# Patient Record
Sex: FEMALE | Race: WHITE | NOT HISPANIC OR LATINO | Employment: OTHER | URBAN - NONMETROPOLITAN AREA
[De-identification: names, ages, dates, MRNs, and addresses within clinical notes are randomized per-mention and may not be internally consistent; named-entity substitution may affect disease eponyms.]

---

## 2017-02-15 ENCOUNTER — OFFICE VISIT (OUTPATIENT)
Dept: FAMILY MEDICINE CLINIC | Facility: CLINIC | Age: 82
End: 2017-02-15

## 2017-02-15 VITALS
SYSTOLIC BLOOD PRESSURE: 150 MMHG | BODY MASS INDEX: 28.61 KG/M2 | HEART RATE: 85 BPM | DIASTOLIC BLOOD PRESSURE: 80 MMHG | HEIGHT: 66 IN | WEIGHT: 178 LBS | OXYGEN SATURATION: 98 %

## 2017-02-15 DIAGNOSIS — M25.562 PAIN IN BOTH KNEES, UNSPECIFIED CHRONICITY: ICD-10-CM

## 2017-02-15 DIAGNOSIS — I87.2 VENOUS INSUFFICIENCY OF BOTH LOWER EXTREMITIES: ICD-10-CM

## 2017-02-15 DIAGNOSIS — M25.561 PAIN IN BOTH KNEES, UNSPECIFIED CHRONICITY: ICD-10-CM

## 2017-02-15 DIAGNOSIS — E11.9 CONTROLLED TYPE 2 DIABETES MELLITUS WITHOUT COMPLICATION, WITHOUT LONG-TERM CURRENT USE OF INSULIN (HCC): Primary | ICD-10-CM

## 2017-02-15 DIAGNOSIS — I49.9 IRREGULAR HEART BEAT: ICD-10-CM

## 2017-02-15 PROBLEM — M16.12 PRIMARY OSTEOARTHRITIS OF LEFT HIP: Status: ACTIVE | Noted: 2017-02-15

## 2017-02-15 LAB — HBA1C MFR BLD: 6.1 %

## 2017-02-15 PROCEDURE — 99214 OFFICE O/P EST MOD 30 MIN: CPT | Performed by: FAMILY MEDICINE

## 2017-02-15 PROCEDURE — 93000 ELECTROCARDIOGRAM COMPLETE: CPT | Performed by: FAMILY MEDICINE

## 2017-02-15 PROCEDURE — 83036 HEMOGLOBIN GLYCOSYLATED A1C: CPT | Performed by: FAMILY MEDICINE

## 2017-02-15 NOTE — PROGRESS NOTES
Subjective   Bri Iniguez is a 81 y.o. female.     History of Present Illness   81-year-old female.  Chief complaint fatigue, some shortness of breath.  No specific chest pain.  Has also noted some swelling in her legs.  She recently fell.  She has a history of some arthritic pain in her knees and she thinks this is what happened.    Diabetes.  A1c 6 months ago was 6.6.  She looks as if she has lost some weight but it's been only about 3 pounds in the last 6 months.    Some peripheral edema noted.  She does have a history of significant varicose veins.  The following portions of the patient's history were reviewed and updated as appropriate: allergies, current medications, past family history, past medical history, past social history, past surgical history and problem list.    Review of Systems   Constitutional: Positive for fatigue and unexpected weight change. Negative for chills.   HENT: Negative for congestion.    Respiratory: Positive for shortness of breath and wheezing. Negative for cough.    Cardiovascular: Positive for palpitations and leg swelling. Negative for chest pain.   Gastrointestinal: Negative.        Objective   Physical Exam   Constitutional: She appears well-developed and well-nourished.   HENT:   Right Ear: External ear normal.   Left Ear: External ear normal.   Eyes: EOM are normal. Pupils are equal, round, and reactive to light.   Neck: Neck supple.   Cardiovascular: Normal rate and normal heart sounds.  An irregular rhythm present. Frequent extrasystoles are present.   Mild peripheral edema on both sides, perhaps greater on the right.   Pulmonary/Chest: Effort normal and breath sounds normal.   Vitals reviewed.    ECG 12 Lead  Date/Time: 2/15/2017 9:50 AM  Performed by: ERIK WHALEY  Authorized by: ERIK WHALEY   Comparison: not compared with previous ECG   Rhythm: sinus rhythm  Rate: normal  Conduction: LAFB  ST Segments: ST segments normal  T Waves: T waves normal  QRS axis:  left  Clinical impression: normal ECG  Comments: prob REGULO. AGREE W COMPUTER WHICH CALLS THIS A SINUS ARRHYMIA.            Assessment/Plan   Bri was seen today for med refill.    Diagnoses and all orders for this visit:    Controlled type 2 diabetes mellitus without complication, without long-term current use of insulin  -     POC Glycosylated Hemoglobin (Hb A1C)  -     metFORMIN (GLUCOPHAGE) 500 MG tablet; Take 1 tablet by mouth Daily.    Irregular heart beat  -     ECG 12 Lead    Pain in both knees, unspecified chronicity  -     XR Knee 1 or 2 View Bilateral    Venous insufficiency of both lower extremities      Diabetes.  A1c is now 6.1.  She has lost weight.  Debated whether to reduce or stop her metformin but I decided to keep her on 500 twice a day.  In 6 months we'll look at this again when we do her yearly exam.    Sinus arrhythmia.  I ordered an EKG because she was feeling tired and her pulse was irregular.  I was afraid this might be A. fib.  There is no ischemic changes.    Bilateral knee pain.  Suspect osteoarthritis.  She's come to the point now where she wants something done.  We'll x-ray both knees follow-up in a couple weeks.  We'll then decide if referral or other x-rays are needed.    Edema/venous insufficiency.  She did recently fall and I think this is part of the reason her legs are swollen.  There is nothing to suggest congestive heart failure.  Labs 6 months ago which I have reviewed were all fine regarding renal and liver function.  She is not a large consumer of salt.  No new specific therapy for this today.

## 2017-03-02 ENCOUNTER — OFFICE VISIT (OUTPATIENT)
Dept: FAMILY MEDICINE CLINIC | Facility: CLINIC | Age: 82
End: 2017-03-02

## 2017-03-02 VITALS
SYSTOLIC BLOOD PRESSURE: 120 MMHG | HEART RATE: 82 BPM | DIASTOLIC BLOOD PRESSURE: 60 MMHG | OXYGEN SATURATION: 98 % | HEIGHT: 66 IN | WEIGHT: 178 LBS | BODY MASS INDEX: 28.61 KG/M2

## 2017-03-02 DIAGNOSIS — M25.561 PAIN IN BOTH KNEES, UNSPECIFIED CHRONICITY: Primary | ICD-10-CM

## 2017-03-02 DIAGNOSIS — M25.562 PAIN IN BOTH KNEES, UNSPECIFIED CHRONICITY: Primary | ICD-10-CM

## 2017-03-02 DIAGNOSIS — M17.11 PRIMARY OSTEOARTHRITIS OF RIGHT KNEE: ICD-10-CM

## 2017-03-02 PROCEDURE — 99213 OFFICE O/P EST LOW 20 MIN: CPT | Performed by: FAMILY MEDICINE

## 2017-03-02 NOTE — PROGRESS NOTES
Subjective   Bri Iniguez is a 81 y.o. female.     History of Present Illness   81-year-old female.  Knee pain.  Right worse than the left.  She is here with her daughter.  Bri minimizes her symptoms but her daughter states that it is causing a lot of problems.  When she gets up to walk she is obviously limping.  X-rays were made in she's here to review these and progress our plan  The following portions of the patient's history were reviewed and updated as appropriate: allergies, current medications, past family history, past medical history, past social history, past surgical history and problem list.    Review of Systems   Constitutional: Positive for activity change. Negative for appetite change and fatigue.   Musculoskeletal: Positive for arthralgias, gait problem and joint swelling. Negative for myalgias.       Objective   Physical Exam   Constitutional: She is oriented to person, place, and time. She appears well-nourished.   HENT:   Head: Normocephalic.   Pulmonary/Chest: Effort normal.   Musculoskeletal: She exhibits edema.        Right knee: She exhibits swelling and effusion. She exhibits no deformity.        Legs:  Neurological: She is alert and oriented to person, place, and time.       Assessment/Plan   Bri was seen today for knee pain.    Diagnoses and all orders for this visit:    Pain in both knees, unspecified chronicity  -     MRI Knee Right Without Contrast    Primary osteoarthritis of right knee  -     MRI Knee Right Without Contrast      Knee pain/arthritis.  X-ray confirms degenerative arthritis in the knee.  Right greater than the left.  However, I think there may be much more.  Her right knee is giving out and she has an obvious limp.  I am concerned of meniscal damage.  MRI is ordered.  We spoke in detail about osteoarthritis.  Discussed NSAIDs, prolonged cough.  Will follow-up after the MRI to finalize our plan.

## 2017-03-13 ENCOUNTER — OFFICE VISIT (OUTPATIENT)
Dept: FAMILY MEDICINE CLINIC | Facility: CLINIC | Age: 82
End: 2017-03-13

## 2017-03-13 VITALS
WEIGHT: 176 LBS | SYSTOLIC BLOOD PRESSURE: 124 MMHG | OXYGEN SATURATION: 98 % | HEIGHT: 66 IN | DIASTOLIC BLOOD PRESSURE: 70 MMHG | BODY MASS INDEX: 28.28 KG/M2 | HEART RATE: 101 BPM

## 2017-03-13 DIAGNOSIS — M25.561 PAIN IN BOTH KNEES, UNSPECIFIED CHRONICITY: Primary | ICD-10-CM

## 2017-03-13 DIAGNOSIS — M25.562 PAIN IN BOTH KNEES, UNSPECIFIED CHRONICITY: Primary | ICD-10-CM

## 2017-03-13 DIAGNOSIS — M23.303 MENISCUS, MEDIAL, DERANGEMENT, RIGHT: ICD-10-CM

## 2017-03-13 PROCEDURE — 99213 OFFICE O/P EST LOW 20 MIN: CPT | Performed by: FAMILY MEDICINE

## 2017-03-13 NOTE — PROGRESS NOTES
Subjective   Bri Iniguez is a 81 y.o. female.     History of Present Illness   81-year-old female with knee pain.  Knee is been locking and giving out.  Has had an MRI and is here for follow-up.  The following portions of the patient's history were reviewed and updated as appropriate: allergies, current medications, past family history, past medical history, past social history, past surgical history and problem list.    Review of Systems   Musculoskeletal: Positive for arthralgias (nee pain.) and gait problem. Negative for back pain.       Objective   Physical Exam   Constitutional: She is oriented to person, place, and time. She appears well-nourished.   HENT:   Head: Normocephalic.   Eyes: EOM are normal.   Pulmonary/Chest: Effort normal.   Neurological: She is alert and oriented to person, place, and time.       Assessment/Plan   Bri was seen today for knee pain.    Diagnoses and all orders for this visit:    Pain in both knees, unspecified chronicity    Meniscus, medial, derangement, right  -     Ambulatory Referral to Orthopedic Surgery      MRI reveals significant meniscal damage on the medial aspect of the right knee.  Also degenerative changes in the area of the patella.  I want her to see an orthopedist.  We'll try to arrange this and will give them a call.

## 2017-08-29 DIAGNOSIS — I10 ESSENTIAL HYPERTENSION: ICD-10-CM

## 2017-08-30 RX ORDER — ATORVASTATIN CALCIUM 10 MG/1
10 TABLET, FILM COATED ORAL DAILY
Qty: 90 TABLET | Refills: 3 | Status: SHIPPED | OUTPATIENT
Start: 2017-08-30 | End: 2020-01-22 | Stop reason: ALTCHOICE

## 2017-08-30 RX ORDER — LISINOPRIL AND HYDROCHLOROTHIAZIDE 20; 12.5 MG/1; MG/1
1 TABLET ORAL DAILY
Qty: 90 TABLET | Refills: 3 | Status: SHIPPED | OUTPATIENT
Start: 2017-08-30 | End: 2020-01-22 | Stop reason: ALTCHOICE

## 2017-09-25 ENCOUNTER — OFFICE VISIT (OUTPATIENT)
Dept: ORTHOPEDIC SURGERY | Facility: CLINIC | Age: 82
End: 2017-09-25

## 2017-09-25 VITALS
HEIGHT: 66 IN | BODY MASS INDEX: 26.87 KG/M2 | SYSTOLIC BLOOD PRESSURE: 158 MMHG | HEART RATE: 71 BPM | WEIGHT: 167.2 LBS | DIASTOLIC BLOOD PRESSURE: 80 MMHG

## 2017-09-25 DIAGNOSIS — M17.0 PRIMARY OSTEOARTHRITIS OF BOTH KNEES: Primary | ICD-10-CM

## 2017-09-25 PROCEDURE — 99213 OFFICE O/P EST LOW 20 MIN: CPT | Performed by: ORTHOPAEDIC SURGERY

## 2017-09-25 PROCEDURE — 20610 DRAIN/INJ JOINT/BURSA W/O US: CPT | Performed by: ORTHOPAEDIC SURGERY

## 2017-09-25 NOTE — PROGRESS NOTES
Procedure   Large Joint Arthrocentesis  Date/Time: 9/25/2017 10:58 AM  Consent given by: patient  Site marked: site marked  Timeout: Immediately prior to procedure a time out was called to verify the correct patient, procedure, equipment, support staff and site/side marked as required   Supporting Documentation  Indications: pain   Procedure Details  Location: knee - R knee  Preparation: Patient was prepped and draped in the usual sterile fashion  Needle size: 22 G  Medications administered: 30 mg Hyaluronan 30 MG/2ML  Patient tolerance: patient tolerated the procedure well with no immediate complications

## 2017-09-25 NOTE — PROGRESS NOTES
Procedure   Large Joint Arthrocentesis  Date/Time: 9/25/2017 10:59 AM  Consent given by: patient  Site marked: site marked  Timeout: Immediately prior to procedure a time out was called to verify the correct patient, procedure, equipment, support staff and site/side marked as required   Supporting Documentation  Indications: pain   Procedure Details  Location: knee - L knee  Preparation: Patient was prepped and draped in the usual sterile fashion  Needle size: 22 G  Approach: anterolateral  Medications administered: 30 mg Hyaluronan 30 MG/2ML  Patient tolerance: patient tolerated the procedure well with no immediate complications

## 2017-09-25 NOTE — PROGRESS NOTES
[facility] [place] [facility] [place] [place] [facility] [place]    Subjective     Chief Complaint   Patient presents with   • Left Knee - Follow-up     7 weeks     • Right Knee - Follow-up     7 weeks          HPI    Bri Iniguez is a 81 y.o. female. She presents today for bilateral knee pain.  Pain is continuous, and persistent in both of her knees.  The pain is severe, and sharp in quality.  It is associated with swelling and stiffness.  It worsens with walking.  She would like to try a Visco supplementation injections at this point.  She has tried previous steroid injections with some relief.  She is not interested in surgical management.      Patient Active Problem List   Diagnosis   • Macular degeneration   • Hyperlipidemia   • Essential hypertension   • Deafness   • Diabetes type 2, controlled   • Primary osteoarthritis of left hip   • Venous insufficiency of both lower extremities     Past Medical History:   Diagnosis Date   • Bursitis     trochanteric area   • Colonoscopy refused 2015   • Deafness     unspecified   • Diabetes mellitus    • Essential hypertension    • Hip pain    • Hyperlipidemia     other   • Influenza vaccine administered 2014   • Macular degeneration    • Mammogram declined 2015   • Osteoarthritis of left hip    • Primary osteoarthritis of right knee    • Supraventricular premature beats       Past Surgical History:   Procedure Laterality Date   • CATARACT EXTRACTION     • CHOLECYSTECTOMY  2002   • HERNIA REPAIR  2005   • HYSTERECTOMY  1974   • TONSILLECTOMY        Family History   Problem Relation Age of Onset   • Hypertension Other    • Diabetes Other      type 2   • Stroke Mother    • Diabetes Mother    • Hypertension Mother    • Cancer Father      Social History     Social History   • Marital status:      Spouse name: N/A   • Number of children: N/A   • Years of education: N/A     Occupational History   • Not on file.     Social History Main Topics   • Smoking status: Never Smoker   • Smokeless  tobacco: Never Used   • Alcohol use No   • Drug use: Defer   • Sexual activity: Defer     Other Topics Concern   • Not on file     Social History Narrative      Current Outpatient Prescriptions on File Prior to Visit   Medication Sig Dispense Refill   • aspirin 81 MG chewable tablet Chew 81 mg every morning.     • atorvastatin (LIPITOR) 10 MG tablet Take 1 tablet by mouth Daily. 90 tablet 3   • lisinopril-hydrochlorothiazide (PRINZIDE,ZESTORETIC) 20-12.5 MG per tablet Take 1 tablet by mouth Daily. 90 tablet 3   • metFORMIN (GLUCOPHAGE) 500 MG tablet Take 1 tablet by mouth Daily. 90 tablet 3   • Multiple Vitamins-Minerals (OCUVITE ADULT 50+) capsule Take  by mouth.       No current facility-administered medications on file prior to visit.       No Known Allergies     Review of Systems   Constitutional: Negative for activity change, appetite change, chills, diaphoresis, fatigue, fever and unexpected weight change.   HENT: Negative for congestion, dental problem, drooling, ear discharge, ear pain, facial swelling, hearing loss, mouth sores, nosebleeds, postnasal drip, rhinorrhea, sinus pressure, sneezing, sore throat, tinnitus, trouble swallowing and voice change.    Eyes: Negative for photophobia, pain, discharge, redness, itching and visual disturbance.   Respiratory: Negative for apnea, cough, choking, chest tightness, shortness of breath, wheezing and stridor.    Cardiovascular: Negative for chest pain, palpitations and leg swelling.   Gastrointestinal: Negative for abdominal distention, abdominal pain, anal bleeding, blood in stool, constipation, diarrhea, nausea, rectal pain and vomiting.   Endocrine: Negative for cold intolerance, heat intolerance, polydipsia, polyphagia and polyuria.   Genitourinary: Negative for decreased urine volume, difficulty urinating, dysuria, enuresis, flank pain, frequency, genital sores, hematuria and urgency.   Musculoskeletal: Positive for arthralgias, gait problem and joint  "swelling. Negative for back pain, myalgias, neck pain and neck stiffness.   Skin: Negative for color change, pallor, rash and wound.   Allergic/Immunologic: Negative for environmental allergies, food allergies and immunocompromised state.   Neurological: Negative for dizziness, tremors, seizures, syncope, facial asymmetry, speech difficulty, weakness, light-headedness, numbness and headaches.   Hematological: Negative for adenopathy. Does not bruise/bleed easily.   Psychiatric/Behavioral: Negative for agitation, behavioral problems, confusion, decreased concentration, dysphoric mood, hallucinations, self-injury, sleep disturbance and suicidal ideas. The patient is not nervous/anxious and is not hyperactive.         Objective      Physical Exam  /80  Pulse 71  Ht 66\" (167.6 cm)  Wt 167 lb 3.2 oz (75.8 kg)  BMI 26.99 kg/m2    Body mass index is 26.99 kg/(m^2).    General:   Mental Status:  Alert   Appearance: Cooperative, in no acute distress   Build and Nutrition: Well-nourished and well developed female   Orientation: Alert and oriented to person, place and time   Posture: Normal   Gait: Uses a cane    Integument:   Right knee: no skin lesions, no rash, no ecchymosis   Left knee: no skin lesions, no rash, no ecchymosis    Lower Extremities:   Right Knee:    Tenderness:  Medial and lateral joint line tenderness    Effusion:  1+    Swelling:  None    Crepitus:  Positive    Atrophy:  None    Range of motion:  Extension: 0°       Flexion: 120°  Instability:  No varus laxity, no valgus laxity, negative anterior drawer  Deformities:  None   Left Knee:    Tenderness:  Mild medial joint line tenderness    Effusion:  None    Swelling:  None    Crepitus: Positive    Atrophy: None    Range of motion:  Extension: 0°       Flexion: 120°  Instability:  No varus laxity, no valgus laxity, negative anterior drawer  Deformities:  None      Imaging/Studies  Previous radiographs were reviewed, which showed arthritic changes " in both knees, consistent with tricompartmental arthritis.      Assessment and Plan     Bri was seen today for follow-up and follow-up.    Diagnoses and all orders for this visit:    Primary osteoarthritis of both knees  -     Large Joint Arthrocentesis  -     Large Joint Arthrocentesis        I reviewed my findings with patient today.  Her knees continue to bother her, and she would like to try the Visco supplementation injections.  She is not interested in surgical management.  Please see my procedure note for details.    Return in about 1 week (around 10/2/2017) for Injection.      Medical Decision Making  Management Options : prescription/IM medicine  Data/Risk: independent visualization of imaging, lab tests, or EMG/NCV      Gideon Son MD  09/25/17  11:05 AM

## 2017-10-04 ENCOUNTER — CLINICAL SUPPORT (OUTPATIENT)
Dept: ORTHOPEDIC SURGERY | Facility: CLINIC | Age: 82
End: 2017-10-04

## 2017-10-04 VITALS
DIASTOLIC BLOOD PRESSURE: 89 MMHG | BODY MASS INDEX: 26.81 KG/M2 | SYSTOLIC BLOOD PRESSURE: 170 MMHG | HEART RATE: 71 BPM | WEIGHT: 166.8 LBS | HEIGHT: 66 IN

## 2017-10-04 DIAGNOSIS — M17.0 PRIMARY OSTEOARTHRITIS OF BOTH KNEES: Primary | ICD-10-CM

## 2017-10-04 PROCEDURE — 20610 DRAIN/INJ JOINT/BURSA W/O US: CPT | Performed by: ORTHOPAEDIC SURGERY

## 2017-10-04 NOTE — PROGRESS NOTES
[facility] – [place], [place] Orth[facility] Surgery Clinic Note    Subjective     Chief Complaint   Patient presents with   • Left Knee - Follow-up     1 week   2nd injection     • Right Knee - Follow-up     1 week  2nd injection            EVITA Iniguez is a 81 y.o. female who presents for the 2nd injections into both knee joints.  No significant relief up to this point.      Patient Active Problem List   Diagnosis   • Macular degeneration   • Hyperlipidemia   • Essential hypertension   • Deafness   • Diabetes type 2, controlled   • Primary osteoarthritis of left hip   • Venous insufficiency of both lower extremities     Past Medical History:   Diagnosis Date   • Bursitis     trochanteric area   • Colonoscopy refused 2015   • Deafness     unspecified   • Diabetes mellitus    • Essential hypertension    • Hip pain    • Hyperlipidemia     other   • Influenza vaccine administered 2014   • Macular degeneration    • Mammogram declined 2015   • Osteoarthritis of left hip    • Primary osteoarthritis of right knee    • Supraventricular premature beats       Past Surgical History:   Procedure Laterality Date   • CATARACT EXTRACTION     • CHOLECYSTECTOMY  2002   • HERNIA REPAIR  2005   • HYSTERECTOMY  1974   • TONSILLECTOMY        Family History   Problem Relation Age of Onset   • Hypertension Other    • Diabetes Other      type 2   • Stroke Mother    • Diabetes Mother    • Hypertension Mother    • Cancer Father      Social History     Social History   • Marital status:      Spouse name: N/A   • Number of children: N/A   • Years of education: N/A     Occupational History   • Not on file.     Social History Main Topics   • Smoking status: Never Smoker   • Smokeless tobacco: Never Used   • Alcohol use No   • Drug use: Defer   • Sexual activity: Defer     Other Topics Concern   • Not on file     Social History Narrative      Current Outpatient Prescriptions on File Prior to Visit   Medication Sig Dispense Refill   • aspirin 81 MG  "chewable tablet Chew 81 mg every morning.     • atorvastatin (LIPITOR) 10 MG tablet Take 1 tablet by mouth Daily. 90 tablet 3   • lisinopril-hydrochlorothiazide (PRINZIDE,ZESTORETIC) 20-12.5 MG per tablet Take 1 tablet by mouth Daily. 90 tablet 3   • metFORMIN (GLUCOPHAGE) 500 MG tablet Take 1 tablet by mouth Daily. 90 tablet 3   • Multiple Vitamins-Minerals (OCUVITE ADULT 50+) capsule Take  by mouth.       No current facility-administered medications on file prior to visit.       No Known Allergies     Review of Systems     Objective      Physical Exam  /89  Pulse 71  Ht 66\" (167.6 cm)  Wt 166 lb 12.8 oz (75.7 kg)  BMI 26.92 kg/m2    Body mass index is 26.92 kg/(m^2).    General:   Mental Status:  Alert   Appearance: Cooperative, in no acute distress   Posture: Normal    Assessment and Plan     Bri was seen today for follow-up and follow-up.    Diagnoses and all orders for this visit:    Primary osteoarthritis of both knees  -     Large Joint Arthrocentesis  -     Large Joint Arthrocentesis  -     Hyaluronan (ORTHOVISC) injection 30 mg; Inject 30 mg into the joint Once.  -     Hyaluronan (ORTHOVISC) injection 30 mg; Inject 30 mg into the joint Once.      Administration of injection(s) today.  Please see my procedure note for details.    Return in about 1 week (around 10/11/2017) for Injection.    Gideon Son MD  10/04/17  6:02 PM  "

## 2017-10-04 NOTE — PROGRESS NOTES
Procedure   Large Joint Arthrocentesis  Date/Time: 10/4/2017 1:34 PM  Consent given by: patient  Site marked: site marked  Timeout: Immediately prior to procedure a time out was called to verify the correct patient, procedure, equipment, support staff and site/side marked as required   Supporting Documentation  Indications: pain   Procedure Details  Location: knee - R knee  Preparation: Patient was prepped and draped in the usual sterile fashion  Needle size: 22 G  Approach: anterolateral  Medications administered: 30 mg Hyaluronan 30 MG/2ML  Patient tolerance: patient tolerated the procedure well with no immediate complications    Large Joint Arthrocentesis  Date/Time: 10/4/2017 1:34 PM  Consent given by: patient  Site marked: site marked  Timeout: Immediately prior to procedure a time out was called to verify the correct patient, procedure, equipment, support staff and site/side marked as required   Supporting Documentation  Indications: pain   Procedure Details  Location: knee - L knee  Preparation: Patient was prepped and draped in the usual sterile fashion  Needle size: 22 G  Approach: anterolateral  Medications administered: 30 mg Hyaluronan 30 MG/2ML  Patient tolerance: patient tolerated the procedure well with no immediate complications

## 2017-10-11 ENCOUNTER — CLINICAL SUPPORT (OUTPATIENT)
Dept: ORTHOPEDIC SURGERY | Facility: CLINIC | Age: 82
End: 2017-10-11

## 2017-10-11 DIAGNOSIS — M17.0 PRIMARY OSTEOARTHRITIS OF BOTH KNEES: Primary | ICD-10-CM

## 2017-10-11 PROCEDURE — 20610 DRAIN/INJ JOINT/BURSA W/O US: CPT | Performed by: PHYSICIAN ASSISTANT

## 2017-10-11 NOTE — PROGRESS NOTES
Subjective     Injections (bilateral knees #3 orthovisc)      Bri Iniguez is a 81 y.o. female.     History of Present Illness   Patient presents for third injection of Orthovisc bilateral knees.  She is tolerated previous injections well.  No Known Allergies  Current Outpatient Prescriptions on File Prior to Visit   Medication Sig Dispense Refill   • aspirin 81 MG chewable tablet Chew 81 mg every morning.     • atorvastatin (LIPITOR) 10 MG tablet Take 1 tablet by mouth Daily. 90 tablet 3   • lisinopril-hydrochlorothiazide (PRINZIDE,ZESTORETIC) 20-12.5 MG per tablet Take 1 tablet by mouth Daily. 90 tablet 3   • metFORMIN (GLUCOPHAGE) 500 MG tablet Take 1 tablet by mouth Daily. 90 tablet 3   • Multiple Vitamins-Minerals (OCUVITE ADULT 50+) capsule Take  by mouth.       No current facility-administered medications on file prior to visit.      Social History     Social History   • Marital status:      Spouse name: N/A   • Number of children: N/A   • Years of education: N/A     Occupational History   • Not on file.     Social History Main Topics   • Smoking status: Never Smoker   • Smokeless tobacco: Never Used   • Alcohol use No   • Drug use: Defer   • Sexual activity: Defer     Other Topics Concern   • Not on file     Social History Narrative     Past Surgical History:   Procedure Laterality Date   • CATARACT EXTRACTION     • CHOLECYSTECTOMY  2002   • HERNIA REPAIR  2005   • HYSTERECTOMY  1974   • TONSILLECTOMY       Family History   Problem Relation Age of Onset   • Hypertension Other    • Diabetes Other      type 2   • Stroke Mother    • Diabetes Mother    • Hypertension Mother    • Cancer Father      Past Medical History:   Diagnosis Date   • Bursitis     trochanteric area   • Colonoscopy refused 2015   • Deafness     unspecified   • Diabetes mellitus    • Essential hypertension    • Hip pain    • Hyperlipidemia     other   • Influenza vaccine administered 2014   • Macular degeneration    • Mammogram  declined 2015   • Osteoarthritis of left hip    • Primary osteoarthritis of right knee    • Supraventricular premature beats          Review of Systems    The following portions of the patient's history were reviewed and updated as appropriate: allergies, current medications, past family history, past medical history, past social history, past surgical history and problem list.    Ortho Exam      Medical Decision Making    Assessment and Plan/ Diagnosis/Treatment options:   Bilateral knee arthritis undergoing an Orthovisc series.  Patient is tolerated previous injections well.  Plan is to finish the series today in bilateral knees.  She will return in 6-8 weeks to see how she is progressed and consider repeat steroid injection if needed.    Using sterile technique, the left knee was sterilely prepped with Hibiclens.  Following time out, using a 22 gauge needle the left knee was aspirated and then injected with 2 ml Orthovisc. Approximately 0.5 mm of straw-colored fluid was obtained.  Patient tolerated the procedure well.  No complications.      Using sterile technique, the right knee was sterilely prepped with Hibiclens.  Following time out using a 22 gauge needle, the right knee was aspirated and then injected with 2 ml Orthovisc.  Approximately 0.5 cc of straw-colored fluid was obtained.  Patient tolerated the procedure well. No complications

## 2017-10-11 NOTE — PROGRESS NOTES
Procedure   Large Joint Arthrocentesis  Date/Time: 10/11/2017 1:22 PM  Consent given by: patient  Site marked: site marked  Timeout: Immediately prior to procedure a time out was called to verify the correct patient, procedure, equipment, support staff and site/side marked as required   Supporting Documentation  Indications: pain   Procedure Details  Location: knee - R knee  Preparation: Patient was prepped and draped in the usual sterile fashion  Needle size: 22 G  Approach: anterolateral  Medications administered: 30 mg Hyaluronan 30 MG/2ML  Patient tolerance: patient tolerated the procedure well with no immediate complications    Large Joint Arthrocentesis  Date/Time: 10/11/2017 1:23 PM  Consent given by: patient  Site marked: site marked  Timeout: Immediately prior to procedure a time out was called to verify the correct patient, procedure, equipment, support staff and site/side marked as required   Supporting Documentation  Indications: pain   Procedure Details  Location: knee - L knee  Preparation: Patient was prepped and draped in the usual sterile fashion  Needle size: 22 G  Approach: anterolateral  Medications administered: 30 mg Hyaluronan 30 MG/2ML  Patient tolerance: patient tolerated the procedure well with no immediate complications

## 2017-11-21 ENCOUNTER — OFFICE VISIT (OUTPATIENT)
Dept: ORTHOPEDIC SURGERY | Facility: CLINIC | Age: 82
End: 2017-11-21

## 2017-11-21 DIAGNOSIS — M16.12 ARTHRITIS OF LEFT HIP: Primary | ICD-10-CM

## 2017-11-21 PROCEDURE — 99212 OFFICE O/P EST SF 10 MIN: CPT | Performed by: PHYSICIAN ASSISTANT

## 2017-11-21 NOTE — PROGRESS NOTES
[facility] [place] [facility] Note    Subjective     Follow-up of the Left Hip (4 months- had Fluoro guided injection 07/06/2017)      EVITA Iniguez is a 82 y.o. female here to discuss her returned left hip pain.  She reports this is the same pain she had prior to her fluoroscopy guided intra-articular injection in July 2017.  She has some radiating leg pain associated with the buttock and lateral hip pain.  She has occasional groin pain as well.  She is having pain with walking, pain lying on her back and night pain.  She reports the pain has gradually returned over the past month she has some level of pain constantly.  She is using a cane secondary to the hip pain.  She is having difficulty doing her normal daily activities secondary to the pain.  She rates the pain as severe.  She is ready to schedule hip replacement with Dr. Son.  She denies any fever, chills or constitutional symptoms.     Past Medical History:   Diagnosis Date   • Bursitis     trochanteric area   • Colonoscopy refused 2015   • Deafness     unspecified   • Diabetes mellitus    • Essential hypertension    • Hip pain    • Hyperlipidemia     other   • Influenza vaccine administered 2014   • Macular degeneration    • Mammogram declined 2015   • Osteoarthritis of left hip    • Primary osteoarthritis of right knee    • Supraventricular premature beats       Past Surgical History:   Procedure Laterality Date   • CATARACT EXTRACTION     • CHOLECYSTECTOMY  2002   • HERNIA REPAIR  2005   • HYSTERECTOMY  1974   • TONSILLECTOMY        Family History   Problem Relation Age of Onset   • Hypertension Other    • Diabetes Other      type 2   • Stroke Mother    • Diabetes Mother    • Hypertension Mother    • Cancer Father      Social History     Social History   • Marital status:      Spouse name: N/A   • Number of children: N/A   • Years of education: N/A     Occupational History   • Not on file.     Social History Main Topics   •  Smoking status: Never Smoker   • Smokeless tobacco: Never Used   • Alcohol use No   • Drug use: Defer   • Sexual activity: Defer     Other Topics Concern   • Not on file     Social History Narrative      Current Outpatient Prescriptions on File Prior to Visit   Medication Sig Dispense Refill   • aspirin 81 MG chewable tablet Chew 81 mg every morning.     • atorvastatin (LIPITOR) 10 MG tablet Take 1 tablet by mouth Daily. 90 tablet 3   • lisinopril-hydrochlorothiazide (PRINZIDE,ZESTORETIC) 20-12.5 MG per tablet Take 1 tablet by mouth Daily. 90 tablet 3   • metFORMIN (GLUCOPHAGE) 500 MG tablet Take 1 tablet by mouth Daily. 90 tablet 3   • Multiple Vitamins-Minerals (OCUVITE ADULT 50+) capsule Take  by mouth.       No current facility-administered medications on file prior to visit.       No Known Allergies     The following portions of the patient's history were reviewed and updated as appropriate: allergies, current medications, past family history, past medical history, past social history, past surgical history and problem list.    Review of Systems     Objective      Physical Exam  There were no vitals taken for this visit.    There is no height or weight on file to calculate BMI.    General  Mental Status - alert  General Appearance - cooperative, well groomed, not in acute distress  Orientation - Oriented X3  Build & Nutrition - well developed and well nourished  Posture - normal posture  Gait - using cane     Integumentary  Global Assessment  Examination of related systems reveals - no lymphadenopathy  General Characteristics  Overall examination of the patient's skin reveals - no rashes, no evidence of scars, no suspicious lesions and no bruises.  Color - normal coloration of skin.    Ortho Exam  Left hip exam:    RANGE OF MOTION:   FLEXION CONTRACTURE:  10  FLEXION: 90 degrees   INTERNAL ROTATION: 20 degrees at 90 degrees of flexion   EXTERNAL ROTATION: 40 degrees at 90 degrees of flexion    PAIN WITH HIP  MOTION: yes  PAIN WITH LOGROLL: yes  STINCHFIELD TEST: positive    STRENGTH:  4/5 hip adduction, abduction, flexion. 5/5 strength knee flexion, extension. 5/5 strength ankle dorsiflexion and plantarflexion.     GREATER TROCHANTER BURSAL PAIN:  No    SENSATION TO LIGHT TOUCH:  DEEP PERONEAL/SUPERFICIAL PERONEAL/SURAL/SAPHENOUS/TIBIAL:  intact    STRAIGHT LEG TEST:   negative      Right  hip    RANGE OF MOTION:   FLEXION CONTRACTURE: None   FLEXION: 110 degrees   INTERNAL ROTATION: 20 degrees at 90 degrees of flexion   EXTERNAL ROTATION: 40 degrees at 90 degrees of flexion    PAIN WITH HIP MOTION: no  PAIN WITH LOGROLL: no  STINCHFIELD TEST: negative    STRENGTH:  5/5 hip adduction, abduction, flexion. 5/5 strength knee flexion, extension. 5/5 strength ankle dorsiflexion and plantarflexion.     GREATER TROCHANTER BURSAL PAIN:  No    SENSATION TO LIGHT TOUCH:  DEEP PERONEAL/SUPERFICIAL PERONEAL/SURAL/SAPHENOUS/TIBIAL:  intact    STRAIGHT LEG TEST:   negative      Imaging/Studies  none    Assessment:  1. Arthritis of left hip        Plan:  Left hip arthritis.  I reviewed previous x-rays, clinical findings past and current treatment with the patient.  On exam, she has extreme pain with hip rotation, positive Stinchfield's and decreased range of motion of the hip.  She has been treated in the past with intra-articular steroid injection as well as cane use and ibuprofen.  She has had returned pain over the past month or so which is now keeping her awake at night and affecting her ability to do normal daily activity.  We discussed further treatment options including repeat intra-articular injection or hip replacement.  She would like to proceed with hip replacement however have her return tomorrow to see Dr. Son and get it scheduled.  Ayala Caceres PA-C  11/22/17  2:23 PM

## 2017-11-22 ENCOUNTER — OFFICE VISIT (OUTPATIENT)
Dept: ORTHOPEDIC SURGERY | Facility: CLINIC | Age: 82
End: 2017-11-22

## 2017-11-22 DIAGNOSIS — M16.12 PRIMARY OSTEOARTHRITIS OF LEFT HIP: Primary | ICD-10-CM

## 2017-11-22 DIAGNOSIS — M16.12 ARTHRITIS OF LEFT HIP: ICD-10-CM

## 2017-11-22 PROCEDURE — 99214 OFFICE O/P EST MOD 30 MIN: CPT | Performed by: PHYSICIAN ASSISTANT

## 2017-11-22 RX ORDER — CHLORHEXIDINE GLUCONATE 4 G/100ML
SOLUTION TOPICAL DAILY
Qty: 236 ML | Refills: 0 | Status: SHIPPED | OUTPATIENT
Start: 2017-11-22 | End: 2018-01-24 | Stop reason: HOSPADM

## 2017-11-22 RX ORDER — MELOXICAM 7.5 MG/1
15 TABLET ORAL ONCE
Status: CANCELLED | OUTPATIENT
Start: 2017-11-22 | End: 2017-11-22

## 2017-11-22 RX ORDER — ACETAMINOPHEN 325 MG/1
1000 TABLET ORAL ONCE
Status: CANCELLED | OUTPATIENT
Start: 2017-11-22 | End: 2017-11-22

## 2017-11-22 RX ORDER — PREGABALIN 150 MG/1
150 CAPSULE ORAL ONCE
Status: CANCELLED | OUTPATIENT
Start: 2017-11-22 | End: 2017-11-22

## 2017-11-22 NOTE — PROGRESS NOTES
[facility] [place] [facility] Note    Subjective     Follow-up of the Left Hip and Follow-up (left hip arthritis)      HPI    Bri Iniguez is a 82 y.o. female returning today to discuss scheduling right hip replacement with Dr. Son.  She reports mild improved pain today from yesterday.  Again, she has returned left hip and leg pain for the past month.  She describes the pain as lateral with some groin pain and radiating leg pain.  No radiating pain past the knee.  She describes sharp pain as well as aching pain at rest sometimes.  It is keeping her awake at night and making it difficult for her to do her normal daily activities.  She rates the pain as severe.  She is using a cane secondary to pain.  She has taken ibuprofen.  She has had previous intra-articular joint injection in 2017 which gave her relief for 2-3 months.  She reports her current pain is the exact same pain she had prior to intra-articular hip injection.  She is a diabetic with most recent A1c 6.0.  She takes a baby aspirin daily.  She denies any fever chills or constitutional symptoms    Past Medical History:   Diagnosis Date   • Bursitis     trochanteric area   • Colonoscopy refused 2015   • Deafness     unspecified   • Diabetes mellitus    • Essential hypertension    • Hip pain    • Hyperlipidemia     other   • Influenza vaccine administered 2014   • Macular degeneration    • Mammogram declined 2015   • Osteoarthritis of left hip    • Primary osteoarthritis of right knee    • Supraventricular premature beats       Past Surgical History:   Procedure Laterality Date   • CATARACT EXTRACTION     • CHOLECYSTECTOMY  2002   • HERNIA REPAIR  2005   • HYSTERECTOMY  1974   • TONSILLECTOMY        Family History   Problem Relation Age of Onset   • Hypertension Other    • Diabetes Other      type 2   • Stroke Mother    • Diabetes Mother    • Hypertension Mother    • Cancer Father      Social History     Social History   • Marital status:       Spouse name: N/A   • Number of children: N/A   • Years of education: N/A     Occupational History   • Not on file.     Social History Main Topics   • Smoking status: Never Smoker   • Smokeless tobacco: Never Used   • Alcohol use No   • Drug use: Defer   • Sexual activity: Defer     Other Topics Concern   • Not on file     Social History Narrative      Current Outpatient Prescriptions on File Prior to Visit   Medication Sig Dispense Refill   • aspirin 81 MG chewable tablet Chew 81 mg every morning.     • atorvastatin (LIPITOR) 10 MG tablet Take 1 tablet by mouth Daily. 90 tablet 3   • lisinopril-hydrochlorothiazide (PRINZIDE,ZESTORETIC) 20-12.5 MG per tablet Take 1 tablet by mouth Daily. 90 tablet 3   • metFORMIN (GLUCOPHAGE) 500 MG tablet Take 1 tablet by mouth Daily. 90 tablet 3   • Multiple Vitamins-Minerals (OCUVITE ADULT 50+) capsule Take  by mouth.       No current facility-administered medications on file prior to visit.       No Known Allergies     The following portions of the patient's history were reviewed and updated as appropriate: allergies, current medications, past family history, past medical history, past social history, past surgical history and problem list.    Review of Systems     Objective      Physical Exam  There were no vitals taken for this visit.    There is no height or weight on file to calculate BMI.    General  Mental Status - alert  General Appearance - cooperative, well groomed, not in acute distress  Orientation - Oriented X3  Build & Nutrition - well developed and well nourished  Posture - normal posture  Gait - normal gait     Integumentary  Global Assessment  Examination of related systems reveals - no lymphadenopathy  General Characteristics  Overall examination of the patient's skin reveals - no rashes, no evidence of scars, no suspicious lesions and no bruises.  Color - normal coloration of skin.    Ortho Exam  Left hip exam:    RANGE OF MOTION:   FLEXION CONTRACTURE:  10  FLEXION: 90 degrees   INTERNAL ROTATION: 20 degrees at 90 degrees of flexion   EXTERNAL ROTATION: 40 degrees at 90 degrees of flexion    PAIN WITH HIP MOTION: yes  PAIN WITH LOGROLL: yes  STINCHFIELD TEST: positive    STRENGTH:  4/5 hip adduction, abduction, flexion. 5/5 strength knee flexion, extension. 5/5 strength ankle dorsiflexion and plantarflexion.     GREATER TROCHANTER BURSAL PAIN:  No    SENSATION TO LIGHT TOUCH:  DEEP PERONEAL/SUPERFICIAL PERONEAL/SURAL/SAPHENOUS/TIBIAL:  intact    STRAIGHT LEG TEST:   negative      Assessment:  1. Arthritis of left hip        Plan:  1. End-stage left hip arthritis.  Patient has x-rays from June 2017 showing end-stage left hip arthritis.  On exam she has stiffness of the hip with pain with hip rotation, decreased range of motion and positive Stinchfield's.  Patient has been treated in the past with cane use, anti-inflammatories, intra-articular joint injection, activity modification with persisting pain..  She is now having daily pain during the day and at night.  She reports the pain to be severe.  She is ready to proceed with left hip replacement.  2.   Surgical Counseling     I have informed the patient of the diagnosis and the prognosis.  Exhaustive conservative treatment modalities have not resulted in long term pain relief.  The symptoms have progressed to the point of daily pain and inability to perform activities of daily living without significant pain.  The patient has reached the point of desiring to proceed with total hip arthroplasty after discussing the risks, benefits and alternatives to the procedure.  The surgical procedure itself was discussed in detail.  Risks of the procedure were discussed, which included but are not limited to, bleeding, infection, damage to blood vessels and nerves, incomplete pain relief, loosening of the prosthesis, deep infection, need for further surgery, leg length discrepancy, hip dislocation, loss of limb, deep venous  thrombosis, pulmonary embolus, death, heart attack, stroke, kidney failure, liver failure, and anesthetic complications.  In addition, the potential for deep infection developing in the future was discussed, which could require further surgery.  The hip would have to be re-opened, debrided, and potentially remove the prosthesis, which may or may not be replaced in the future.  Also, the possibility for loosening of the prosthesis has been mentioned.  If the prosthesis loosened, a revision arthroplasty could be performed, with results that are not as predictable compared to the original procedure.  The typical rehabilitative course has also been discussed, and full recovery may take up to a year to see the maximum benefit.  The importance of patient cooperation in the rehabilitative efforts has also been discussed.  No guarantees whatsoever were given.  The patient understands the potential risks versus the benefits and desires to proceed with total hip arthroplasty at a mutually convenient time.    Ayala Caceres PA-C  11/22/17  3:21 PM

## 2018-01-09 ENCOUNTER — APPOINTMENT (OUTPATIENT)
Dept: PREADMISSION TESTING | Facility: HOSPITAL | Age: 83
End: 2018-01-09

## 2018-01-09 VITALS — HEIGHT: 66 IN | WEIGHT: 166.67 LBS | BODY MASS INDEX: 26.79 KG/M2

## 2018-01-09 DIAGNOSIS — Z01.89 LABORATORY TEST: Primary | ICD-10-CM

## 2018-01-09 DIAGNOSIS — M16.12 PRIMARY OSTEOARTHRITIS OF LEFT HIP: ICD-10-CM

## 2018-01-09 LAB
ABO GROUP BLD: NORMAL
ALBUMIN SERPL-MCNC: 4.4 G/DL (ref 3.2–4.8)
ALBUMIN/GLOB SERPL: 1.4 G/DL (ref 1.5–2.5)
ALP SERPL-CCNC: 69 U/L (ref 25–100)
ALT SERPL W P-5'-P-CCNC: 23 U/L (ref 7–40)
ANION GAP SERPL CALCULATED.3IONS-SCNC: 11 MMOL/L (ref 3–11)
APTT PPP: 24.1 SECONDS (ref 24–31)
AST SERPL-CCNC: 34 U/L (ref 0–33)
BASOPHILS # BLD AUTO: 0.01 10*3/MM3 (ref 0–0.2)
BASOPHILS NFR BLD AUTO: 0.1 % (ref 0–1)
BILIRUB SERPL-MCNC: 0.6 MG/DL (ref 0.3–1.2)
BILIRUB UR QL STRIP: NEGATIVE
BUN BLD-MCNC: 13 MG/DL (ref 9–23)
BUN/CREAT SERPL: 21.7 (ref 7–25)
CALCIUM SPEC-SCNC: 9.6 MG/DL (ref 8.7–10.4)
CHLORIDE SERPL-SCNC: 95 MMOL/L (ref 99–109)
CLARITY UR: CLEAR
CO2 SERPL-SCNC: 27 MMOL/L (ref 20–31)
COLOR UR: YELLOW
CREAT BLD-MCNC: 0.6 MG/DL (ref 0.6–1.3)
CRP SERPL-MCNC: 0.02 MG/DL (ref 0–1)
DEPRECATED RDW RBC AUTO: 42.6 FL (ref 37–54)
EOSINOPHIL # BLD AUTO: 0.07 10*3/MM3 (ref 0–0.3)
EOSINOPHIL NFR BLD AUTO: 0.9 % (ref 0–3)
ERYTHROCYTE [DISTWIDTH] IN BLOOD BY AUTOMATED COUNT: 12.9 % (ref 11.3–14.5)
ERYTHROCYTE [SEDIMENTATION RATE] IN BLOOD: 37 MM/HR (ref 0–30)
GFR SERPL CREATININE-BSD FRML MDRD: 96 ML/MIN/1.73
GLOBULIN UR ELPH-MCNC: 3.1 GM/DL
GLUCOSE BLD-MCNC: 90 MG/DL (ref 70–100)
GLUCOSE UR STRIP-MCNC: NEGATIVE MG/DL
HBA1C MFR BLD: 6.6 % (ref 4.8–5.6)
HCT VFR BLD AUTO: 39.9 % (ref 34.5–44)
HGB BLD-MCNC: 13.4 G/DL (ref 11.5–15.5)
HGB UR QL STRIP.AUTO: NEGATIVE
IMM GRANULOCYTES # BLD: 0.02 10*3/MM3 (ref 0–0.03)
IMM GRANULOCYTES NFR BLD: 0.3 % (ref 0–0.6)
INR PPP: 0.95
KETONES UR QL STRIP: NEGATIVE
LEUKOCYTE ESTERASE UR QL STRIP.AUTO: NEGATIVE
LYMPHOCYTES # BLD AUTO: 2.82 10*3/MM3 (ref 0.6–4.8)
LYMPHOCYTES NFR BLD AUTO: 35.7 % (ref 24–44)
MCH RBC QN AUTO: 30.4 PG (ref 27–31)
MCHC RBC AUTO-ENTMCNC: 33.6 G/DL (ref 32–36)
MCV RBC AUTO: 90.5 FL (ref 80–99)
MONOCYTES # BLD AUTO: 0.63 10*3/MM3 (ref 0–1)
MONOCYTES NFR BLD AUTO: 8 % (ref 0–12)
NEUTROPHILS # BLD AUTO: 4.35 10*3/MM3 (ref 1.5–8.3)
NEUTROPHILS NFR BLD AUTO: 55 % (ref 41–71)
NITRITE UR QL STRIP: NEGATIVE
PH UR STRIP.AUTO: 5.5 [PH] (ref 5–8)
PLATELET # BLD AUTO: 265 10*3/MM3 (ref 150–450)
PMV BLD AUTO: 8.8 FL (ref 6–12)
POTASSIUM BLD-SCNC: 4.1 MMOL/L (ref 3.5–5.5)
PROT SERPL-MCNC: 7.5 G/DL (ref 5.7–8.2)
PROT UR QL STRIP: NEGATIVE
PROTHROMBIN TIME: 10.3 SECONDS (ref 9.6–11.5)
RBC # BLD AUTO: 4.41 10*6/MM3 (ref 3.89–5.14)
RH BLD: POSITIVE
SODIUM BLD-SCNC: 133 MMOL/L (ref 132–146)
SP GR UR STRIP: 1.02 (ref 1–1.03)
UROBILINOGEN UR QL STRIP: NORMAL
WBC NRBC COR # BLD: 7.9 10*3/MM3 (ref 3.5–10.8)

## 2018-01-09 PROCEDURE — 93010 ELECTROCARDIOGRAM REPORT: CPT | Performed by: INTERNAL MEDICINE

## 2018-01-09 PROCEDURE — 85730 THROMBOPLASTIN TIME PARTIAL: CPT | Performed by: ORTHOPAEDIC SURGERY

## 2018-01-09 PROCEDURE — 86900 BLOOD TYPING SEROLOGIC ABO: CPT

## 2018-01-09 PROCEDURE — 81003 URINALYSIS AUTO W/O SCOPE: CPT | Performed by: ORTHOPAEDIC SURGERY

## 2018-01-09 PROCEDURE — 36415 COLL VENOUS BLD VENIPUNCTURE: CPT

## 2018-01-09 PROCEDURE — 80053 COMPREHEN METABOLIC PANEL: CPT | Performed by: ORTHOPAEDIC SURGERY

## 2018-01-09 PROCEDURE — 86901 BLOOD TYPING SEROLOGIC RH(D): CPT

## 2018-01-09 PROCEDURE — 86140 C-REACTIVE PROTEIN: CPT | Performed by: ORTHOPAEDIC SURGERY

## 2018-01-09 PROCEDURE — 93005 ELECTROCARDIOGRAM TRACING: CPT

## 2018-01-09 PROCEDURE — 85610 PROTHROMBIN TIME: CPT | Performed by: ORTHOPAEDIC SURGERY

## 2018-01-09 PROCEDURE — 83036 HEMOGLOBIN GLYCOSYLATED A1C: CPT | Performed by: ORTHOPAEDIC SURGERY

## 2018-01-09 PROCEDURE — 85025 COMPLETE CBC W/AUTO DIFF WBC: CPT | Performed by: ORTHOPAEDIC SURGERY

## 2018-01-09 RX ORDER — CEPHALEXIN 500 MG/1
500 CAPSULE ORAL 2 TIMES DAILY
Status: ON HOLD | COMMUNITY
Start: 2017-12-26 | End: 2018-01-23

## 2018-01-09 RX ORDER — HYDROCODONE BITARTRATE AND ACETAMINOPHEN 10; 325 MG/1; MG/1
1 TABLET ORAL EVERY 6 HOURS PRN
COMMUNITY
End: 2018-01-24 | Stop reason: HOSPADM

## 2018-01-09 RX ORDER — SENNOSIDES 8.6 MG
650 CAPSULE ORAL
COMMUNITY
End: 2020-09-25

## 2018-01-09 RX ORDER — CELECOXIB 200 MG/1
200 CAPSULE ORAL DAILY
COMMUNITY
End: 2018-01-24 | Stop reason: HOSPADM

## 2018-01-09 ASSESSMENT — HOOS JR
HOOS JR SCORE: 14
HOOS JR SCORE: 46.652

## 2018-01-09 NOTE — PAT
Amina notified of pt recently finishing po keflex or right toe infection, last dose 1-3-18, pt has appt with dr canales on 1-10-18, pt instructed to call dr still office with any new orders and also to stop celebrex 1 week prior to surgery, pt verbalized understanding    Dr meng reviewed ekg and stated sinus rhythm not a fib and cleared pt for surgery

## 2018-01-09 NOTE — PAT
MEASURED FOR TEDS/SCDS IN PAT  CALF MEASUREMENT     15in  LENGTH MEASUREMENT 17in    Bactroban and Chlorhexidine Prescription given during PAT visit, as well as written and verbal instructions given to patient during PAT visit.    Patient to apply Chlorhexadine wipes  to surgical area (as instructed) the night before procedure and the AM of procedure. Wipes provided.    Pt passed memory screen 5/5    Pt attended joint class today    Type and screen too early in pat, pink tube drawn

## 2018-01-11 ENCOUNTER — TELEPHONE (OUTPATIENT)
Dept: ORTHOPEDIC SURGERY | Facility: CLINIC | Age: 83
End: 2018-01-11

## 2018-01-22 ENCOUNTER — ANESTHESIA EVENT (OUTPATIENT)
Dept: PERIOP | Facility: HOSPITAL | Age: 83
End: 2018-01-22

## 2018-01-22 RX ORDER — FAMOTIDINE 10 MG/ML
20 INJECTION, SOLUTION INTRAVENOUS ONCE
Status: CANCELLED | OUTPATIENT
Start: 2018-01-22 | End: 2018-01-22

## 2018-01-23 ENCOUNTER — APPOINTMENT (OUTPATIENT)
Dept: GENERAL RADIOLOGY | Facility: HOSPITAL | Age: 83
End: 2018-01-23

## 2018-01-23 ENCOUNTER — ANESTHESIA (OUTPATIENT)
Dept: PERIOP | Facility: HOSPITAL | Age: 83
End: 2018-01-23

## 2018-01-23 ENCOUNTER — HOSPITAL ENCOUNTER (INPATIENT)
Facility: HOSPITAL | Age: 83
LOS: 1 days | Discharge: HOME HEALTH | End: 2018-01-24
Attending: ORTHOPAEDIC SURGERY | Admitting: ORTHOPAEDIC SURGERY

## 2018-01-23 DIAGNOSIS — Z96.642 STATUS POST TOTAL REPLACEMENT OF LEFT HIP: ICD-10-CM

## 2018-01-23 DIAGNOSIS — Z78.9 IMPAIRED MOBILITY AND ADLS: ICD-10-CM

## 2018-01-23 DIAGNOSIS — M16.12 PRIMARY OSTEOARTHRITIS OF LEFT HIP: ICD-10-CM

## 2018-01-23 DIAGNOSIS — Z74.09 IMPAIRED FUNCTIONAL MOBILITY, BALANCE, GAIT, AND ENDURANCE: Primary | ICD-10-CM

## 2018-01-23 DIAGNOSIS — Z74.09 IMPAIRED MOBILITY AND ADLS: ICD-10-CM

## 2018-01-23 LAB
ABO GROUP BLD: NORMAL
BLD GP AB SCN SERPL QL: NEGATIVE
GLUCOSE BLDC GLUCOMTR-MCNC: 135 MG/DL (ref 70–130)
GLUCOSE BLDC GLUCOMTR-MCNC: 159 MG/DL (ref 70–130)
GLUCOSE BLDC GLUCOMTR-MCNC: 246 MG/DL (ref 70–130)
POTASSIUM BLDA-SCNC: 3.85 MMOL/L (ref 3.5–5.3)
RH BLD: POSITIVE

## 2018-01-23 PROCEDURE — 25010000003 CEFAZOLIN IN DEXTROSE 2-4 GM/100ML-% SOLUTION: Performed by: ORTHOPAEDIC SURGERY

## 2018-01-23 PROCEDURE — C1776 JOINT DEVICE (IMPLANTABLE): HCPCS | Performed by: ORTHOPAEDIC SURGERY

## 2018-01-23 PROCEDURE — C1755 CATHETER, INTRASPINAL: HCPCS | Performed by: ORTHOPAEDIC SURGERY

## 2018-01-23 PROCEDURE — 25010000002 DEXAMETHASONE PER 1 MG: Performed by: NURSE ANESTHETIST, CERTIFIED REGISTERED

## 2018-01-23 PROCEDURE — 86901 BLOOD TYPING SEROLOGIC RH(D): CPT | Performed by: ORTHOPAEDIC SURGERY

## 2018-01-23 PROCEDURE — 25010000002 FENTANYL CITRATE (PF) 100 MCG/2ML SOLUTION: Performed by: ANESTHESIOLOGY

## 2018-01-23 PROCEDURE — 73502 X-RAY EXAM HIP UNI 2-3 VIEWS: CPT

## 2018-01-23 PROCEDURE — 86900 BLOOD TYPING SEROLOGIC ABO: CPT | Performed by: ORTHOPAEDIC SURGERY

## 2018-01-23 PROCEDURE — 0SRB04Z REPLACEMENT OF LEFT HIP JOINT WITH CERAMIC ON POLYETHYLENE SYNTHETIC SUBSTITUTE, OPEN APPROACH: ICD-10-PCS | Performed by: ORTHOPAEDIC SURGERY

## 2018-01-23 PROCEDURE — 25010000002 ROPIVACAINE PER 1 MG: Performed by: ORTHOPAEDIC SURGERY

## 2018-01-23 PROCEDURE — 63710000001 INSULIN LISPRO (HUMAN) PER 5 UNITS: Performed by: NURSE PRACTITIONER

## 2018-01-23 PROCEDURE — 25010000002 ONDANSETRON PER 1 MG: Performed by: NURSE ANESTHETIST, CERTIFIED REGISTERED

## 2018-01-23 PROCEDURE — 25010000002 PROPOFOL 10 MG/ML EMULSION: Performed by: NURSE ANESTHETIST, CERTIFIED REGISTERED

## 2018-01-23 PROCEDURE — 86850 RBC ANTIBODY SCREEN: CPT | Performed by: ORTHOPAEDIC SURGERY

## 2018-01-23 PROCEDURE — 82962 GLUCOSE BLOOD TEST: CPT

## 2018-01-23 PROCEDURE — 25010000003 MORPHINE PER 10 MG: Performed by: ORTHOPAEDIC SURGERY

## 2018-01-23 PROCEDURE — 27130 TOTAL HIP ARTHROPLASTY: CPT | Performed by: ORTHOPAEDIC SURGERY

## 2018-01-23 PROCEDURE — 84132 ASSAY OF SERUM POTASSIUM: CPT | Performed by: ANESTHESIOLOGY

## 2018-01-23 DEVICE — PINNACLE HIP SOLUTIONS ALTRX POLYETHYLENE ACETABULAR LINER +4 NEUTRAL 36MM ID 56MM OD
Type: IMPLANTABLE DEVICE | Site: HIP | Status: FUNCTIONAL
Brand: PINNACLE ALTRX

## 2018-01-23 DEVICE — PINNACLE GRIPTION ACETABULAR SHELL SECTOR 56MM OD
Type: IMPLANTABLE DEVICE | Site: HIP | Status: FUNCTIONAL
Brand: PINNACLE GRIPTION

## 2018-01-23 DEVICE — BIOLOX DELTA CERAMIC FEMORAL HEAD +1.5 36MM DIA 12/14 TAPER
Type: IMPLANTABLE DEVICE | Site: HIP | Status: FUNCTIONAL
Brand: BIOLOX DELTA

## 2018-01-23 DEVICE — TOTL HIP GRIPTION CUP DEPUY UPCHRG: Type: IMPLANTABLE DEVICE | Site: HIP | Status: FUNCTIONAL

## 2018-01-23 DEVICE — SUMMIT FEMORAL STEM 12/14 TAPER TAPER ED W/POROCOAT SIZE 6 STD 150MM
Type: IMPLANTABLE DEVICE | Site: HIP | Status: FUNCTIONAL
Brand: SUMMIT POROCOAT

## 2018-01-23 DEVICE — TOTL HIP COA DEPUY 9641334: Type: IMPLANTABLE DEVICE | Site: HIP | Status: FUNCTIONAL

## 2018-01-23 RX ORDER — BUPIVACAINE HYDROCHLORIDE 5 MG/ML
INJECTION, SOLUTION EPIDURAL; INTRACAUDAL AS NEEDED
Status: DISCONTINUED | OUTPATIENT
Start: 2018-01-23 | End: 2018-01-23 | Stop reason: SURG

## 2018-01-23 RX ORDER — DEXTROSE MONOHYDRATE 25 G/50ML
25 INJECTION, SOLUTION INTRAVENOUS
Status: DISCONTINUED | OUTPATIENT
Start: 2018-01-23 | End: 2018-01-24 | Stop reason: HOSPADM

## 2018-01-23 RX ORDER — ONDANSETRON 2 MG/ML
4 INJECTION INTRAMUSCULAR; INTRAVENOUS EVERY 6 HOURS PRN
Status: DISCONTINUED | OUTPATIENT
Start: 2018-01-23 | End: 2018-01-24 | Stop reason: HOSPADM

## 2018-01-23 RX ORDER — ASPIRIN 325 MG
325 TABLET, DELAYED RELEASE (ENTERIC COATED) ORAL DAILY
Status: DISCONTINUED | OUTPATIENT
Start: 2018-01-24 | End: 2018-01-24 | Stop reason: HOSPADM

## 2018-01-23 RX ORDER — NALOXONE HCL 0.4 MG/ML
0.1 VIAL (ML) INJECTION
Status: DISCONTINUED | OUTPATIENT
Start: 2018-01-23 | End: 2018-01-24 | Stop reason: HOSPADM

## 2018-01-23 RX ORDER — MELOXICAM 7.5 MG/1
15 TABLET ORAL ONCE
Status: COMPLETED | OUTPATIENT
Start: 2018-01-23 | End: 2018-01-23

## 2018-01-23 RX ORDER — FAMOTIDINE 20 MG/1
20 TABLET, FILM COATED ORAL ONCE
Status: COMPLETED | OUTPATIENT
Start: 2018-01-23 | End: 2018-01-23

## 2018-01-23 RX ORDER — PROPOFOL 10 MG/ML
VIAL (ML) INTRAVENOUS CONTINUOUS PRN
Status: DISCONTINUED | OUTPATIENT
Start: 2018-01-23 | End: 2018-01-23 | Stop reason: SURG

## 2018-01-23 RX ORDER — BISACODYL 5 MG/1
10 TABLET, DELAYED RELEASE ORAL DAILY PRN
Status: DISCONTINUED | OUTPATIENT
Start: 2018-01-23 | End: 2018-01-24 | Stop reason: HOSPADM

## 2018-01-23 RX ORDER — PREGABALIN 75 MG/1
150 CAPSULE ORAL ONCE
Status: COMPLETED | OUTPATIENT
Start: 2018-01-23 | End: 2018-01-23

## 2018-01-23 RX ORDER — ONDANSETRON 2 MG/ML
INJECTION INTRAMUSCULAR; INTRAVENOUS AS NEEDED
Status: DISCONTINUED | OUTPATIENT
Start: 2018-01-23 | End: 2018-01-23 | Stop reason: SURG

## 2018-01-23 RX ORDER — HYDROMORPHONE HYDROCHLORIDE 1 MG/ML
0.5 INJECTION, SOLUTION INTRAMUSCULAR; INTRAVENOUS; SUBCUTANEOUS
Status: DISCONTINUED | OUTPATIENT
Start: 2018-01-23 | End: 2018-01-24 | Stop reason: HOSPADM

## 2018-01-23 RX ORDER — NICOTINE POLACRILEX 4 MG
15 LOZENGE BUCCAL
Status: DISCONTINUED | OUTPATIENT
Start: 2018-01-23 | End: 2018-01-24 | Stop reason: HOSPADM

## 2018-01-23 RX ORDER — CEFAZOLIN SODIUM 2 G/100ML
2 INJECTION, SOLUTION INTRAVENOUS EVERY 8 HOURS
Status: COMPLETED | OUTPATIENT
Start: 2018-01-23 | End: 2018-01-24

## 2018-01-23 RX ORDER — CEFAZOLIN SODIUM 2 G/100ML
2 INJECTION, SOLUTION INTRAVENOUS ONCE
Status: COMPLETED | OUTPATIENT
Start: 2018-01-23 | End: 2018-01-23

## 2018-01-23 RX ORDER — LISINOPRIL 20 MG/1
20 TABLET ORAL
Status: DISCONTINUED | OUTPATIENT
Start: 2018-01-24 | End: 2018-01-24 | Stop reason: HOSPADM

## 2018-01-23 RX ORDER — ATORVASTATIN CALCIUM 10 MG/1
10 TABLET, FILM COATED ORAL DAILY
Status: DISCONTINUED | OUTPATIENT
Start: 2018-01-23 | End: 2018-01-24 | Stop reason: HOSPADM

## 2018-01-23 RX ORDER — ONDANSETRON 2 MG/ML
4 INJECTION INTRAMUSCULAR; INTRAVENOUS ONCE AS NEEDED
Status: DISCONTINUED | OUTPATIENT
Start: 2018-01-23 | End: 2018-01-23 | Stop reason: HOSPADM

## 2018-01-23 RX ORDER — FENTANYL CITRATE 50 UG/ML
25 INJECTION, SOLUTION INTRAMUSCULAR; INTRAVENOUS
Status: DISCONTINUED | OUTPATIENT
Start: 2018-01-23 | End: 2018-01-23 | Stop reason: HOSPADM

## 2018-01-23 RX ORDER — FENTANYL CITRATE 50 UG/ML
50 INJECTION, SOLUTION INTRAMUSCULAR; INTRAVENOUS
Status: DISCONTINUED | OUTPATIENT
Start: 2018-01-23 | End: 2018-01-23 | Stop reason: HOSPADM

## 2018-01-23 RX ORDER — SODIUM CHLORIDE 0.9 % (FLUSH) 0.9 %
1-10 SYRINGE (ML) INJECTION AS NEEDED
Status: DISCONTINUED | OUTPATIENT
Start: 2018-01-23 | End: 2018-01-24 | Stop reason: HOSPADM

## 2018-01-23 RX ORDER — MORPHINE SULFATE 2 MG/ML
4 INJECTION, SOLUTION INTRAMUSCULAR; INTRAVENOUS
Status: DISCONTINUED | OUTPATIENT
Start: 2018-01-23 | End: 2018-01-24 | Stop reason: HOSPADM

## 2018-01-23 RX ORDER — SODIUM CHLORIDE 0.9 % (FLUSH) 0.9 %
1-10 SYRINGE (ML) INJECTION AS NEEDED
Status: DISCONTINUED | OUTPATIENT
Start: 2018-01-23 | End: 2018-01-23 | Stop reason: HOSPADM

## 2018-01-23 RX ORDER — MELOXICAM 7.5 MG/1
15 TABLET ORAL DAILY
Status: DISCONTINUED | OUTPATIENT
Start: 2018-01-23 | End: 2018-01-24 | Stop reason: HOSPADM

## 2018-01-23 RX ORDER — HYDROCODONE BITARTRATE AND ACETAMINOPHEN 7.5; 325 MG/1; MG/1
1 TABLET ORAL EVERY 4 HOURS PRN
Status: DISCONTINUED | OUTPATIENT
Start: 2018-01-23 | End: 2018-01-24 | Stop reason: HOSPADM

## 2018-01-23 RX ORDER — NALOXONE HCL 0.4 MG/ML
0.4 VIAL (ML) INJECTION
Status: DISCONTINUED | OUTPATIENT
Start: 2018-01-23 | End: 2018-01-24 | Stop reason: HOSPADM

## 2018-01-23 RX ORDER — SODIUM CHLORIDE 9 MG/ML
120 INJECTION, SOLUTION INTRAVENOUS CONTINUOUS
Status: DISCONTINUED | OUTPATIENT
Start: 2018-01-23 | End: 2018-01-24 | Stop reason: HOSPADM

## 2018-01-23 RX ORDER — LIDOCAINE HYDROCHLORIDE 10 MG/ML
0.5 INJECTION, SOLUTION EPIDURAL; INFILTRATION; INTRACAUDAL; PERINEURAL ONCE AS NEEDED
Status: COMPLETED | OUTPATIENT
Start: 2018-01-23 | End: 2018-01-23

## 2018-01-23 RX ORDER — ACETAMINOPHEN 500 MG
1000 TABLET ORAL ONCE
Status: COMPLETED | OUTPATIENT
Start: 2018-01-23 | End: 2018-01-23

## 2018-01-23 RX ORDER — ATORVASTATIN CALCIUM 10 MG/1
10 TABLET, FILM COATED ORAL DAILY
Status: DISCONTINUED | OUTPATIENT
Start: 2018-01-23 | End: 2018-01-23

## 2018-01-23 RX ORDER — DEXAMETHASONE SODIUM PHOSPHATE 10 MG/ML
INJECTION INTRAMUSCULAR; INTRAVENOUS AS NEEDED
Status: DISCONTINUED | OUTPATIENT
Start: 2018-01-23 | End: 2018-01-23 | Stop reason: SURG

## 2018-01-23 RX ORDER — ONDANSETRON 4 MG/1
4 TABLET, FILM COATED ORAL EVERY 6 HOURS PRN
Status: DISCONTINUED | OUTPATIENT
Start: 2018-01-23 | End: 2018-01-24 | Stop reason: HOSPADM

## 2018-01-23 RX ORDER — MAGNESIUM HYDROXIDE 1200 MG/15ML
LIQUID ORAL AS NEEDED
Status: DISCONTINUED | OUTPATIENT
Start: 2018-01-23 | End: 2018-01-23 | Stop reason: HOSPADM

## 2018-01-23 RX ORDER — BISACODYL 10 MG
10 SUPPOSITORY, RECTAL RECTAL DAILY PRN
Status: DISCONTINUED | OUTPATIENT
Start: 2018-01-23 | End: 2018-01-24 | Stop reason: HOSPADM

## 2018-01-23 RX ORDER — HYDRALAZINE HYDROCHLORIDE 20 MG/ML
10 INJECTION INTRAMUSCULAR; INTRAVENOUS EVERY 6 HOURS PRN
Status: DISCONTINUED | OUTPATIENT
Start: 2018-01-23 | End: 2018-01-24 | Stop reason: HOSPADM

## 2018-01-23 RX ORDER — SODIUM CHLORIDE, SODIUM LACTATE, POTASSIUM CHLORIDE, CALCIUM CHLORIDE 600; 310; 30; 20 MG/100ML; MG/100ML; MG/100ML; MG/100ML
INJECTION, SOLUTION INTRAVENOUS CONTINUOUS PRN
Status: DISCONTINUED | OUTPATIENT
Start: 2018-01-23 | End: 2018-01-23 | Stop reason: SURG

## 2018-01-23 RX ORDER — DOCUSATE SODIUM 100 MG/1
100 CAPSULE, LIQUID FILLED ORAL 2 TIMES DAILY PRN
Status: DISCONTINUED | OUTPATIENT
Start: 2018-01-23 | End: 2018-01-24 | Stop reason: HOSPADM

## 2018-01-23 RX ORDER — SODIUM CHLORIDE, SODIUM LACTATE, POTASSIUM CHLORIDE, CALCIUM CHLORIDE 600; 310; 30; 20 MG/100ML; MG/100ML; MG/100ML; MG/100ML
9 INJECTION, SOLUTION INTRAVENOUS CONTINUOUS
Status: DISCONTINUED | OUTPATIENT
Start: 2018-01-23 | End: 2018-01-23

## 2018-01-23 RX ADMIN — DEXAMETHASONE SODIUM PHOSPHATE 8 MG: 10 INJECTION INTRAMUSCULAR; INTRAVENOUS at 14:40

## 2018-01-23 RX ADMIN — EPHEDRINE SULFATE 20 MG: 50 INJECTION INTRAMUSCULAR; INTRAVENOUS; SUBCUTANEOUS at 14:35

## 2018-01-23 RX ADMIN — ACETAMINOPHEN 1000 MG: 500 TABLET ORAL at 11:36

## 2018-01-23 RX ADMIN — SODIUM CHLORIDE, POTASSIUM CHLORIDE, SODIUM LACTATE AND CALCIUM CHLORIDE: 600; 310; 30; 20 INJECTION, SOLUTION INTRAVENOUS at 15:00

## 2018-01-23 RX ADMIN — HYDROCODONE BITARTRATE AND ACETAMINOPHEN 1 TABLET: 7.5; 325 TABLET ORAL at 19:43

## 2018-01-23 RX ADMIN — MELOXICAM 15 MG: 7.5 TABLET ORAL at 11:36

## 2018-01-23 RX ADMIN — MELOXICAM 15 MG: 7.5 TABLET ORAL at 18:52

## 2018-01-23 RX ADMIN — ATORVASTATIN CALCIUM 10 MG: 10 TABLET, FILM COATED ORAL at 20:51

## 2018-01-23 RX ADMIN — CEFAZOLIN SODIUM 2 G: 2 INJECTION, SOLUTION INTRAVENOUS at 21:47

## 2018-01-23 RX ADMIN — PROPOFOL 66 MCG/KG/MIN: 10 INJECTION, EMULSION INTRAVENOUS at 14:29

## 2018-01-23 RX ADMIN — SODIUM CHLORIDE, POTASSIUM CHLORIDE, SODIUM LACTATE AND CALCIUM CHLORIDE: 600; 310; 30; 20 INJECTION, SOLUTION INTRAVENOUS at 14:19

## 2018-01-23 RX ADMIN — SODIUM CHLORIDE 120 ML/HR: 9 INJECTION, SOLUTION INTRAVENOUS at 18:54

## 2018-01-23 RX ADMIN — EPHEDRINE SULFATE 20 MG: 50 INJECTION INTRAMUSCULAR; INTRAVENOUS; SUBCUTANEOUS at 15:03

## 2018-01-23 RX ADMIN — SODIUM CHLORIDE, POTASSIUM CHLORIDE, SODIUM LACTATE AND CALCIUM CHLORIDE 9 ML/HR: 600; 310; 30; 20 INJECTION, SOLUTION INTRAVENOUS at 11:36

## 2018-01-23 RX ADMIN — MUPIROCIN 1 APPLICATION: 20 OINTMENT TOPICAL at 11:36

## 2018-01-23 RX ADMIN — INSULIN LISPRO 3 UNITS: 100 INJECTION, SOLUTION INTRAVENOUS; SUBCUTANEOUS at 21:48

## 2018-01-23 RX ADMIN — LIDOCAINE HYDROCHLORIDE 0.3 ML: 10 INJECTION, SOLUTION EPIDURAL; INFILTRATION; INTRACAUDAL; PERINEURAL at 11:37

## 2018-01-23 RX ADMIN — CEFAZOLIN SODIUM 2 G: 2 INJECTION, SOLUTION INTRAVENOUS at 14:25

## 2018-01-23 RX ADMIN — TRANEXAMIC ACID 1000 MG: 100 INJECTION, SOLUTION INTRAVENOUS at 14:34

## 2018-01-23 RX ADMIN — ONDANSETRON 4 MG: 2 INJECTION INTRAMUSCULAR; INTRAVENOUS at 16:05

## 2018-01-23 RX ADMIN — PREGABALIN 150 MG: 75 CAPSULE ORAL at 11:36

## 2018-01-23 RX ADMIN — FENTANYL CITRATE 25 MCG: 50 INJECTION INTRAMUSCULAR; INTRAVENOUS at 13:13

## 2018-01-23 RX ADMIN — FAMOTIDINE 20 MG: 20 TABLET, FILM COATED ORAL at 11:36

## 2018-01-23 RX ADMIN — BUPIVACAINE HYDROCHLORIDE 12 MG: 5 INJECTION, SOLUTION EPIDURAL; INTRACAUDAL; PERINEURAL at 14:29

## 2018-01-23 RX ADMIN — INSULIN LISPRO 2 UNITS: 100 INJECTION, SOLUTION INTRAVENOUS; SUBCUTANEOUS at 18:51

## 2018-01-23 RX ADMIN — TRANEXAMIC ACID 1000 MG: 100 INJECTION, SOLUTION INTRAVENOUS at 15:54

## 2018-01-23 NOTE — ANESTHESIA PROCEDURE NOTES
Spinal Block    Patient location during procedure: OR  Indication:at surgeon's request  Performed By  CRNA: VIDHYA GUEVARA  Preanesthetic Checklist  Completed: patient identified, site marked, surgical consent, pre-op evaluation, timeout performed, IV checked, risks and benefits discussed and monitors and equipment checked  Spinal Block Prep:  Patient Position:sitting  Sterile Tech:cap, gloves, sterile barriers and mask  Prep:Chloraprep  Patient Monitoring:blood pressure monitoring, continuous pulse oximetry and EKG  Spinal Block Procedure  Approach:midline  Guidance:landmark technique and palpation technique  Location:L4-L5  Needle Type:Karley  Needle Gauge:25 G  Placement of Spinal needle event:cerebrospinal fluid aspirated  Paresthesia: no  Fluid Appearance:clear  Post Assessment  Patient Tolerance:patient tolerated the procedure well with no apparent complications  Complications no  Additional Notes  Procedure:  Pt assisted to sitting position, with legs in position of comfort over side of bed.  Pt. instructed in optimal spine presentation, the spine was prepped/ Draped and the skin at insertion site was anesthetized with 1% Lidocaine 2 ml.  The spinal needle was then advanced until CSF flow was obtained and LA was injected:      Marcaine 0.5% PSF injected 12 Mg.

## 2018-01-23 NOTE — ANESTHESIA PREPROCEDURE EVALUATION
Anesthesia Evaluation     Patient summary reviewed and Nursing notes reviewed   NPO Solid Status: > 8 hours  NPO Liquid Status: > 8 hours     Airway   Mallampati: II  TM distance: >3 FB  Neck ROM: full  no difficulty expected  Dental      Pulmonary    (-) COPD, asthma, shortness of breath, recent URI, not a smoker  Cardiovascular     ECG reviewed    (+) hypertension, PVD (venous insuffiency both legs), hyperlipidemia  (-) CAD, dysrhythmias, angina, cardiac stents    ROS comment: NORMAL SINUS RHYTHM WITH PACS  Left axis deviation    Neuro/Psych  (-) seizures, TIA, CVA  GI/Hepatic/Renal/Endo    (+)  diabetes mellitus type 2 well controlled,   (-) liver disease, no renal disease    Musculoskeletal     Abdominal    Substance History      OB/GYN          Other   (+) arthritis                                             Anesthesia Plan    ASA 3     spinal and MAC   (Propofol Infusion as part of Anti PONV tech )  intravenous induction   Anesthetic plan and risks discussed with patient.    Plan discussed with CRNA.

## 2018-01-23 NOTE — ANESTHESIA POSTPROCEDURE EVALUATION
Patient: Bri Iniguez    Procedure Summary     Date Anesthesia Start Anesthesia Stop Room / Location    01/23/18 1419 1624 [facility] / [facility]       Procedure Diagnosis Surgeon Provider    LEFT TOTAL HIP ARTHROPLASTY (Left Hip) Primary osteoarthritis of left hip  (Primary osteoarthritis of left hip [M16.12]) MD Peña Potter MD          Anesthesia Type: spinal, MAC  Last vitals  BP   114/59   Temp   97.0   Pulse   98   Resp   14   SpO2   98     Post Anesthesia Care and Evaluation    Patient location during evaluation: PACU  Patient participation: complete - patient participated  Level of consciousness: awake and alert  Pain score: 0  Pain management: adequate  Airway patency: patent  Anesthetic complications: No anesthetic complications  PONV Status: none  Cardiovascular status: hemodynamically stable and acceptable  Respiratory status: nonlabored ventilation, acceptable and nasal cannula  Hydration status: acceptable

## 2018-01-24 VITALS
DIASTOLIC BLOOD PRESSURE: 55 MMHG | TEMPERATURE: 98.2 F | BODY MASS INDEX: 26.68 KG/M2 | SYSTOLIC BLOOD PRESSURE: 98 MMHG | HEIGHT: 66 IN | HEART RATE: 107 BPM | RESPIRATION RATE: 17 BRPM | WEIGHT: 166 LBS | OXYGEN SATURATION: 98 %

## 2018-01-24 PROBLEM — D62 ACUTE BLOOD LOSS ANEMIA: Status: ACTIVE | Noted: 2018-01-24

## 2018-01-24 PROBLEM — D72.829 LEUKOCYTOSIS: Status: ACTIVE | Noted: 2018-01-24

## 2018-01-24 LAB
ANION GAP SERPL CALCULATED.3IONS-SCNC: 5 MMOL/L (ref 3–11)
BUN BLD-MCNC: 12 MG/DL (ref 9–23)
BUN/CREAT SERPL: 24 (ref 7–25)
CALCIUM SPEC-SCNC: 8.6 MG/DL (ref 8.7–10.4)
CHLORIDE SERPL-SCNC: 101 MMOL/L (ref 99–109)
CO2 SERPL-SCNC: 27 MMOL/L (ref 20–31)
CREAT BLD-MCNC: 0.5 MG/DL (ref 0.6–1.3)
DEPRECATED RDW RBC AUTO: 43.7 FL (ref 37–54)
ERYTHROCYTE [DISTWIDTH] IN BLOOD BY AUTOMATED COUNT: 13.1 % (ref 11.3–14.5)
GFR SERPL CREATININE-BSD FRML MDRD: 118 ML/MIN/1.73
GLUCOSE BLD-MCNC: 169 MG/DL (ref 70–100)
GLUCOSE BLDC GLUCOMTR-MCNC: 142 MG/DL (ref 70–130)
GLUCOSE BLDC GLUCOMTR-MCNC: 215 MG/DL (ref 70–130)
HCT VFR BLD AUTO: 32.4 % (ref 34.5–44)
HGB BLD-MCNC: 10.5 G/DL (ref 11.5–15.5)
MCH RBC QN AUTO: 29.6 PG (ref 27–31)
MCHC RBC AUTO-ENTMCNC: 32.4 G/DL (ref 32–36)
MCV RBC AUTO: 91.3 FL (ref 80–99)
PLATELET # BLD AUTO: 217 10*3/MM3 (ref 150–450)
PMV BLD AUTO: 9.1 FL (ref 6–12)
POTASSIUM BLD-SCNC: 3.9 MMOL/L (ref 3.5–5.5)
RBC # BLD AUTO: 3.55 10*6/MM3 (ref 3.89–5.14)
SODIUM BLD-SCNC: 133 MMOL/L (ref 132–146)
WBC NRBC COR # BLD: 11.14 10*3/MM3 (ref 3.5–10.8)

## 2018-01-24 PROCEDURE — 97166 OT EVAL MOD COMPLEX 45 MIN: CPT | Performed by: OCCUPATIONAL THERAPIST

## 2018-01-24 PROCEDURE — 80048 BASIC METABOLIC PNL TOTAL CA: CPT | Performed by: NURSE PRACTITIONER

## 2018-01-24 PROCEDURE — 25010000003 CEFAZOLIN IN DEXTROSE 2-4 GM/100ML-% SOLUTION: Performed by: ORTHOPAEDIC SURGERY

## 2018-01-24 PROCEDURE — 85027 COMPLETE CBC AUTOMATED: CPT | Performed by: ORTHOPAEDIC SURGERY

## 2018-01-24 PROCEDURE — 97161 PT EVAL LOW COMPLEX 20 MIN: CPT

## 2018-01-24 PROCEDURE — 82962 GLUCOSE BLOOD TEST: CPT

## 2018-01-24 PROCEDURE — 97530 THERAPEUTIC ACTIVITIES: CPT | Performed by: OCCUPATIONAL THERAPIST

## 2018-01-24 RX ORDER — HYDROCODONE BITARTRATE AND ACETAMINOPHEN 7.5; 325 MG/1; MG/1
1 TABLET ORAL EVERY 4 HOURS PRN
Qty: 40 TABLET | Refills: 0 | Status: SHIPPED | OUTPATIENT
Start: 2018-01-24 | End: 2018-02-02

## 2018-01-24 RX ORDER — PSEUDOEPHEDRINE HCL 30 MG
1 TABLET ORAL 2 TIMES DAILY PRN
Qty: 60 CAPSULE | Refills: 0 | Status: SHIPPED | OUTPATIENT
Start: 2018-01-24 | End: 2020-06-01

## 2018-01-24 RX ORDER — ASPIRIN 81 MG/1
81 TABLET, CHEWABLE ORAL EVERY MORNING
Start: 2018-01-24 | End: 2020-01-22 | Stop reason: ALTCHOICE

## 2018-01-24 RX ADMIN — LISINOPRIL 20 MG: 20 TABLET ORAL at 08:33

## 2018-01-24 RX ADMIN — INSULIN LISPRO 3 UNITS: 100 INJECTION, SOLUTION INTRAVENOUS; SUBCUTANEOUS at 08:32

## 2018-01-24 RX ADMIN — ASPIRIN 325 MG: 325 TABLET, DELAYED RELEASE ORAL at 08:33

## 2018-01-24 RX ADMIN — HYDROCODONE BITARTRATE AND ACETAMINOPHEN 1 TABLET: 7.5; 325 TABLET ORAL at 11:53

## 2018-01-24 RX ADMIN — MELOXICAM 15 MG: 7.5 TABLET ORAL at 08:33

## 2018-01-24 RX ADMIN — SODIUM CHLORIDE 120 ML/HR: 9 INJECTION, SOLUTION INTRAVENOUS at 06:31

## 2018-01-24 RX ADMIN — CEFAZOLIN SODIUM 2 G: 2 INJECTION, SOLUTION INTRAVENOUS at 06:31

## 2018-01-24 RX ADMIN — HYDROCODONE BITARTRATE AND ACETAMINOPHEN 1 TABLET: 7.5; 325 TABLET ORAL at 00:36

## 2018-02-05 ENCOUNTER — OFFICE VISIT (OUTPATIENT)
Dept: ORTHOPEDIC SURGERY | Facility: CLINIC | Age: 83
End: 2018-02-05

## 2018-02-05 DIAGNOSIS — Z96.642 STATUS POST TOTAL REPLACEMENT OF LEFT HIP: Primary | ICD-10-CM

## 2018-02-05 PROCEDURE — 99024 POSTOP FOLLOW-UP VISIT: CPT | Performed by: ORTHOPAEDIC SURGERY

## 2018-02-05 NOTE — PROGRESS NOTES
"    [facility] [place] [facility] Clinic Note    Subjective     Chief Complaint   Patient presents with   • Post-op     2 weeks s/p (L) Total Hip Arthroplasty 1/23/18        HPI    Bri Iniguez is a 82 y.o. female. She follows up today for her left total hip arthroplasty.  She is doing well today.  Only mild pain.  Much improved compared to her preoperative symptoms.  She is walking with a walker.  90% improvement compared to her preoperative symptoms.      Patient Active Problem List   Diagnosis   • Macular degeneration   • Hyperlipidemia   • Essential hypertension   • Deafness   • Diabetes type 2, controlled   • Primary osteoarthritis of left hip   • Venous insufficiency of both lower extremities   • Status post total replacement of left hip   • Acute blood loss anemia, mild, asymptomatic   • Leukocytosis, mild, likely reactive     Past Medical History:   Diagnosis Date   • Bursitis     trochanteric area   • Colonoscopy refused 2015   • Deafness     unspecified   • Diabetes mellitus     \"prediabetes\" per pt report, takes po metformin daily and does not check fsbg   • Essential hypertension    • Hip pain    • Hyperlipidemia     other   • Influenza vaccine administered 2014   • Macular degeneration    • Mammogram declined 2015   • Osteoarthritis of left hip    • Primary osteoarthritis of right knee    • Supraventricular premature beats    • Wears dentures       Past Surgical History:   Procedure Laterality Date   • CATARACT EXTRACTION Bilateral    • CHOLECYSTECTOMY  2002   • COLONOSCOPY     • HERNIA REPAIR  2005    umbilical   • HYSTERECTOMY  1974   • TONSILLECTOMY     • TOTAL HIP ARTHROPLASTY Left 1/23/2018    Procedure: LEFT TOTAL HIP ARTHROPLASTY;  Surgeon: Gideon Son MD;  Location: [facility];  Service:       Family History   Problem Relation Age of Onset   • Hypertension Other    • Diabetes Other      type 2   • Stroke Mother    • Diabetes Mother    • Hypertension Mother    • Cancer Father      Social History "     Social History   • Marital status:      Spouse name: N/A   • Number of children: N/A   • Years of education: N/A     Occupational History   • Not on file.     Social History Main Topics   • Smoking status: Never Smoker   • Smokeless tobacco: Never Used   • Alcohol use No   • Drug use: No   • Sexual activity: Defer     Other Topics Concern   • Not on file     Social History Narrative      Current Outpatient Prescriptions on File Prior to Visit   Medication Sig Dispense Refill   • acetaminophen (TYLENOL) 650 MG 8 hr tablet Take 650 mg by mouth 2 (Two) Times a Day.     • aspirin 325 MG EC tablet Take 1 tablet by mouth Daily for 1 month 30 tablet 0   • aspirin 81 MG chewable tablet Chew 1 tablet Every Morning. Resume in 1 month     • atorvastatin (LIPITOR) 10 MG tablet Take 1 tablet by mouth Daily. 90 tablet 3   • docusate sodium 100 MG capsule Take 1 capsule by mouth 2 (Two) Times a Day As Needed for Constipation. 60 capsule 0   • lisinopril-hydrochlorothiazide (PRINZIDE,ZESTORETIC) 20-12.5 MG per tablet Take 1 tablet by mouth Daily. 90 tablet 3   • metFORMIN (GLUCOPHAGE) 500 MG tablet Take 1 tablet by mouth Daily. 90 tablet 3   • Multiple Vitamins-Minerals (OCUVITE ADULT 50+) capsule Take 1 tablet by mouth Daily.       No current facility-administered medications on file prior to visit.       No Known Allergies     Review of Systems   Constitutional: Negative for activity change, appetite change, chills, diaphoresis, fatigue, fever and unexpected weight change.   HENT: Negative for congestion, dental problem, drooling, ear discharge, ear pain, facial swelling, hearing loss, mouth sores, nosebleeds, postnasal drip, rhinorrhea, sinus pressure, sneezing, sore throat, tinnitus, trouble swallowing and voice change.    Eyes: Negative for photophobia, pain, discharge, redness, itching and visual disturbance.   Respiratory: Negative for apnea, cough, choking, chest tightness, shortness of breath, wheezing and  stridor.    Cardiovascular: Negative for chest pain, palpitations and leg swelling.   Gastrointestinal: Negative for abdominal distention, abdominal pain, anal bleeding, blood in stool, constipation, diarrhea, nausea, rectal pain and vomiting.   Endocrine: Negative for cold intolerance, heat intolerance, polydipsia, polyphagia and polyuria.   Genitourinary: Negative for decreased urine volume, difficulty urinating, dysuria, enuresis, flank pain, frequency, genital sores, hematuria and urgency.   Musculoskeletal: Positive for joint swelling. Negative for arthralgias, back pain, gait problem, myalgias, neck pain and neck stiffness.   Skin: Negative for color change, pallor, rash and wound.   Allergic/Immunologic: Negative for environmental allergies, food allergies and immunocompromised state.   Neurological: Negative for dizziness, tremors, seizures, syncope, facial asymmetry, speech difficulty, weakness, light-headedness, numbness and headaches.   Hematological: Negative for adenopathy. Does not bruise/bleed easily.   Psychiatric/Behavioral: Negative for agitation, behavioral problems, confusion, decreased concentration, dysphoric mood, hallucinations, self-injury, sleep disturbance and suicidal ideas. The patient is not nervous/anxious and is not hyperactive.         Objective      Physical Exam  LMP 06/03/1980 (Approximate) Comment: hysterectomy    There is no height or weight on file to calculate BMI.    General:   Mental Status:  Alert   Appearance: Cooperative, in no acute distress   Build and Nutrition: Well-nourished and well developed female   Orientation: Alert and oriented to person, place and time   Posture: Normal   Gait: With a walker    Integument:   Left hip: Wound is well-healed with no signs of infection    Lower Extremity:   Left Hip:    Tenderness:  None    Swelling:  None    Crepitus:  None    Range of motion:  External Rotation: 30°       Internal  Rotation: 30°       Flexion:  100°       Extension:  0°    Deformities:  None  Functional testing: Negative Stinchfield    No leg length discrepancy    Imaging/Studies  Imaging Results (last 24 hours)     Procedure Component Value Units Date/Time    XR Pelvis 1 or 2 View [[id number]] Resulted:  02/05/18 1020     Updated:  02/05/18 1020    Narrative:       Left Hip Radiographs  Indication: status-post left total hip arthroplasty  Views: low AP pelvis and lateral of the left hip    Comparison: no change compared to prior study, 1/23/2018    Findings:   The components are well aligned, with no signs of loosening or failure.            Assessment and Plan     Bri was seen today for post-op.    Diagnoses and all orders for this visit:    Status post total replacement of left hip  -     XR Pelvis 1 or 2 View        I reviewed my findings with patient today.  Her left total hip arthroplasty is functioning well so far.  I will see her back in 2 months for recheck, but sooner for problems.  No x-rays are required on the next visit.    Return in about 2 months (around 4/5/2018) for Recheck without X-Rays.        Gideon Son MD  02/05/18  10:27 AM

## 2018-02-14 ENCOUNTER — OFFICE VISIT (OUTPATIENT)
Dept: ORTHOPEDIC SURGERY | Facility: CLINIC | Age: 83
End: 2018-02-14

## 2018-02-14 DIAGNOSIS — Z96.652 S/P TOTAL KNEE ARTHROPLASTY, LEFT: Primary | ICD-10-CM

## 2018-02-14 PROCEDURE — 99024 POSTOP FOLLOW-UP VISIT: CPT | Performed by: PHYSICIAN ASSISTANT

## 2018-02-14 RX ORDER — HYDROCODONE BITARTRATE AND ACETAMINOPHEN 5; 325 MG/1; MG/1
1 TABLET ORAL EVERY 6 HOURS PRN
Qty: 30 TABLET | Refills: 0 | Status: SHIPPED | OUTPATIENT
Start: 2018-02-14 | End: 2018-08-06

## 2018-02-14 NOTE — PROGRESS NOTES
"        [facility] [place] [place]    Subjective     Patient: Bri Iniguez  : 1935    Primary Care Provider: Clifford Nolasco MD    Requesting Provider: As above    Post-op (3 weeks s/p (L) Total Hip Arthroplasty 18)      History        History of Present Illness: s/p L DEMARCO with Dr. Son.  She reports minimal pain in the hip.  She reports 100% better than prior to surgery.  She reports she did physical therapy Monday and her therapist was concerned about the hip being unstable.  Patient reports she doesn't feel unstable she feels she is doing well.  She continues to ambulate with a walker.    Current Outpatient Prescriptions on File Prior to Visit   Medication Sig Dispense Refill   • acetaminophen (TYLENOL) 650 MG 8 hr tablet Take 650 mg by mouth 2 (Two) Times a Day.     • aspirin 325 MG EC tablet Take 1 tablet by mouth Daily for 1 month 30 tablet 0   • aspirin 81 MG chewable tablet Chew 1 tablet Every Morning. Resume in 1 month     • atorvastatin (LIPITOR) 10 MG tablet Take 1 tablet by mouth Daily. 90 tablet 3   • docusate sodium 100 MG capsule Take 1 capsule by mouth 2 (Two) Times a Day As Needed for Constipation. 60 capsule 0   • lisinopril-hydrochlorothiazide (PRINZIDE,ZESTORETIC) 20-12.5 MG per tablet Take 1 tablet by mouth Daily. 90 tablet 3   • metFORMIN (GLUCOPHAGE) 500 MG tablet Take 1 tablet by mouth Daily. 90 tablet 3   • Multiple Vitamins-Minerals (OCUVITE ADULT 50+) capsule Take 1 tablet by mouth Daily.       No current facility-administered medications on file prior to visit.       No Known Allergies   Past Medical History:   Diagnosis Date   • Bursitis     trochanteric area   • Colonoscopy refused    • Deafness     unspecified   • Diabetes mellitus     \"prediabetes\" per pt report, takes po metformin daily and does not check fsbg   • Essential hypertension    • Hip pain    • Hyperlipidemia     other   • Influenza vaccine administered    • Macular degeneration    • " Mammogram declined 2015   • Osteoarthritis of left hip    • Primary osteoarthritis of right knee    • Supraventricular premature beats    • Wears dentures      Past Surgical History:   Procedure Laterality Date   • CATARACT EXTRACTION Bilateral    • CHOLECYSTECTOMY  2002   • COLONOSCOPY     • HERNIA REPAIR  2005    umbilical   • HYSTERECTOMY  1974   • TONSILLECTOMY     • TOTAL HIP ARTHROPLASTY Left 1/23/2018    Procedure: LEFT TOTAL HIP ARTHROPLASTY;  Surgeon: Gideon Son MD;  Location: [facility];  Service:      Family History   Problem Relation Age of Onset   • Hypertension Other    • Diabetes Other      type 2   • Stroke Mother    • Diabetes Mother    • Hypertension Mother    • Cancer Father       Social History     Social History   • Marital status:      Spouse name: N/A   • Number of children: N/A   • Years of education: N/A     Occupational History   • Not on file.     Social History Main Topics   • Smoking status: Never Smoker   • Smokeless tobacco: Never Used   • Alcohol use No   • Drug use: No   • Sexual activity: Defer     Other Topics Concern   • Not on file     Social History Narrative        Review of Systems   Constitutional: Negative.    HENT: Negative.    Eyes: Negative.    Respiratory: Negative.    Cardiovascular: Negative.    Gastrointestinal: Negative.    Endocrine: Negative.    Genitourinary: Negative.    Musculoskeletal: Positive for joint swelling.   Skin: Negative.    Allergic/Immunologic: Negative.    Neurological: Negative.    Hematological: Negative.    Psychiatric/Behavioral: Negative.        The following portions of the patient's history were reviewed and updated as appropriate: allergies, current medications, past family history, past medical history, past social history, past surgical history and problem list.      Objective      Physical Exam  LMP 06/03/1980 (Approximate) Comment: hysterectomy    There is no height or weight on file to calculate BMI.    Ortho Exam  Left hip  exam:   Left hip incision is healing well  No pain with hip rotation  Hip flexion/abduction/adduction with good strength  NVI distally  Ambulating with a walker    Medical Decision Making    Data Review:   ordered and reviewed x-rays today    Assessment:  1. S/P total knee arthroplasty, left        Plan:  1.  Doing well s/p L TKA.  Patient reports 100% improved pain since prior to surgery.  X-rays show a well-positioned total hip arthroplasty.  I reassured the patient that I don't see any evidence that her hip is unstable.  I think what her therapist think is going to instability is really weakness in the muscle.  I encouraged her to continue her strengthening exercises and I think this will resolve.  She will return in 6 weeks to see Dr. Son or sooner if needed.      Ayala Caceres PA-C  02/14/18  4:35 PM

## 2018-04-09 ENCOUNTER — OFFICE VISIT (OUTPATIENT)
Dept: ORTHOPEDIC SURGERY | Facility: CLINIC | Age: 83
End: 2018-04-09

## 2018-04-09 DIAGNOSIS — Z47.89 ORTHOPEDIC AFTERCARE: ICD-10-CM

## 2018-04-09 DIAGNOSIS — Z96.642 STATUS POST TOTAL REPLACEMENT OF LEFT HIP: Primary | ICD-10-CM

## 2018-04-09 PROCEDURE — 99024 POSTOP FOLLOW-UP VISIT: CPT | Performed by: ORTHOPAEDIC SURGERY

## 2018-04-09 NOTE — PROGRESS NOTES
"    [facility] [place] [facility]opa[facility] Surgery Clinic Note    Subjective     Chief Complaint   Patient presents with   • Post-op     10 weeks s/p (L) Total Hip Arthroplasty 1/23/18        HPI    Bri Iniguez is a 82 y.o. female. She follows up today for her left total hip arthroplasty.  She is doing well today.  No pain.  100% improvement compared to her preoperative symptoms.  She is using a cane secondary to weakness in the hip.      Patient Active Problem List   Diagnosis   • Macular degeneration   • Hyperlipidemia   • Essential hypertension   • Deafness   • Diabetes type 2, controlled   • Primary osteoarthritis of left hip   • Venous insufficiency of both lower extremities   • Status post total replacement of left hip   • Acute blood loss anemia, mild, asymptomatic   • Leukocytosis, mild, likely reactive     Past Medical History:   Diagnosis Date   • Bursitis     trochanteric area   • Colonoscopy refused 2015   • Deafness     unspecified   • Diabetes mellitus     \"prediabetes\" per pt report, takes po metformin daily and does not check fsbg   • Essential hypertension    • Hip pain    • Hyperlipidemia     other   • Influenza vaccine administered 2014   • Macular degeneration    • Mammogram declined 2015   • Osteoarthritis of left hip    • Primary osteoarthritis of right knee    • Supraventricular premature beats    • Wears dentures       Past Surgical History:   Procedure Laterality Date   • CATARACT EXTRACTION Bilateral    • CHOLECYSTECTOMY  2002   • COLONOSCOPY     • HERNIA REPAIR  2005    umbilical   • HYSTERECTOMY  1974   • TONSILLECTOMY     • TOTAL HIP ARTHROPLASTY Left 1/23/2018    Procedure: LEFT TOTAL HIP ARTHROPLASTY;  Surgeon: Gideon Son MD;  Location: [facility];  Service:       Family History   Problem Relation Age of Onset   • Hypertension Other    • Diabetes Other      type 2   • Stroke Mother    • Diabetes Mother    • Hypertension Mother    • Cancer Father      Social History     Social History   • Marital " status:      Spouse name: N/A   • Number of children: N/A   • Years of education: N/A     Occupational History   • Not on file.     Social History Main Topics   • Smoking status: Never Smoker   • Smokeless tobacco: Never Used   • Alcohol use No   • Drug use: No   • Sexual activity: Defer     Other Topics Concern   • Not on file     Social History Narrative   • No narrative on file      Current Outpatient Prescriptions on File Prior to Visit   Medication Sig Dispense Refill   • acetaminophen (TYLENOL) 650 MG 8 hr tablet Take 650 mg by mouth 2 (Two) Times a Day.     • aspirin 325 MG EC tablet Take 1 tablet by mouth Daily for 1 month 30 tablet 0   • aspirin 81 MG chewable tablet Chew 1 tablet Every Morning. Resume in 1 month     • atorvastatin (LIPITOR) 10 MG tablet Take 1 tablet by mouth Daily. 90 tablet 3   • docusate sodium 100 MG capsule Take 1 capsule by mouth 2 (Two) Times a Day As Needed for Constipation. 60 capsule 0   • HYDROcodone-acetaminophen (NORCO) 5-325 MG per tablet Take 1 tablet by mouth Every 6 (Six) Hours As Needed for Moderate Pain . 30 tablet 0   • lisinopril-hydrochlorothiazide (PRINZIDE,ZESTORETIC) 20-12.5 MG per tablet Take 1 tablet by mouth Daily. 90 tablet 3   • metFORMIN (GLUCOPHAGE) 500 MG tablet Take 1 tablet by mouth Daily. 90 tablet 3   • Multiple Vitamins-Minerals (OCUVITE ADULT 50+) capsule Take 1 tablet by mouth Daily.       No current facility-administered medications on file prior to visit.       No Known Allergies     Review of Systems   Constitutional: Negative for activity change, appetite change, chills, diaphoresis, fatigue, fever and unexpected weight change.   HENT: Negative for congestion, dental problem, drooling, ear discharge, ear pain, facial swelling, hearing loss, mouth sores, nosebleeds, postnasal drip, rhinorrhea, sinus pressure, sneezing, sore throat, tinnitus, trouble swallowing and voice change.    Eyes: Negative for photophobia, pain, discharge, redness,  itching and visual disturbance.   Respiratory: Negative for apnea, cough, choking, chest tightness, shortness of breath, wheezing and stridor.    Cardiovascular: Negative for chest pain, palpitations and leg swelling.   Gastrointestinal: Negative for abdominal distention, abdominal pain, anal bleeding, blood in stool, constipation, diarrhea, nausea, rectal pain and vomiting.   Endocrine: Negative for cold intolerance, heat intolerance, polydipsia, polyphagia and polyuria.   Genitourinary: Negative for decreased urine volume, difficulty urinating, dysuria, enuresis, flank pain, frequency, genital sores, hematuria and urgency.   Musculoskeletal: Positive for joint swelling. Negative for arthralgias, back pain, gait problem, myalgias, neck pain and neck stiffness.   Skin: Negative for color change, pallor, rash and wound.   Allergic/Immunologic: Negative for environmental allergies, food allergies and immunocompromised state.   Neurological: Negative for dizziness, tremors, seizures, syncope, facial asymmetry, speech difficulty, weakness, light-headedness, numbness and headaches.   Hematological: Negative for adenopathy. Does not bruise/bleed easily.   Psychiatric/Behavioral: Negative for agitation, behavioral problems, confusion, decreased concentration, dysphoric mood, hallucinations, self-injury, sleep disturbance and suicidal ideas. The patient is not nervous/anxious and is not hyperactive.         Objective      Physical Exam  LMP 06/03/1980 (Approximate) Comment: hysterectomy    There is no height or weight on file to calculate BMI.    General:   Mental Status:  Alert   Appearance: Cooperative, in no acute distress   Build and Nutrition: Well-nourished and well developed female   Orientation: Alert and oriented to person, place and time   Posture: Normal   Gait: With a cane    Integument:   Left hip: Wound is well-healed with no signs of infection    Lower Extremity:   Left Hip:    Tenderness:  None    Swelling:   None    Crepitus:  None    Range of motion:  External Rotation: 30°       Internal Rotation: 30°       Flexion:  100°       Extension:  0°    Deformities:  None  Functional testing: Negative Stinchfield    No leg length discrepancy        Assessment and Plan     Bri was seen today for post-op.    Diagnoses and all orders for this visit:    Status post total replacement of left hip    Orthopedic aftercare        I reviewed my findings with the patient and her daughter today.  Her left hip replacement is doing well.  I will see her back in 4 months with an x-ray, but sooner for any problems.  She will continue to work on her abductor strengthening.    Return in about 4 months (around 8/9/2018) for Recheck with X-Rays.        Gideon Son MD  04/09/18  10:29 AM

## 2018-06-27 ENCOUNTER — OFFICE VISIT (OUTPATIENT)
Dept: ORTHOPEDIC SURGERY | Facility: CLINIC | Age: 83
End: 2018-06-27

## 2018-06-27 VITALS — HEART RATE: 125 BPM | WEIGHT: 161.38 LBS | BODY MASS INDEX: 25.94 KG/M2 | OXYGEN SATURATION: 98 % | HEIGHT: 66 IN

## 2018-06-27 DIAGNOSIS — M17.11 ARTHRITIS OF RIGHT KNEE: Primary | ICD-10-CM

## 2018-06-27 PROCEDURE — 20610 DRAIN/INJ JOINT/BURSA W/O US: CPT | Performed by: PHYSICIAN ASSISTANT

## 2018-06-27 PROCEDURE — 99214 OFFICE O/P EST MOD 30 MIN: CPT | Performed by: PHYSICIAN ASSISTANT

## 2018-06-27 RX ORDER — LIDOCAINE HYDROCHLORIDE 10 MG/ML
4 INJECTION, SOLUTION INFILTRATION; PERINEURAL
Status: COMPLETED | OUTPATIENT
Start: 2018-06-27 | End: 2018-06-27

## 2018-06-27 RX ORDER — BUPIVACAINE HYDROCHLORIDE 2.5 MG/ML
4 INJECTION, SOLUTION INFILTRATION; PERINEURAL
Status: COMPLETED | OUTPATIENT
Start: 2018-06-27 | End: 2018-06-27

## 2018-06-27 RX ORDER — METHYLPREDNISOLONE ACETATE 40 MG/ML
40 INJECTION, SUSPENSION INTRA-ARTICULAR; INTRALESIONAL; INTRAMUSCULAR; SOFT TISSUE
Status: COMPLETED | OUTPATIENT
Start: 2018-06-27 | End: 2018-06-27

## 2018-06-27 RX ADMIN — LIDOCAINE HYDROCHLORIDE 4 ML: 10 INJECTION, SOLUTION INFILTRATION; PERINEURAL at 11:17

## 2018-06-27 RX ADMIN — METHYLPREDNISOLONE ACETATE 40 MG: 40 INJECTION, SUSPENSION INTRA-ARTICULAR; INTRALESIONAL; INTRAMUSCULAR; SOFT TISSUE at 11:17

## 2018-06-27 RX ADMIN — BUPIVACAINE HYDROCHLORIDE 4 ML: 2.5 INJECTION, SOLUTION INFILTRATION; PERINEURAL at 11:17

## 2018-06-27 NOTE — PROGRESS NOTES
[facility] [place] [facility] [place]    Subjective     Patient: Bri Iniguez  : 1935    Primary Care Provider: Clifford Nolasco MD    Requesting Provider: As above    Follow-up and Pain of the Right Knee      History    Chief Complaint: Right knee pain    History of Present Illness: Mrs. Iniguez returns today to discuss her increasing right knee pain.  She has known right knee arthritis.  She reports increasing pain since early May 2018.  She complains of medial functional pain with no night pain.  She denies any radiating pain or mechanical symptoms.  She rates the pain to be 7/10.  She's been treated in the past with both steroid injection as well as HA injections with good relief.  She is using a cane for ambulation.  She is not interested in knee replacement this time and would like to consider repeat series of a change actions.    Current Outpatient Prescriptions on File Prior to Visit   Medication Sig Dispense Refill   • acetaminophen (TYLENOL) 650 MG 8 hr tablet Take 650 mg by mouth 2 (Two) Times a Day.     • aspirin 325 MG EC tablet Take 1 tablet by mouth Daily for 1 month 30 tablet 0   • aspirin 81 MG chewable tablet Chew 1 tablet Every Morning. Resume in 1 month     • atorvastatin (LIPITOR) 10 MG tablet Take 1 tablet by mouth Daily. 90 tablet 3   • docusate sodium 100 MG capsule Take 1 capsule by mouth 2 (Two) Times a Day As Needed for Constipation. 60 capsule 0   • HYDROcodone-acetaminophen (NORCO) 5-325 MG per tablet Take 1 tablet by mouth Every 6 (Six) Hours As Needed for Moderate Pain . 30 tablet 0   • lisinopril-hydrochlorothiazide (PRINZIDE,ZESTORETIC) 20-12.5 MG per tablet Take 1 tablet by mouth Daily. 90 tablet 3   • metFORMIN (GLUCOPHAGE) 500 MG tablet Take 1 tablet by mouth Daily. 90 tablet 3   • Multiple Vitamins-Minerals (OCUVITE ADULT 50+) capsule Take 1 tablet by mouth Daily.       No current facility-administered medications on file prior to visit.       No Known Allergies  "  Past Medical History:   Diagnosis Date   • Bursitis     trochanteric area   • Colonoscopy refused 2015   • Deafness     unspecified   • Diabetes mellitus     \"prediabetes\" per pt report, takes po metformin daily and does not check fsbg   • Essential hypertension    • Hip pain    • Hyperlipidemia     other   • Influenza vaccine administered 2014   • Macular degeneration    • Mammogram declined 2015   • Osteoarthritis of left hip    • Primary osteoarthritis of right knee    • Supraventricular premature beats    • Wears dentures      Past Surgical History:   Procedure Laterality Date   • CATARACT EXTRACTION Bilateral    • CHOLECYSTECTOMY  2002   • COLONOSCOPY     • HERNIA REPAIR  2005    umbilical   • HYSTERECTOMY  1974   • TONSILLECTOMY     • TOTAL HIP ARTHROPLASTY Left 1/23/2018    Procedure: LEFT TOTAL HIP ARTHROPLASTY;  Surgeon: Gideon Son MD;  Location: [facility];  Service:      Family History   Problem Relation Age of Onset   • Hypertension Other    • Diabetes Other         type 2   • Stroke Mother    • Diabetes Mother    • Hypertension Mother    • Cancer Father       Social History     Social History   • Marital status:      Spouse name: N/A   • Number of children: N/A   • Years of education: N/A     Occupational History   • Not on file.     Social History Main Topics   • Smoking status: Never Smoker   • Smokeless tobacco: Never Used   • Alcohol use No   • Drug use: No   • Sexual activity: Defer     Other Topics Concern   • Not on file     Social History Narrative   • No narrative on file        Review of Systems   Constitutional: Negative.    HENT: Negative.    Eyes: Negative.    Respiratory: Negative.    Cardiovascular: Negative.    Gastrointestinal: Negative.    Endocrine: Negative.    Genitourinary: Negative.    Musculoskeletal: Positive for joint swelling.        Joint Pain    Skin: Negative.    Allergic/Immunologic: Negative.    Neurological: Negative.    Hematological: Negative.  " "  Psychiatric/Behavioral: Negative.        The following portions of the patient's history were reviewed and updated as appropriate: allergies, current medications, past family history, past medical history, past social history, past surgical history and problem list.      Objective      Physical Exam  Pulse (!) 125   Ht 167.6 cm (65.98\")   Wt 73.2 kg (161 lb 6 oz)   LMP 06/03/1980 (Approximate) Comment: hysterectomy  SpO2 98%   BMI 26.06 kg/m²     Body mass index is 26.06 kg/m².    GENERAL: Body habitus: normal weight for height    Gait: using cane     Mental Status:  awake and alert; oriented to person, place, and time    Voice:  clear  SKIN:  Normal    Hair Growth:  Right:normal; Left:  normal  HEENT: Head: Normocephalic, atraumatic,  without obvious abnormality.  eye: normal external eye, no icterus   PULM:  Repiratory effort normal    Ortho Exam  Right Hip Exam  ----------  FLEXION CONTRACTURE: None  FLEXION: 110 degrees  INTERNAL ROTATION: 20 degrees at 90 degrees of flexion   EXTERNAL ROTATION: 40 degrees at 90 degrees of flexion    PAIN WITH HIP MOTION: no      Right Knee Exam  ----------  ALIGNMENT: Right: neutral----------  RANGE OF MOTION:  Right: Normal (0-130 degrees) with no extensor lag or flexion contracture  LIGAMENTOUS STABILITY:   Right:stable to varus and valgus stress at terminal extension and 30 degrees without any evidence of laxity----------  STRENGTH:  KNEE FLEXION Right 5/5  KNEE EXTENSION Right 5/5 ----------  PAIN WITH PALPATION: Right medial joint line  PAIN WITH KNEE ROM: Right no  PATELLAR CREPITUS: Right yes   ----------  SENSATION TO LIGHT TOUCH:  DEEP PERONEAL/SUPERFICIAL PERONEAL/SURAL/SAPHENOUS/TIBIAL:   Right intact----------  EFFUSION  Right:  no;  ERYTHEMA:  Right: no;  WOUNDS/INCISIONS: none, no overlying skin problems.      Medical Decision Making    Data Review:   ordered and reviewed x-rays today    Assessment:  1. Arthritis of right knee        Plan:  Right knee " arthritis.  I reviewed x-rays and clinical findings with the patient.  On exam, he has medial joint line tenderness with good range of motion and stable ligament is exam.  X-rays today show bone-on-bone contact in the medial compartment as well as the patellofemoral compartment.  I think her return pain and functional limitations are secondary to knee arthritis.  Patient is been treated with both steroid injection HA injections in the past with good relief.  Recommendation today is repeat steroid injection into the right knee.  We will have HA injections preauthorized and she'll return in 2-3 weeks to begin the series or sooner if needed.    Using sterile technique, the right knee was sterilely prepped with Hibiclens.  Following a time out,  using a 22 gauge needle, the right knee was injected with 40mg Depo Medrol, 4 cc lidocaine and 4 cc marcaine.  Patient tolerated the procedure well.  No complications.          Ayala Caceres PA-C  06/27/18  4:07 PM

## 2018-06-27 NOTE — PROGRESS NOTES
Procedure   Large Joint Arthrocentesis  Date/Time: 6/27/2018 11:17 AM  Consent given by: patient  Site marked: site marked  Timeout: Immediately prior to procedure a time out was called to verify the correct patient, procedure, equipment, support staff and site/side marked as required   Supporting Documentation  Indications: pain   Procedure Details  Location: knee - R knee  Preparation: Patient was prepped and draped in the usual sterile fashion  Needle size: 22 G  Approach: anterolateral  Medications administered: 4 mL bupivacaine 0.25 %; 4 mL lidocaine 1 %; 40 mg methylPREDNISolone acetate 40 MG/ML  Patient tolerance: patient tolerated the procedure well with no immediate complications

## 2018-07-18 ENCOUNTER — CLINICAL SUPPORT (OUTPATIENT)
Dept: ORTHOPEDIC SURGERY | Facility: CLINIC | Age: 83
End: 2018-07-18

## 2018-07-18 DIAGNOSIS — M17.11 ARTHRITIS OF RIGHT KNEE: Primary | ICD-10-CM

## 2018-07-18 PROCEDURE — 20610 DRAIN/INJ JOINT/BURSA W/O US: CPT | Performed by: PHYSICIAN ASSISTANT

## 2018-07-18 RX ORDER — CITALOPRAM 10 MG/1
10 TABLET ORAL DAILY
Refills: 1 | COMMUNITY
Start: 2018-07-16 | End: 2019-01-10

## 2018-07-18 RX ORDER — NITROFURANTOIN 25; 75 MG/1; MG/1
CAPSULE ORAL
Refills: 0 | COMMUNITY
Start: 2018-07-07 | End: 2019-01-10

## 2018-07-18 RX ORDER — PHENAZOPYRIDINE HYDROCHLORIDE 100 MG/1
TABLET, FILM COATED ORAL
Refills: 0 | COMMUNITY
Start: 2018-07-14 | End: 2019-01-10

## 2018-07-18 NOTE — PROGRESS NOTES
Procedure   Large Joint Arthrocentesis  Date/Time: 7/18/2018 11:19 AM  Consent given by: patient  Site marked: site marked  Timeout: Immediately prior to procedure a time out was called to verify the correct patient, procedure, equipment, support staff and site/side marked as required   Supporting Documentation  Indications: pain   Procedure Details  Location: knee - R knee  Preparation: Patient was prepped and draped in the usual sterile fashion  Needle size: 22 G  Approach: superior  Medications administered: 30 mg Hyaluronan 30 MG/2ML  Aspirate: clear (to verify intraarticular placement of the needle)  Patient tolerance: patient tolerated the procedure well with no immediate complications

## 2018-07-18 NOTE — PROGRESS NOTES
Subjective     Injections (Right knee Orthovisc Injection #1)      Bri Iniguez is a 82 y.o. female.     History of Present Illness   Patient presents for the first injection of Orthovisc in the right knee.  She's had previous series with good relief.  No Known Allergies  Current Outpatient Prescriptions on File Prior to Visit   Medication Sig Dispense Refill   • acetaminophen (TYLENOL) 650 MG 8 hr tablet Take 650 mg by mouth 2 (Two) Times a Day.     • aspirin 325 MG EC tablet Take 1 tablet by mouth Daily for 1 month 30 tablet 0   • aspirin 81 MG chewable tablet Chew 1 tablet Every Morning. Resume in 1 month     • atorvastatin (LIPITOR) 10 MG tablet Take 1 tablet by mouth Daily. 90 tablet 3   • docusate sodium 100 MG capsule Take 1 capsule by mouth 2 (Two) Times a Day As Needed for Constipation. 60 capsule 0   • HYDROcodone-acetaminophen (NORCO) 5-325 MG per tablet Take 1 tablet by mouth Every 6 (Six) Hours As Needed for Moderate Pain . 30 tablet 0   • lisinopril-hydrochlorothiazide (PRINZIDE,ZESTORETIC) 20-12.5 MG per tablet Take 1 tablet by mouth Daily. 90 tablet 3   • metFORMIN (GLUCOPHAGE) 500 MG tablet Take 1 tablet by mouth Daily. 90 tablet 3   • Multiple Vitamins-Minerals (OCUVITE ADULT 50+) capsule Take 1 tablet by mouth Daily.       No current facility-administered medications on file prior to visit.      Social History     Social History   • Marital status:      Spouse name: N/A   • Number of children: N/A   • Years of education: N/A     Occupational History   • Not on file.     Social History Main Topics   • Smoking status: Never Smoker   • Smokeless tobacco: Never Used   • Alcohol use No   • Drug use: No   • Sexual activity: Defer     Other Topics Concern   • Not on file     Social History Narrative   • No narrative on file     Past Surgical History:   Procedure Laterality Date   • CATARACT EXTRACTION Bilateral    • CHOLECYSTECTOMY  2002   • COLONOSCOPY     • HERNIA REPAIR  2005    umbilical   •  "HYSTERECTOMY  1974   • TONSILLECTOMY     • TOTAL HIP ARTHROPLASTY Left 1/23/2018    Procedure: LEFT TOTAL HIP ARTHROPLASTY;  Surgeon: Gideon Son MD;  Location: [facility];  Service:      Family History   Problem Relation Age of Onset   • Hypertension Other    • Diabetes Other         type 2   • Stroke Mother    • Diabetes Mother    • Hypertension Mother    • Cancer Father      Past Medical History:   Diagnosis Date   • Bursitis     trochanteric area   • Colonoscopy refused 2015   • Deafness     unspecified   • Diabetes mellitus (CMS/HCC)     \"prediabetes\" per pt report, takes po metformin daily and does not check fsbg   • Essential hypertension    • Hip pain    • Hyperlipidemia     other   • Influenza vaccine administered 2014   • Macular degeneration    • Mammogram declined 2015   • Osteoarthritis of left hip    • Primary osteoarthritis of right knee    • Supraventricular premature beats    • Wears dentures          Review of Systems    The following portions of the patient's history were reviewed and updated as appropriate: allergies, current medications, past family history, past medical history, past social history, past surgical history and problem list.    Ortho Exam      Medical Decision Making    Assessment and Plan/ Diagnosis/Treatment options:   Right knee arthritis here to begin a series of Orthovisc.  Plan is to proceed with first injection right knee today.  She'll return in 1 week for the second injection or sooner if needed.    Using sterile technique, the right knee was sterilely prepped with Hibiclens.  Following time out, using a 22 gauge needle the right knee was aspirated and then injected with 2 ml Orthovisc. Approximately 0.5 mm of straw-colored fluid was obtained.  Patient tolerated the procedure well.  No complications.            "

## 2018-07-25 ENCOUNTER — CLINICAL SUPPORT (OUTPATIENT)
Dept: ORTHOPEDIC SURGERY | Facility: CLINIC | Age: 83
End: 2018-07-25

## 2018-07-25 DIAGNOSIS — M17.11 ARTHRITIS OF RIGHT KNEE: Primary | ICD-10-CM

## 2018-07-25 PROCEDURE — 20610 DRAIN/INJ JOINT/BURSA W/O US: CPT | Performed by: PHYSICIAN ASSISTANT

## 2018-07-25 NOTE — PROGRESS NOTES
Procedure   Large Joint Arthrocentesis  Date/Time: 7/25/2018 11:22 AM  Consent given by: patient  Site marked: site marked  Timeout: Immediately prior to procedure a time out was called to verify the correct patient, procedure, equipment, support staff and site/side marked as required   Supporting Documentation  Indications: pain   Procedure Details  Location: knee - R knee  Preparation: Patient was prepped and draped in the usual sterile fashion  Needle size: 22 G  Approach: anterolateral  Medications administered: 30 mg Hyaluronan 30 MG/2ML  Patient tolerance: patient tolerated the procedure well with no immediate complications

## 2018-07-25 NOTE — PROGRESS NOTES
Subjective     Follow-up of the Right Knee (Orthovisc injection #2)      Bri Iniguez is a 82 y.o. female.     History of Present Illness   She presents for the second injection of Orthovisc in the right knee.  She is tolerated previous injections well.  No change knee pain.  No Known Allergies  Current Outpatient Prescriptions on File Prior to Visit   Medication Sig Dispense Refill   • acetaminophen (TYLENOL) 650 MG 8 hr tablet Take 650 mg by mouth 2 (Two) Times a Day.     • aspirin 325 MG EC tablet Take 1 tablet by mouth Daily for 1 month 30 tablet 0   • aspirin 81 MG chewable tablet Chew 1 tablet Every Morning. Resume in 1 month     • atorvastatin (LIPITOR) 10 MG tablet Take 1 tablet by mouth Daily. 90 tablet 3   • citalopram (CeleXA) 10 MG tablet Take 10 mg by mouth Daily.  1   • docusate sodium 100 MG capsule Take 1 capsule by mouth 2 (Two) Times a Day As Needed for Constipation. 60 capsule 0   • HYDROcodone-acetaminophen (NORCO) 5-325 MG per tablet Take 1 tablet by mouth Every 6 (Six) Hours As Needed for Moderate Pain . 30 tablet 0   • lisinopril-hydrochlorothiazide (PRINZIDE,ZESTORETIC) 20-12.5 MG per tablet Take 1 tablet by mouth Daily. 90 tablet 3   • metFORMIN (GLUCOPHAGE) 500 MG tablet Take 1 tablet by mouth Daily. 90 tablet 3   • Multiple Vitamins-Minerals (OCUVITE ADULT 50+) capsule Take 1 tablet by mouth Daily.     • nitrofurantoin, macrocrystal-monohydrate, (MACROBID) 100 MG capsule TAKE ONE CAPSULE BY MOUTH EVERY 12 HOURS FOR 5 DAYS TAKE WITH FOOD  0   • phenazopyridine (PYRIDIUM) 100 MG tablet TAKE ONE TABLET BY MOUTH TWO TIMES A DAY AS NEEDED FOR PAIN WITH URINATION  0     No current facility-administered medications on file prior to visit.      Social History     Social History   • Marital status:      Spouse name: N/A   • Number of children: N/A   • Years of education: N/A     Occupational History   • Not on file.     Social History Main Topics   • Smoking status: Never Smoker   •  "Smokeless tobacco: Never Used   • Alcohol use No   • Drug use: No   • Sexual activity: Defer     Other Topics Concern   • Not on file     Social History Narrative   • No narrative on file     Past Surgical History:   Procedure Laterality Date   • CATARACT EXTRACTION Bilateral    • CHOLECYSTECTOMY  2002   • COLONOSCOPY     • HERNIA REPAIR  2005    umbilical   • HYSTERECTOMY  1974   • TONSILLECTOMY     • TOTAL HIP ARTHROPLASTY Left 1/23/2018    Procedure: LEFT TOTAL HIP ARTHROPLASTY;  Surgeon: Gideon Son MD;  Location: [facility];  Service:      Family History   Problem Relation Age of Onset   • Hypertension Other    • Diabetes Other         type 2   • Stroke Mother    • Diabetes Mother    • Hypertension Mother    • Cancer Father      Past Medical History:   Diagnosis Date   • Bursitis     trochanteric area   • Colonoscopy refused 2015   • Deafness     unspecified   • Diabetes mellitus (CMS/HCC)     \"prediabetes\" per pt report, takes po metformin daily and does not check fsbg   • Essential hypertension    • Hip pain    • Hyperlipidemia     other   • Influenza vaccine administered 2014   • Macular degeneration    • Mammogram declined 2015   • Osteoarthritis of left hip    • Primary osteoarthritis of right knee    • Supraventricular premature beats    • Wears dentures          Review of Systems    The following portions of the patient's history were reviewed and updated as appropriate: allergies, current medications, past family history, past medical history, past social history, past surgical history and problem list.    Ortho Exam      Medical Decision Making    Assessment and Plan/ Diagnosis/Treatment options:   Right knee arthritis undergoing an Orthovisc series.  Plan is to proceed with second injection in the right knee today.  She'll return in 2 weeks for the final injection or sooner if needed.    Using sterile technique, the right knee was sterilely prepped with Hibiclens.  Following time out, using a 22 " gauge needle the right knee was aspirated and then injected with 2 ml Orthovisc. Approximately 0.5 mm of straw-colored fluid was obtained.  Patient tolerated the procedure well.  No complications.

## 2018-08-06 ENCOUNTER — OFFICE VISIT (OUTPATIENT)
Dept: ORTHOPEDIC SURGERY | Facility: CLINIC | Age: 83
End: 2018-08-06

## 2018-08-06 VITALS — HEART RATE: 78 BPM | WEIGHT: 156.53 LBS | BODY MASS INDEX: 25.16 KG/M2 | HEIGHT: 66 IN | OXYGEN SATURATION: 97 %

## 2018-08-06 DIAGNOSIS — Z96.642 STATUS POST TOTAL REPLACEMENT OF LEFT HIP: Primary | ICD-10-CM

## 2018-08-06 DIAGNOSIS — M17.11 PRIMARY OSTEOARTHRITIS OF RIGHT KNEE: ICD-10-CM

## 2018-08-06 PROCEDURE — 99213 OFFICE O/P EST LOW 20 MIN: CPT | Performed by: ORTHOPAEDIC SURGERY

## 2018-08-06 PROCEDURE — 20610 DRAIN/INJ JOINT/BURSA W/O US: CPT | Performed by: ORTHOPAEDIC SURGERY

## 2018-08-06 NOTE — PROGRESS NOTES
"    [facility] Note    Subjective     Chief Complaint   Patient presents with   • Left Hip - Follow-up     6 months s/p (L) DEMARCO 01/23/2018        HPI    Bri Iniguez is a 82 y.o. female.  She follows up today for her left total hip arthroplasty.  She is doing well, and is very pleased with results.  She continues to use a cane because of weakness, but excellent pain relief, with 100% improvement compared to her preoperative symptoms.    She is also due for the final injection into her right knee of Orthovisc.  She has seen some improvement with the injections so far.    Patient Active Problem List   Diagnosis   • Macular degeneration   • Hyperlipidemia   • Essential hypertension   • Deafness   • Diabetes type 2, controlled (CMS/HCC)   • Primary osteoarthritis of left hip   • Venous insufficiency of both lower extremities   • Status post total replacement of left hip   • Acute blood loss anemia, mild, asymptomatic   • Leukocytosis, mild, likely reactive     Past Medical History:   Diagnosis Date   • Bursitis     trochanteric area   • Colonoscopy refused 2015   • Deafness     unspecified   • Diabetes mellitus (CMS/[facility])     \"prediabetes\" per pt report, takes po metformin daily and does not check fsbg   • Essential hypertension    • Hip pain    • Hyperlipidemia     other   • Influenza vaccine administered 2014   • Macular degeneration    • Mammogram declined 2015   • Osteoarthritis of left hip    • Primary osteoarthritis of right knee    • Supraventricular premature beats    • Wears dentures       Past Surgical History:   Procedure Laterality Date   • CATARACT EXTRACTION Bilateral    • CHOLECYSTECTOMY  2002   • COLONOSCOPY     • HERNIA REPAIR  2005    umbilical   • HYSTERECTOMY  1974   • TONSILLECTOMY     • TOTAL HIP ARTHROPLASTY Left 1/23/2018    Procedure: LEFT TOTAL HIP ARTHROPLASTY;  Surgeon: Gideon Son MD;  Location: [facility];  Service:       Family History   Problem Relation Age of " Onset   • Hypertension Other    • Diabetes Other         type 2   • Stroke Mother    • Diabetes Mother    • Hypertension Mother    • Cancer Father      Social History     Social History   • Marital status:      Spouse name: N/A   • Number of children: N/A   • Years of education: N/A     Occupational History   • Not on file.     Social History Main Topics   • Smoking status: Never Smoker   • Smokeless tobacco: Never Used   • Alcohol use No   • Drug use: No   • Sexual activity: Defer     Other Topics Concern   • Not on file     Social History Narrative   • No narrative on file      Current Outpatient Prescriptions on File Prior to Visit   Medication Sig Dispense Refill   • acetaminophen (TYLENOL) 650 MG 8 hr tablet Take 650 mg by mouth 2 (Two) Times a Day.     • aspirin 81 MG chewable tablet Chew 1 tablet Every Morning. Resume in 1 month     • atorvastatin (LIPITOR) 10 MG tablet Take 1 tablet by mouth Daily. 90 tablet 3   • citalopram (CeleXA) 10 MG tablet Take 10 mg by mouth Daily.  1   • docusate sodium 100 MG capsule Take 1 capsule by mouth 2 (Two) Times a Day As Needed for Constipation. 60 capsule 0   • lisinopril-hydrochlorothiazide (PRINZIDE,ZESTORETIC) 20-12.5 MG per tablet Take 1 tablet by mouth Daily. 90 tablet 3   • metFORMIN (GLUCOPHAGE) 500 MG tablet Take 1 tablet by mouth Daily. 90 tablet 3   • Multiple Vitamins-Minerals (OCUVITE ADULT 50+) capsule Take 1 tablet by mouth Daily.     • nitrofurantoin, macrocrystal-monohydrate, (MACROBID) 100 MG capsule TAKE ONE CAPSULE BY MOUTH EVERY 12 HOURS FOR 5 DAYS TAKE WITH FOOD  0   • phenazopyridine (PYRIDIUM) 100 MG tablet TAKE ONE TABLET BY MOUTH TWO TIMES A DAY AS NEEDED FOR PAIN WITH URINATION  0   • [DISCONTINUED] aspirin 325 MG EC tablet Take 1 tablet by mouth Daily for 1 month 30 tablet 0   • [DISCONTINUED] HYDROcodone-acetaminophen (NORCO) 5-325 MG per tablet Take 1 tablet by mouth Every 6 (Six) Hours As Needed for Moderate Pain . 30 tablet 0     No  current facility-administered medications on file prior to visit.       No Known Allergies     Review of Systems   Constitutional: Negative.  Negative for activity change, appetite change, chills, diaphoresis, fatigue, fever and unexpected weight change.   HENT: Negative.  Negative for congestion, dental problem, drooling, ear discharge, ear pain, facial swelling, hearing loss, mouth sores, nosebleeds, postnasal drip, rhinorrhea, sinus pressure, sneezing, sore throat, tinnitus, trouble swallowing and voice change.    Eyes: Negative.  Negative for photophobia, pain, discharge, redness, itching and visual disturbance.   Respiratory: Negative.  Negative for apnea, cough, choking, chest tightness, shortness of breath, wheezing and stridor.    Cardiovascular: Negative.  Negative for chest pain, palpitations and leg swelling.   Gastrointestinal: Negative.  Negative for abdominal distention, abdominal pain, anal bleeding, blood in stool, constipation, diarrhea, nausea, rectal pain and vomiting.   Endocrine: Negative.  Negative for cold intolerance, heat intolerance, polydipsia, polyphagia and polyuria.   Genitourinary: Negative.  Negative for decreased urine volume, difficulty urinating, dysuria, enuresis, flank pain, frequency, genital sores, hematuria and urgency.   Musculoskeletal: Positive for arthralgias. Negative for back pain, gait problem, joint swelling, myalgias, neck pain and neck stiffness.   Skin: Negative.  Negative for color change, pallor, rash and wound.   Allergic/Immunologic: Negative.  Negative for environmental allergies, food allergies and immunocompromised state.   Neurological: Negative.  Negative for dizziness, tremors, seizures, syncope, facial asymmetry, speech difficulty, weakness, light-headedness, numbness and headaches.   Hematological: Negative.  Negative for adenopathy. Does not bruise/bleed easily.   Psychiatric/Behavioral: Negative.  Negative for agitation, behavioral problems, confusion,  "decreased concentration, dysphoric mood, hallucinations, self-injury, sleep disturbance and suicidal ideas. The patient is not nervous/anxious and is not hyperactive.         Objective      Physical Exam  Pulse 78   Ht 167.6 cm (65.98\")   Wt 71 kg (156 lb 8.4 oz)   LMP 06/03/1980 (Approximate) Comment: hysterectomy  SpO2 97%   BMI 25.28 kg/m²     Body mass index is 25.28 kg/m².    General:   Mental Status:  Alert   Appearance: Cooperative, in no acute distress   Build and Nutrition: Well-nourished and well developed female   Orientation: Alert and oriented to person, place and time   Posture: Normal   Gait: With a cane    Integument:   Left hip: Wound is well-healed with no signs of infection    Lower Extremity:   Left Hip:    Tenderness:  None    Swelling:  None    Crepitus:  None    Range of motion:  External Rotation: 30°       Internal Rotation: 30°       Flexion:  100°       Extension:  0°    Deformities:  None  Functional testing: Negative Stinchfield    No leg length discrepancy      Imaging/Studies      Imaging Results (last 24 hours)     Procedure Component Value Units Date/Time    XR Hip With or Without Pelvis 1 View Left [[id number]] Resulted:  08/06/18 1017     Updated:  08/06/18 1018    Narrative:       Left Hip Radiographs  Indication: status-post left total hip arthroplasty  Views: low AP pelvis and lateral of the left hip    Comparison: no change compared to prior study, 2/14/2018    Findings:   The components are well aligned, with no signs of loosening or failure.          Assessment and Plan     Bri was seen today for follow-up.    Diagnoses and all orders for this visit:    Status post total replacement of left hip  -     XR Hip With or Without Pelvis 1 View Left    Primary osteoarthritis of right knee  -     Large Joint Arthrocentesis        I reviewed my findings with patient today.  Her left total hip arthroplasty is doing well, and she is pleased with results.  I will see her back in 6 " months with an x-ray, but sooner for any problems.  Of note, she does have some abductor weakness, and we'll continue to work on her strengthening.    She is also due for her last viscosupplementation injection for the right knee.  We will go ahead and complete that today.  Follow-up in 6 months as planned.  Please see the procedure note for details.    Return in about 6 months (around 2/6/2019).      Medical Decision Making  Management Options : prescription/IM medicine  Data/Risk: radiology tests and independent visualization of imaging, lab tests, or EMG/NCV      Gideon Son MD  08/06/18  10:28 AM

## 2018-08-06 NOTE — PROGRESS NOTES
Procedure   Large Joint Arthrocentesis  Date/Time: 8/6/2018 10:22 AM  Consent given by: patient  Site marked: site marked  Timeout: Immediately prior to procedure a time out was called to verify the correct patient, procedure, equipment, support staff and site/side marked as required   Supporting Documentation  Indications: pain   Procedure Details  Location: knee - R knee  Preparation: Patient was prepped and draped in the usual sterile fashion  Needle size: 22 G  Approach: anterolateral  Medications administered: 30 mg Hyaluronan 30 MG/2ML  Patient tolerance: patient tolerated the procedure well with no immediate complications

## 2018-12-03 DIAGNOSIS — M79.671 RIGHT FOOT PAIN: Primary | ICD-10-CM

## 2018-12-04 ENCOUNTER — OFFICE VISIT (OUTPATIENT)
Dept: ORTHOPEDIC SURGERY | Facility: CLINIC | Age: 83
End: 2018-12-04

## 2018-12-04 ENCOUNTER — HOSPITAL ENCOUNTER (OUTPATIENT)
Dept: GENERAL RADIOLOGY | Facility: HOSPITAL | Age: 83
Discharge: HOME | End: 2018-12-04
Payer: MEDICARE

## 2018-12-04 ENCOUNTER — HOSPITAL ENCOUNTER (OUTPATIENT)
Facility: HOSPITAL | Age: 83
Discharge: HOME | End: 2018-12-04
Payer: MEDICARE

## 2018-12-04 VITALS — RESPIRATION RATE: 18 BRPM | HEIGHT: 69 IN | WEIGHT: 166 LBS | BODY MASS INDEX: 24.59 KG/M2

## 2018-12-04 DIAGNOSIS — M20.11 HALLUX VALGUS OF RIGHT FOOT: Primary | ICD-10-CM

## 2018-12-04 DIAGNOSIS — M20.41 HAMMERTOE OF SECOND TOE OF RIGHT FOOT: ICD-10-CM

## 2018-12-04 DIAGNOSIS — M79.671 RIGHT FOOT PAIN: ICD-10-CM

## 2018-12-04 DIAGNOSIS — L84 CALLUS OF FOOT: ICD-10-CM

## 2018-12-04 PROCEDURE — 99203 OFFICE O/P NEW LOW 30 MIN: CPT | Performed by: PODIATRIST

## 2018-12-04 PROCEDURE — 73630 X-RAY EXAM OF FOOT: CPT

## 2018-12-04 RX ORDER — PRASTERONE 6.5 MG/1
1 INSERT VAGINAL NIGHTLY
Refills: 6 | COMMUNITY
Start: 2018-11-14

## 2018-12-04 RX ORDER — ESTRADIOL 0.1 MG/G
1 CREAM VAGINAL 3 TIMES WEEKLY
Refills: 6 | COMMUNITY
Start: 2018-09-24

## 2018-12-04 NOTE — PROGRESS NOTES
"Subjective   Patient ID: Bri Iniguez is a 83 y.o. female   Consult of the Right Foot (Referred by Dr. Maurer for surgery consult for right foot hammertoe, right third toe callous)  PTC today for right foot callus, pain, hammertoe and bunion.  Has been seeing Dr Maurer, has it trimmed ever so often. Co shoe gear issues and problems. Says it has gotten worse over the last couple of years.     History of Present Illness                                                   Review of Systems   Constitutional: Negative.    Respiratory: Negative.    Musculoskeletal: Positive for arthralgias (right foot).   Skin: Negative.    Neurological: Negative.        Past Medical History:   Diagnosis Date   • Bursitis     trochanteric area   • Colonoscopy refused 2015   • Deafness     unspecified   • Diabetes mellitus (CMS/HCC)     \"prediabetes\" per pt report, takes po metformin daily and does not check fsbg   • Essential hypertension    • Hip pain    • Hyperlipidemia     other   • Influenza vaccine administered 2014   • Macular degeneration    • Mammogram declined 2015   • Osteoarthritis of left hip    • Primary osteoarthritis of right knee    • Supraventricular premature beats    • Wears dentures         Past Surgical History:   Procedure Laterality Date   • CATARACT EXTRACTION Bilateral    • CHOLECYSTECTOMY  2002   • COLONOSCOPY     • HERNIA REPAIR  2005    umbilical   • HYSTERECTOMY  1974   • TONSILLECTOMY         No Known Allergies      Current Outpatient Medications:   •  acetaminophen (TYLENOL) 650 MG 8 hr tablet, Take 650 mg by mouth 2 (Two) Times a Day., Disp: , Rfl:   •  aspirin 81 MG chewable tablet, Chew 1 tablet Every Morning. Resume in 1 month, Disp: , Rfl:   •  atorvastatin (LIPITOR) 10 MG tablet, Take 1 tablet by mouth Daily., Disp: 90 tablet, Rfl: 3  •  citalopram (CeleXA) 10 MG tablet, Take 10 mg by mouth Daily., Disp: , Rfl: 1  •  docusate sodium 100 MG capsule, Take 1 capsule by mouth 2 (Two) Times a Day As Needed for " Constipation., Disp: 60 capsule, Rfl: 0  •  estradiol (ESTRACE) 0.1 MG/GM vaginal cream, APPLY USING FINGER TECHNIQUE THREE TIMES A WEEK, Disp: , Rfl: 6  •  INTRAROSA 6.5 MG insert, INSERT ONCE VAGINALLY DAILY AT BEDTIME, Disp: , Rfl: 6  •  lisinopril-hydrochlorothiazide (PRINZIDE,ZESTORETIC) 20-12.5 MG per tablet, Take 1 tablet by mouth Daily., Disp: 90 tablet, Rfl: 3  •  metFORMIN (GLUCOPHAGE) 500 MG tablet, Take 1 tablet by mouth Daily., Disp: 90 tablet, Rfl: 3  •  Multiple Vitamins-Minerals (OCUVITE ADULT 50+) capsule, Take 1 tablet by mouth Daily., Disp: , Rfl:   •  nitrofurantoin, macrocrystal-monohydrate, (MACROBID) 100 MG capsule, TAKE ONE CAPSULE BY MOUTH EVERY 12 HOURS FOR 5 DAYS TAKE WITH FOOD, Disp: , Rfl: 0  •  phenazopyridine (PYRIDIUM) 100 MG tablet, TAKE ONE TABLET BY MOUTH TWO TIMES A DAY AS NEEDED FOR PAIN WITH URINATION, Disp: , Rfl: 0    Family History   Problem Relation Age of Onset   • Hypertension Other    • Diabetes Other         type 2   • Stroke Mother    • Diabetes Mother    • Hypertension Mother    • Cancer Father        Social History     Socioeconomic History   • Marital status:      Spouse name: Not on file   • Number of children: Not on file   • Years of education: Not on file   • Highest education level: Not on file   Social Needs   • Financial resource strain: Not on file   • Food insecurity - worry: Not on file   • Food insecurity - inability: Not on file   • Transportation needs - medical: Not on file   • Transportation needs - non-medical: Not on file   Occupational History   • Not on file   Tobacco Use   • Smoking status: Never Smoker   • Smokeless tobacco: Never Used   Substance and Sexual Activity   • Alcohol use: No   • Drug use: No   • Sexual activity: Defer   Other Topics Concern   • Not on file   Social History Narrative   • Not on file       Counseling given: No       I have reviewed all of the above social hx, family hx, surgical hx, medications, allergies & ROS  and confirm that it is accurate.  Objective   Physical Exam   Constitutional: She is oriented to person, place, and time. She appears well-developed and well-nourished.   HENT:   Head: Normocephalic and atraumatic.   Eyes: EOM are normal. Pupils are equal, round, and reactive to light.   Neck: Normal range of motion.   Cardiovascular: Normal rate.   Pulmonary/Chest: Effort normal.   Abdominal: Soft. Bowel sounds are normal.   Musculoskeletal: Normal range of motion.   Neurological: She is alert and oriented to person, place, and time. She has normal reflexes.   Skin: Skin is warm.   Psychiatric: She has a normal mood and affect. Her behavior is normal. Judgment and thought content normal.   Nursing note and vitals reviewed.    Right Ankle Exam   Right ankle exam is normal.    Range of Motion   The patient has normal right ankle ROM.    Muscle Strength   The patient has normal right ankle strength.    Other   Erythema: present  Sensation: normal  Pulse: present     Comments:  Rt LE is NVIS  PULSES 2/4  There is lateral deviation of the hallux and the 1st mpj is prominent medially.  There is ttp at the medial mpj.  Mild pain with rom of the 1st mpj  The hallux tracks laterally but is not track bound.        she has cross over rigidly contracted 2nd digit of right foot and semirigid 3rd digit with callus on distal tip      Assessment/Plan  right foot hammertoe 2nd digit, hallux valgus, hammertoe 3rd digit and callus  Independent Review of Radiographic Studies:    3v xray MW today, no comparison, show dorsally dislocated 2nd mpj, hammertoe of 2nd and 3rd with cross over deformity  Laboratory and Other Studies:     Medical Decision Making:        Procedures  [] No procedures were performed in office today.    There are no diagnoses linked to this encounter.      Recommendations/Plan:  dw her the pathology and etiology of her deformity. dw her conservative and surgical options.  dw her 2nd and 3rd digit hammertoe  correction (fusion vs tenotomy) also dw her the possibility of the bunion being addressed.  She will think about this over the holidays and come back prn.     No Follow-up on file.  Patient agreeable to call or return sooner for any concerns.

## 2019-01-10 ENCOUNTER — OFFICE VISIT (OUTPATIENT)
Dept: ORTHOPEDIC SURGERY | Facility: CLINIC | Age: 84
End: 2019-01-10

## 2019-01-10 VITALS — HEIGHT: 69 IN | RESPIRATION RATE: 18 BRPM | BODY MASS INDEX: 26.22 KG/M2 | WEIGHT: 177 LBS

## 2019-01-10 DIAGNOSIS — M20.41 HAMMERTOE OF SECOND TOE OF RIGHT FOOT: ICD-10-CM

## 2019-01-10 DIAGNOSIS — L97.512 ULCERATED, FOOT, RIGHT, WITH FAT LAYER EXPOSED (HCC): ICD-10-CM

## 2019-01-10 DIAGNOSIS — L84 CALLUS OF FOOT: ICD-10-CM

## 2019-01-10 DIAGNOSIS — M20.11 HALLUX VALGUS OF RIGHT FOOT: Primary | ICD-10-CM

## 2019-01-10 PROCEDURE — 11042 DBRDMT SUBQ TIS 1ST 20SQCM/<: CPT | Performed by: PODIATRIST

## 2019-01-10 PROCEDURE — S0260 H&P FOR SURGERY: HCPCS | Performed by: PODIATRIST

## 2019-01-10 RX ORDER — SULFAMETHOXAZOLE AND TRIMETHOPRIM 800; 160 MG/1; MG/1
1 TABLET ORAL 2 TIMES DAILY
Qty: 20 TABLET | Refills: 0 | Status: ON HOLD | OUTPATIENT
Start: 2019-01-10 | End: 2019-12-30

## 2019-01-10 NOTE — PROGRESS NOTES
"Subjective   Patient ID: Bri Iniguez is a 83 y.o. female   Follow-up and Pre-op Exam of the Right Foot (Hammertoe correction 1/21/19)  She comes back in today for what was supposed to be a preoperative exam for her right foot as she was considering having some type of surgery done but she seems to have a major problem with the third digit on her right foot.  She states it's become very sore and she's noticed bleeding and having issues over the last several days.    History of Present Illness                                                   Review of Systems   Constitutional: Negative.    Respiratory: Negative.    Cardiovascular: Negative.    Musculoskeletal: Positive for arthralgias (right foot ).   Skin: Positive for wound (Right 3rd toe).       Past Medical History:   Diagnosis Date   • Bursitis     trochanteric area   • Colonoscopy refused 2015   • Deafness     unspecified   • Diabetes mellitus (CMS/HCC)     \"prediabetes\" per pt report, takes po metformin daily and does not check fsbg   • Essential hypertension    • Hip pain    • Hyperlipidemia     other   • Influenza vaccine administered 2014   • Macular degeneration    • Mammogram declined 2015   • Osteoarthritis of left hip    • Primary osteoarthritis of right knee    • Supraventricular premature beats    • Wears dentures         Past Surgical History:   Procedure Laterality Date   • CATARACT EXTRACTION Bilateral    • CHOLECYSTECTOMY  2002   • COLONOSCOPY     • HERNIA REPAIR  2005    umbilical   • HYSTERECTOMY  1974   • TONSILLECTOMY     • TOTAL HIP ARTHROPLASTY Left 1/23/2018    Procedure: LEFT TOTAL HIP ARTHROPLASTY;  Surgeon: Gideon Son MD;  Location: [facility];  Service:        No Known Allergies      Current Outpatient Medications:   •  acetaminophen (TYLENOL) 650 MG 8 hr tablet, Take 650 mg by mouth 2 (Two) Times a Day., Disp: , Rfl:   •  aspirin 81 MG chewable tablet, Chew 1 tablet Every Morning. Resume in 1 month, Disp: , Rfl:   •  atorvastatin " (LIPITOR) 10 MG tablet, Take 1 tablet by mouth Daily., Disp: 90 tablet, Rfl: 3  •  docusate sodium 100 MG capsule, Take 1 capsule by mouth 2 (Two) Times a Day As Needed for Constipation., Disp: 60 capsule, Rfl: 0  •  estradiol (ESTRACE) 0.1 MG/GM vaginal cream, APPLY USING FINGER TECHNIQUE THREE TIMES A WEEK, Disp: , Rfl: 6  •  INTRAROSA 6.5 MG insert, INSERT ONCE VAGINALLY DAILY AT BEDTIME, Disp: , Rfl: 6  •  lisinopril-hydrochlorothiazide (PRINZIDE,ZESTORETIC) 20-12.5 MG per tablet, Take 1 tablet by mouth Daily., Disp: 90 tablet, Rfl: 3  •  Multiple Vitamins-Minerals (OCUVITE ADULT 50+) capsule, Take 1 tablet by mouth Daily., Disp: , Rfl:   •  sulfamethoxazole-trimethoprim (BACTRIM DS) 800-160 MG per tablet, Take 1 tablet by mouth 2 (Two) Times a Day., Disp: 20 tablet, Rfl: 0    Family History   Problem Relation Age of Onset   • Hypertension Other    • Diabetes Other         type 2   • Stroke Mother    • Diabetes Mother    • Hypertension Mother    • Cancer Father        Social History     Socioeconomic History   • Marital status:      Spouse name: Not on file   • Number of children: Not on file   • Years of education: Not on file   • Highest education level: Not on file   Social Needs   • Financial resource strain: Not on file   • Food insecurity - worry: Not on file   • Food insecurity - inability: Not on file   • Transportation needs - medical: Not on file   • Transportation needs - non-medical: Not on file   Occupational History   • Not on file   Tobacco Use   • Smoking status: Never Smoker   • Smokeless tobacco: Never Used   Substance and Sexual Activity   • Alcohol use: No   • Drug use: No   • Sexual activity: Defer   Other Topics Concern   • Not on file   Social History Narrative   • Not on file       Counseling given: No       I have reviewed all of the above social hx, family hx, surgical hx, medications, allergies & ROS and confirm that it is accurate.  Objective   Physical Exam   Constitutional:  She is oriented to person, place, and time. She appears well-developed and well-nourished.   HENT:   Head: Normocephalic and atraumatic.   Eyes: EOM are normal. Pupils are equal, round, and reactive to light.   Neck: Normal range of motion.   Cardiovascular: Normal rate.   Pulmonary/Chest: Effort normal.   Musculoskeletal: Normal range of motion.   Neurological: She is alert and oriented to person, place, and time. She has normal reflexes.   Skin: Skin is warm.   Psychiatric: She has a normal mood and affect. Her behavior is normal. Judgment and thought content normal.   Nursing note and vitals reviewed.    Right Ankle Exam   Right ankle exam is normal.    Range of Motion   The patient has normal right ankle ROM.    Muscle Strength   The patient has normal right ankle strength.    Other   Erythema: present  Sensation: normal  Pulse: present     Comments:  Rt LE is NVIS  PULSES 2/4  There is lateral deviation of the hallux and the 1st mpj is prominent medially.  There is ttp at the medial mpj.  Mild pain with rom of the 1st mpj  The hallux tracks laterally but is not track bound.        She has a crossover second digit that sits over the top of the hallux as well as semirigid dorsal contraction of the third digit.  The third digit has a callus to the blistered area then once this is removed shows an open wound.  The distal half of the third digits very erythematous and edematous and painful.  There is some bleeding but also some slight drainage from here.      Assessment/Plan right foot hallux valgus, crossover second digit hammertoe deformity, third digit hammertoe deformity with ulceration  Independent Review of Radiographic Studies:      Laboratory and Other Studies:     Medical Decision Making:        Procedures  With a 15 blade I performed full-thickness excisional wound debridement of the third digit wound.  Debridement was into the subcutaneous tissue layer.  Once all the dead devitalized improved callus  tissue and unhealthy tissue was removed there is a wound bed and the subcutaneous tissue layer that measures 1.5 x 1.5.  It does not probe or tunnel or track or tunnel.  No odor.    There are no diagnoses linked to this encounter.      Recommendations/Plan: We again discussed the surgical options and she states that she was planning all may be just having a tenotomy and I explained her that may or may not work on the third digit and I still think an arthroplasty may be the better option.  I reminded her that that will do nothing for the great toe or second toe and we briefly discussed the overall procedure to address all these issues.  I explained to her at this point the priority is to make sure there is no infection and improve the third digit before we can attempt any surgery.  I'll prescribe her one week for the Bactrim.  She's been instructed to keep the toe clean and covered dry, do not get this wet.  Do not submerge or soak in water.  She is to wear open toe shoes as much as she can her shoes that allow the toe room and do not create trauma.  She'll follow up her next week and then we will consider whether or not surgery is appropriate at this time.  No Follow-up on file.  Patient agreeable to call or return sooner for any concerns.

## 2019-01-17 ENCOUNTER — APPOINTMENT (OUTPATIENT)
Dept: PREADMISSION TESTING | Facility: HOSPITAL | Age: 84
End: 2019-01-17

## 2019-01-17 ENCOUNTER — HOSPITAL ENCOUNTER (OUTPATIENT)
Dept: GENERAL RADIOLOGY | Facility: HOSPITAL | Age: 84
Discharge: HOME | End: 2019-01-17
Admitting: PODIATRIST

## 2019-01-17 ENCOUNTER — PREP FOR SURGERY (OUTPATIENT)
Dept: OTHER | Facility: HOSPITAL | Age: 84
End: 2019-01-17

## 2019-01-17 ENCOUNTER — OFFICE VISIT (OUTPATIENT)
Dept: ORTHOPEDIC SURGERY | Facility: CLINIC | Age: 84
End: 2019-01-17

## 2019-01-17 VITALS
WEIGHT: 173 LBS | HEART RATE: 98 BPM | SYSTOLIC BLOOD PRESSURE: 138 MMHG | HEIGHT: 69 IN | DIASTOLIC BLOOD PRESSURE: 64 MMHG | OXYGEN SATURATION: 98 % | BODY MASS INDEX: 25.62 KG/M2

## 2019-01-17 VITALS — RESPIRATION RATE: 18 BRPM | BODY MASS INDEX: 26.22 KG/M2 | WEIGHT: 177 LBS | HEIGHT: 69 IN

## 2019-01-17 DIAGNOSIS — B35.1 ONYCHOMYCOSIS: ICD-10-CM

## 2019-01-17 DIAGNOSIS — S91.301D OPEN WOUND OF RIGHT FOOT, SUBSEQUENT ENCOUNTER: ICD-10-CM

## 2019-01-17 DIAGNOSIS — L97.512 ULCERATED, FOOT, RIGHT, WITH FAT LAYER EXPOSED (HCC): ICD-10-CM

## 2019-01-17 DIAGNOSIS — L84 CALLUS OF FOOT: Primary | ICD-10-CM

## 2019-01-17 DIAGNOSIS — R79.1 ABNORMAL COAGULATION PROFILE: ICD-10-CM

## 2019-01-17 DIAGNOSIS — M20.41 HAMMERTOE OF SECOND TOE OF RIGHT FOOT: ICD-10-CM

## 2019-01-17 DIAGNOSIS — M20.11 HALLUX VALGUS OF RIGHT FOOT: ICD-10-CM

## 2019-01-17 DIAGNOSIS — M79.671 RIGHT FOOT PAIN: ICD-10-CM

## 2019-01-17 DIAGNOSIS — M20.41 HAMMERTOE OF SECOND TOE OF RIGHT FOOT: Primary | ICD-10-CM

## 2019-01-17 LAB
ALBUMIN SERPL-MCNC: 4.6 G/DL (ref 3.5–5)
ALBUMIN/GLOB SERPL: 1.3 G/DL (ref 1–2)
ALP SERPL-CCNC: 91 U/L (ref 38–126)
ALT SERPL W P-5'-P-CCNC: 23 U/L (ref 13–69)
ANION GAP SERPL CALCULATED.3IONS-SCNC: 14.6 MMOL/L (ref 10–20)
AST SERPL-CCNC: 34 U/L (ref 15–46)
BASOPHILS # BLD AUTO: 0.03 10*3/MM3 (ref 0–0.2)
BASOPHILS NFR BLD AUTO: 0.5 % (ref 0–2.5)
BILIRUB SERPL-MCNC: 0.6 MG/DL (ref 0.2–1.3)
BUN BLD-MCNC: 11 MG/DL (ref 7–20)
BUN/CREAT SERPL: 13.8 (ref 7.1–23.5)
CALCIUM SPEC-SCNC: 9.4 MG/DL (ref 8.4–10.2)
CHLORIDE SERPL-SCNC: 96 MMOL/L (ref 98–107)
CO2 SERPL-SCNC: 30 MMOL/L (ref 26–30)
CREAT BLD-MCNC: 0.8 MG/DL (ref 0.6–1.3)
DEPRECATED RDW RBC AUTO: 44.7 FL (ref 37–54)
EOSINOPHIL # BLD AUTO: 0.11 10*3/MM3 (ref 0–0.7)
EOSINOPHIL NFR BLD AUTO: 1.7 % (ref 0–7)
ERYTHROCYTE [DISTWIDTH] IN BLOOD BY AUTOMATED COUNT: 13.4 % (ref 11.5–14.5)
GFR SERPL CREATININE-BSD FRML MDRD: 69 ML/MIN/1.73
GLOBULIN UR ELPH-MCNC: 3.6 GM/DL
GLUCOSE BLD-MCNC: 124 MG/DL (ref 74–98)
HCT VFR BLD AUTO: 42.4 % (ref 37–47)
HGB BLD-MCNC: 13.7 G/DL (ref 12–16)
IMM GRANULOCYTES # BLD AUTO: 0.04 10*3/MM3 (ref 0–0.06)
IMM GRANULOCYTES NFR BLD AUTO: 0.6 % (ref 0–0.6)
LYMPHOCYTES # BLD AUTO: 1.8 10*3/MM3 (ref 0.6–3.4)
LYMPHOCYTES NFR BLD AUTO: 28.4 % (ref 10–50)
MCH RBC QN AUTO: 29.5 PG (ref 27–31)
MCHC RBC AUTO-ENTMCNC: 32.3 G/DL (ref 30–37)
MCV RBC AUTO: 91.2 FL (ref 81–99)
MONOCYTES # BLD AUTO: 0.68 10*3/MM3 (ref 0–0.9)
MONOCYTES NFR BLD AUTO: 10.7 % (ref 0–12)
NEUTROPHILS # BLD AUTO: 3.68 10*3/MM3 (ref 2–6.9)
NEUTROPHILS NFR BLD AUTO: 58.1 % (ref 37–80)
NRBC BLD AUTO-RTO: 0 /100 WBC (ref 0–0)
PLATELET # BLD AUTO: 279 10*3/MM3 (ref 130–400)
PMV BLD AUTO: 9.2 FL (ref 6–12)
POTASSIUM BLD-SCNC: 4.6 MMOL/L (ref 3.5–5.1)
PROT SERPL-MCNC: 8.2 G/DL (ref 6.3–8.2)
RBC # BLD AUTO: 4.65 10*6/MM3 (ref 4.2–5.4)
SODIUM BLD-SCNC: 136 MMOL/L (ref 137–145)
WBC NRBC COR # BLD: 6.34 10*3/MM3 (ref 4.8–10.8)

## 2019-01-17 PROCEDURE — 85025 COMPLETE CBC W/AUTO DIFF WBC: CPT | Performed by: PODIATRIST

## 2019-01-17 PROCEDURE — 99213 OFFICE O/P EST LOW 20 MIN: CPT | Performed by: PODIATRIST

## 2019-01-17 PROCEDURE — 71046 X-RAY EXAM CHEST 2 VIEWS: CPT

## 2019-01-17 PROCEDURE — 11720 DEBRIDE NAIL 1-5: CPT | Performed by: PODIATRIST

## 2019-01-17 PROCEDURE — 11042 DBRDMT SUBQ TIS 1ST 20SQCM/<: CPT | Performed by: PODIATRIST

## 2019-01-17 PROCEDURE — 80053 COMPREHEN METABOLIC PANEL: CPT | Performed by: PODIATRIST

## 2019-01-17 PROCEDURE — 93005 ELECTROCARDIOGRAM TRACING: CPT | Performed by: PODIATRIST

## 2019-01-17 PROCEDURE — 36415 COLL VENOUS BLD VENIPUNCTURE: CPT

## 2019-01-17 RX ORDER — CEFAZOLIN SODIUM 2 G/50ML
2 SOLUTION INTRAVENOUS ONCE
Status: CANCELLED | OUTPATIENT
Start: 2019-01-17 | End: 2019-01-17

## 2019-01-17 NOTE — PROGRESS NOTES
"Subjective   Patient ID: Bri Iniguez is a 83 y.o. female   Follow-up of the Right Foot  Patient returns today for her right foot to evaluate her third digit callus/ulceration and cellulitis.  She's been taking her antibiotics and wearing open toed shoes.  She's been keeping it clean and covered and dry.  It still sore but does feel better.    History of Present Illness                                                   Review of Systems   Constitutional: Negative.    Respiratory: Negative.    Gastrointestinal: Negative.    Musculoskeletal: Positive for arthralgias.   Skin: Positive for wound (right third toe).       Past Medical History:   Diagnosis Date   • Bursitis     trochanteric area   • Colonoscopy refused 2015   • Deafness     unspecified   • Diabetes mellitus (CMS/HCC)     \"prediabetes\" per pt report, takes po metformin daily and does not check fsbg   • Essential hypertension    • Hip pain    • Hyperlipidemia     other   • Influenza vaccine administered 2014   • Macular degeneration    • Mammogram declined 2015   • Osteoarthritis of left hip    • Primary osteoarthritis of right knee    • Supraventricular premature beats    • Wears dentures         Past Surgical History:   Procedure Laterality Date   • CATARACT EXTRACTION Bilateral    • CHOLECYSTECTOMY  2002   • COLONOSCOPY     • HERNIA REPAIR  2005    umbilical   • HYSTERECTOMY  1974   • TONSILLECTOMY     • TOTAL HIP ARTHROPLASTY Left 1/23/2018    Procedure: LEFT TOTAL HIP ARTHROPLASTY;  Surgeon: Gideon Son MD;  [facility];  Service:        No Known Allergies      Current Outpatient Medications:   •  acetaminophen (TYLENOL) 650 MG 8 hr tablet, Take 650 mg by mouth 2 (Two) Times a Day., Disp: , Rfl:   •  aspirin 81 MG chewable tablet, Chew 1 tablet Every Morning. Resume in 1 month, Disp: , Rfl:   •  atorvastatin (LIPITOR) 10 MG tablet, Take 1 tablet by mouth Daily., Disp: 90 tablet, Rfl: 3  •  docusate sodium 100 MG capsule, Take 1 capsule by " mouth 2 (Two) Times a Day As Needed for Constipation., Disp: 60 capsule, Rfl: 0  •  estradiol (ESTRACE) 0.1 MG/GM vaginal cream, APPLY USING FINGER TECHNIQUE THREE TIMES A WEEK, Disp: , Rfl: 6  •  INTRAROSA 6.5 MG insert, INSERT ONCE VAGINALLY DAILY AT BEDTIME, Disp: , Rfl: 6  •  lisinopril-hydrochlorothiazide (PRINZIDE,ZESTORETIC) 20-12.5 MG per tablet, Take 1 tablet by mouth Daily., Disp: 90 tablet, Rfl: 3  •  Multiple Vitamins-Minerals (OCUVITE ADULT 50+) capsule, Take 1 tablet by mouth Daily., Disp: , Rfl:   •  sulfamethoxazole-trimethoprim (BACTRIM DS) 800-160 MG per tablet, Take 1 tablet by mouth 2 (Two) Times a Day., Disp: 20 tablet, Rfl: 0    Family History   Problem Relation Age of Onset   • Hypertension Other    • Diabetes Other         type 2   • Stroke Mother    • Diabetes Mother    • Hypertension Mother    • Cancer Father        Social History     Socioeconomic History   • Marital status:      Spouse name: Not on file   • Number of children: Not on file   • Years of education: Not on file   • Highest education level: Not on file   Social Needs   • Financial resource strain: Not on file   • Food insecurity - worry: Not on file   • Food insecurity - inability: Not on file   • Transportation needs - medical: Not on file   • Transportation needs - non-medical: Not on file   Occupational History   • Not on file   Tobacco Use   • Smoking status: Never Smoker   • Smokeless tobacco: Never Used   Substance and Sexual Activity   • Alcohol use: No   • Drug use: No   • Sexual activity: Defer   Other Topics Concern   • Not on file   Social History Narrative   • Not on file       Counseling given: No       I have reviewed all of the above social hx, family hx, surgical hx, medications, allergies & ROS and confirm that it is accurate.  Objective   Physical Exam   Constitutional: She is oriented to person, place, and time. She appears well-developed and well-nourished.   HENT:   Head: Normocephalic and  atraumatic.   Eyes: EOM are normal. Pupils are equal, round, and reactive to light.   Neck: Normal range of motion.   Cardiovascular: Normal rate.   Pulmonary/Chest: Effort normal.   Abdominal: Soft. Bowel sounds are normal.   Musculoskeletal: Normal range of motion.   Neurological: She is alert and oriented to person, place, and time. She has normal reflexes.   Skin: Skin is warm.   Psychiatric: She has a normal mood and affect. Her behavior is normal. Judgment and thought content normal.   Nursing note and vitals reviewed.    Right Ankle Exam   Right ankle exam is normal.    Range of Motion   The patient has normal right ankle ROM.    Muscle Strength   The patient has normal right ankle strength.    Other   Erythema: present  Sensation: normal  Pulse: present     Comments:  Rt LE is NVIS  PULSES 2/4  There is lateral deviation of the hallux and the 1st mpj is prominent medially.  There is ttp at the medial mpj.  Mild pain with rom of the 1st mpj  The hallux tracks laterally but is not track bound.          Lower extremity exam:  Vascular: Pulses palpable, pedal hair noted, CFT brisk, no edema noted  Neuro: Gross sensation intact  Derm: Normal turgor and temperature, no wounds or sores or lesions.  Toenails on the right foot are elongated, incurvated, thick, discolored, dystrophic.  MSK:   She has a crossover second digit that sits over the top of the hallux as well as semirigid dorsal contraction of the third digit.  The third digit has a callus to the blistered area then once this is removed shows an open wound.  The distal half of the third digits very erythematous and edematous and painful.  There is some bleeding but also some slight drainage from here.  The skin has peeled off the toe circumferentially and the erythema and edema is much improved.  No drainage or odor.  No sign of infection.  Assessment/Plan right foot hallux valgus, crossover second digit hammertoe deformity with dislocated second MTPJ, right  third digit hammertoe deformity with the distal tip ulceration/callus.  Independent Review of Radiographic Studies:      Laboratory and Other Studies:     Medical Decision Making:        Procedures  Procedures  With a 15 blade I performed full-thickness excisional wound debridement of the third digit wound.  Debridement was into the subcutaneous tissue layer.  Once all the dead devitalized improved callus tissue and unhealthy tissue was removed there is a wound bed and the subcutaneous tissue layer that measures 1.5 x 1.5.  It does not probe or tunnel or track or tunnel.  No odor.  Toenails 1 through 5 on the right foot were sharply debrided without incidence.  Bri was seen today for follow-up.    Diagnoses and all orders for this visit:    Callus of foot    Ulcerated, foot, right, with fat layer exposed (CMS/HCC)    Right foot pain    Hallux valgus of right foot    Onychomycosis    Open wound of right foot, subsequent encounter    Hammertoe of second toe of right foot          Recommendations/Plan:  We again had a long discussion about her options and along with her family discussed and she wishes to go ahead and proceed with having all digits addressed.  I discussed Silver exostectomy or digital Surendra osteotomy of the great toe along with hammertoe correction of the second and third digits.  The second MTPJ will need to be released as well.  I will most likely plan implant and fusion of the second digit and the third digit will be arthroplasty versus fusion.  We discussed all the risks versus benefits and potential, complications of surgery including bleeding, infection, nerve damage, continued pain and swelling, wound healing complications, potential need for hardware failure or removal.  Potential need for revisional surgery.  Anesthetic complications.  Ultimately she is agreeable and willing to proceed with surgery.  We will have her planned first thing Monday morning.  She will go directly for preop testing  now.  We discussed all the additional risks and potential complications of surgery including bleeding, infection, nerve damage, need for further surgery, delayed union or nonunion, possible revisional surgery, hardware complication or breakage or failure, potential need for hardware or fixation removal, DVT and PE, delayed wound healing and wound complications, prolonged swelling, loss of limb and even death.      Return for post op, xoa.  Patient agreeable to call or return sooner for any concerns.

## 2019-01-21 ENCOUNTER — HOSPITAL ENCOUNTER (OUTPATIENT)
Facility: HOSPITAL | Age: 84
Setting detail: HOSPITAL OUTPATIENT SURGERY
Discharge: HOME | End: 2019-01-21
Attending: PODIATRIST | Admitting: PODIATRIST

## 2019-01-21 ENCOUNTER — APPOINTMENT (OUTPATIENT)
Dept: GENERAL RADIOLOGY | Facility: HOSPITAL | Age: 84
End: 2019-01-21

## 2019-01-21 ENCOUNTER — ANESTHESIA EVENT (OUTPATIENT)
Dept: PERIOP | Facility: HOSPITAL | Age: 84
End: 2019-01-21

## 2019-01-21 ENCOUNTER — ANESTHESIA (OUTPATIENT)
Dept: PERIOP | Facility: HOSPITAL | Age: 84
End: 2019-01-21

## 2019-01-21 VITALS
TEMPERATURE: 98.3 F | SYSTOLIC BLOOD PRESSURE: 116 MMHG | DIASTOLIC BLOOD PRESSURE: 73 MMHG | RESPIRATION RATE: 16 BRPM | OXYGEN SATURATION: 96 % | HEART RATE: 86 BPM

## 2019-01-21 DIAGNOSIS — M20.41 HAMMERTOE OF SECOND TOE OF RIGHT FOOT: ICD-10-CM

## 2019-01-21 LAB — GLUCOSE BLDC GLUCOMTR-MCNC: 117 MG/DL (ref 70–130)

## 2019-01-21 PROCEDURE — C1713 ANCHOR/SCREW BN/BN,TIS/BN: HCPCS | Performed by: PODIATRIST

## 2019-01-21 PROCEDURE — 82962 GLUCOSE BLOOD TEST: CPT

## 2019-01-21 PROCEDURE — 76000 FLUOROSCOPY <1 HR PHYS/QHP: CPT

## 2019-01-21 PROCEDURE — 15275 SKIN SUB GRAFT FACE/NK/HF/G: CPT | Performed by: PODIATRIST

## 2019-01-21 PROCEDURE — 25010000002 PROPOFOL 1000 MG/ML EMULSION: Performed by: NURSE ANESTHETIST, CERTIFIED REGISTERED

## 2019-01-21 PROCEDURE — C1776 JOINT DEVICE (IMPLANTABLE): HCPCS | Performed by: PODIATRIST

## 2019-01-21 PROCEDURE — 25010000003 CEFAZOLIN SODIUM-DEXTROSE 2-3 GM-%(50ML) RECONSTITUTED SOLUTION: Performed by: PODIATRIST

## 2019-01-21 PROCEDURE — 25010000002 PROPOFOL 10 MG/ML EMULSION: Performed by: NURSE ANESTHETIST, CERTIFIED REGISTERED

## 2019-01-21 PROCEDURE — 28285 REPAIR OF HAMMERTOE: CPT | Performed by: PODIATRIST

## 2019-01-21 PROCEDURE — 28298 COR HLX VLGS PRX PHLX OSTEOT: CPT | Performed by: PODIATRIST

## 2019-01-21 PROCEDURE — 25010000002 ONDANSETRON PER 1 MG: Performed by: NURSE ANESTHETIST, CERTIFIED REGISTERED

## 2019-01-21 PROCEDURE — 25010000002 FENTANYL CITRATE (PF) 100 MCG/2ML SOLUTION: Performed by: NURSE ANESTHETIST, CERTIFIED REGISTERED

## 2019-01-21 PROCEDURE — 28270 RELEASE OF FOOT CONTRACTURE: CPT | Performed by: PODIATRIST

## 2019-01-21 DEVICE — IMPLANTABLE DEVICE: Type: IMPLANTABLE DEVICE | Site: FOOT | Status: FUNCTIONAL

## 2019-01-21 DEVICE — MINI MONSTER HEADLESS, SHORT THREAD, 3.0 X 26MM
Type: IMPLANTABLE DEVICE | Site: FOOT | Status: FUNCTIONAL
Brand: MONSTER SCREW SYSTEM

## 2019-01-21 DEVICE — SYS IMP REPR FT CPR/MINI/SCORPION W/LASSO: Type: IMPLANTABLE DEVICE | Site: FOOT | Status: FUNCTIONAL

## 2019-01-21 DEVICE — IMPLANTABLE DEVICE
Type: IMPLANTABLE DEVICE | Site: FOOT | Status: FUNCTIONAL
Brand: HAMMERTOE

## 2019-01-21 RX ORDER — ONDANSETRON 2 MG/ML
INJECTION INTRAMUSCULAR; INTRAVENOUS AS NEEDED
Status: DISCONTINUED | OUTPATIENT
Start: 2019-01-21 | End: 2019-01-21 | Stop reason: SURG

## 2019-01-21 RX ORDER — ALBUTEROL SULFATE 2.5 MG/3ML
2.5 SOLUTION RESPIRATORY (INHALATION) ONCE AS NEEDED
Status: CANCELLED | OUTPATIENT
Start: 2019-01-21

## 2019-01-21 RX ORDER — PROPOFOL 10 MG/ML
VIAL (ML) INTRAVENOUS AS NEEDED
Status: DISCONTINUED | OUTPATIENT
Start: 2019-01-21 | End: 2019-01-21 | Stop reason: SURG

## 2019-01-21 RX ORDER — CEFAZOLIN SODIUM 2 G/50ML
2 SOLUTION INTRAVENOUS ONCE
Status: COMPLETED | OUTPATIENT
Start: 2019-01-21 | End: 2019-01-21

## 2019-01-21 RX ORDER — KETAMINE HYDROCHLORIDE 50 MG/ML
INJECTION, SOLUTION, CONCENTRATE INTRAMUSCULAR; INTRAVENOUS AS NEEDED
Status: DISCONTINUED | OUTPATIENT
Start: 2019-01-21 | End: 2019-01-21 | Stop reason: SURG

## 2019-01-21 RX ORDER — PROMETHAZINE HYDROCHLORIDE 25 MG/1
25 SUPPOSITORY RECTAL ONCE AS NEEDED
Status: CANCELLED | OUTPATIENT
Start: 2019-01-21

## 2019-01-21 RX ORDER — PROMETHAZINE HYDROCHLORIDE 25 MG/ML
6.25 INJECTION, SOLUTION INTRAMUSCULAR; INTRAVENOUS ONCE AS NEEDED
Status: CANCELLED | OUTPATIENT
Start: 2019-01-21

## 2019-01-21 RX ORDER — PROMETHAZINE HYDROCHLORIDE 25 MG/1
25 TABLET ORAL ONCE AS NEEDED
Status: CANCELLED | OUTPATIENT
Start: 2019-01-21

## 2019-01-21 RX ORDER — BUPIVACAINE HYDROCHLORIDE 5 MG/ML
INJECTION, SOLUTION EPIDURAL; INTRACAUDAL AS NEEDED
Status: DISCONTINUED | OUTPATIENT
Start: 2019-01-21 | End: 2019-01-21 | Stop reason: HOSPADM

## 2019-01-21 RX ORDER — FENTANYL CITRATE 50 UG/ML
INJECTION, SOLUTION INTRAMUSCULAR; INTRAVENOUS AS NEEDED
Status: DISCONTINUED | OUTPATIENT
Start: 2019-01-21 | End: 2019-01-21 | Stop reason: SURG

## 2019-01-21 RX ORDER — SODIUM CHLORIDE, SODIUM LACTATE, POTASSIUM CHLORIDE, CALCIUM CHLORIDE 600; 310; 30; 20 MG/100ML; MG/100ML; MG/100ML; MG/100ML
1000 INJECTION, SOLUTION INTRAVENOUS CONTINUOUS
Status: DISCONTINUED | OUTPATIENT
Start: 2019-01-21 | End: 2019-01-21 | Stop reason: HOSPADM

## 2019-01-21 RX ORDER — SODIUM CHLORIDE 0.9 % (FLUSH) 0.9 %
3 SYRINGE (ML) INJECTION AS NEEDED
Status: DISCONTINUED | OUTPATIENT
Start: 2019-01-21 | End: 2019-01-21 | Stop reason: HOSPADM

## 2019-01-21 RX ORDER — ONDANSETRON 2 MG/ML
4 INJECTION INTRAMUSCULAR; INTRAVENOUS ONCE AS NEEDED
Status: CANCELLED | OUTPATIENT
Start: 2019-01-21

## 2019-01-21 RX ORDER — BUPIVACAINE HCL/0.9 % NACL/PF 0.125 %
PLASTIC BAG, INJECTION (ML) EPIDURAL AS NEEDED
Status: DISCONTINUED | OUTPATIENT
Start: 2019-01-21 | End: 2019-01-21 | Stop reason: SURG

## 2019-01-21 RX ORDER — HYDROCODONE BITARTRATE AND ACETAMINOPHEN 7.5; 325 MG/1; MG/1
1-2 TABLET ORAL EVERY 6 HOURS PRN
Qty: 30 TABLET | Refills: 0 | Status: SHIPPED | OUTPATIENT
Start: 2019-01-21 | End: 2020-01-03 | Stop reason: HOSPADM

## 2019-01-21 RX ORDER — LIDOCAINE HYDROCHLORIDE 10 MG/ML
INJECTION, SOLUTION INFILTRATION; PERINEURAL AS NEEDED
Status: DISCONTINUED | OUTPATIENT
Start: 2019-01-21 | End: 2019-01-21 | Stop reason: HOSPADM

## 2019-01-21 RX ADMIN — PROPOFOL 80 MG: 10 INJECTION, EMULSION INTRAVENOUS at 07:30

## 2019-01-21 RX ADMIN — Medication 100 MCG: at 08:43

## 2019-01-21 RX ADMIN — FENTANYL CITRATE 25 MCG: 50 INJECTION, SOLUTION INTRAMUSCULAR; INTRAVENOUS at 09:04

## 2019-01-21 RX ADMIN — Medication 100 MCG: at 09:05

## 2019-01-21 RX ADMIN — KETAMINE HYDROCHLORIDE 50 MG: 50 INJECTION, SOLUTION INTRAMUSCULAR; INTRAVENOUS at 07:33

## 2019-01-21 RX ADMIN — Medication 200 MCG: at 08:34

## 2019-01-21 RX ADMIN — FENTANYL CITRATE 25 MCG: 50 INJECTION, SOLUTION INTRAMUSCULAR; INTRAVENOUS at 08:53

## 2019-01-21 RX ADMIN — FENTANYL CITRATE 25 MCG: 50 INJECTION, SOLUTION INTRAMUSCULAR; INTRAVENOUS at 08:07

## 2019-01-21 RX ADMIN — CEFAZOLIN SODIUM 2 G: 2 SOLUTION INTRAVENOUS at 07:28

## 2019-01-21 RX ADMIN — Medication 100 MCG: at 08:07

## 2019-01-21 RX ADMIN — ONDANSETRON 4 MG: 2 INJECTION INTRAMUSCULAR; INTRAVENOUS at 07:35

## 2019-01-21 RX ADMIN — FENTANYL CITRATE 50 MCG: 50 INJECTION, SOLUTION INTRAMUSCULAR; INTRAVENOUS at 07:35

## 2019-01-21 RX ADMIN — PROPOFOL 75 MCG/KG/MIN: 10 INJECTION, EMULSION INTRAVENOUS at 07:30

## 2019-01-21 RX ADMIN — Medication 200 MCG: at 08:22

## 2019-01-21 RX ADMIN — SODIUM CHLORIDE, POTASSIUM CHLORIDE, SODIUM LACTATE AND CALCIUM CHLORIDE 1000 ML: 600; 310; 30; 20 INJECTION, SOLUTION INTRAVENOUS at 06:30

## 2019-01-21 RX ADMIN — Medication 100 MCG: at 07:43

## 2019-01-21 RX ADMIN — PROPOFOL 20 MG: 10 INJECTION, EMULSION INTRAVENOUS at 08:53

## 2019-01-21 NOTE — ANESTHESIA POSTPROCEDURE EVALUATION
Patient: Bri Iniguez    Procedure Summary     Date:  01/21/19 Room / Location:  [facility] OR  /  [facility] OR    Anesthesia Start:  0726 Anesthesia Stop:  0945    Procedure:  Right foot bunionectomy with exostectomy, proximal phalanx osteotomy, right foot second and third digit hammertoe correction with interphalangeal joint fusion, right second metatarsophalangeal joint capsular release/capsulotomy (Right Toes) Diagnosis:       Hammertoe of second toe of right foot      (Hammertoe of second toe of right foot [M20.41])    Surgeon:  Klaus Cason DPM Provider:  Edil Vuong CRNA    Anesthesia Type:  MAC ASA Status:  3          Anesthesia Type: MAC  Last vitals  BP   116/73 (01/21/19 1020)   Temp   98.3 °F (36.8 °C) (01/21/19 0940)   Pulse   86 (01/21/19 1020)   Resp   16 (01/21/19 1020)     SpO2   96 % (01/21/19 1020)     Post Anesthesia Care and Evaluation    Patient location during evaluation: PHASE II  Patient participation: complete - patient participated  Level of consciousness: awake and awake and alert  Pain management: adequate  Airway patency: patent  Anesthetic complications: No anesthetic complications  PONV Status: none  Cardiovascular status: acceptable  Respiratory status: acceptable, nasal cannula, spontaneous ventilation and nonlabored ventilation  Hydration status: acceptable

## 2019-01-21 NOTE — ANESTHESIA PREPROCEDURE EVALUATION
Anesthesia Evaluation     Patient summary reviewed and Nursing notes reviewed   NPO Solid Status: > 8 hours  NPO Liquid Status: > 8 hours           Airway   Mallampati: II  TM distance: >3 FB  Neck ROM: full  no difficulty expected  Dental - normal exam   (+) partials    Pulmonary - negative pulmonary ROS and normal exam    breath sounds clear to auscultation  (-) not a smoker  Cardiovascular - normal exam    ECG reviewed    (+) hypertension well controlled, PVD (venous insuffiency both legs), hyperlipidemia,     ROS comment: Hx of supraventricular premature beats    NSR with premature atrial complexes and LAD    Neuro/Psych- negative ROS    ROS Comment: Deaf Left ear, [place] right ear  GI/Hepatic/Renal/Endo    (+)  GERD,  diabetes mellitus type 2 well controlled,     Musculoskeletal     Abdominal  - normal exam    Abdomen: soft.   Substance History - negative use     OB/GYN negative ob/gyn ROS     Comment: Hx hysterectomy      Other   (+) arthritis       Other Comment: Macular degeneration  Hyperlipidemia  Essential hypertension  Deafness  Diabetes type 2, controlled (CMS/HCC)  Primary osteoarthritis of left hip  Venous insufficiency of both lower extremities  Status post total replacement of left hip  Acute blood loss anemia, mild, asymptomatic  Leukocytosis, mild, likely reactive  Hammertoe of second toe of right foot          Phys Exam Other: Partial upper and lower dentures.                  Anesthesia Plan    ASA 3     MAC   (Patient advised that intravenous sedation would be used as the primary anesthetic, along with local anesthesia if necessary. Every effort will be made to make sure the patient is comfortable.     The patient was told that they may experience recall of the procedure. The patient was further advised that if the MAC anesthetic was deemed ineffective that general anesthesia administered via LMA or ETT may be required.    Patient verbalized understanding and agreed to plan of care.     MAC VS  GENERAL)  intravenous induction   Anesthetic plan, all risks, benefits, and alternatives have been provided, discussed and informed consent has been obtained with: patient.

## 2019-02-01 DIAGNOSIS — M79.671 RIGHT FOOT PAIN: Primary | ICD-10-CM

## 2019-02-04 ENCOUNTER — OFFICE VISIT (OUTPATIENT)
Dept: ORTHOPEDIC SURGERY | Facility: CLINIC | Age: 84
End: 2019-02-04

## 2019-02-04 VITALS — BODY MASS INDEX: 25.62 KG/M2 | WEIGHT: 173 LBS | RESPIRATION RATE: 18 BRPM | HEIGHT: 69 IN

## 2019-02-04 DIAGNOSIS — M20.41 HAMMERTOE OF SECOND TOE OF RIGHT FOOT: ICD-10-CM

## 2019-02-04 DIAGNOSIS — L97.512 ULCERATED, FOOT, RIGHT, WITH FAT LAYER EXPOSED (HCC): ICD-10-CM

## 2019-02-04 DIAGNOSIS — M79.671 RIGHT FOOT PAIN: ICD-10-CM

## 2019-02-04 DIAGNOSIS — M20.11 HALLUX VALGUS OF RIGHT FOOT: ICD-10-CM

## 2019-02-04 DIAGNOSIS — L84 CALLUS OF FOOT: Primary | ICD-10-CM

## 2019-02-04 DIAGNOSIS — S91.301D OPEN WOUND OF RIGHT FOOT, SUBSEQUENT ENCOUNTER: ICD-10-CM

## 2019-02-04 PROCEDURE — 11042 DBRDMT SUBQ TIS 1ST 20SQCM/<: CPT | Performed by: PODIATRIST

## 2019-02-04 PROCEDURE — 99024 POSTOP FOLLOW-UP VISIT: CPT | Performed by: PODIATRIST

## 2019-02-04 NOTE — PROGRESS NOTES
Subjective   Patient ID: Bri Iniguez is a 83 y.o. female STATUS POST:  Post-op of the Right Foot (01/21/19: Right foot bunionectomy with exostectomy, proximal phalanx osteotomy, right foot second and third digit hammertoe correction with interphalangeal joint fusion, right second metatarsophalangeal joint capsular release/capsulotomy )  She presents back today for her first postop follow-up visit status post right foot bunionectomy with Surendra osteotomy, right foot second and third digit hammertoe correction with second digit PIPJ fusion, third digit IPJ arthroplasty, plantar plate repair of the second MTPJ.  She has a postoperative boot but it's extremely too large for her.  She complains of her foot sweating and smelling but says it feels much better now that the dressings off.  She seems that she's been fairly compliant with her weightbearing restrictions.    History of Present Illness    Review of Systems   Constitutional: Negative for diaphoresis, fever and unexpected weight change.   HENT: Negative for dental problem and sore throat.    Eyes: Negative for visual disturbance.   Respiratory: Negative for shortness of breath.    Cardiovascular: Negative for chest pain.   Gastrointestinal: Negative for abdominal pain, constipation, diarrhea, nausea and vomiting.   Genitourinary: Negative for difficulty urinating and frequency.   Neurological: Negative for headaches.   Hematological: Does not bruise/bleed easily.       Past Medical History:   Diagnosis Date   • Body piercing     EARS   • Bursitis     trochanteric area   • Colonoscopy refused 2015   • Constipation    • Deaf, left    • Deafness     unspecified   • Diabetes mellitus (CMS/[facility])     PATIENT REPORTS SHE HAS TAKEN METFORMIN IN THE PAST BUT WAS TAKEN OFF OF THIS MEDICATION AROUND OCTOBER 2018.     • Elevated cholesterol    • Essential hypertension    • GERD (gastroesophageal reflux disease)    • Hearing loss, right     PATIENT DOES USE A HEARING AID TO  "RIGHT SIDE INTERMITTENTLY (REPORTS DEAFNESS IN LEFT EAR)   • Hip pain    • History of UTI     PATIENT REPORTS RESOLVED AFTER HORMONE THERAPY WAS ORDERED   • Hyperlipidemia     other   • Influenza vaccine administered 2014   • Macular degeneration    • Mammogram declined 2015   • Osteoarthritis of left hip    • Primary osteoarthritis of right knee    • Supraventricular premature beats     REPORTED IN PREADMISSION TESTING VISIT \"IRREGULAR HEART BEAT\".  REPORTS UNSURE THE ACTUAL VERBIAGE OF WHAT SHE WAS TOLD OTHER THAN THIS.    • Wears partial dentures     REPORTS UPPER AND LOWER PARTIALS. INSTRUCTED NO ADHESIVES THE DOS.        Past Surgical History:   Procedure Laterality Date   • BUNIONECTOMY Right 1/21/2019    Procedure: Right foot bunionectomy with exostectomy, proximal phalanx osteotomy, right foot second and third digit hammertoe correction with interphalangeal joint fusion, right second metatarsophalangeal joint capsular release/capsulotomy;  Surgeon: Klaus Cason DPM;  Location: [facility];  Service: Podiatry   • CATARACT EXTRACTION Bilateral    • CHOLECYSTECTOMY  2002   • COLONOSCOPY     • HERNIA REPAIR  2005    umbilical   • HYSTERECTOMY  1974   • TONSILLECTOMY     • TOTAL HIP ARTHROPLASTY Left 1/23/2018    Procedure: LEFT TOTAL HIP ARTHROPLASTY;  Surgeon: Gideon Son MD;  [facility]: [facility];  Service:        Social History     Socioeconomic History   • Marital status:      Spouse name: Not on file   • Number of children: Not on file   • Years of education: Not on file   • Highest education level: Not on file   Social Needs   • Financial resource strain: Not on file   • Food insecurity - worry: Not on file   • Food insecurity - inability: Not on file   • Transportation needs - medical: Not on file   • Transportation needs - non-medical: Not on file   Occupational History   • Not on file   Tobacco Use   • Smoking status: Never Smoker   • Smokeless tobacco: Never Used   Substance and Sexual " Activity   • Alcohol use: No   • Drug use: No   • Sexual activity: Defer   Other Topics Concern   • Not on file   Social History Narrative   • Not on file       Counseling given: No      I have reviewed all of the above social hx, family hx, surgical hx, medications, allergies & ROS and confirm that it is accurate.    No Known Allergies    Objective   Physical Exam   Constitutional: She is oriented to person, place, and time. She appears well-developed and well-nourished.   HENT:   Head: Normocephalic and atraumatic.   Eyes: EOM are normal. Pupils are equal, round, and reactive to light.   Neck: Normal range of motion.   Cardiovascular: Normal rate.   Pulmonary/Chest: Effort normal.   Musculoskeletal: Normal range of motion.   Neurological: She is alert and oriented to person, place, and time. She has normal reflexes.   Skin: Skin is warm.   Psychiatric: She has a normal mood and affect. Her behavior is normal. Judgment and thought content normal.   Nursing note and vitals reviewed.    Ortho Exam  [unfilled]  Right foot exam:  Pulses palpable, blanchable erythema and 1+ edema noted to the entire midfoot and forefoot.  All digits are rough rectus.  Second digit has no dorsal drifting at this point.  I am able to slightly compress the PIPJ which was mentioned in x-ray exam the seem to be slightly gapped.  Third digit remains rectus with very slight plantarflexion at the DIPJ.  Her wound is healing well on the distal tip of the third digit.  It's filled with callus and dried bleeding scab and eschar and once removed shows are still a very thin small open ulceration measures 0.5 x 0.4.  Seems to be bleeding and healthy and healing well.  There is good range of motion at the hallux first MTPJ.  She has a slight black scab area along the medial first MTPJ that I'm assuming is from pressure from her dressing.  No sign of neurovascular compromise.  Incisions are clean dry and intact, well coapted, no sign of  dehiscence  Sutures are intact and no sign of infection is present      Assessment/Plan  status post weeks frompost right foot bunionectomy with Surendra osteotomy, right foot second and third digit hammertoe correction with second digit PIPJ fusion, third digit IPJ arthroplasty, plantar plate repair of the second MTPJ.  Right third digit ulceration  Independent Review of Radiographic Studies:      Laboratory and Other Studies:     Medical Decision Making:        Procedures  [] with a 15 blade I sharply debrided the callused ulceration on the tip of the right third digit.  Debridement was full-thickness into the subcutaneous tissue layer removing dead devitalized scab and hyperkeratotic tissue.  Full-thickness bleeding healthy ulceration measures 0.5 x0.4 remains.    There are no diagnoses linked to this encounter.      Recommendations/Plan:  The patient's sutures were removed today and Steri-Strips and Mastisol were placed.  They can begin getting the foot wet in the shower now but does have to keep the tip of the right third digit covered..  We will continue to weight-bear as tolerated in a Darco shoe/cam boot, whichever she is more comfortable with.  We will ensure she has the proper size and fit..  They should continue [place] as needed.  Elevation as needed.  Compression is needed.  sHe'll follow-up in 2 weeks for x-rays and hopefully weightbearing transition.  She'll call with any signs or complaints of infection.  I cautioned her about her activities and swelling.  Notify me if any concerns at all.    No Follow-up on file.  Patient agreeable to call or return sooner for any concerns.

## 2019-02-15 DIAGNOSIS — L84 CALLUS OF FOOT: Primary | ICD-10-CM

## 2019-02-18 ENCOUNTER — OFFICE VISIT (OUTPATIENT)
Dept: ORTHOPEDIC SURGERY | Facility: CLINIC | Age: 84
End: 2019-02-18

## 2019-02-18 VITALS — RESPIRATION RATE: 18 BRPM | BODY MASS INDEX: 25.62 KG/M2 | HEIGHT: 69 IN | WEIGHT: 173 LBS

## 2019-02-18 DIAGNOSIS — M20.21 HALLUX RIGIDUS OF RIGHT FOOT: ICD-10-CM

## 2019-02-18 DIAGNOSIS — M20.41 HAMMERTOE OF SECOND TOE OF RIGHT FOOT: ICD-10-CM

## 2019-02-18 DIAGNOSIS — L97.512 ULCERATED, FOOT, RIGHT, WITH FAT LAYER EXPOSED (HCC): ICD-10-CM

## 2019-02-18 DIAGNOSIS — M79.671 RIGHT FOOT PAIN: ICD-10-CM

## 2019-02-18 DIAGNOSIS — M20.11 HALLUX VALGUS OF RIGHT FOOT: Primary | ICD-10-CM

## 2019-02-18 DIAGNOSIS — R60.0 EDEMA OF EXTREMITY OF UNKNOWN CAUSE: ICD-10-CM

## 2019-02-18 DIAGNOSIS — B35.1 ONYCHOMYCOSIS: ICD-10-CM

## 2019-02-18 DIAGNOSIS — L84 CALLUS OF FOOT: ICD-10-CM

## 2019-02-18 DIAGNOSIS — S91.301D OPEN WOUND OF RIGHT FOOT, SUBSEQUENT ENCOUNTER: ICD-10-CM

## 2019-02-18 PROCEDURE — 99024 POSTOP FOLLOW-UP VISIT: CPT | Performed by: PODIATRIST

## 2019-02-18 RX ORDER — METHYLPREDNISOLONE 4 MG/1
TABLET ORAL
Qty: 21 TABLET | Refills: 0 | Status: ON HOLD | OUTPATIENT
Start: 2019-02-18 | End: 2019-12-30

## 2019-02-18 RX ORDER — SULFAMETHOXAZOLE AND TRIMETHOPRIM 800; 160 MG/1; MG/1
1 TABLET ORAL 2 TIMES DAILY
Qty: 20 TABLET | Refills: 0 | Status: ON HOLD | OUTPATIENT
Start: 2019-02-18 | End: 2019-12-30

## 2019-02-18 RX ORDER — FUROSEMIDE 20 MG/1
20 TABLET ORAL DAILY
Qty: 10 TABLET | Refills: 0 | Status: SHIPPED | OUTPATIENT
Start: 2019-02-18 | End: 2020-01-03 | Stop reason: HOSPADM

## 2019-02-18 NOTE — PROGRESS NOTES
Subjective   Patient ID: Bri Iniguez is a 83 y.o. female STATUS POST:  Post-op of the Right Foot (Right foot bunionectomy with exostectomy, proximal phalanx osteotomy, right foot second and third digit hammertoe correction with interphalangeal joint fusion, right second metatarsophalangeal joint capsular release/capsulotomy 1/21/19)  She comes back in for follow-up today on her right foot.  She is weightbearing with a cane in her regular shoe.  She's having issues with swelling but denies any pain.  She says she's been keeping a Band-Aid on the tip of her right third toe.    History of Present Illness    Review of Systems   Constitutional: Negative for fever.   HENT: Negative for voice change.    Eyes: Negative for visual disturbance.   Respiratory: Negative for shortness of breath.    Cardiovascular: Negative for chest pain.   Gastrointestinal: Negative for abdominal distention and abdominal pain.   Genitourinary: Negative for dysuria.   Musculoskeletal: Negative for arthralgias, gait problem and joint swelling.   Skin: Negative for rash.   Neurological: Negative for speech difficulty.   Hematological: Does not bruise/bleed easily.   Psychiatric/Behavioral: Negative for confusion.       Past Medical History:   Diagnosis Date   • Body piercing     EARS   • Bursitis     trochanteric area   • Colonoscopy refused 2015   • Constipation    • Deaf, left    • Deafness     unspecified   • Diabetes mellitus (CMS/[facility])     PATIENT REPORTS SHE HAS TAKEN METFORMIN IN THE PAST BUT WAS TAKEN OFF OF THIS MEDICATION AROUND OCTOBER 2018.     • Elevated cholesterol    • Essential hypertension    • GERD (gastroesophageal reflux disease)    • Hearing loss, right     PATIENT DOES USE A HEARING AID TO RIGHT SIDE INTERMITTENTLY (REPORTS DEAFNESS IN LEFT EAR)   • Hip pain    • History of UTI     PATIENT REPORTS RESOLVED AFTER HORMONE THERAPY WAS ORDERED   • Hyperlipidemia     other   • Influenza vaccine administered 2014   • Macular  "degeneration    • Mammogram declined 2015   • Osteoarthritis of left hip    • Primary osteoarthritis of right knee    • Supraventricular premature beats     REPORTED IN PREADMISSION TESTING VISIT \"IRREGULAR HEART BEAT\".  REPORTS UNSURE THE ACTUAL VERBIAGE OF WHAT SHE WAS TOLD OTHER THAN THIS.    • Wears partial dentures     REPORTS UPPER AND LOWER PARTIALS. INSTRUCTED NO ADHESIVES THE DOS.        Past Surgical History:   Procedure Laterality Date   • BUNIONECTOMY Right 1/21/2019    Procedure: Right foot bunionectomy with exostectomy, proximal phalanx osteotomy, right foot second and third digit hammertoe correction with interphalangeal joint fusion, right second metatarsophalangeal joint capsular release/capsulotomy;  Surgeon: Klaus Cason DPM;  Location: [facility];  Service: Podiatry   • CATARACT EXTRACTION Bilateral    • CHOLECYSTECTOMY  2002   • COLONOSCOPY     • HERNIA REPAIR  2005    umbilical   • HYSTERECTOMY  1974   • TONSILLECTOMY     • TOTAL HIP ARTHROPLASTY Left 1/23/2018    Procedure: LEFT TOTAL HIP ARTHROPLASTY;  Surgeon: Gideon Son MD;  Location: [facility];  Service:        Social History     Socioeconomic History   • Marital status:      Spouse name: Not on file   • Number of children: Not on file   • Years of education: Not on file   • Highest education level: Not on file   Social Needs   • Financial resource strain: Not on file   • Food insecurity - worry: Not on file   • Food insecurity - inability: Not on file   • Transportation needs - medical: Not on file   • Transportation needs - non-medical: Not on file   Occupational History   • Not on file   Tobacco Use   • Smoking status: Never Smoker   • Smokeless tobacco: Never Used   Substance and Sexual Activity   • Alcohol use: No   • Drug use: No   • Sexual activity: Defer   Other Topics Concern   • Not on file   Social History Narrative   • Not on file       Counseling given: Not Answered      I have reviewed all of the above social " hx, family hx, surgical hx, medications, allergies & ROS and confirm that it is accurate.    No Known Allergies    Objective   Physical Exam   Constitutional: She is oriented to person, place, and time. She appears well-developed and well-nourished.   HENT:   Head: Normocephalic and atraumatic.   Eyes: EOM are normal. Pupils are equal, round, and reactive to light.   Neck: Normal range of motion.   Cardiovascular: Normal rate.   Pulmonary/Chest: Effort normal.   Musculoskeletal: Normal range of motion.   Neurological: She is alert and oriented to person, place, and time. She has normal reflexes.   Skin: Skin is warm.   Psychiatric: She has a normal mood and affect. Her behavior is normal. Judgment and thought content normal.   Nursing note and vitals reviewed.    Ortho Exam  [unfilled]    Right foot exam:   Pulses palpable, 2-3+ edema is noted to the entire right lower extremity from the knees distally.  The right third digit is especially edematous.  The ulceration on the tip of the right third toe has healed with callused skin and healthy skin underneath.  There is slight Alexandria contracture at the DIPJ.  Hallux remains rectus was slight lateral drift but there is good spacing between the first second and third digits.  There is no abnormal warmth.  The erythema to the forefoot and midfoot is blanchable.  There is good range of motion at the MTPJ's of all first, second, third DIGITS.  There is extensive venous engorgement to the entire right lower extremity and calf but no pain.  No induration.  No warmth.  Aside from the edema there is nothing else seems to be healed quite well.      Assessment/Plan  status post Right foot bunionectomy with exostectomy, proximal phalanx osteotomy, right foot second and third digit hammertoe correction with interphalangeal joint fusion, right second metatarsophalangeal joint capsular release/capsulotomy 1/21/19)  Independent Review of Radiographic Studies:      Laboratory and  Other Studies:     Medical Decision Making:        Procedures  [] No procedures were performed in office today.    Bri was seen today for post-op.    Diagnoses and all orders for this visit:    Hallux valgus of right foot    Onychomycosis    Hallux rigidus of right foot    Edema of extremity of unknown cause    Ulcerated, foot, right, with fat layer exposed (CMS/HCC)    Hammertoe of second toe of right foot    Open wound of right foot, subsequent encounter    Right foot pain    Callus of foot    Other orders  -     MethylPREDNISolone (MEDROL, JANIE,) 4 MG tablet; Use as directed by package instructions  -     sulfamethoxazole-trimethoprim (BACTRIM DS) 800-160 MG per tablet; Take 1 tablet by mouth 2 (Two) Times a Day.  -     furosemide (LASIX) 20 MG tablet; Take 1 tablet by mouth Daily.          Recommendations/Plan:   WILL RX her a Medrol Dosepak as well as antibiotics to help ensure there is no underlying infection causing the swelling but this seems to be more related to venous engorgement and fluid overload.  I'll give her a few days worth of Lasix as well.  If this doesn't improve her recommend she come back and see me and let her primary care physician no.  Based off her swelling she can begin normal shoe gear but I have stressed multiple times the importance of a knee-high compression sock to be worn all day long right now.  Her swelling.  I'll see her back in [place] and a few weeks.  Call me with any concerns or problems at all.    Return in about 6 weeks (around 4/1/2019) for in [place] with xoa.  Patient agreeable to call or return sooner for any concerns.

## 2019-03-04 ENCOUNTER — OFFICE VISIT (OUTPATIENT)
Dept: ORTHOPEDIC SURGERY | Facility: CLINIC | Age: 84
End: 2019-03-04

## 2019-03-04 VITALS — BODY MASS INDEX: 24.65 KG/M2 | WEIGHT: 166.45 LBS | OXYGEN SATURATION: 98 % | HEIGHT: 69 IN | HEART RATE: 79 BPM

## 2019-03-04 DIAGNOSIS — Z09 POSTOPERATIVE EXAMINATION: ICD-10-CM

## 2019-03-04 DIAGNOSIS — Z96.642 STATUS POST TOTAL REPLACEMENT OF LEFT HIP: Primary | ICD-10-CM

## 2019-03-04 PROCEDURE — 99213 OFFICE O/P EST LOW 20 MIN: CPT | Performed by: ORTHOPAEDIC SURGERY

## 2019-03-04 ASSESSMENT — HOOS JR
HOOS JR SCORE: 0
HOOS JR SCORE: 100

## 2019-03-04 NOTE — PROGRESS NOTES
"    [facility] [place]    Subjective     Chief Complaint   Patient presents with   • Follow-up     6 month follow up - Status post total replacement of left hip 01/23/18        HPI    Bri Iniguez is a 83 y.o. female.  She follows up today for left total hip arthroplasty.  She is doing well today, with no pain.  100% improvement compared to her preoperative symptoms.  She does walk with a cane.      Patient Active Problem List   Diagnosis   • Macular degeneration   • Hyperlipidemia   • Essential hypertension   • Deafness   • Diabetes type 2, controlled (CMS/[facility])   • Primary osteoarthritis of left hip   • Venous insufficiency of both lower extremities   • Status post total replacement of left hip   • Acute blood loss anemia, mild, asymptomatic   • Leukocytosis, mild, likely reactive     Past Medical History:   Diagnosis Date   • Body piercing     EARS   • Bursitis     trochanteric area   • Colonoscopy refused 2015   • Constipation    • Deaf, left    • Deafness     unspecified   • Diabetes mellitus (CMS/[facility])     PATIENT REPORTS SHE HAS TAKEN METFORMIN IN THE PAST BUT WAS TAKEN OFF OF THIS MEDICATION AROUND OCTOBER 2018.     • Elevated cholesterol    • Essential hypertension    • GERD (gastroesophageal reflux disease)    • Hearing loss, right     PATIENT DOES USE A HEARING AID TO RIGHT SIDE INTERMITTENTLY (REPORTS DEAFNESS IN LEFT EAR)   • Hip pain    • History of UTI     PATIENT REPORTS RESOLVED AFTER HORMONE THERAPY WAS ORDERED   • Hyperlipidemia     other   • Influenza vaccine administered 2014   • Macular degeneration    • Mammogram declined 2015   • Osteoarthritis of left hip    • Primary osteoarthritis of right knee    • Supraventricular premature beats     REPORTED IN PREADMISSION TESTING VISIT \"IRREGULAR HEART BEAT\".  REPORTS UNSURE THE ACTUAL VERBIAGE OF WHAT SHE WAS TOLD OTHER THAN THIS.    • Wears partial dentures     REPORTS UPPER AND LOWER PARTIALS. INSTRUCTED NO ADHESIVES THE DOS.    "   Past Surgical History:   Procedure Laterality Date   • BUNIONECTOMY Right 1/21/2019    Procedure: Right foot bunionectomy with exostectomy, proximal phalanx osteotomy, right foot second and third digit hammertoe correction with interphalangeal joint fusion, right second metatarsophalangeal joint capsular release/capsulotomy;  Surgeon: Klaus Cason DPM;  Location: [facility];  Service: Podiatry   • CATARACT EXTRACTION Bilateral    • CHOLECYSTECTOMY  2002   • COLONOSCOPY     • HERNIA REPAIR  2005    umbilical   • HYSTERECTOMY  1974   • TONSILLECTOMY     • TOTAL HIP ARTHROPLASTY Left 1/23/2018    Procedure: LEFT TOTAL HIP ARTHROPLASTY;  Surgeon: Gideon Son MD;  Location: [facility];  Service:       Family History   Problem Relation Age of Onset   • Hypertension Other    • Diabetes Other         type 2   • Stroke Mother    • Diabetes Mother    • Hypertension Mother    • Cancer Father      Social History     Socioeconomic History   • Marital status:      Spouse name: Not on file   • Number of children: Not on file   • Years of education: Not on file   • Highest education level: Not on file   Social Needs   • Financial resource strain: Not on file   • Food insecurity - worry: Not on file   • Food insecurity - inability: Not on file   • Transportation needs - medical: Not on file   • Transportation needs - non-medical: Not on file   Occupational History   • Not on file   Tobacco Use   • Smoking status: Never Smoker   • Smokeless tobacco: Never Used   Substance and Sexual Activity   • Alcohol use: No   • Drug use: No   • Sexual activity: Defer   Other Topics Concern   • Not on file   Social History Narrative   • Not on file      Current Outpatient Medications on File Prior to Visit   Medication Sig Dispense Refill   • acetaminophen (TYLENOL) 650 MG 8 hr tablet Take 650 mg by mouth 2 (Two) Times a Day.     • aspirin 81 MG chewable tablet Chew 1 tablet Every Morning. Resume in 1 month     • atorvastatin  (LIPITOR) 10 MG tablet Take 1 tablet by mouth Daily. 90 tablet 3   • docusate sodium 100 MG capsule Take 1 capsule by mouth 2 (Two) Times a Day As Needed for Constipation. (Patient taking differently: Take 1 capsule by mouth 2 (Two) Times a Day As Needed (CONSTIPATION).) 60 capsule 0   • estradiol (ESTRACE) 0.1 MG/GM vaginal cream APPLY USING FINGER TECHNIQUE THREE TIMES A WEEK  6   • furosemide (LASIX) 20 MG tablet Take 1 tablet by mouth Daily. 10 tablet 0   • HYDROcodone-acetaminophen (NORCO) 7.5-325 MG per tablet Take 1-2 tablets by mouth Every 6 (Six) Hours As Needed for Moderate Pain 30 tablet 0   • INTRAROSA 6.5 MG insert INSERT ONCE VAGINALLY DAILY AT BEDTIME  6   • lisinopril-hydrochlorothiazide (PRINZIDE,ZESTORETIC) 20-12.5 MG per tablet Take 1 tablet by mouth Daily. 90 tablet 3   • MethylPREDNISolone (MEDROL, JANIE,) 4 MG tablet Use as directed by package instructions 21 tablet 0   • Multiple Vitamins-Minerals (OCUVITE ADULT 50+) capsule Take 1 tablet by mouth Daily.     • sulfamethoxazole-trimethoprim (BACTRIM DS) 800-160 MG per tablet Take 1 tablet by mouth 2 (Two) Times a Day. 20 tablet 0   • sulfamethoxazole-trimethoprim (BACTRIM DS) 800-160 MG per tablet Take 1 tablet by mouth 2 (Two) Times a Day. 20 tablet 0     No current facility-administered medications on file prior to visit.       No Known Allergies     Review of Systems   Constitutional: Negative for activity change, appetite change, chills, diaphoresis, fatigue, fever and unexpected weight change.   HENT: Negative for congestion, dental problem, drooling, ear discharge, ear pain, facial swelling, hearing loss, mouth sores, nosebleeds, postnasal drip, rhinorrhea, sinus pressure, sneezing, sore throat, tinnitus, trouble swallowing and voice change.    Eyes: Negative for photophobia, pain, discharge, redness, itching and visual disturbance.   Respiratory: Negative for apnea, cough, choking, chest tightness, shortness of breath, wheezing and  "stridor.    Cardiovascular: Negative for chest pain, palpitations and leg swelling.   Gastrointestinal: Negative for abdominal distention, abdominal pain, anal bleeding, blood in stool, constipation, diarrhea, nausea, rectal pain and vomiting.   Endocrine: Negative for cold intolerance, heat intolerance, polydipsia, polyphagia and polyuria.   Genitourinary: Negative for decreased urine volume, difficulty urinating, dysuria, enuresis, flank pain, frequency, genital sores, hematuria and urgency.   Musculoskeletal: Negative for arthralgias, back pain, gait problem, joint swelling, myalgias, neck pain and neck stiffness.        Status post total replacement of left hip   Skin: Negative for color change, pallor, rash and wound.   Allergic/Immunologic: Negative for environmental allergies, food allergies and immunocompromised state.   Neurological: Negative for dizziness, tremors, seizures, syncope, facial asymmetry, speech difficulty, weakness, light-headedness, numbness and headaches.   Hematological: Negative for adenopathy. Does not bruise/bleed easily.   Psychiatric/Behavioral: Negative for agitation, behavioral problems, confusion, decreased concentration, dysphoric mood, hallucinations, self-injury, sleep disturbance and suicidal ideas. The patient is not nervous/anxious and is not hyperactive.         Objective      Physical Exam  Pulse 79   Ht 175.3 cm (69.02\")   Wt 75.5 kg (166 lb 7.2 oz)   LMP  (LMP Unknown) Comment: HYSTERECTOMY  SpO2 98%   Breastfeeding? No   BMI 24.57 kg/m²     Body mass index is 24.57 kg/m².    General:   Mental Status:  Alert   Appearance: Cooperative, in no acute distress   Build and Nutrition: Well-nourished and well developed female   Orientation: Alert and oriented to person, place and time   Posture: Normal   Gait: With a cane    Integument:   Left hip: Wound is well-healed with no signs of infection    Lower Extremity:   Left Hip:    Tenderness:  None    Swelling: "  None    Crepitus:  None    Range of motion:  External Rotation: 30°       Internal Rotation: 30°       Flexion:  100°       Extension:  0°    Deformities:  None  Functional testing: Negative Stinchfield    No leg length discrepancy      Imaging/Studies  Imaging Results (last 24 hours)     Procedure Component Value Units Date/Time    XR Hip With or Without Pelvis 1 View Left [[id number]] Resulted:  03/04/19 1353     Updated:  03/04/19 1354    Narrative:       Left Hip Radiographs  Indication: status-post left total hip arthroplasty  Views: low AP pelvis and lateral of the left hip    Comparison: no change compared to prior study, 8/6/2018    Findings:   The components are well aligned, with no signs of loosening or failure.            Assessment and Plan     Bri was seen today for follow-up.    Diagnoses and all orders for this visit:    Status post total replacement of left hip  -     XR Hip With or Without Pelvis 1 View Left    Postoperative examination        I reviewed my findings with the patient today.  Her left total hip arthroplasty is functioning well, but she still has some abductor weakness.  She uses a cane for ambulation.  I will see her back in 4 years with a repeat x-ray, but sooner for any problems.    Return in about 4 years (around 3/4/2023) for Recheck with X-Rays.      Medical Decision Making  Data/Risk: radiology tests and independent visualization of imaging, lab tests, or EMG/NCV      Gideon Son MD  03/04/19  2:07 PM

## 2019-04-02 ENCOUNTER — OFFICE VISIT (OUTPATIENT)
Dept: ORTHOPEDIC SURGERY | Facility: CLINIC | Age: 84
End: 2019-04-02

## 2019-04-02 VITALS — WEIGHT: 177 LBS | RESPIRATION RATE: 15 BRPM | BODY MASS INDEX: 26.22 KG/M2 | HEIGHT: 69 IN

## 2019-04-02 DIAGNOSIS — M20.11 HALLUX VALGUS OF RIGHT FOOT: Primary | ICD-10-CM

## 2019-04-02 DIAGNOSIS — M20.21 HALLUX RIGIDUS OF RIGHT FOOT: ICD-10-CM

## 2019-04-02 DIAGNOSIS — R60.0 EDEMA OF EXTREMITY OF UNKNOWN CAUSE: ICD-10-CM

## 2019-04-02 PROCEDURE — 99024 POSTOP FOLLOW-UP VISIT: CPT | Performed by: PODIATRIST

## 2019-04-02 NOTE — PROGRESS NOTES
"Subjective   Patient ID: Bri Iniguez is a 83 y.o. female   No chief complaint on file.      History of Present Illness                                                   Review of Systems   Constitutional: Negative for diaphoresis, fever and unexpected weight change.   HENT: Negative for dental problem and sore throat.    Eyes: Negative for visual disturbance.   Respiratory: Negative for shortness of breath.    Cardiovascular: Negative for chest pain.   Gastrointestinal: Negative for abdominal pain, constipation, diarrhea, nausea and vomiting.   Genitourinary: Negative for difficulty urinating and frequency.   Musculoskeletal: Positive for joint swelling (right foot). Negative for arthralgias.   Neurological: Negative for headaches.   Hematological: Does not bruise/bleed easily.       Past Medical History:   Diagnosis Date   • Body piercing     EARS   • Bursitis     trochanteric area   • Colonoscopy refused 2015   • Constipation    • Deaf, left    • Deafness     unspecified   • Diabetes mellitus (CMS/[facility])     PATIENT REPORTS SHE HAS TAKEN METFORMIN IN THE PAST BUT WAS TAKEN OFF OF THIS MEDICATION AROUND OCTOBER 2018.     • Elevated cholesterol    • Essential hypertension    • GERD (gastroesophageal reflux disease)    • Hearing loss, right     PATIENT DOES USE A HEARING AID TO RIGHT SIDE INTERMITTENTLY (REPORTS DEAFNESS IN LEFT EAR)   • Hip pain    • History of UTI     PATIENT REPORTS RESOLVED AFTER HORMONE THERAPY WAS ORDERED   • Hyperlipidemia     other   • Influenza vaccine administered 2014   • Macular degeneration    • Mammogram declined 2015   • Osteoarthritis of left hip    • Primary osteoarthritis of right knee    • Supraventricular premature beats     REPORTED IN PREADMISSION TESTING VISIT \"IRREGULAR HEART BEAT\".  REPORTS UNSURE THE ACTUAL VERBIAGE OF WHAT SHE WAS TOLD OTHER THAN THIS.    • Wears partial dentures     REPORTS UPPER AND LOWER PARTIALS. INSTRUCTED NO ADHESIVES THE DOS.        Past Surgical " History:   Procedure Laterality Date   • BUNIONECTOMY Right 1/21/2019    Procedure: Right foot bunionectomy with exostectomy, proximal phalanx osteotomy, right foot second and third digit hammertoe correction with interphalangeal joint fusion, right second metatarsophalangeal joint capsular release/capsulotomy;  Surgeon: Klaus Cason DPM;  Location: [facility] OR;  Service: Podiatry   • CATARACT EXTRACTION Bilateral    • CHOLECYSTECTOMY  2002   • COLONOSCOPY     • HERNIA REPAIR  2005    umbilical   • HYSTERECTOMY  1974   • TONSILLECTOMY     • TOTAL HIP ARTHROPLASTY Left 1/23/2018    Procedure: LEFT TOTAL HIP ARTHROPLASTY;  Surgeon: Gideon Son MD;  Location: [facility] OR;  Service:        No Known Allergies      Current Outpatient Medications:   •  acetaminophen (TYLENOL) 650 MG 8 hr tablet, Take 650 mg by mouth 2 (Two) Times a Day., Disp: , Rfl:   •  aspirin 81 MG chewable tablet, Chew 1 tablet Every Morning. Resume in 1 month, Disp: , Rfl:   •  atorvastatin (LIPITOR) 10 MG tablet, Take 1 tablet by mouth Daily., Disp: 90 tablet, Rfl: 3  •  docusate sodium 100 MG capsule, Take 1 capsule by mouth 2 (Two) Times a Day As Needed for Constipation. (Patient taking differently: Take 1 capsule by mouth 2 (Two) Times a Day As Needed (CONSTIPATION).), Disp: 60 capsule, Rfl: 0  •  estradiol (ESTRACE) 0.1 MG/GM vaginal cream, APPLY USING FINGER TECHNIQUE THREE TIMES A WEEK, Disp: , Rfl: 6  •  furosemide (LASIX) 20 MG tablet, Take 1 tablet by mouth Daily., Disp: 10 tablet, Rfl: 0  •  HYDROcodone-acetaminophen (NORCO) 7.5-325 MG per tablet, Take 1-2 tablets by mouth Every 6 (Six) Hours As Needed for Moderate Pain, Disp: 30 tablet, Rfl: 0  •  INTRAROSA 6.5 MG insert, INSERT ONCE VAGINALLY DAILY AT BEDTIME, Disp: , Rfl: 6  •  lisinopril-hydrochlorothiazide (PRINZIDE,ZESTORETIC) 20-12.5 MG per tablet, Take 1 tablet by mouth Daily., Disp: 90 tablet, Rfl: 3  •  MethylPREDNISolone (MEDROL, JANIE,) 4 MG tablet, Use as directed by  "package instructions, Disp: 21 tablet, Rfl: 0  •  Multiple Vitamins-Minerals (OCUVITE ADULT 50+) capsule, Take 1 tablet by mouth Daily., Disp: , Rfl:   •  sulfamethoxazole-trimethoprim (BACTRIM DS) 800-160 MG per tablet, Take 1 tablet by mouth 2 (Two) Times a Day., Disp: 20 tablet, Rfl: 0  •  sulfamethoxazole-trimethoprim (BACTRIM DS) 800-160 MG per tablet, Take 1 tablet by mouth 2 (Two) Times a Day., Disp: 20 tablet, Rfl: 0    Family History   Problem Relation Age of Onset   • Hypertension Other    • Diabetes Other         type 2   • Stroke Mother    • Diabetes Mother    • Hypertension Mother    • Cancer Father        Social History     Socioeconomic History   • Marital status:      Spouse name: Not on file   • Number of children: Not on file   • Years of education: Not on file   • Highest education level: Not on file   Tobacco Use   • Smoking status: Never Smoker   • Smokeless tobacco: Never Used   Substance and Sexual Activity   • Alcohol use: No   • Drug use: No   • Sexual activity: Defer       Counseling given: Not Answered       {Hx REVIEW:55887::\"I have reviewed all of the above social hx, family hx, surgical hx, medications, allergies & ROS and confirm that it is accurate.\"}  Objective   Physical Exam  Ortho Exam  [unfilled]  Lower extremity exam:  Vascular:  Neuro:  Derm:  MSK:    Assessment/Plan   Independent Review of Radiographic Studies:      Laboratory and Other Studies:     Medical Decision Making:        Procedures  [] No procedures were performed in office today.    There are no diagnoses linked to this encounter.      Recommendations/Plan:      No Follow-up on file.  Patient agreeable to call or return sooner for any concerns.       "

## 2019-04-02 NOTE — PROGRESS NOTES
Subjective   Patient ID: Bri Iniguez is a 83 y.o. female STATUS POST:  Follow-up of the Right Foot (Hallux valgus of right foot )  She returns today for postoperative evaluation.  She is now approximately 2-1/2 months status post right foot bunionectomy with second and third digit hammertoe correction.  Still complains of notices the swelling but has no pain.  Says she can walk.  Has no restrictions or problems that she really complains of.    History of Present Illness    Review of Systems   Constitutional: Negative for diaphoresis, fever and unexpected weight change.   HENT: Negative for dental problem and sore throat.    Eyes: Negative for visual disturbance.   Respiratory: Negative for shortness of breath.    Cardiovascular: Negative for chest pain.   Gastrointestinal: Negative for abdominal pain, constipation, diarrhea, nausea and vomiting.   Genitourinary: Negative for difficulty urinating and frequency.   Musculoskeletal: Positive for joint swelling (right foot). Negative for arthralgias.   Neurological: Negative for headaches.   Hematological: Does not bruise/bleed easily.       Past Medical History:   Diagnosis Date   • Body piercing     EARS   • Bursitis     trochanteric area   • Colonoscopy refused 2015   • Constipation    • Deaf, left    • Deafness     unspecified   • Diabetes mellitus (CMS/[facility])     PATIENT REPORTS SHE HAS TAKEN METFORMIN IN THE PAST BUT WAS TAKEN OFF OF THIS MEDICATION AROUND OCTOBER 2018.     • Elevated cholesterol    • Essential hypertension    • GERD (gastroesophageal reflux disease)    • Hearing loss, right     PATIENT DOES USE A HEARING AID TO RIGHT SIDE INTERMITTENTLY (REPORTS DEAFNESS IN LEFT EAR)   • Hip pain    • History of UTI     PATIENT REPORTS RESOLVED AFTER HORMONE THERAPY WAS ORDERED   • Hyperlipidemia     other   • Influenza vaccine administered 2014   • Macular degeneration    • Mammogram declined 2015   • Osteoarthritis of left hip    • Primary osteoarthritis of  "right knee    • Supraventricular premature beats     REPORTED IN PREADMISSION TESTING VISIT \"IRREGULAR HEART BEAT\".  REPORTS UNSURE THE ACTUAL VERBIAGE OF WHAT SHE WAS TOLD OTHER THAN THIS.    • Wears partial dentures     REPORTS UPPER AND LOWER PARTIALS. INSTRUCTED NO ADHESIVES THE DOS.        Past Surgical History:   Procedure Laterality Date   • BUNIONECTOMY Right 1/21/2019    Procedure: Right foot bunionectomy with exostectomy, proximal phalanx osteotomy, right foot second and third digit hammertoe correction with interphalangeal joint fusion, right second metatarsophalangeal joint capsular release/capsulotomy;  Surgeon: Klaus Cason DPM;  Location: [facility];  Service: Podiatry   • CATARACT EXTRACTION Bilateral    • CHOLECYSTECTOMY  2002   • COLONOSCOPY     • HERNIA REPAIR  2005    umbilical   • HYSTERECTOMY  1974   • TONSILLECTOMY     • TOTAL HIP ARTHROPLASTY Left 1/23/2018    Procedure: LEFT TOTAL HIP ARTHROPLASTY;  Surgeon: Gideon Son MD;  Location: [facility];  Service:        Social History     Socioeconomic History   • Marital status:      Spouse name: Not on file   • Number of children: Not on file   • Years of education: Not on file   • Highest education level: Not on file   Tobacco Use   • Smoking status: Never Smoker   • Smokeless tobacco: Never Used   Substance and Sexual Activity   • Alcohol use: No   • Drug use: No   • Sexual activity: Defer       Counseling given: Not Answered      I have reviewed all of the above social hx, family hx, surgical hx, medications, allergies & ROS and confirm that it is accurate.    No Known Allergies    Objective   Physical Exam  Ortho Exam  All digits are rectus.  Interphalangeal joint of the right second digit seems to be rigid and straight and rectus.  Great toe is rectus and there is good spacing between all digits.  The third digit does have slight plantarflexion at the DIPJ but is flexible.  There is no callus lesions.  Third digit is still " edematous but there is no erythema.  She has good range of motion of all digits.  She is grossly neurovascular intact.      Assessment/Plan  status post and Medina osteotomy, right foot second and third digit hammertoe correction with second digit fusion and third digit arthroplasty  Independent Review of Radiographic Studies:      Laboratory and Other Studies:     Medical Decision Making:        Procedures  [] No procedures were performed in office today.    There are no diagnoses linked to this encounter.      Recommendations/Plan:  We again discussed the swelling and I think with her and she is going to take longer especially given her varicose veins.  She has no restrictions.  Activity as tolerated.  If she develops any issues she can let me know.  2.5 months from right foot bunionectomy with exostectomy    No Follow-up on file.  Patient agreeable to call or return sooner for any concerns.

## 2019-12-24 ENCOUNTER — OFFICE VISIT (OUTPATIENT)
Dept: ORTHOPEDIC SURGERY | Facility: CLINIC | Age: 84
End: 2019-12-24

## 2019-12-24 VITALS — BODY MASS INDEX: 26.22 KG/M2 | HEART RATE: 59 BPM | WEIGHT: 177.03 LBS | OXYGEN SATURATION: 98 % | HEIGHT: 69 IN

## 2019-12-24 DIAGNOSIS — M17.11 PRIMARY OSTEOARTHRITIS OF RIGHT KNEE: Primary | ICD-10-CM

## 2019-12-24 PROCEDURE — 99213 OFFICE O/P EST LOW 20 MIN: CPT | Performed by: PHYSICIAN ASSISTANT

## 2019-12-24 PROCEDURE — 20610 DRAIN/INJ JOINT/BURSA W/O US: CPT | Performed by: PHYSICIAN ASSISTANT

## 2019-12-24 RX ORDER — LIDOCAINE HYDROCHLORIDE 10 MG/ML
4 INJECTION, SOLUTION EPIDURAL; INFILTRATION; INTRACAUDAL; PERINEURAL
Status: COMPLETED | OUTPATIENT
Start: 2019-12-24 | End: 2019-12-24

## 2019-12-24 RX ORDER — METHYLPREDNISOLONE ACETATE 40 MG/ML
40 INJECTION, SUSPENSION INTRA-ARTICULAR; INTRALESIONAL; INTRAMUSCULAR; SOFT TISSUE
Status: COMPLETED | OUTPATIENT
Start: 2019-12-24 | End: 2019-12-24

## 2019-12-24 RX ADMIN — METHYLPREDNISOLONE ACETATE 40 MG: 40 INJECTION, SUSPENSION INTRA-ARTICULAR; INTRALESIONAL; INTRAMUSCULAR; SOFT TISSUE at 11:49

## 2019-12-24 RX ADMIN — LIDOCAINE HYDROCHLORIDE 4 ML: 10 INJECTION, SOLUTION EPIDURAL; INFILTRATION; INTRACAUDAL; PERINEURAL at 11:49

## 2019-12-24 NOTE — PROGRESS NOTES
Procedure   Large Joint Arthrocentesis: R knee  Date/Time: 12/24/2019 11:49 AM  Consent given by: patient  Site marked: site marked  Timeout: Immediately prior to procedure a time out was called to verify the correct patient, procedure, equipment, support staff and site/side marked as required   Supporting Documentation  Indications: pain   Procedure Details  Location: knee - R knee  Preparation: Patient was prepped and draped in the usual sterile fashion  Needle size: 22 G  Approach: anterolateral  Medications administered: 40 mg methylPREDNISolone acetate 40 MG/ML; 4 mL lidocaine PF 1% 1 %  Patient tolerance: patient tolerated the procedure well with no immediate complications

## 2019-12-24 NOTE — PROGRESS NOTES
[facility] Note    Subjective     Patient: Bri Iniguez  : 1935    Primary Care Provider: Clifford Nolasco MD    Requesting Provider: As above    Follow-up of the Right Knee (Last orthovisc injection 2018)      History    Chief Complaint: Right knee pain    History of Present Illness: Patient returns today with increasing and progressively worsening right knee pain.  She is well-known to me with right knee arthritis.  She is been treated in the past with Visco supplementation and steroid injection with good relief.  Last Visco was 2018.  She reports about 1 month ago she felt a pop getting out of the car and has had increased pain since then.  She has pain 5/10 with weightbearing and walking as well as rest pain and night pain.  She has been using a cane for ambulation.  She is here hoping for repeat injections.    Current Outpatient Medications on File Prior to Visit   Medication Sig Dispense Refill   • acetaminophen (TYLENOL) 650 MG 8 hr tablet Take 650 mg by mouth 2 (Two) Times a Day.     • aspirin 81 MG chewable tablet Chew 1 tablet Every Morning. Resume in 1 month     • atorvastatin (LIPITOR) 10 MG tablet Take 1 tablet by mouth Daily. 90 tablet 3   • docusate sodium 100 MG capsule Take 1 capsule by mouth 2 (Two) Times a Day As Needed for Constipation. (Patient taking differently: Take 1 capsule by mouth 2 (Two) Times a Day As Needed (CONSTIPATION).) 60 capsule 0   • estradiol (ESTRACE) 0.1 MG/GM vaginal cream APPLY USING FINGER TECHNIQUE THREE TIMES A WEEK  6   • furosemide (LASIX) 20 MG tablet Take 1 tablet by mouth Daily. 10 tablet 0   • HYDROcodone-acetaminophen (NORCO) 7.5-325 MG per tablet Take 1-2 tablets by mouth Every 6 (Six) Hours As Needed for Moderate Pain 30 tablet 0   • INTRAROSA 6.5 MG insert INSERT ONCE VAGINALLY DAILY AT BEDTIME  6   • lisinopril-hydrochlorothiazide (PRINZIDE,ZESTORETIC) 20-12.5 MG per tablet Take 1 tablet by mouth Daily. 90  "tablet 3   • MethylPREDNISolone (MEDROL, JANIE,) 4 MG tablet Use as directed by package instructions 21 tablet 0   • Multiple Vitamins-Minerals (OCUVITE ADULT 50+) capsule Take 1 tablet by mouth Daily.     • sulfamethoxazole-trimethoprim (BACTRIM DS) 800-160 MG per tablet Take 1 tablet by mouth 2 (Two) Times a Day. 20 tablet 0   • sulfamethoxazole-trimethoprim (BACTRIM DS) 800-160 MG per tablet Take 1 tablet by mouth 2 (Two) Times a Day. 20 tablet 0     No current facility-administered medications on file prior to visit.       No Known Allergies   Past Medical History:   Diagnosis Date   • Body piercing     EARS   • Bursitis     trochanteric area   • Colonoscopy refused 2015   • Constipation    • Deaf, left    • Deafness     unspecified   • Diabetes mellitus (CMS/[facility])     PATIENT REPORTS SHE HAS TAKEN METFORMIN IN THE PAST BUT WAS TAKEN OFF OF THIS MEDICATION AROUND OCTOBER 2018.     • Elevated cholesterol    • Essential hypertension    • GERD (gastroesophageal reflux disease)    • Hearing loss, right     PATIENT DOES USE A HEARING AID TO RIGHT SIDE INTERMITTENTLY (REPORTS DEAFNESS IN LEFT EAR)   • Hip pain    • History of UTI     PATIENT REPORTS RESOLVED AFTER HORMONE THERAPY WAS ORDERED   • Hyperlipidemia     other   • Influenza vaccine administered 2014   • Macular degeneration    • Mammogram declined 2015   • Osteoarthritis of left hip    • Primary osteoarthritis of right knee    • Supraventricular premature beats     REPORTED IN PREADMISSION TESTING VISIT \"IRREGULAR HEART BEAT\".  REPORTS UNSURE THE ACTUAL VERBIAGE OF WHAT SHE WAS TOLD OTHER THAN THIS.    • Wears partial dentures     REPORTS UPPER AND LOWER PARTIALS. INSTRUCTED NO ADHESIVES THE DOS.     Past Surgical History:   Procedure Laterality Date   • BUNIONECTOMY Right 1/21/2019    Procedure: Right foot bunionectomy with exostectomy, proximal phalanx osteotomy, right foot second and third digit hammertoe correction with interphalangeal joint fusion, right " "second metatarsophalangeal joint capsular release/capsulotomy;  Surgeon: Klaus Cason DPM;  Location: [facility];  Service: Podiatry   • CATARACT EXTRACTION Bilateral    • CHOLECYSTECTOMY  2002   • COLONOSCOPY     • HERNIA REPAIR  2005    umbilical   • HYSTERECTOMY  1974   • TONSILLECTOMY     • TOTAL HIP ARTHROPLASTY Left 1/23/2018    Procedure: LEFT TOTAL HIP ARTHROPLASTY;  Surgeon: Gideon Son MD;  Location: [facility] OR;  Service:      Family History   Problem Relation Age of Onset   • Hypertension Other    • Diabetes Other         type 2   • Stroke Mother    • Diabetes Mother    • Hypertension Mother    • Cancer Father       Social History     Socioeconomic History   • Marital status:      Spouse name: Not on file   • Number of children: Not on file   • Years of education: Not on file   • Highest education level: Not on file   Tobacco Use   • Smoking status: Never Smoker   • Smokeless tobacco: Never Used   Substance and Sexual Activity   • Alcohol use: No   • Drug use: No   • Sexual activity: Defer        Review of Systems   Constitutional: Negative.    HENT: Negative.    Eyes: Negative.    Respiratory: Negative.    Cardiovascular: Negative.    Gastrointestinal: Negative.    Endocrine: Negative.    Genitourinary: Negative.    Musculoskeletal: Positive for arthralgias.   Skin: Negative.    Allergic/Immunologic: Negative.    Neurological: Negative.    Hematological: Negative.    Psychiatric/Behavioral: Negative.        The following portions of the patient's history were reviewed and updated as appropriate: allergies, current medications, past family history, past medical history, past social history, past surgical history and problem list.      Objective      Physical Exam  Pulse 59   Ht 175.3 cm (69.02\")   Wt 80.3 kg (177 lb 0.5 oz)   LMP  (LMP Unknown) Comment: HYSTERECTOMY  SpO2 98%   BMI 26.13 kg/m²     Body mass index is 26.13 kg/m².    Patient is well developed, well nourished and in no " acute distress.  Alert and oriented x 3.    Ortho Exam    Right Knee Exam  ----------  ALIGNMENT: Right: neutral----------  RANGE OF MOTION:  Right: 0-120  LIGAMENTOUS STABILITY:   Right: stable to valgus and varus stress----------  STRENGTH:  KNEE FLEXION Right 5/5  KNEE EXTENSION Right 5/5 ----------  PAIN WITH PALPATION: Right medial joint line  PAIN WITH KNEE ROM: Right no  PATELLAR CREPITUS: Right yes, asymptomatic  ----------      Medical Decision Making    Data Review:   ordered and reviewed x-rays today    Assessment:  1. Primary osteoarthritis of right knee        Plan:  Right knee arthritis with flare.  I reviewed today's x-rays and clinical findings with the patient.  X-rays today show bone-on-bone contact in the medial compartment and severe patellofemoral compartment changes.  No acute bony findings.  Patient is done about done well with both Visco supplementation and steroid in the past.  She is not interested in replacement.  Plan today is steroid injection into the right knee.  I will put in approval for Visco supplementation and she will return once eligible.  She will return sooner if needed.    Using sterile technique, the right knee was sterilely prepped with Hibiclens.  Following a time out,  using a 22 gauge needle, the right knee was injected with 40mg Depo Medrol, 4 cc lidocaine.  Patient tolerated the procedure well.  No complications.          Ayala Caceres PA-C  12/24/19  11:59 AM

## 2019-12-30 ENCOUNTER — HOSPITAL ENCOUNTER (EMERGENCY)
Facility: HOSPITAL | Age: 84
Discharge: ANOTHER HOSPITAL | End: 2019-12-30
Attending: HOSPITALIST
Payer: MEDICARE

## 2019-12-30 ENCOUNTER — APPOINTMENT (OUTPATIENT)
Dept: GENERAL RADIOLOGY | Facility: HOSPITAL | Age: 84
End: 2019-12-30
Payer: MEDICARE

## 2019-12-30 ENCOUNTER — HOSPITAL ENCOUNTER (INPATIENT)
Facility: HOSPITAL | Age: 84
LOS: 4 days | Discharge: NURSING, REHAB OR LONG-TERM CARE | End: 2020-01-03
Attending: INTERNAL MEDICINE | Admitting: ORTHOPAEDIC SURGERY

## 2019-12-30 VITALS
SYSTOLIC BLOOD PRESSURE: 129 MMHG | TEMPERATURE: 97 F | OXYGEN SATURATION: 97 % | DIASTOLIC BLOOD PRESSURE: 59 MMHG | HEART RATE: 73 BPM | RESPIRATION RATE: 14 BRPM | BODY MASS INDEX: 28.77 KG/M2 | WEIGHT: 179 LBS | HEIGHT: 66 IN

## 2019-12-30 DIAGNOSIS — I87.2 VENOUS INSUFFICIENCY OF BOTH LOWER EXTREMITIES: ICD-10-CM

## 2019-12-30 DIAGNOSIS — Z78.9 IMPAIRED MOBILITY AND ADLS: Primary | ICD-10-CM

## 2019-12-30 DIAGNOSIS — Z74.09 IMPAIRED MOBILITY AND ADLS: Primary | ICD-10-CM

## 2019-12-30 DIAGNOSIS — S72.001S CLOSED FRACTURE OF RIGHT HIP, SEQUELA: ICD-10-CM

## 2019-12-30 PROBLEM — S72.009A HIP FRACTURE: Status: ACTIVE | Noted: 2019-12-30

## 2019-12-30 PROBLEM — S72.009A HIP FX (HCC): Status: ACTIVE | Noted: 2019-12-30

## 2019-12-30 LAB
A/G RATIO: 1.2 (ref 0.8–2)
ABO GROUP BLD: NORMAL
ALBUMIN SERPL-MCNC: 3.7 G/DL (ref 3.4–4.8)
ALP BLD-CCNC: 63 U/L (ref 25–100)
ALT SERPL-CCNC: 13 U/L (ref 4–36)
ANION GAP SERPL CALCULATED.3IONS-SCNC: 11 MMOL/L (ref 3–16)
ANION GAP SERPL CALCULATED.3IONS-SCNC: 11.2 MMOL/L (ref 5–15)
AST SERPL-CCNC: 19 U/L (ref 8–33)
BASOPHILS # BLD AUTO: 0.03 10*3/MM3 (ref 0–0.2)
BASOPHILS ABSOLUTE: 0 K/UL (ref 0–0.1)
BASOPHILS NFR BLD AUTO: 0.2 % (ref 0–1.5)
BASOPHILS RELATIVE PERCENT: 0.3 %
BILIRUB SERPL-MCNC: <0.2 MG/DL (ref 0.3–1.2)
BLD GP AB SCN SERPL QL: NEGATIVE
BUN BLD-MCNC: 13 MG/DL (ref 8–23)
BUN BLDV-MCNC: 12 MG/DL (ref 6–20)
BUN/CREAT SERPL: 25 (ref 7–25)
CALCIUM SERPL-MCNC: 8.9 MG/DL (ref 8.5–10.5)
CALCIUM SPEC-SCNC: 8.7 MG/DL (ref 8.6–10.5)
CHLORIDE BLD-SCNC: 93 MMOL/L (ref 98–107)
CHLORIDE SERPL-SCNC: 95 MMOL/L (ref 98–107)
CO2 SERPL-SCNC: 26.8 MMOL/L (ref 22–29)
CO2: 26 MMOL/L (ref 20–30)
CREAT BLD-MCNC: 0.52 MG/DL (ref 0.57–1)
CREAT SERPL-MCNC: 0.6 MG/DL (ref 0.4–1.2)
DEPRECATED RDW RBC AUTO: 43.8 FL (ref 37–54)
EOSINOPHIL # BLD AUTO: 0.01 10*3/MM3 (ref 0–0.4)
EOSINOPHIL NFR BLD AUTO: 0.1 % (ref 0.3–6.2)
EOSINOPHILS ABSOLUTE: 0.1 K/UL (ref 0–0.4)
EOSINOPHILS RELATIVE PERCENT: 0.6 %
ERYTHROCYTE [DISTWIDTH] IN BLOOD BY AUTOMATED COUNT: 13.3 % (ref 12.3–15.4)
GFR AFRICAN AMERICAN: >59
GFR NON-AFRICAN AMERICAN: >60
GFR SERPL CREATININE-BSD FRML MDRD: 112 ML/MIN/1.73
GLOBULIN: 3.2 G/DL
GLUCOSE BLD-MCNC: 177 MG/DL (ref 74–106)
GLUCOSE BLD-MCNC: 179 MG/DL (ref 65–99)
HBA1C MFR BLD: 6.9 % (ref 4.8–5.6)
HCT VFR BLD AUTO: 35.6 % (ref 34–46.6)
HCT VFR BLD CALC: 36.6 % (ref 37–47)
HEMOGLOBIN: 11.9 G/DL (ref 11.5–16.5)
HGB BLD-MCNC: 12 G/DL (ref 12–15.9)
IMM GRANULOCYTES # BLD AUTO: 0.07 10*3/MM3 (ref 0–0.05)
IMM GRANULOCYTES NFR BLD AUTO: 0.5 % (ref 0–0.5)
IMMATURE GRANULOCYTES #: 0.1 K/UL
IMMATURE GRANULOCYTES %: 0.7 % (ref 0–5)
LYMPHOCYTES # BLD AUTO: 1.09 10*3/MM3 (ref 0.7–3.1)
LYMPHOCYTES ABSOLUTE: 1.7 K/UL (ref 1.5–4)
LYMPHOCYTES NFR BLD AUTO: 7.6 % (ref 19.6–45.3)
LYMPHOCYTES RELATIVE PERCENT: 15.2 %
MCH RBC QN AUTO: 29.2 PG (ref 27–32)
MCH RBC QN AUTO: 30.7 PG (ref 26.6–33)
MCHC RBC AUTO-ENTMCNC: 32.5 G/DL (ref 31–35)
MCHC RBC AUTO-ENTMCNC: 33.7 G/DL (ref 31.5–35.7)
MCV RBC AUTO: 89.9 FL (ref 80–100)
MCV RBC AUTO: 91 FL (ref 79–97)
MONOCYTES # BLD AUTO: 0.82 10*3/MM3 (ref 0.1–0.9)
MONOCYTES ABSOLUTE: 0.8 K/UL (ref 0.2–0.8)
MONOCYTES NFR BLD AUTO: 5.7 % (ref 5–12)
MONOCYTES RELATIVE PERCENT: 6.8 %
NEUTROPHILS # BLD AUTO: 12.33 10*3/MM3 (ref 1.7–7)
NEUTROPHILS ABSOLUTE: 8.7 K/UL (ref 2–7.5)
NEUTROPHILS NFR BLD AUTO: 85.9 % (ref 42.7–76)
NEUTROPHILS RELATIVE PERCENT: 76.4 %
NRBC BLD AUTO-RTO: 0 /100 WBC (ref 0–0.2)
PDW BLD-RTO: 13.4 % (ref 11–16)
PLATELET # BLD AUTO: 258 10*3/MM3 (ref 140–450)
PLATELET # BLD: 240 K/UL (ref 150–400)
PMV BLD AUTO: 8.8 FL (ref 6–10)
PMV BLD AUTO: 9.1 FL (ref 6–12)
POTASSIUM BLD-SCNC: 4.3 MMOL/L (ref 3.5–5.2)
POTASSIUM REFLEX MAGNESIUM: 3.6 MMOL/L (ref 3.4–5.1)
RBC # BLD AUTO: 3.91 10*6/MM3 (ref 3.77–5.28)
RBC # BLD: 4.07 M/UL (ref 3.8–5.8)
RH BLD: POSITIVE
SODIUM BLD-SCNC: 130 MMOL/L (ref 136–145)
SODIUM BLD-SCNC: 133 MMOL/L (ref 136–145)
T&S EXPIRATION DATE: NORMAL
TOTAL PROTEIN: 6.9 G/DL (ref 6.4–8.3)
WBC # BLD: 11.4 K/UL (ref 4–11)
WBC NRBC COR # BLD: 14.35 10*3/MM3 (ref 3.4–10.8)

## 2019-12-30 PROCEDURE — 25010000002 MORPHINE PER 10 MG: Performed by: NURSE PRACTITIONER

## 2019-12-30 PROCEDURE — 86900 BLOOD TYPING SEROLOGIC ABO: CPT | Performed by: INTERNAL MEDICINE

## 2019-12-30 PROCEDURE — 93005 ELECTROCARDIOGRAM TRACING: CPT

## 2019-12-30 PROCEDURE — 80048 BASIC METABOLIC PNL TOTAL CA: CPT | Performed by: INTERNAL MEDICINE

## 2019-12-30 PROCEDURE — 25010000002 MORPHINE PER 10 MG: Performed by: INTERNAL MEDICINE

## 2019-12-30 PROCEDURE — 86901 BLOOD TYPING SEROLOGIC RH(D): CPT | Performed by: INTERNAL MEDICINE

## 2019-12-30 PROCEDURE — 85025 COMPLETE CBC W/AUTO DIFF WBC: CPT | Performed by: INTERNAL MEDICINE

## 2019-12-30 PROCEDURE — 83036 HEMOGLOBIN GLYCOSYLATED A1C: CPT | Performed by: NURSE PRACTITIONER

## 2019-12-30 PROCEDURE — 85025 COMPLETE CBC W/AUTO DIFF WBC: CPT

## 2019-12-30 PROCEDURE — 96376 TX/PRO/DX INJ SAME DRUG ADON: CPT

## 2019-12-30 PROCEDURE — 25010000002 ENOXAPARIN PER 10 MG: Performed by: NURSE PRACTITIONER

## 2019-12-30 PROCEDURE — 86920 COMPATIBILITY TEST SPIN: CPT

## 2019-12-30 PROCEDURE — 86850 RBC ANTIBODY SCREEN: CPT | Performed by: INTERNAL MEDICINE

## 2019-12-30 PROCEDURE — 6360000002 HC RX W HCPCS: Performed by: HOSPITALIST

## 2019-12-30 PROCEDURE — 80053 COMPREHEN METABOLIC PANEL: CPT

## 2019-12-30 PROCEDURE — 73502 X-RAY EXAM HIP UNI 2-3 VIEWS: CPT

## 2019-12-30 PROCEDURE — 99283 EMERGENCY DEPT VISIT LOW MDM: CPT

## 2019-12-30 PROCEDURE — 96375 TX/PRO/DX INJ NEW DRUG ADDON: CPT

## 2019-12-30 PROCEDURE — 36415 COLL VENOUS BLD VENIPUNCTURE: CPT

## 2019-12-30 PROCEDURE — 96374 THER/PROPH/DIAG INJ IV PUSH: CPT

## 2019-12-30 PROCEDURE — 99222 1ST HOSP IP/OBS MODERATE 55: CPT | Performed by: INTERNAL MEDICINE

## 2019-12-30 RX ORDER — SODIUM CHLORIDE 0.9 % (FLUSH) 0.9 %
10 SYRINGE (ML) INJECTION AS NEEDED
Status: DISCONTINUED | OUTPATIENT
Start: 2019-12-30 | End: 2020-01-03 | Stop reason: HOSPADM

## 2019-12-30 RX ORDER — DOCUSATE SODIUM 100 MG/1
100 CAPSULE, LIQUID FILLED ORAL 2 TIMES DAILY
COMMUNITY

## 2019-12-30 RX ORDER — SIMVASTATIN 10 MG
10 TABLET ORAL NIGHTLY
COMMUNITY

## 2019-12-30 RX ORDER — SODIUM CHLORIDE 9 MG/ML
100 INJECTION, SOLUTION INTRAVENOUS CONTINUOUS
Status: DISCONTINUED | OUTPATIENT
Start: 2019-12-30 | End: 2019-12-30

## 2019-12-30 RX ORDER — ACETAMINOPHEN 160 MG/5ML
650 SOLUTION ORAL EVERY 4 HOURS PRN
Status: DISCONTINUED | OUTPATIENT
Start: 2019-12-30 | End: 2020-01-03 | Stop reason: HOSPADM

## 2019-12-30 RX ORDER — SODIUM CHLORIDE 0.9 % (FLUSH) 0.9 %
10 SYRINGE (ML) INJECTION EVERY 12 HOURS SCHEDULED
Status: DISCONTINUED | OUTPATIENT
Start: 2019-12-30 | End: 2020-01-03 | Stop reason: HOSPADM

## 2019-12-30 RX ORDER — ACETAMINOPHEN 325 MG/1
650 TABLET ORAL EVERY 4 HOURS PRN
Status: DISCONTINUED | OUTPATIENT
Start: 2019-12-30 | End: 2020-01-03 | Stop reason: HOSPADM

## 2019-12-30 RX ORDER — ACETAMINOPHEN 650 MG/1
650 SUPPOSITORY RECTAL EVERY 4 HOURS PRN
Status: DISCONTINUED | OUTPATIENT
Start: 2019-12-30 | End: 2020-01-03 | Stop reason: HOSPADM

## 2019-12-30 RX ORDER — ESTRADIOL 0.1 MG/G
2 CREAM VAGINAL DAILY
COMMUNITY

## 2019-12-30 RX ORDER — ONDANSETRON 2 MG/ML
4 INJECTION INTRAMUSCULAR; INTRAVENOUS EVERY 30 MIN PRN
Status: DISCONTINUED | OUTPATIENT
Start: 2019-12-30 | End: 2019-12-30 | Stop reason: HOSPADM

## 2019-12-30 RX ORDER — MORPHINE SULFATE 2 MG/ML
2 INJECTION, SOLUTION INTRAMUSCULAR; INTRAVENOUS EVERY 4 HOURS PRN
Status: DISCONTINUED | OUTPATIENT
Start: 2019-12-30 | End: 2019-12-31

## 2019-12-30 RX ORDER — MORPHINE SULFATE 2 MG/ML
1 INJECTION, SOLUTION INTRAMUSCULAR; INTRAVENOUS EVERY 4 HOURS PRN
Status: DISCONTINUED | OUTPATIENT
Start: 2019-12-30 | End: 2019-12-30

## 2019-12-30 RX ORDER — DOCUSATE SODIUM 100 MG/1
100 CAPSULE, LIQUID FILLED ORAL 2 TIMES DAILY PRN
Status: DISCONTINUED | OUTPATIENT
Start: 2019-12-30 | End: 2019-12-30

## 2019-12-30 RX ORDER — FUROSEMIDE 20 MG/1
20 TABLET ORAL DAILY
Status: DISCONTINUED | OUTPATIENT
Start: 2019-12-30 | End: 2019-12-30

## 2019-12-30 RX ORDER — DOCUSATE SODIUM 100 MG/1
100 CAPSULE, LIQUID FILLED ORAL 2 TIMES DAILY
Status: DISCONTINUED | OUTPATIENT
Start: 2019-12-30 | End: 2020-01-03 | Stop reason: HOSPADM

## 2019-12-30 RX ORDER — NALOXONE HCL 0.4 MG/ML
0.4 VIAL (ML) INJECTION
Status: DISCONTINUED | OUTPATIENT
Start: 2019-12-30 | End: 2019-12-31

## 2019-12-30 RX ORDER — ONDANSETRON 2 MG/ML
4 INJECTION INTRAMUSCULAR; INTRAVENOUS EVERY 6 HOURS PRN
Status: DISCONTINUED | OUTPATIENT
Start: 2019-12-30 | End: 2019-12-31

## 2019-12-30 RX ORDER — BISACODYL 10 MG
10 SUPPOSITORY, RECTAL RECTAL DAILY PRN
Status: DISCONTINUED | OUTPATIENT
Start: 2019-12-30 | End: 2020-01-03 | Stop reason: HOSPADM

## 2019-12-30 RX ORDER — MORPHINE SULFATE 2 MG/ML
2 INJECTION, SOLUTION INTRAMUSCULAR; INTRAVENOUS EVERY 30 MIN PRN
Status: DISCONTINUED | OUTPATIENT
Start: 2019-12-30 | End: 2019-12-30 | Stop reason: HOSPADM

## 2019-12-30 RX ORDER — LISINOPRIL 10 MG/1
10 TABLET ORAL DAILY
Status: ON HOLD | COMMUNITY
End: 2020-01-03

## 2019-12-30 RX ORDER — SODIUM CHLORIDE 9 MG/ML
100 INJECTION, SOLUTION INTRAVENOUS CONTINUOUS
Status: DISCONTINUED | OUTPATIENT
Start: 2019-12-31 | End: 2020-01-01

## 2019-12-30 RX ORDER — ATORVASTATIN CALCIUM 10 MG/1
10 TABLET, FILM COATED ORAL NIGHTLY
Status: DISCONTINUED | OUTPATIENT
Start: 2019-12-30 | End: 2020-01-03 | Stop reason: HOSPADM

## 2019-12-30 RX ORDER — FENTANYL CITRATE 50 UG/ML
25 INJECTION, SOLUTION INTRAMUSCULAR; INTRAVENOUS ONCE
Status: COMPLETED | OUTPATIENT
Start: 2019-12-30 | End: 2019-12-30

## 2019-12-30 RX ORDER — NALOXONE HCL 0.4 MG/ML
0.4 VIAL (ML) INJECTION
Status: DISCONTINUED | OUTPATIENT
Start: 2019-12-30 | End: 2019-12-30

## 2019-12-30 RX ADMIN — FENTANYL CITRATE 25 MCG: 50 INJECTION INTRAMUSCULAR; INTRAVENOUS at 02:04

## 2019-12-30 RX ADMIN — SODIUM CHLORIDE 100 ML/HR: 9 INJECTION, SOLUTION INTRAVENOUS at 23:57

## 2019-12-30 RX ADMIN — MORPHINE SULFATE 2 MG: 2 INJECTION, SOLUTION INTRAMUSCULAR; INTRAVENOUS at 09:04

## 2019-12-30 RX ADMIN — SODIUM CHLORIDE, PRESERVATIVE FREE 10 ML: 5 INJECTION INTRAVENOUS at 09:05

## 2019-12-30 RX ADMIN — MORPHINE SULFATE 2 MG: 2 INJECTION, SOLUTION INTRAMUSCULAR; INTRAVENOUS at 01:43

## 2019-12-30 RX ADMIN — SODIUM CHLORIDE 100 ML/HR: 9 INJECTION, SOLUTION INTRAVENOUS at 09:04

## 2019-12-30 RX ADMIN — ENOXAPARIN SODIUM 40 MG: 40 INJECTION SUBCUTANEOUS at 15:49

## 2019-12-30 RX ADMIN — MORPHINE SULFATE 1 MG: 2 INJECTION, SOLUTION INTRAMUSCULAR; INTRAVENOUS at 04:40

## 2019-12-30 RX ADMIN — SODIUM CHLORIDE, PRESERVATIVE FREE 10 ML: 5 INJECTION INTRAVENOUS at 20:59

## 2019-12-30 RX ADMIN — DOCUSATE SODIUM 100 MG: 100 CAPSULE, LIQUID FILLED ORAL at 20:59

## 2019-12-30 RX ADMIN — ATORVASTATIN CALCIUM 10 MG: 10 TABLET, FILM COATED ORAL at 20:58

## 2019-12-30 RX ADMIN — ONDANSETRON 4 MG: 2 INJECTION INTRAMUSCULAR; INTRAVENOUS at 01:08

## 2019-12-30 RX ADMIN — MORPHINE SULFATE 2 MG: 2 INJECTION, SOLUTION INTRAMUSCULAR; INTRAVENOUS at 01:05

## 2019-12-30 RX ADMIN — MORPHINE SULFATE 2 MG: 2 INJECTION, SOLUTION INTRAMUSCULAR; INTRAVENOUS at 14:42

## 2019-12-30 RX ADMIN — MORPHINE SULFATE 2 MG: 2 INJECTION, SOLUTION INTRAMUSCULAR; INTRAVENOUS at 18:38

## 2019-12-30 RX ADMIN — MORPHINE SULFATE 2 MG: 2 INJECTION, SOLUTION INTRAMUSCULAR; INTRAVENOUS at 13:04

## 2019-12-30 SDOH — HEALTH STABILITY: MENTAL HEALTH: HOW OFTEN DO YOU HAVE A DRINK CONTAINING ALCOHOL?: NEVER

## 2019-12-30 ASSESSMENT — PAIN SCALES - GENERAL
PAINLEVEL_OUTOF10: 5
PAINLEVEL_OUTOF10: 3
PAINLEVEL_OUTOF10: 5
PAINLEVEL_OUTOF10: 0

## 2019-12-30 NOTE — ED NOTES
Dr Alen Moses returned call to speak with Dr Linda Bardales. Dr Barclay Cheadle agreeable to take patient if Dr Alen Moses will accept also.      Chris Hoffman RN  12/30/19 [id number]

## 2019-12-30 NOTE — ED NOTES
Patient departed ER per [facility] stretcher.      Giulia Robbins RN  12/30/19 5512

## 2019-12-30 NOTE — ED NOTES
Access center called back stating [facility] is at capacity, patient will be 9th on wait list and it is doubtful there would be a bed tomorrow. Family opted for [facility], and access center will contact.      Mitzy Culp RN  12/30/19 [id number]

## 2019-12-30 NOTE — ED NOTES
[facility] center called with Dr Kayce Cook to speak with Dr Denita Moore.      Rabia Avendano, RN  12/30/19 4011

## 2019-12-30 NOTE — ED PROVIDER NOTES
anxiety  Genitourinary:  No dysuria, no hematuria    Except as noted above the remainder of the review of systems was reviewed and negative. PAST MEDICAL HISTORY     Past Medical History:   Diagnosis Date    Deaf, left     Hyperlipidemia     Hypertension     Macular degeneration          SURGICAL HISTORY       Past Surgical History:   Procedure Laterality Date    HIP SURGERY Left          CURRENT MEDICATIONS       Previous Medications    ASPIRIN 81 MG TABLET    Take 81 mg by mouth daily    DOCUSATE SODIUM (STOOL SOFTENER) 100 MG CAPSULE    Take 100 mg by mouth 2 times daily    ESTRADIOL (ESTRACE) 0.1 MG/GM VAGINAL CREAM    Place 2 g vaginally daily    LISINOPRIL (PRINIVIL;ZESTRIL) 10 MG TABLET    Take 10 mg by mouth daily    MULTIPLE VITAMINS-MINERALS (OCUVITE EXTRA PO)    Take by mouth    SIMVASTATIN (ZOCOR) 10 MG TABLET    Take 10 mg by mouth nightly       ALLERGIES     Patient has no known allergies. FAMILY HISTORY     History reviewed. No pertinent family history.        SOCIAL HISTORY       Social History     Socioeconomic History    Marital status:      Spouse name: None    Number of children: None    Years of education: None    Highest education level: None   Occupational History    None   Social Needs    Financial resource strain: None    Food insecurity:     Worry: None     Inability: None    Transportation needs:     Medical: None     Non-medical: None   Tobacco Use    Smoking status: Never Smoker    Smokeless tobacco: Never Used   Substance and Sexual Activity    Alcohol use: Never     Frequency: Never    Drug use: Never    Sexual activity: None   Lifestyle    Physical activity:     Days per week: None     Minutes per session: None    Stress: None   Relationships    Social connections:     Talks on phone: None     Gets together: None     Attends Taoism service: None     Active member of club or organization: None     Attends meetings of clubs or organizations: None (H) 74 - 106 mg/dL    BUN 12 6 - 20 mg/dL    CREATININE 0.6 0.4 - 1.2 mg/dL    GFR Non-African American >60 >59    GFR African American >59 >59    Calcium 8.9 8.5 - 10.5 mg/dL    Total Protein 6.9 6.4 - 8.3 g/dL    Alb 3.7 3.4 - 4.8 g/dL    Albumin/Globulin Ratio 1.2 0.8 - 2.0    Total Bilirubin <0.2 (L) 0.3 - 1.2 mg/dL    Alkaline Phosphatase 63 25 - 100 U/L    ALT 13 4 - 36 U/L    AST 19 8 - 33 U/L    Globulin 3.2 g/dL        All other labs were within normal range or not returned as of this dictation. EMERGENCY DEPARTMENT COURSE and DIFFERENTIAL DIAGNOSIS/MDM:   Vitals:    Vitals:    12/30/19 0040 12/30/19 0045 12/30/19 0100 12/30/19 0130   BP: (!) 127/48 (!) 127/48 (!) 125/39 (!) 150/66   Pulse: 75 72  78   Resp: 16  10 21   Temp: 97.3 °F (36.3 °C)      TempSrc: Oral      SpO2: 99% 97%  96%   Weight: 179 lb (81.2 kg)      Height: 5' 6\" (1.676 m)          MEDICATIONS ADMINISTERED IN ED:  Medications   morphine (PF) injection 2 mg (2 mg Intravenous Given 12/30/19 0143)   ondansetron (ZOFRAN) injection 4 mg (4 mg Intravenous Given 12/30/19 0108)   fentaNYL (SUBLIMAZE) injection 25 mcg (25 mcg Intravenous Given 12/30/19 0204)       After initial evaluation and examination I did have a conversation with the patient and her family about the upcoming plan, treatment and possible disposition they were agreeable to the time of the dictation. Patient vies we give her some morphine for pain if needed. Patient be offered Zofran also as needed for nausea control. Patient had radiograph of the right hip performed with pelvis to rule out acute fracture. We will check a CBC and a CMP. Patient will also have twelve-lead EKG performed. Patient's final disposition will be determined once her radiological diagnostic studies been performed reviewed. Family advised that if the hip is fractured they would prefer to be transferred onto [facility] in [place] to be evaluated by orthopedics there.     Blood work showed

## 2019-12-30 NOTE — ED NOTES
[facility] called and states that ortho is not on call at [facility], and hospitalist would not accept patient due to ortho not being on call. Family chose [facility] and [facility] made aware.      Leticia Zabala RN  12/30/19 [id number]

## 2019-12-30 NOTE — ED NOTES
Backboard and pelvis stabilizer removed at this time per paramedics. Dr Cailin Panda in room.      Darwin Hightower RN  12/30/19 4484

## 2019-12-31 ENCOUNTER — APPOINTMENT (OUTPATIENT)
Dept: GENERAL RADIOLOGY | Facility: HOSPITAL | Age: 84
End: 2019-12-31

## 2019-12-31 ENCOUNTER — ANESTHESIA EVENT (OUTPATIENT)
Dept: PERIOP | Facility: HOSPITAL | Age: 84
End: 2019-12-31

## 2019-12-31 ENCOUNTER — ANESTHESIA (OUTPATIENT)
Dept: PERIOP | Facility: HOSPITAL | Age: 84
End: 2019-12-31

## 2019-12-31 LAB
ALBUMIN SERPL-MCNC: 3.3 G/DL (ref 3.5–5.2)
ALBUMIN/GLOB SERPL: 1.5 G/DL
ALP SERPL-CCNC: 63 U/L (ref 39–117)
ALT SERPL W P-5'-P-CCNC: 13 U/L (ref 1–33)
ANION GAP SERPL CALCULATED.3IONS-SCNC: 12.1 MMOL/L (ref 5–15)
APTT PPP: 27.8 SECONDS (ref 24.5–37.2)
AST SERPL-CCNC: 20 U/L (ref 1–32)
BASOPHILS # BLD AUTO: 0.02 10*3/MM3 (ref 0–0.2)
BASOPHILS NFR BLD AUTO: 0.2 % (ref 0–1.5)
BILIRUB SERPL-MCNC: 0.5 MG/DL (ref 0.2–1.2)
BUN BLD-MCNC: 12 MG/DL (ref 8–23)
BUN/CREAT SERPL: 23.5 (ref 7–25)
CALCIUM SPEC-SCNC: 8.1 MG/DL (ref 8.6–10.5)
CHLORIDE SERPL-SCNC: 99 MMOL/L (ref 98–107)
CO2 SERPL-SCNC: 25.9 MMOL/L (ref 22–29)
CREAT BLD-MCNC: 0.51 MG/DL (ref 0.57–1)
DEPRECATED RDW RBC AUTO: 45 FL (ref 37–54)
EOSINOPHIL # BLD AUTO: 0.09 10*3/MM3 (ref 0–0.4)
EOSINOPHIL NFR BLD AUTO: 1.1 % (ref 0.3–6.2)
ERYTHROCYTE [DISTWIDTH] IN BLOOD BY AUTOMATED COUNT: 13.5 % (ref 12.3–15.4)
GFR SERPL CREATININE-BSD FRML MDRD: 115 ML/MIN/1.73
GLOBULIN UR ELPH-MCNC: 2.2 GM/DL
GLUCOSE BLD-MCNC: 134 MG/DL (ref 65–99)
GLUCOSE BLDC GLUCOMTR-MCNC: 138 MG/DL (ref 70–130)
GLUCOSE BLDC GLUCOMTR-MCNC: 150 MG/DL (ref 70–130)
GLUCOSE BLDC GLUCOMTR-MCNC: 166 MG/DL (ref 70–130)
HCT VFR BLD AUTO: 30.5 % (ref 34–46.6)
HGB BLD-MCNC: 10.1 G/DL (ref 12–15.9)
IMM GRANULOCYTES # BLD AUTO: 0.04 10*3/MM3 (ref 0–0.05)
IMM GRANULOCYTES NFR BLD AUTO: 0.5 % (ref 0–0.5)
INR PPP: 1.06 (ref 0.9–1.1)
LYMPHOCYTES # BLD AUTO: 1.68 10*3/MM3 (ref 0.7–3.1)
LYMPHOCYTES NFR BLD AUTO: 20.7 % (ref 19.6–45.3)
MCH RBC QN AUTO: 30.1 PG (ref 26.6–33)
MCHC RBC AUTO-ENTMCNC: 33.1 G/DL (ref 31.5–35.7)
MCV RBC AUTO: 91 FL (ref 79–97)
MONOCYTES # BLD AUTO: 0.89 10*3/MM3 (ref 0.1–0.9)
MONOCYTES NFR BLD AUTO: 11 % (ref 5–12)
NEUTROPHILS # BLD AUTO: 5.38 10*3/MM3 (ref 1.7–7)
NEUTROPHILS NFR BLD AUTO: 66.5 % (ref 42.7–76)
NRBC BLD AUTO-RTO: 0 /100 WBC (ref 0–0.2)
PLATELET # BLD AUTO: 196 10*3/MM3 (ref 140–450)
PMV BLD AUTO: 9 FL (ref 6–12)
POTASSIUM BLD-SCNC: 4.4 MMOL/L (ref 3.5–5.2)
PROT SERPL-MCNC: 5.5 G/DL (ref 6–8.5)
PROTHROMBIN TIME: 14.1 SECONDS (ref 12–15.1)
RBC # BLD AUTO: 3.35 10*6/MM3 (ref 3.77–5.28)
SODIUM BLD-SCNC: 137 MMOL/L (ref 136–145)
WBC NRBC COR # BLD: 8.1 10*3/MM3 (ref 3.4–10.8)

## 2019-12-31 PROCEDURE — 85610 PROTHROMBIN TIME: CPT | Performed by: NURSE PRACTITIONER

## 2019-12-31 PROCEDURE — C1769 GUIDE WIRE: HCPCS | Performed by: ORTHOPAEDIC SURGERY

## 2019-12-31 PROCEDURE — 76000 FLUOROSCOPY <1 HR PHYS/QHP: CPT

## 2019-12-31 PROCEDURE — C1713 ANCHOR/SCREW BN/BN,TIS/BN: HCPCS | Performed by: ORTHOPAEDIC SURGERY

## 2019-12-31 PROCEDURE — 25010000002 ONDANSETRON PER 1 MG: Performed by: INTERNAL MEDICINE

## 2019-12-31 PROCEDURE — 25010000002 PROPOFOL 200 MG/20ML EMULSION: Performed by: NURSE ANESTHETIST, CERTIFIED REGISTERED

## 2019-12-31 PROCEDURE — 0QS606Z REPOSITION RIGHT UPPER FEMUR WITH INTRAMEDULLARY INTERNAL FIXATION DEVICE, OPEN APPROACH: ICD-10-PCS | Performed by: ORTHOPAEDIC SURGERY

## 2019-12-31 PROCEDURE — 25010000003 CEFAZOLIN SODIUM-DEXTROSE 2-3 GM-%(50ML) RECONSTITUTED SOLUTION: Performed by: ORTHOPAEDIC SURGERY

## 2019-12-31 PROCEDURE — 82962 GLUCOSE BLOOD TEST: CPT

## 2019-12-31 PROCEDURE — 25010000002 HYDROMORPHONE PER 4 MG: Performed by: NURSE ANESTHETIST, CERTIFIED REGISTERED

## 2019-12-31 PROCEDURE — 25010000002 HYDROMORPHONE 1 MG/ML SOLUTION: Performed by: FAMILY MEDICINE

## 2019-12-31 PROCEDURE — 25010000002 FENTANYL CITRATE (PF) 100 MCG/2ML SOLUTION: Performed by: NURSE ANESTHETIST, CERTIFIED REGISTERED

## 2019-12-31 PROCEDURE — 25010000002 MORPHINE PER 10 MG: Performed by: NURSE PRACTITIONER

## 2019-12-31 PROCEDURE — 25010000002 ONDANSETRON PER 1 MG: Performed by: NURSE ANESTHETIST, CERTIFIED REGISTERED

## 2019-12-31 PROCEDURE — 85025 COMPLETE CBC W/AUTO DIFF WBC: CPT | Performed by: NURSE PRACTITIONER

## 2019-12-31 PROCEDURE — 85730 THROMBOPLASTIN TIME PARTIAL: CPT | Performed by: NURSE PRACTITIONER

## 2019-12-31 PROCEDURE — 25010000002 DEXAMETHASONE PER 1 MG: Performed by: NURSE ANESTHETIST, CERTIFIED REGISTERED

## 2019-12-31 PROCEDURE — 25010000002 HYDROMORPHONE 1 MG/ML SOLUTION

## 2019-12-31 PROCEDURE — 93005 ELECTROCARDIOGRAM TRACING: CPT | Performed by: INTERNAL MEDICINE

## 2019-12-31 PROCEDURE — 80053 COMPREHEN METABOLIC PANEL: CPT | Performed by: NURSE PRACTITIONER

## 2019-12-31 PROCEDURE — 99232 SBSQ HOSP IP/OBS MODERATE 35: CPT | Performed by: NURSE PRACTITIONER

## 2019-12-31 DEVICE — BLD FEM FIX HELI TFN ADV PERF 110MM STRL: Type: IMPLANTABLE DEVICE | Site: HIP | Status: FUNCTIONAL

## 2019-12-31 DEVICE — SCRW LK STRDRV TI 5X36MM STRL: Type: IMPLANTABLE DEVICE | Site: HIP | Status: FUNCTIONAL

## 2019-12-31 DEVICE — NAIL FEM TFN ADV PROX 130D 11X235MM RT STRL: Type: IMPLANTABLE DEVICE | Site: HIP | Status: FUNCTIONAL

## 2019-12-31 DEVICE — KT CMT BONE TRAUMACEM VPLS 10ML STRL: Type: IMPLANTABLE DEVICE | Status: FUNCTIONAL

## 2019-12-31 RX ORDER — ONDANSETRON 2 MG/ML
4 INJECTION INTRAMUSCULAR; INTRAVENOUS EVERY 6 HOURS PRN
Status: DISCONTINUED | OUTPATIENT
Start: 2019-12-31 | End: 2020-01-03 | Stop reason: HOSPADM

## 2019-12-31 RX ORDER — MAGNESIUM HYDROXIDE 1200 MG/15ML
LIQUID ORAL AS NEEDED
Status: DISCONTINUED | OUTPATIENT
Start: 2019-12-31 | End: 2019-12-31 | Stop reason: HOSPADM

## 2019-12-31 RX ORDER — NICOTINE POLACRILEX 4 MG
1 LOZENGE BUCCAL
Status: DISCONTINUED | OUTPATIENT
Start: 2019-12-31 | End: 2020-01-03 | Stop reason: HOSPADM

## 2019-12-31 RX ORDER — CEFAZOLIN SODIUM 2 G/50ML
2 SOLUTION INTRAVENOUS ONCE
Status: DISCONTINUED | OUTPATIENT
Start: 2019-12-31 | End: 2019-12-31 | Stop reason: HOSPADM

## 2019-12-31 RX ORDER — BUPIVACAINE HYDROCHLORIDE 2.5 MG/ML
INJECTION, SOLUTION EPIDURAL; INFILTRATION; INTRACAUDAL
Status: COMPLETED | OUTPATIENT
Start: 2019-12-31 | End: 2019-12-31

## 2019-12-31 RX ORDER — KETAMINE HYDROCHLORIDE 50 MG/ML
INJECTION, SOLUTION, CONCENTRATE INTRAMUSCULAR; INTRAVENOUS AS NEEDED
Status: DISCONTINUED | OUTPATIENT
Start: 2019-12-31 | End: 2019-12-31 | Stop reason: SURG

## 2019-12-31 RX ORDER — ONDANSETRON 2 MG/ML
4 INJECTION INTRAMUSCULAR; INTRAVENOUS ONCE AS NEEDED
Status: COMPLETED | OUTPATIENT
Start: 2019-12-31 | End: 2019-12-31

## 2019-12-31 RX ORDER — MORPHINE SULFATE 2 MG/ML
2 INJECTION, SOLUTION INTRAMUSCULAR; INTRAVENOUS EVERY 4 HOURS PRN
Status: DISCONTINUED | OUTPATIENT
Start: 2019-12-31 | End: 2020-01-03 | Stop reason: HOSPADM

## 2019-12-31 RX ORDER — FENTANYL CITRATE 50 UG/ML
INJECTION, SOLUTION INTRAMUSCULAR; INTRAVENOUS AS NEEDED
Status: DISCONTINUED | OUTPATIENT
Start: 2019-12-31 | End: 2019-12-31 | Stop reason: SURG

## 2019-12-31 RX ORDER — DEXAMETHASONE SODIUM PHOSPHATE 4 MG/ML
INJECTION, SOLUTION INTRA-ARTICULAR; INTRALESIONAL; INTRAMUSCULAR; INTRAVENOUS; SOFT TISSUE AS NEEDED
Status: DISCONTINUED | OUTPATIENT
Start: 2019-12-31 | End: 2019-12-31 | Stop reason: SURG

## 2019-12-31 RX ORDER — LISINOPRIL 20 MG/1
20 TABLET ORAL
Status: DISCONTINUED | OUTPATIENT
Start: 2020-01-01 | End: 2020-01-03 | Stop reason: HOSPADM

## 2019-12-31 RX ORDER — PROPOFOL 10 MG/ML
INJECTION, EMULSION INTRAVENOUS AS NEEDED
Status: DISCONTINUED | OUTPATIENT
Start: 2019-12-31 | End: 2019-12-31 | Stop reason: SURG

## 2019-12-31 RX ORDER — CEFAZOLIN SODIUM 2 G/50ML
2 SOLUTION INTRAVENOUS EVERY 8 HOURS
Status: DISCONTINUED | OUTPATIENT
Start: 2019-12-31 | End: 2020-01-01

## 2019-12-31 RX ORDER — BUPIVACAINE HCL/0.9 % NACL/PF 0.125 %
PLASTIC BAG, INJECTION (ML) EPIDURAL AS NEEDED
Status: DISCONTINUED | OUTPATIENT
Start: 2019-12-31 | End: 2019-12-31 | Stop reason: SURG

## 2019-12-31 RX ORDER — SODIUM CHLORIDE, SODIUM LACTATE, POTASSIUM CHLORIDE, CALCIUM CHLORIDE 600; 310; 30; 20 MG/100ML; MG/100ML; MG/100ML; MG/100ML
9 INJECTION, SOLUTION INTRAVENOUS CONTINUOUS PRN
Status: DISCONTINUED | OUTPATIENT
Start: 2019-12-31 | End: 2019-12-31 | Stop reason: HOSPADM

## 2019-12-31 RX ORDER — DEXTROSE MONOHYDRATE 25 G/50ML
25 INJECTION, SOLUTION INTRAVENOUS
Status: DISCONTINUED | OUTPATIENT
Start: 2019-12-31 | End: 2020-01-03 | Stop reason: HOSPADM

## 2019-12-31 RX ORDER — CEFAZOLIN SODIUM 2 G/50ML
2 SOLUTION INTRAVENOUS
Status: CANCELLED | OUTPATIENT
Start: 2019-12-31 | End: 2020-01-01

## 2019-12-31 RX ORDER — HYDROMORPHONE HCL 110MG/55ML
0.25 PATIENT CONTROLLED ANALGESIA SYRINGE INTRAVENOUS
Status: DISCONTINUED | OUTPATIENT
Start: 2019-12-31 | End: 2019-12-31 | Stop reason: HOSPADM

## 2019-12-31 RX ADMIN — Medication 100 MCG: at 18:18

## 2019-12-31 RX ADMIN — SODIUM CHLORIDE, POTASSIUM CHLORIDE, SODIUM LACTATE AND CALCIUM CHLORIDE: 600; 310; 30; 20 INJECTION, SOLUTION INTRAVENOUS at 18:33

## 2019-12-31 RX ADMIN — HYDROMORPHONE HYDROCHLORIDE 0.5 MG: 1 INJECTION, SOLUTION INTRAMUSCULAR; INTRAVENOUS; SUBCUTANEOUS at 02:10

## 2019-12-31 RX ADMIN — HYDROMORPHONE HYDROCHLORIDE 0.5 MG: 1 INJECTION, SOLUTION INTRAMUSCULAR; INTRAVENOUS; SUBCUTANEOUS at 14:18

## 2019-12-31 RX ADMIN — PROPOFOL 100 MG: 10 INJECTION, EMULSION INTRAVENOUS at 17:55

## 2019-12-31 RX ADMIN — ONDANSETRON 4 MG: 2 INJECTION INTRAMUSCULAR; INTRAVENOUS at 15:37

## 2019-12-31 RX ADMIN — DEXAMETHASONE SODIUM PHOSPHATE 4 MG: 4 INJECTION, SOLUTION INTRAMUSCULAR; INTRAVENOUS at 18:07

## 2019-12-31 RX ADMIN — SODIUM CHLORIDE, POTASSIUM CHLORIDE, SODIUM LACTATE AND CALCIUM CHLORIDE 9 ML/HR: 600; 310; 30; 20 INJECTION, SOLUTION INTRAVENOUS at 12:13

## 2019-12-31 RX ADMIN — KETAMINE HYDROCHLORIDE 25 MG: 50 INJECTION, SOLUTION INTRAMUSCULAR; INTRAVENOUS at 17:39

## 2019-12-31 RX ADMIN — MORPHINE SULFATE 2 MG: 2 INJECTION, SOLUTION INTRAMUSCULAR; INTRAVENOUS at 06:40

## 2019-12-31 RX ADMIN — HYDROMORPHONE HYDROCHLORIDE 0.25 MG: 2 INJECTION, SOLUTION INTRAMUSCULAR; INTRAVENOUS; SUBCUTANEOUS at 14:19

## 2019-12-31 RX ADMIN — MORPHINE SULFATE 2 MG: 2 INJECTION, SOLUTION INTRAMUSCULAR; INTRAVENOUS at 00:00

## 2019-12-31 RX ADMIN — SODIUM CHLORIDE 100 ML/HR: 9 INJECTION, SOLUTION INTRAVENOUS at 21:14

## 2019-12-31 RX ADMIN — ONDANSETRON 4 MG: 2 INJECTION INTRAMUSCULAR; INTRAVENOUS at 06:46

## 2019-12-31 RX ADMIN — FENTANYL CITRATE 100 MCG: 50 INJECTION INTRAMUSCULAR; INTRAVENOUS at 18:08

## 2019-12-31 RX ADMIN — PROPOFOL 50 MG: 10 INJECTION, EMULSION INTRAVENOUS at 17:39

## 2019-12-31 RX ADMIN — CEFAZOLIN SODIUM 2 G: 2 SOLUTION INTRAVENOUS at 21:48

## 2019-12-31 RX ADMIN — ONDANSETRON 4 MG: 2 INJECTION INTRAMUSCULAR; INTRAVENOUS at 18:07

## 2019-12-31 RX ADMIN — BUPIVACAINE HYDROCHLORIDE 40 ML: 2.5 INJECTION, SOLUTION EPIDURAL; INFILTRATION; INTRACAUDAL; PERINEURAL at 19:22

## 2019-12-31 RX ADMIN — EPHEDRINE SULFATE 10 MG: 50 INJECTION INTRAMUSCULAR; INTRAVENOUS; SUBCUTANEOUS at 18:18

## 2019-12-31 RX ADMIN — Medication 100 MCG: at 18:35

## 2019-12-31 RX ADMIN — MORPHINE SULFATE 2 MG: 2 INJECTION, SOLUTION INTRAMUSCULAR; INTRAVENOUS at 11:34

## 2020-01-01 LAB
DEPRECATED RDW RBC AUTO: 44.7 FL (ref 37–54)
ERYTHROCYTE [DISTWIDTH] IN BLOOD BY AUTOMATED COUNT: 13.3 % (ref 12.3–15.4)
GLUCOSE BLDC GLUCOMTR-MCNC: 153 MG/DL (ref 70–130)
GLUCOSE BLDC GLUCOMTR-MCNC: 184 MG/DL (ref 70–130)
GLUCOSE BLDC GLUCOMTR-MCNC: 185 MG/DL (ref 70–130)
GLUCOSE BLDC GLUCOMTR-MCNC: 251 MG/DL (ref 70–130)
HCT VFR BLD AUTO: 26.6 % (ref 34–46.6)
HCT VFR BLD AUTO: 27.1 % (ref 34–46.6)
HGB BLD-MCNC: 8.6 G/DL (ref 12–15.9)
HGB BLD-MCNC: 8.7 G/DL (ref 12–15.9)
MCH RBC QN AUTO: 29.9 PG (ref 26.6–33)
MCHC RBC AUTO-ENTMCNC: 32.3 G/DL (ref 31.5–35.7)
MCV RBC AUTO: 92.4 FL (ref 79–97)
PLATELET # BLD AUTO: 194 10*3/MM3 (ref 140–450)
PMV BLD AUTO: 9 FL (ref 6–12)
RBC # BLD AUTO: 2.88 10*6/MM3 (ref 3.77–5.28)
WBC NRBC COR # BLD: 10.38 10*3/MM3 (ref 3.4–10.8)

## 2020-01-01 PROCEDURE — 85018 HEMOGLOBIN: CPT | Performed by: NURSE PRACTITIONER

## 2020-01-01 PROCEDURE — 25010000002 ONDANSETRON PER 1 MG: Performed by: NURSE PRACTITIONER

## 2020-01-01 PROCEDURE — 85014 HEMATOCRIT: CPT | Performed by: NURSE PRACTITIONER

## 2020-01-01 PROCEDURE — 25010000003 CEFAZOLIN SODIUM-DEXTROSE 2-3 GM-%(50ML) RECONSTITUTED SOLUTION: Performed by: ORTHOPAEDIC SURGERY

## 2020-01-01 PROCEDURE — 97161 PT EVAL LOW COMPLEX 20 MIN: CPT

## 2020-01-01 PROCEDURE — 82962 GLUCOSE BLOOD TEST: CPT

## 2020-01-01 PROCEDURE — 25010000002 ONDANSETRON PER 1 MG: Performed by: FAMILY MEDICINE

## 2020-01-01 PROCEDURE — 97165 OT EVAL LOW COMPLEX 30 MIN: CPT

## 2020-01-01 PROCEDURE — 25010000002 ENOXAPARIN PER 10 MG: Performed by: NURSE PRACTITIONER

## 2020-01-01 PROCEDURE — 97116 GAIT TRAINING THERAPY: CPT

## 2020-01-01 PROCEDURE — 97110 THERAPEUTIC EXERCISES: CPT

## 2020-01-01 PROCEDURE — 85027 COMPLETE CBC AUTOMATED: CPT | Performed by: NURSE PRACTITIONER

## 2020-01-01 PROCEDURE — 63710000001 INSULIN ASPART PER 5 UNITS: Performed by: FAMILY MEDICINE

## 2020-01-01 PROCEDURE — 99232 SBSQ HOSP IP/OBS MODERATE 35: CPT | Performed by: NURSE PRACTITIONER

## 2020-01-01 PROCEDURE — 63710000001 INSULIN ASPART PER 5 UNITS: Performed by: NURSE PRACTITIONER

## 2020-01-01 PROCEDURE — 25010000002 MORPHINE PER 10 MG: Performed by: NURSE PRACTITIONER

## 2020-01-01 RX ORDER — FUROSEMIDE 20 MG/1
20 TABLET ORAL DAILY
Status: DISCONTINUED | OUTPATIENT
Start: 2020-01-01 | End: 2020-01-02

## 2020-01-01 RX ORDER — HYDROCHLOROTHIAZIDE 12.5 MG/1
12.5 CAPSULE, GELATIN COATED ORAL DAILY
Status: DISCONTINUED | OUTPATIENT
Start: 2020-01-01 | End: 2020-01-02

## 2020-01-01 RX ADMIN — MORPHINE SULFATE 2 MG: 2 INJECTION, SOLUTION INTRAMUSCULAR; INTRAVENOUS at 16:27

## 2020-01-01 RX ADMIN — SODIUM CHLORIDE 100 ML/HR: 9 INJECTION, SOLUTION INTRAVENOUS at 09:14

## 2020-01-01 RX ADMIN — HYDROCHLOROTHIAZIDE 12.5 MG: 12.5 CAPSULE ORAL at 11:22

## 2020-01-01 RX ADMIN — ONDANSETRON 4 MG: 2 INJECTION INTRAMUSCULAR; INTRAVENOUS at 11:22

## 2020-01-01 RX ADMIN — INSULIN ASPART 2 UNITS: 100 INJECTION, SOLUTION INTRAVENOUS; SUBCUTANEOUS at 11:22

## 2020-01-01 RX ADMIN — INSULIN ASPART 2 UNITS: 100 INJECTION, SOLUTION INTRAVENOUS; SUBCUTANEOUS at 17:34

## 2020-01-01 RX ADMIN — LISINOPRIL 20 MG: 20 TABLET ORAL at 09:14

## 2020-01-01 RX ADMIN — INSULIN ASPART 2 UNITS: 100 INJECTION, SOLUTION INTRAVENOUS; SUBCUTANEOUS at 06:39

## 2020-01-01 RX ADMIN — SODIUM CHLORIDE, PRESERVATIVE FREE 10 ML: 5 INJECTION INTRAVENOUS at 21:01

## 2020-01-01 RX ADMIN — FUROSEMIDE 20 MG: 20 TABLET ORAL at 11:22

## 2020-01-01 RX ADMIN — INSULIN ASPART 4 UNITS: 100 INJECTION, SOLUTION INTRAVENOUS; SUBCUTANEOUS at 21:01

## 2020-01-01 RX ADMIN — ONDANSETRON 4 MG: 2 INJECTION INTRAMUSCULAR; INTRAVENOUS at 02:35

## 2020-01-01 RX ADMIN — DOCUSATE SODIUM 100 MG: 100 CAPSULE, LIQUID FILLED ORAL at 21:01

## 2020-01-01 RX ADMIN — ENOXAPARIN SODIUM 40 MG: 40 INJECTION SUBCUTANEOUS at 21:01

## 2020-01-01 RX ADMIN — MORPHINE SULFATE 2 MG: 2 INJECTION, SOLUTION INTRAMUSCULAR; INTRAVENOUS at 21:57

## 2020-01-01 RX ADMIN — CEFAZOLIN SODIUM 2 G: 2 SOLUTION INTRAVENOUS at 04:46

## 2020-01-01 RX ADMIN — ATORVASTATIN CALCIUM 10 MG: 10 TABLET, FILM COATED ORAL at 21:01
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
## 2020-01-01 NOTE — ANESTHESIA POSTPROCEDURE EVALUATION
Patient: Bri Iniguez    Procedure Summary     Date:  12/31/19 Room / Location:  [facility] 5 /  [facility]    Anesthesia Start:  1739 Anesthesia Stop:  1931    Procedure:  HIP RIGHT OPEN REDUCTION INTERNAL FIXATION, INTERMEDULLARY  NAIL (Right Hip) Diagnosis:      Surgeon:  Carlos Barba Jr., MD Provider:  Tremaine Cuadra CRNA    Anesthesia Type:  general with block ASA Status:  3          Anesthesia Type: general with block    Vitals  Vitals Value Taken Time   /59 12/31/2019  8:34 PM   Temp 98 °F (36.7 °C) 12/31/2019  8:34 PM   Pulse 103 12/31/2019  8:34 PM   Resp 16 12/31/2019  8:34 PM   SpO2 92 % 12/31/2019  8:34 PM           Post Anesthesia Care and Evaluation    Patient location during evaluation: PACU  Patient participation: complete - patient participated  Level of consciousness: awake and alert  Pain score: 1  Pain management: satisfactory to patient  Airway patency: patent  Anesthetic complications: No anesthetic complications  PONV Status: none  Cardiovascular status: acceptable and hemodynamically stable  Respiratory status: acceptable  Hydration status: acceptable

## 2020-01-01 NOTE — BRIEF OP NOTE
HIP INTERTROCHANTERIC NAILING  Progress Note    Bri Iniguez  12/31/2019    Pre-op Diagnosis:   Right intertrochanteric femur fracture       Post-Op Diagnosis Codes:  Same    Procedure/CPT® Codes:      Procedure(s):  HIP RIGHT OPEN REDUCTION INTERNAL FIXATION, INTERMEDULLARY  NAIL    Surgeon(s):  Carlos Barba Jr., MD    Anesthesia: General    Staff:   Circulator: Darline Collins RN  Radiology Technologist: Rafaela Mcbride  Scrub Person: Betito Robert    Estimated Blood Loss: 250 mL      Specimens:                none          Drains:   Urethral Catheter Non-latex 16 Fr. (Active)   Daily Indications Required activity restriction from trauma, surgery, (e.g. unstable spine, fracture, hemodynamics) 12/31/2019 11:55 AM   Site Assessment Clean;Skin intact 12/31/2019  6:08 PM   Collection Container Standard drainage bag 12/31/2019  6:08 PM   Securement Method Securing device 12/31/2019  6:08 PM   Catheter care complete Yes 12/31/2019  8:00 AM   Output (mL) 350 mL 12/31/2019  6:00 AM       Findings: See operative note     Complications: None apparent      Carlos Barba Jr, MD     Date: 12/31/2019  Time: 7:27 PM

## 2020-01-01 NOTE — PLAN OF CARE
Problem: Patient Care Overview  Goal: Plan of Care Review  1/1/2020 3356 by Ruth Badillo, PT  Outcome: Ongoing (interventions implemented as appropriate)  Flowsheets (Taken 1/1/2020 1351)  Progress: --  Outcome Summary: PT tx completed. Patient R LE WB clarified by Dr. Barba as WBAT. Patient able to perform ther ex as indicated on care map. Able to perform mobility with min A and RW. Cont to goals.

## 2020-01-01 NOTE — THERAPY TREATMENT NOTE
Acute Care - Physical Therapy Treatment Note  [facility]     Patient Name: Bri Iniguez  : 1935  MRN: [id number]  Today's Date: 2020  Onset of Illness/Injury or Date of Surgery: 19     Referring Physician: HAWA Camarena    Admit Date: 2019    Visit Dx:    ICD-10-CM ICD-9-CM   1. Impaired mobility and ADLs Z74.09 799.89     Patient Active Problem List   Diagnosis   • Macular degeneration   • Hyperlipidemia   • Essential hypertension   • Deafness   • Diabetes type 2, controlled (CMS/[facility])   • Primary osteoarthritis of left hip   • Venous insufficiency of both lower extremities   • Status post total replacement of left hip   • Acute blood loss anemia, mild, asymptomatic   • Leukocytosis, mild, likely reactive   • Hip fracture (CMS/[facility])       Therapy Treatment    Rehabilitation Treatment Summary     Row Name 20 1355             Treatment Time/Intention    Discipline  physical therapist  -LM      Document Type  therapy note (daily note)  -LM      Subjective Information  no complaints  -LM      Mode of Treatment  physical therapy  -LM      Patient/Family Observations  Pt received sitting up in bedside chair. Requests assistance to get to bedside commode.  -LM      Care Plan Review  care plan/treatment goals reviewed;patient/other agree to care plan  -LM      Therapy Frequency (PT Clinical Impression)  2 times/day  -LM      Patient Effort  good  -LM      Existing Precautions/Restrictions  fall  -LM      Patient Response to Treatment  Tolerated PT with improved mobility.  -LM      Recorded by [LM] Ruth Badillo, PT 20 1627      Row Name 20 2874             Mobility Assessment/Intervention    Extremity Weight-bearing Status  right lower extremity  -LM      Right Lower Extremity (Weight-bearing Status)  weight-bearing as tolerated (WBAT);other (see comments) WB status clarified by Dr. Barba.  -LM      Recorded by [LM] Ruth Badillo, PT 20 6844      Row Name  01/01/20 1355             Bed Mobility Assessment/Treatment    Comment (Bed Mobility)  Pt received sitting up in bedside chair.  -LM      Recorded by [LM] Ruth Badillo, PT 01/01/20 1625      Row Name 01/01/20 1355             Transfer Assessment/Treatment    Transfer Assessment/Treatment  sit-stand transfer;stand-sit transfer;toilet transfer  -LM      Recorded by [LM] Ruth Badillo, PT 01/01/20 1625      Row Name 01/01/20 1355             Sit-Stand Transfer    Sit-Stand [place] (Transfers)  minimum assist (75% patient effort)  -LM      Assistive Device (Sit-Stand Transfers)  walker, front-wheeled;other (see comments) posterior list  -LM      Recorded by [VAHE] Ruth Badillo, PT 01/01/20 1625      Row Name 01/01/20 1355             Stand-Sit Transfer    Stand-Sit [place] (Transfers)  minimum assist (75% patient effort)  -LM      Assistive Device (Stand-Sit Transfers)  walker, front-wheeled  -LM      Recorded by [LM] Ruth Badillo, PT 01/01/20 1625      Row Name 01/01/20 1355             Toilet Transfer    Type (Toilet Transfer)  stand pivot/stand step  -LM      [place] Level (Toilet Transfer)  minimum assist (75% patient effort)  -LM      Assistive Device (Toilet Transfer)  commode, bedside without drop arms;walker, front-wheeled  -LM      Recorded by [LM] Ruth Badillo, PT 01/01/20 1625      Row Name 01/01/20 1355             Gait/Stairs Assessment/Training    Gait/Stairs Assessment/Training  gait/ambulation assistive device  -LM      [place] Level (Gait)  contact guard;minimum assist (75% patient effort)  -LM      Assistive Device (Gait)  walker, front-wheeled  -LM      Distance in Feet (Gait)  15'x2  -LM      Pattern (Gait)  step-to  -LM      Deviations/Abnormal Patterns (Gait)  antalgic;isai decreased;gait speed decreased;stride length decreased  -LM      Bilateral Gait Deviations  heel strike decreased;weight shift ability decreased  -LM      Recorded by [LM] Ruth Badillo, PT 01/01/20 1625       Row Name 01/01/20 1355             Therapeutic Exercise    Lower Extremity (Therapeutic Exercise)  gluteal sets;heel slides, right;LAQ (long arc quad), right;quad sets, bilateral;SAQ (short arc quad), right;SLR (straight leg raise), right  -LM      Exercise Type (Therapeutic Exercise)  AAROM (active assistive range of motion);AROM (active range of motion)  -LM      Position (Therapeutic Exercise)  seated  -LM      Sets/Reps (Therapeutic Exercise)  1 x 10 reps  -LM      Recorded by [LM] Ruth Badillo, PT 01/01/20 1625      Row Name 01/01/20 1355             Positioning and Restraints    Pre-Treatment Position  sitting in chair/recliner  -LM      Post Treatment Position  chair  -LM      In Chair  reclined;call light within reach;encouraged to call for assist  -LM      Recorded by [LM] Ruth Badillo, PT 01/01/20 1625      Row Name 01/01/20 1355             Pain Scale: Numbers Pre/Post-Treatment    Pain Scale: Numbers, Pretreatment  0/10 - no pain  -LM      Pain Scale: Numbers, Post-Treatment  0/10 - no pain  -LM      Recorded by [LM] Ruth Badillo, PT 01/01/20 1625      Row Name                Wound 12/30/19 0800 Left posterior elbow Skin Tear    Wound - Properties Group Date first assessed: 12/30/19 [SP] Time first assessed: 0800 [SP] Side: Left [SP] Orientation: posterior [SP] Location: elbow [SP] Primary Wound Type: Skin tear [SP] Recorded by:  [SP] Alfonzo Colon RN 12/30/19 0948    Row Name                Wound 12/30/19 0800 Right posterior elbow Skin Tear    Wound - Properties Group Date first assessed: 12/30/19 [SP] Time first assessed: 0800 [SP] Side: Right [SP] Orientation: posterior [SP] Location: elbow [SP] Primary Wound Type: Skin tear [SP] Recorded by:  [SP] Alfonzo Colon RN 12/30/19 0949    Row Name                Wound 12/31/19 Right anterior greater trochanter Incision    Wound - Properties Group Date first assessed: 12/31/19 [JS] Present on Hospital Admission: N [JS] Side: Right [JS] Orientation:  anterior [JS] Location: greater trochanter [JS] Primary Wound Type: Incision [JS] Recorded by:  [JS] Darline Collins RN 12/31/19 1809    Row Name 01/01/20 1355             Plan of Care Review    Plan of Care Reviewed With  patient  -LM      Recorded by [VAHE] Ruth Badillo, PT 01/01/20 1625      Row Name 01/01/20 1355             Outcome Summary/Treatment Plan (PT)    Daily Summary of Progress (PT)  progress toward functional goals as expected  -LM      Plan for Continued Treatment (PT)  Cont PT per POC to outlined goals.  -LM      Anticipated Discharge Disposition (PT)  inpatient rehabilitation facility  -LM      Recorded by [VAHE] Ruth Badillo, PT 01/01/20 1625        User Key  (r) = Recorded By, (t) = Taken By, (c) = Cosigned By    Initials Name Effective Dates Discipline     Darline Collins RN 07/14/16 -  Nurse    Alfonzo Jacob RN 10/26/16 -  Nurse    Ruth Davalos, PT 04/03/18 -  PT          Wound 12/30/19 0800 Left posterior elbow Skin Tear (Active)   Dressing Appearance dry;intact 1/1/2020 12:00 PM   Closure JAYASHREE 1/1/2020 12:00 PM   Base dressing in place, unable to visualize 1/1/2020 12:00 PM       Wound 12/30/19 0800 Right posterior elbow Skin Tear (Active)   Dressing Appearance dry;intact 1/1/2020 12:00 PM   Closure JAYASHREE 1/1/2020 12:00 PM   Base dressing in place, unable to visualize 1/1/2020 12:00 PM       Wound 12/31/19 Right anterior greater trochanter Incision (Active)   Dressing Appearance dry;intact;dried drainage 1/1/2020 12:00 PM   Closure JAYASHREE 1/1/2020 12:00 PM   Base dressing in place, unable to visualize 1/1/2020 12:00 PM   Drainage Amount scant 12/31/2019  8:54 PM   Dressing Care, Wound dressing applied;hydrofiber 12/31/2019  7:15 PM       Rehab Goal Summary     Row Name 01/01/20 1021 01/01/20 1019          Bed Mobility Goal 1 (PT)    Activity/Assistive Device (Bed Mobility Goal 1, PT)  --  sit to supine/supine to sit;bed rails  -LM     [place] Level/Cues Needed (Bed Mobility Goal 1,  PT)  --  contact guard assist  -LM     Time Frame (Bed Mobility Goal 1, PT)  --  short term goal (STG);10 days  -LM     Progress/Outcomes (Bed Mobility Goal 1, PT)  --  goal ongoing  -LM        Transfer Goal 1 (PT)    Activity/Assistive Device (Transfer Goal 1, PT)  --  sit-to-stand/stand-to-sit;bed-to-chair/chair-to-bed;toilet;walker, rolling  -LM     [place] Level/Cues Needed (Transfer Goal 1, PT)  --  contact guard assist  -LM     Time Frame (Transfer Goal 1, PT)  --  short term goal (STG);10 days  -LM     Progress/Outcome (Transfer Goal 1, PT)  --  goal ongoing  -LM        Gait Training Goal 1 (PT)    Activity/Assistive Device (Gait Training Goal 1, PT)  --  gait (walking locomotion);assistive device use;walker, rolling  -LM     [place] Level (Gait Training Goal 1, PT)  --  contact guard assist  -LM     Distance (Gait Goal 1, PT)  --  100'  -LM     Time Frame (Gait Training Goal 1, PT)  --  short term goal (STG);10 days  -LM     Progress/Outcome (Gait Training Goal 1, PT)  --  goal ongoing  -LM        Patient Education Goal (PT)    Activity (Patient Education Goal, PT)  --  Pt will perform LE ther ex x 15 reps.  -LM     [place]/Cues/Accuracy (Memory Goal 2, PT)  --  demonstrates adequately;verbalizes understanding  -LM     Time Frame (Patient Education Goal, PT)  --  short term goal (STG);10 days  -LM     Progress/Outcome (Patient Education Goal, PT)  --  goal ongoing  -LM        Occupational Therapy Goals    Bed Mobility Goal Selection (OT)  bed mobility, OT goal 1  -AH  --     Transfer Goal Selection (OT)  transfer, OT goal 1  -AH  --     Dressing Goal Selection (OT)  dressing, OT goal 1  -AH  --     Toileting Goal Selection (OT)  toileting, OT goal 1  -AH  --     Strength Goal Selection (OT)  strength, OT goal 1  -AH  --     Functional Mobility Goal Selection (OT)  functional mobility, OT goal 1  -AH  --        Bed Mobility Goal 1 (OT)    Activity/Assistive Device (Bed Mobility Goal 1, OT)   supine to sit  -AH  --     [place] Level/Cues Needed (Bed Mobility Goal 1, OT)  contact guard assist  -AH  --     Time Frame (Bed Mobility Goal 1, OT)  by discharge  -  --     Progress/Outcomes (Bed Mobility Goal 1, OT)  goal ongoing  -AH  --        Transfer Goal 1 (OT)    Activity/Assistive Device (Transfer Goal 1, OT)  sit-to-stand/stand-to-sit;walker, rolling  -AH  --     [place] Level/Cues Needed (Transfer Goal 1, OT)  contact guard assist  -AH  --     Time Frame (Transfer Goal 1, OT)  by discharge  -AH  --     Progress/Outcome (Transfer Goal 1, OT)  goal ongoing  -  --        Dressing Goal 1 (OT)    Activity/Assistive Device (Dressing Goal 1, OT)  lower body dressing;reacher;sock-aid  -  --     [place]/Cues Needed (Dressing Goal 1, OT)  minimum assist (75% or more patient effort)  -AH  --     Time Frame (Dressing Goal 1, OT)  by discharge  -  --     Progress/Outcome (Dressing Goal 1, OT)  goal ongoing  -  --        Toileting Goal 1 (OT)    Activity/Device (Toileting Goal 1, OT)  adjust/manage clothing;perform perineal hygiene  -  --     [place] Level/Cues Needed (Toileting Goal 1, OT)  contact guard assist  -AH  --     Time Frame (Toileting Goal 1, OT)  by discharge  -  --     Progress/Outcome (Toileting Goal 1, OT)  goal ongoing  -  --        Strength Goal 1 (OT)    Strength Goal 1 (OT)  Pt will perform UB strengthening ex using theraband for resistance.  -AH  --     Time Frame (Strength Goal 1, OT)  long term goal (LTG);2 weeks  -  --     Progress/Outcome (Strength Goal 1, OT)  goal ongoing  -  --        Functional Mobility Goal 1 (OT)    Activity/Assistive Device (Functional Mobility Goal 1, OT)  walker, rolling  -AH  --     [place] Level/Cues Needed (Functional Mobility Goal 1, OT)  contact guard assist  -  --     Distance Goal 1 (Functional Mobility, OT)  50  -AH  --     Time Frame (Functional Mobility Goal 1, OT)  by discharge  -  --     Progress/Outcome  (Functional Mobility Goal 1, OT)  goal ongoing  -AH  --       User Key  (r) = Recorded By, (t) = Taken By, (c) = Cosigned By    Initials Name Provider Type Discipline    Cynthia Pang Occupational Therapist OT    Ruth Davalos, PT Physical Therapist PT              PT Recommendation and Plan  Anticipated Discharge Disposition (PT): inpatient rehabilitation facility  Planned Therapy Interventions (PT Eval): balance training, bed mobility training, gait training, transfer training, home exercise program, patient/family education, strengthening  Therapy Frequency (PT Clinical Impression): 2 times/day  Outcome Summary/Treatment Plan (PT)  Daily Summary of Progress (PT): progress toward functional goals as expected  Plan for Continued Treatment (PT): Cont PT per POC to outlined goals.  Anticipated Discharge Disposition (PT): inpatient rehabilitation facility  Plan of Care Reviewed With: patient  Progress: no change  Outcome Summary: PT tx completed. Patient R LE WB clarified by Dr. Barba as WBAT. Patient able to perform ther ex as indicated on care map. Able to perform mobility with min A and RW. Cont to goals.  Outcome Measures     Row Name 01/01/20 1355 01/01/20 1021          How much help from another person do you currently need...    Turning from your back to your side while in flat bed without using bedrails?  3  -LM  --     Moving from lying on back to sitting on the side of a flat bed without bedrails?  3  -LM  --     Moving to and from a bed to a chair (including a wheelchair)?  3  -LM  --     Standing up from a chair using your arms (e.g., wheelchair, bedside chair)?  3  -LM  --     Climbing 3-5 steps with a railing?  1  -LM  --     To walk in hospital room?  3  -LM  --     AM-PAC 6 Clicks Score (PT)  16  -LM  --        How much help from another is currently needed...    Putting on and taking off regular lower body clothing?  --  2  -     Bathing (including washing, rinsing, and drying)  --  3  -      Toileting (which includes using toilet bed pan or urinal)  --  2  -     Putting on and taking off regular upper body clothing  --  3  -     Taking care of personal grooming (such as brushing teeth)  --  3  -     Eating meals  --  4  -     AM-PAC 6 Clicks Score (OT)  --  17  -        Functional Assessment    Outcome Measure Options  AM-PAC 6 Clicks Basic Mobility (PT)  -  AM-PAC 6 Clicks Daily Activity (OT)  -       User Key  (r) = Recorded By, (t) = Taken By, (c) = Cosigned By    Initials Name Provider Type     Cynthia Murphy Occupational Therapist    LM Ruth Badillo, PT Physical Therapist         Time Calculation:   PT Charges     Row Name 01/01/20 1627 01/01/20 1145          Time Calculation    Start Time  1355  -LM  1019  -LM     PT Received On  01/01/20  -LM  01/01/20  -LM     PT Goal Re-Cert Due Date  --  01/11/20  -LM        Time Calculation- PT    Total Timed Code Minutes- PT  25 minute(s)  -  --       User Key  (r) = Recorded By, (t) = Taken By, (c) = Cosigned By    Initials Name Provider Type     Ruth Badillo, [place] Physical Therapist        Therapy Charges for Today     Code Description Service Date Service Provider Modifiers Qty    22665084193 HC PT EVAL LOW COMPLEXITY 4 1/1/2020 Ruth Badillo, PT GP 1    13903183506 HC GAIT TRAINING EA 15 MIN 1/1/2020 Ruth Badillo, PT GP 1    37457394613 HC PT THER PROC EA 15 MIN 1/1/2020 Ruth Badillo, PT GP 1          PT G-Codes  Outcome Measure Options: AM-PAC 6 Clicks Basic Mobility (PT)  AM-PAC 6 Clicks Score (PT): 16  AM-PAC 6 Clicks Score (OT): 17    Ruth Badillo PT  1/1/2020

## 2020-01-01 NOTE — THERAPY EVALUATION
"Patient Name: Bri Iniguez  : 1935    MRN: [id number]                              Today's Date: 2020       Admit Date: 2019    Visit Dx:     ICD-10-CM ICD-9-CM   1. Impaired mobility and ADLs Z74.09 799.89     Patient Active Problem List   Diagnosis   • Macular degeneration   • Hyperlipidemia   • Essential hypertension   • Deafness   • Diabetes type 2, controlled (CMS/[facility])   • Primary osteoarthritis of left hip   • Venous insufficiency of both lower extremities   • Status post total replacement of left hip   • Acute blood loss anemia, mild, asymptomatic   • Leukocytosis, mild, likely reactive   • Hip fracture (CMS/[facility])     Past Medical History:   Diagnosis Date   • Body piercing     EARS   • Bursitis     trochanteric area   • Colonoscopy refused    • Constipation    • Deaf, left    • Deafness     unspecified   • Diabetes mellitus (CMS/[facility])     PATIENT REPORTS SHE HAS TAKEN METFORMIN IN THE PAST BUT WAS TAKEN OFF OF THIS MEDICATION AROUND 2018.     • Elevated cholesterol    • Essential hypertension    • GERD (gastroesophageal reflux disease)    • Hearing loss, right     PATIENT DOES USE A HEARING AID TO RIGHT SIDE INTERMITTENTLY (REPORTS DEAFNESS IN LEFT EAR)   • Hip pain    • History of UTI     PATIENT REPORTS RESOLVED AFTER HORMONE THERAPY WAS ORDERED   • Hyperlipidemia     other   • Impaired functional mobility, balance, gait, and endurance    • Influenza vaccine administered    • Macular degeneration    • Mammogram declined    • Osteoarthritis of left hip    • Primary osteoarthritis of right knee    • Supraventricular premature beats     REPORTED IN PREADMISSION TESTING VISIT \"IRREGULAR HEART BEAT\".  REPORTS UNSURE THE ACTUAL VERBIAGE OF WHAT SHE WAS TOLD OTHER THAN THIS.    • Wears partial dentures     REPORTS UPPER AND LOWER PARTIALS. INSTRUCTED NO ADHESIVES THE DOS.     Past Surgical History:   Procedure Laterality Date   • BUNIONECTOMY Right 2019    Procedure: " Right foot bunionectomy with exostectomy, proximal phalanx osteotomy, right foot second and third digit hammertoe correction with interphalangeal joint fusion, right second metatarsophalangeal joint capsular release/capsulotomy;  Surgeon: Klaus Cason DPM;  Location: [facility];  Service: Podiatry   • CATARACT EXTRACTION Bilateral    • CHOLECYSTECTOMY  2002   • COLONOSCOPY     • HERNIA REPAIR  2005    umbilical   • HYSTERECTOMY  1974   • TONSILLECTOMY     • TOTAL HIP ARTHROPLASTY Left 1/23/2018    Procedure: LEFT TOTAL HIP ARTHROPLASTY;  Surgeon: Gideon Son MD;  Location: [facility] OR;  Service:      General Information     Row Name 01/01/20 1019          PT Evaluation Time/Intention    Document Type  evaluation  -LM     Mode of Treatment  physical therapy  -LM     Row Name 01/01/20 1019          General Information    Patient Profile Reviewed?  yes  -LM     Prior Level of Function  independent:;community mobility  -LM     Existing Precautions/Restrictions  fall;other (see comments) Awaiting WB'ing status clarification from surgeon.  -LM     Barriers to Rehab  none identified  -LM     Row Name 01/01/20 1019          Relationship/Environment    Lives With  alone  -LM     Row Name 01/01/20 1019          Resource/Environmental Concerns    Current Living Arrangements  home/apartment/condo  -LM     Row Name 01/01/20 1019          Home Main Entrance    Number of Stairs, Main Entrance  one  -LM     Row Name 01/01/20 1019          Stairs Within Home, Primary    Stairs, Within Home, Primary  To basement of home- patient does not have to access.  -LM     Row Name 01/01/20 1019          Cognitive Assessment/Intervention- PT/OT    Orientation Status (Cognition)  oriented x 4  -LM     Row Name 01/01/20 1019          Safety Issues, Functional Mobility    Safety Issues Affecting Function (Mobility)  safety precaution awareness;safety precautions follow-through/compliance  -     Impairments Affecting Function (Mobility)   balance;endurance/activity tolerance;strength  -LM       User Key  (r) = Recorded By, (t) = Taken By, (c) = Cosigned By    Initials Name Provider Type    Ruth Davalos, [place] Physical Therapist        Mobility     Row Name 01/01/20 1019          Bed Mobility Assessment/Treatment    Bed Mobility Assessment/Treatment  supine-sit  -LM     Supine-Sit [place] (Bed Mobility)  minimum assist (75% patient effort);2 person assist  -LM     Assistive Device (Bed Mobility)  head of bed elevated  -LM     Row Name 01/01/20 1019          Sit-Stand Transfer    Sit-Stand [place] (Transfers)  minimum assist (75% patient effort);2 person assist  -LM     Assistive Device (Sit-Stand Transfers)  walker, front-wheeled  -LM     Row Name 01/01/20 1019          Gait/Stairs Assessment/Training    Gait/Stairs Assessment/Training  gait/ambulation assistive device  -LM     [place] Level (Gait)  minimum assist (75% patient effort);2 person assist  -LM     Assistive Device (Gait)  walker, front-wheeled  -LM     Distance in Feet (Gait)  3'- ambulation limited d/t waiting on clarification of WB status from surgeon.  -LM     Bilateral Gait Deviations  forward flexed posture  -LM     Row Name 01/01/20 1019          Mobility Assessment/Intervention    Extremity Weight-bearing Status  right lower extremity  -LM     Right Lower Extremity (Weight-bearing Status)  other (see comments) Protected WB to R LE until surgeon clarified her WB status.  -LM       User Key  (r) = Recorded By, (t) = Taken By, (c) = Cosigned By    Initials Name Provider Type    Ruth Davalos, [place] Physical Therapist        Obj/Interventions     Row Name 01/01/20 1019          General ROM    GENERAL ROM COMMENTS  WFL except R hip s/p sx.  -LM     Row Name 01/01/20 1019          MMT (Manual Muscle Testing)    General MMT Comments  WFL except for R LE s/p sx.  -LM     Row Name 01/01/20 1019          Static Sitting Balance    Level of [place] (Unsupported Sitting,  Static Balance)  supervision  -LM     Sitting Position (Unsupported Sitting, Static Balance)  sitting on edge of bed  -LM     Row Name 01/01/20 1019          Dynamic Sitting Balance    Level of [place], Reaches Outside Midline (Sitting, Dynamic Balance)  supervision  -LM     Sitting Position, Reaches Outside Midline (Sitting, Dynamic Balance)  sitting on edge of bed  -LM     Row Name 01/01/20 1019          Static Standing Balance    Level of [place] (Supported Standing, Static Balance)  minimal assist, 75% patient effort;2 person assist  -LM     Assistive Device Utilized (Supported Standing, Static Balance)  walker, rolling  -LM     Row Name 01/01/20 1019          Dynamic Standing Balance    Level of [place], Reaches Outside Midline (Standing, Dynamic Balance)  minimal assist, 75% patient effort;2 person assist  -LM     Assistive Device Utilized (Supported Standing, Dynamic Balance)  walker, rolling  -LM       User Key  (r) = Recorded By, (t) = Taken By, (c) = Cosigned By    Initials Name Provider Type    LM Ruth Badillo, PT Physical Therapist        Goals/Plan     Row Name 01/01/20 1019          Bed Mobility Goal 1 (PT)    Activity/Assistive Device (Bed Mobility Goal 1, PT)  sit to supine/supine to sit;bed rails  -LM     [place] Level/Cues Needed (Bed Mobility Goal 1, PT)  contact guard assist  -LM     Time Frame (Bed Mobility Goal 1, PT)  short term goal (STG);10 days  -LM     Progress/Outcomes (Bed Mobility Goal 1, PT)  goal ongoing  -     Row Name 01/01/20 1019          Transfer Goal 1 (PT)    Activity/Assistive Device (Transfer Goal 1, PT)  sit-to-stand/stand-to-sit;bed-to-chair/chair-to-bed;toilet;walker, rolling  -LM     [place] Level/Cues Needed (Transfer Goal 1, PT)  contact guard assist  -LM     Time Frame (Transfer Goal 1, PT)  short term goal (STG);10 days  -LM     Progress/Outcome (Transfer Goal 1, PT)  goal ongoing  -     Row Name 01/01/20 1019          Gait Training Goal  1 (PT)    Activity/Assistive Device (Gait Training Goal 1, PT)  gait (walking locomotion);assistive device use;walker, rolling  -LM     [place] Level (Gait Training Goal 1, PT)  contact guard assist  -LM     Distance (Gait Goal 1, PT)  100'  -LM     Time Frame (Gait Training Goal 1, PT)  short term goal (STG);10 days  -LM     Progress/Outcome (Gait Training Goal 1, PT)  goal ongoing  -LM     Row Name 01/01/20 1019          Patient Education Goal (PT)    Activity (Patient Education Goal, PT)  Pt will perform LE ther ex x 15 reps.  -LM     [place]/Cues/Accuracy (Memory Goal 2, PT)  demonstrates adequately;verbalizes understanding  -LM     Time Frame (Patient Education Goal, PT)  short term goal (STG);10 days  -LM     Progress/Outcome (Patient Education Goal, PT)  goal ongoing  -LM       User Key  (r) = Recorded By, (t) = Taken By, (c) = Cosigned By    Initials Name Provider Type    LM Ruth Badillo, PT Physical Therapist        Clinical Impression     Row Name 01/01/20 1019          Pain Assessment    Additional Documentation  Pain Scale: Numbers Pre/Post-Treatment (Group)  -LM     Row Name 01/01/20 1019          Pain Scale: Numbers Pre/Post-Treatment    Pain Scale: Numbers, Pretreatment  0/10 - no pain  -LM     Pain Scale: Numbers, Post-Treatment  0/10 - no pain  -LM     Row Name 01/01/20 1019          Plan of Care Review    Plan of Care Reviewed With  patient  -LM     Progress  no change  -LM     Outcome Summary  PT eval completed as pt is s/p ORIF of R hip fx. Pt presents with deficits in strength, ROM, balance, endurance and independence. She is expected to benefit from continued skilled PT intervention to improve her mobility status prior to D/C.  -LM     Row Name 01/01/20 1019          Physical Therapy Clinical Impression    Patient/Family Goals Statement (PT Clinical Impression)  Return home.  -LM     Criteria for Skilled Interventions Met (PT Clinical Impression)  yes;treatment indicated  -LM      Rehab Potential (PT Clinical Summary)  good, to achieve stated therapy goals  -LM     Row Name 01/01/20 1019          Vital Signs    O2 Delivery Pre Treatment  supplemental O2  -LM     O2 Delivery Intra Treatment  supplemental O2  -LM     O2 Delivery Post Treatment  supplemental O2  -LM     Row Name 01/01/20 1019          Positioning and Restraints    Pre-Treatment Position  in bed  -LM     Post Treatment Position  chair  -LM     In Chair  reclined;call light within reach;encouraged to call for assist  -LM       User Key  (r) = Recorded By, (t) = Taken By, (c) = Cosigned By    Initials Name Provider Type    Ruth Davalos, PT Physical Therapist        Outcome Measures     Row Name 01/01/20 1019          How much help from another person do you currently need...    Turning from your back to your side while in flat bed without using bedrails?  3  -LM     Moving from lying on back to sitting on the side of a flat bed without bedrails?  3  -LM     Moving to and from a bed to a chair (including a wheelchair)?  3  -LM     Standing up from a chair using your arms (e.g., wheelchair, bedside chair)?  3  -LM     Climbing 3-5 steps with a railing?  1  -LM     To walk in hospital room?  2  -LM     AM-PAC 6 Clicks Score (PT)  15  -LM     Row Name 01/01/20 1019          Functional Assessment    Outcome Measure Options  AM-PAC 6 Clicks Basic Mobility (PT)  -LM       User Key  (r) = Recorded By, (t) = Taken By, (c) = Cosigned By    Initials Name Provider Type    Ruth Davalos, PT Physical Therapist          PT Recommendation and Plan  Planned Therapy Interventions (PT Eval): balance training, bed mobility training, gait training, transfer training, home exercise program, patient/family education, strengthening  Outcome Summary/Treatment Plan (PT)  Anticipated Discharge Disposition (PT): inpatient rehabilitation facility  Plan of Care Reviewed With: patient  Progress: no change  Outcome Summary: PT eval completed as pt is s/p  ORIF of R hip fx. Pt presents with deficits in strength, ROM, balance, endurance and independence. She is expected to benefit from continued skilled PT intervention to improve her mobility status prior to D/C.     Time Calculation:   PT Charges     Row Name 01/01/20 1145             Time Calculation    Start Time  1019  -LM      PT Received On  01/01/20  -      PT Goal Re-Cert Due Date  01/11/20  -        User Key  (r) = Recorded By, (t) = Taken By, (c) = Cosigned By    Initials Name Provider Type     Ruth Badillo, PT Physical Therapist        Therapy Charges for Today     Code Description Service Date Service Provider Modifiers Qty    [id number] HC PT EVAL LOW COMPLEXITY 4 1/1/2020 Ruth Badillo, PT GP 1          PT G-Codes  Outcome Measure Options: AM-PAC 6 Clicks Daily Activity (OT)  AM-PAC 6 Clicks Score (PT): 15  AM-PAC 6 Clicks Score (OT): 17    Ruth Badillo PT  1/1/2020

## 2020-01-01 NOTE — PLAN OF CARE
Problem: Patient Care Overview  Goal: Plan of Care Review  Outcome: Ongoing (interventions implemented as appropriate)   PT eval completed as patient is s/p ORIF of R hip fx. Pt presents with deficits in strength, balance, endurance and independence. She is expected to benefit from continued skilled PT intervention to improve her mobility status prior to D/C.

## 2020-01-01 NOTE — ANESTHESIA PROCEDURE NOTES
Peripheral Block      Patient reassessed immediately prior to procedure    Start time: 12/31/2019 7:18 PM  Stop time: 12/31/2019 7:24 PM  Reason for block: at surgeon's request and post-op pain management  Performed by  CRNA: Tremaine Cuadra CRNA  Preanesthetic Checklist  Completed: patient identified, site marked, surgical consent, pre-op evaluation, timeout performed, IV checked, risks and benefits discussed and monitors and equipment checked  Prep:  Pt Position: supine  Sterile barriers:cap, gloves and mask  Prep: ChloraPrep  Patient monitoring: EKG, continuous pulse oximetry and blood pressure monitoring  Procedure  Sedation:yes    Guidance:ultrasound guided  ULTRASOUND INTERPRETATION. Using ultrasound guidance a 21 G gauge needle was placed in close proximity to the nerve, at which point, under ultrasound guidance anesthetic was injected in the area of the nerve and spread of the anesthesia was seen on ultrasound in close proximity thereto.  There were no abnormalities seen on ultrasound; a digital image was taken; and the patient tolerated the procedure with no complications. Images:still images obtained    Laterality:right  Block Type:fascia iliaca compartment  Injection Technique:single-shot  Needle Type:echogenic  Needle Gauge:21 G      Medications Used: bupivacaine PF (MARCAINE) injection 0.25%, 40 mL  Med admintered at 12/31/2019 7:22 PM      Medications  Comment:Adjuncts per total volume of LA:      If required, intravenous sedation was given -- see meds on anesthesia record.    Post Assessment  Injection Assessment: negative aspiration for heme, no paresthesia on injection and incremental injection  Patient Tolerance:comfortable throughout block  Complications:no  Additional Notes  Procedure:          FASCIA ILIACA BLOCK                                Patient analgesia was achieved with General Anesthesia      Pt was placed in the supine position.   The insertion site was prepped in sterile fashion with  Chloraprep.  A BBraun echogenic needle was advance In-plane under ultrasound guidance. The Anterior superior Iliac crest was initially visualized and the probe was directed slightly medially and slightly towards the umbilicus.  The course of the needle was tracked over the sartorius muscle through the fascia Iliacus and into the anterior portion of the Iliacus muscle.  Major vessels where identified and avoided as were structures of the peritoneal cavity.  LA injection was made incrementally in 1-5 ml amounts and spread was visualized superiorly below the fascia iliacus.  Injection was completed with negative aspiration of blood and negative intravascular injection.  Injection pressures were normal or minimal resistance.

## 2020-01-01 NOTE — OP NOTE
[place] [address] [facility], [address]  [place]  [address]  [phone]    OPERATIVE REPORT      PATIENT NAME:  Bri Iniguez                            YOB: 1935       PREOP DIAGNOSIS:   Right intertrochanteric femur fracture    POSTOP DIAGNOSIS:   Same.    PROCEDURE:   Right intertrochanteric femur fracture    SURGEON:     Carlos Barba MD    OPERATIVE TEAM:   Circulator: Darline Collins RN  Radiology Technologist: Rafaela Mcbride  Scrub Person: Betito Robert    ANESTHETIST:  CRNA: Tremaine Cuadra CRNA    ANESTHESIA:   General    ESTIMATED BLOOD LOSS:   200 ml    TOURNIQUET TIME:   Not used.    FINDINGS:     Right intertrochanteric femur fracture with adequate reduction, safe implant placement.    IMPLANTS:   Synthes TFNA cephalo-medullary nail, intermediate length, 11 mm diameter, helical blade, interlocking screw.    COMPLICATIONS:    None.    DISPOSITION:    Stable to recovery.    NARRATIVE:   84-year-old female with right intertrochanteric femur fracture.  I had a discussion with her regarding the risk and benefits of operative intervention.  Risks discussed including but not limited to anesthesia, infection, fracture malunion, nonunion, DVT, PE, post-traumatic arthritis, and persistent pain or limitations from the fracture.  Goals include the potential for earlier pain relief, improved fracture position and healing potential, improved mobility and functional outcome.  She was amenable with the plan.    Antibiotic prophylaxis was given.  The right hip was prepped and draped in the usual sterile fashion.  She was placed on a fracture table and near anatomic action was achieved prior to prep and drape.  Surgeon site marking and a time out were performed prior to the procedure.      I establish a starting point with threaded guidewire, modified medial start point.  I advanced this below the level of the lesser trochanter.  I incised through skin and subcutaneous  tissue, bluntly dissected down to the tip of the greater trochanter.  I used an opening reamer to gain access to the intramedullary canal.  I placed a ball-tipped guidewire, sequentially reamed to 12.5 millimeters.  I selected a right, intermediate length, 11 mm diameter trochanteric femoral nail.  I advanced this over the guidewire into the appropriate position.  At this point I noted that I had lost some of the reduction, as such I made a small incision at the level of the fracture, placed a bone hook around the medial femoral neck, and improved reduction.    At this point I use the triple guide, incised skin and the IT band, placed a threaded guidewire up the femoral neck in the center center position within the femoral head.  I overdrilled the lateral cortex, measured and placed an appropriately sized helical blade.  I locked this into place proximally.    At this point, secondary to her age and bone quality, I elected to add some into the femoral head, similar to a kyphoplasty procedure.  The methyl methacrylate cement was mixed on the back table and injected, primarily in the superior, and anterior directions, per 's instructions.  No cement extravasated intra-articularly.    At this point I used the triple guide to place the distal interlocking screw.    Final orthogonal fluoroscopic views demonstrated adequate reduction, safe intraosseous hardware placement.    I copiously irrigated all wounds, closed in anatomic layers with 0 Vicryl, 2-0 Vicryl, staples.  Waterproof dressing, sterile was applied.  She was allowed to awaken from general anesthesia was transported the PACU in stable conditions.  Counts were correct x2.  There were no apparent complications.  I was present and scrubbed for the entire case.      Carlos Barba Jr, MD  12/31/2019

## 2020-01-01 NOTE — PROGRESS NOTES
S:  Resting comfortably. Denies constitutional symptoms.  Tolerating diet.  Up to chair.    O:  Alert, oriented, no distress, nolabored respirations.  RLE DNVI, dressing CDI.    A:  84 year old female with right intertrochanteric femur fracture s/p cephalomedullary nail fixation.    P: WBATRLE.  Pain control, DVT ppx, 23h abx, PT/OT.  Okay to discharge from musculoskeletal perspective.  Okay to shower with waterproof dressing.  F/u 14-21 days for wound check, staple removal.

## 2020-01-01 NOTE — THERAPY EVALUATION
"Acute Care - Occupational Therapy Initial Evaluation  [facility]     Patient Name: Bri Iniguez  : 1935  MRN: [id number]  Today's Date: 2020  Onset of Illness/Injury or Date of Surgery: 19  Date of Referral to OT: 19(surgery completed late on 19)  Referring Physician: HAWA Camarena    Admit Date: 2019       ICD-10-CM ICD-9-CM   1. Impaired mobility and ADLs Z74.09 799.89     Patient Active Problem List   Diagnosis   • Macular degeneration   • Hyperlipidemia   • Essential hypertension   • Deafness   • Diabetes type 2, controlled (CMS/[facility])   • Primary osteoarthritis of left hip   • Venous insufficiency of both lower extremities   • Status post total replacement of left hip   • Acute blood loss anemia, mild, asymptomatic   • Leukocytosis, mild, likely reactive   • Hip fracture (CMS/[facility])     Past Medical History:   Diagnosis Date   • Body piercing     EARS   • Bursitis     trochanteric area   • Colonoscopy refused    • Constipation    • Deaf, left    • Deafness     unspecified   • Diabetes mellitus (CMS/[facility])     PATIENT REPORTS SHE HAS TAKEN METFORMIN IN THE PAST BUT WAS TAKEN OFF OF THIS MEDICATION AROUND 2018.     • Elevated cholesterol    • Essential hypertension    • GERD (gastroesophageal reflux disease)    • Hearing loss, right     PATIENT DOES USE A HEARING AID TO RIGHT SIDE INTERMITTENTLY (REPORTS DEAFNESS IN LEFT EAR)   • Hip pain    • History of UTI     PATIENT REPORTS RESOLVED AFTER HORMONE THERAPY WAS ORDERED   • Hyperlipidemia     other   • Influenza vaccine administered    • Macular degeneration    • Mammogram declined    • Osteoarthritis of left hip    • Primary osteoarthritis of right knee    • Supraventricular premature beats     REPORTED IN PREADMISSION TESTING VISIT \"IRREGULAR HEART BEAT\".  REPORTS UNSURE THE ACTUAL VERBIAGE OF WHAT SHE WAS TOLD OTHER THAN THIS.    • Wears partial dentures     REPORTS UPPER AND LOWER PARTIALS. " INSTRUCTED NO ADHESIVES THE DOS.     Past Surgical History:   Procedure Laterality Date   • BUNIONECTOMY Right 1/21/2019    Procedure: Right foot bunionectomy with exostectomy, proximal phalanx osteotomy, right foot second and third digit hammertoe correction with interphalangeal joint fusion, right second metatarsophalangeal joint capsular release/capsulotomy;  Surgeon: Klaus Cason DPM;  Location: [facility];  Service: Podiatry   • CATARACT EXTRACTION Bilateral    • CHOLECYSTECTOMY  2002   • COLONOSCOPY     • HERNIA REPAIR  2005    umbilical   • HYSTERECTOMY  1974   • TONSILLECTOMY     • TOTAL HIP ARTHROPLASTY Left 1/23/2018    Procedure: LEFT TOTAL HIP ARTHROPLASTY;  Surgeon: Gideon Son MD;  Location: [facility];  Service:           OT ASSESSMENT FLOWSHEET (last 12 hours)      Occupational Therapy Evaluation     Row Name 01/01/20 1021                   OT Evaluation Time/Intention    Subjective Information  no complaints  -        Document Type  evaluation  -        Mode of Treatment  occupational therapy  -        Patient Effort  good  -        Symptoms Noted During/After Treatment  dizziness  -           General Information    Patient Profile Reviewed?  yes  -        Onset of Illness/Injury or Date of Surgery  12/30/19  -        Referring Physician  HAWA Camarena  -        Patient Observations  alert;cooperative;agree to therapy  -        Patient/Family Observations  Pt alone  -        General Observations of Patient  Pt received supine in bed on O2  -        Prior Level of Function  independent:;community mobility;ADL's  -        Equipment Currently Used at Home  cane, straight;shower chair;walker, rolling;commode, bedside  -        Pertinent History of Current Functional Problem  fall resulting in R hip fx s/p ORIF/IM nailing  -        Existing Precautions/Restrictions  fall;other (see comments) awaiting weight bearing clarification per surgeon  -         Risks Reviewed  patient:;increased discomfort  -        Benefits Reviewed  patient:;improve function;increase independence;increase strength  -           Relationship/Environment    Primary Source of Support/Comfort  child(hilario)  -        Lives With  alone  -           Resource/Environmental Concerns    Current Living Arrangements  home/apartment/condo  -           Home Main Entrance    Number of Stairs, Main Entrance  one  -           Stairs Within Home, Primary    Stairs, Within Home, Primary  to basement  -        Number of Stairs, Within Home, Primary  other (see comments) full flight  -        Stairs Comment, Within Home, Primary  Pt able to stay on main level  -           Cognitive Assessment/Intervention- PT/OT    Orientation Status (Cognition)  oriented x 4  -        Follows Commands (Cognition)  WFL  -        Safety Deficit (Cognitive)  safety precautions awareness;safety precautions follow-through/compliance  -           Safety Issues, Functional Mobility    Safety Issues Affecting Function (Mobility)  safety precaution awareness;safety precautions follow-through/compliance  -        Impairments Affecting Function (Mobility)  endurance/activity tolerance;strength  -           Mobility Assessment/Treatment    Extremity Weight-bearing Status  right lower extremity  -        Right Lower Extremity (Weight-bearing Status)  other (see comments) protected wt. bearing until MD confirms WB status  -           Bed Mobility Assessment/Treatment    Bed Mobility Assessment/Treatment  supine-sit  -        Supine-Sit [place] (Bed Mobility)  minimum assist (75% patient effort);2 person assist  -        Bed Mobility, Safety Issues  decreased use of arms for pushing/pulling;decreased use of legs for bridging/pushing  -        Assistive Device (Bed Mobility)  head of bed elevated  -           Functional Mobility    Functional Mobility- Ind. Level  minimum assist (75% patient  effort);2 person assist required  -        Functional Mobility- Device  rolling walker  -        Functional Mobility-Distance (Feet)  2  -        Functional Mobility-Maintain WBing  cues to maintain weight bearing status  -           Transfer Assessment/Treatment    Transfer Assessment/Treatment  sit-stand transfer;stand-sit transfer  -           Sit-Stand Transfer    Sit-Stand [place] (Transfers)  minimum assist (75% patient effort);2 person assist  -        Assistive Device (Sit-Stand Transfers)  walker, front-wheeled  -           Stand-Sit Transfer    Stand-Sit [place] (Transfers)  minimum assist (75% patient effort);2 person assist  -        Assistive Device (Stand-Sit Transfers)  walker, front-wheeled  -           ADL Assessment/Intervention    BADL Assessment/Intervention  bathing;upper body dressing;lower body dressing;grooming;feeding;toileting  -           Bathing Assessment/Intervention    Bathing [place] Level  moderate assist (50% patient effort)  -           Upper Body Dressing Assessment/Training    Upper Body Dressing [place] Level  set up  -           Lower Body Dressing Assessment/Training    Lower Body Dressing [place] Level  moderate assist (50% patient effort)  -           Grooming Assessment/Training    [place] Level (Grooming)  set up  -           Self-Feeding Assessment/Training    [place] Level (Feeding)  set up  -           Toileting Assessment/Training    [place] Level (Toileting)  minimum assist (75% patient effort)  -           BADL Safety/Performance    Impairments, BADL Safety/Performance  endurance/activity tolerance;strength;pain;range of motion  -           General ROM    GENERAL ROM COMMENTS  BUE WF  -           MMT (Manual Muscle Testing)    General MMT Comments  [facility]           Positioning and Restraints    Pre-Treatment Position  in bed  -        Post Treatment Position  chair  -        In  Chair  reclined;call light within reach;encouraged to call for assist  -           Pain Assessment    Additional Documentation  Pain Scale: Numbers Pre/Post-Treatment (Group)  -           Pain Scale: Numbers Pre/Post-Treatment    Pain Scale: Numbers, Pretreatment  0/10 - no pain  -        Pain Scale: Numbers, Post-Treatment  0/10 - no pain  -AH           Wound 12/30/19 0800 Left posterior elbow Skin Tear    Wound - Properties Group Date first assessed: 12/30/19  -SP Time first assessed: 0800  -SP Side: Left  -SP Orientation: posterior  -SP Location: elbow  -SP Primary Wound Type: Skin tear  -SP       Wound 12/30/19 0800 Right posterior elbow Skin Tear    Wound - Properties Group Date first assessed: 12/30/19  -SP Time first assessed: 0800  -SP Side: Right  -SP Orientation: posterior  -SP Location: elbow  -SP Primary Wound Type: Skin tear  -SP       Wound 12/31/19 Right anterior greater trochanter Incision    Wound - Properties Group Date first assessed: 12/31/19  -JS Present on Hospital Admission: N  -JS Side: Right  -JS Orientation: anterior  -JS Location: greater trochanter  -JS Primary Wound Type: Incision  -JS       Coping    Observed Emotional State  accepting;cooperative  -        Verbalized Emotional State  acceptance  -           Plan of Care Review    Plan of Care Reviewed With  patient  -        Progress  no change  -        Outcome Summary  OT evaluation completed as pt is s/p R hip surgery.  Pt noted with decrease independence with self care and functional mobility tasks.  Pt is expected to benefit from skilled OT to improve her strength and independence with ADL tasks.  -           Clinical Impression (OT)    Date of Referral to OT  12/30/19 surgery completed late on 12/31/19  -        OT Diagnosis  ADL decline  -        Patient/Family Goals Statement (OT Eval)  Pt wants to d/c to rehab  -        Criteria for Skilled Therapeutic Interventions Met (OT Eval)  yes;treatment indicated   -        Rehab Potential (OT Eval)  good, to achieve stated therapy goals  -        Therapy Frequency (OT Eval)  daily Mon-Fri  -        Care Plan Review (OT)  evaluation/treatment results reviewed;patient/other agree to care plan  -        Anticipated Discharge Disposition (OT)  inpatient rehabilitation facility  -           Vital Signs    O2 Delivery Pre Treatment  supplemental O2  -        O2 Delivery Intra Treatment  room air  -        O2 Delivery Post Treatment  supplemental O2  -           OT Goals    Bed Mobility Goal Selection (OT)  bed mobility, OT goal 1  -        Transfer Goal Selection (OT)  transfer, OT goal 1  -        Dressing Goal Selection (OT)  dressing, OT goal 1  -        Toileting Goal Selection (OT)  toileting, OT goal 1  -        Strength Goal Selection (OT)  strength, OT goal 1  -        Functional Mobility Goal Selection (OT)  functional mobility, OT goal 1  -        Additional Documentation  Strength Goal Selection (OT) (Row);Functional Mobility Selection (OT) (Row)  -           Bed Mobility Goal 1 (OT)    Activity/Assistive Device (Bed Mobility Goal 1, OT)  supine to sit  -        [place] Level/Cues Needed (Bed Mobility Goal 1, OT)  contact guard assist  -        Time Frame (Bed Mobility Goal 1, OT)  by discharge  -        Progress/Outcomes (Bed Mobility Goal 1, OT)  goal ongoing  -           Transfer Goal 1 (OT)    Activity/Assistive Device (Transfer Goal 1, OT)  sit-to-stand/stand-to-sit;walker, rolling  -        [place] Level/Cues Needed (Transfer Goal 1, OT)  contact guard assist  -        Time Frame (Transfer Goal 1, OT)  by discharge  -        Progress/Outcome (Transfer Goal 1, OT)  goal ongoing  -           Dressing Goal 1 (OT)    Activity/Assistive Device (Dressing Goal 1, OT)  lower body dressing;reacher;sock-aid  -        [place]/Cues Needed (Dressing Goal 1, OT)  minimum assist (75% or more patient effort)  -         Time Frame (Dressing Goal 1, OT)  by discharge  -        Progress/Outcome (Dressing Goal 1, OT)  goal ongoing  -           Toileting Goal 1 (OT)    Activity/Device (Toileting Goal 1, OT)  adjust/manage clothing;perform perineal hygiene  -        [place] Level/Cues Needed (Toileting Goal 1, OT)  contact guard assist  -AH        Time Frame (Toileting Goal 1, OT)  by discharge  -        Progress/Outcome (Toileting Goal 1, OT)  goal ongoing  -           Strength Goal 1 (OT)    Strength Goal 1 (OT)  Pt will perform UB strengthening ex using theraband for resistance.  -AH        Time Frame (Strength Goal 1, OT)  long term goal (LTG);2 weeks  -        Progress/Outcome (Strength Goal 1, OT)  goal ongoing  -           Functional Mobility Goal 1 (OT)    Activity/Assistive Device (Functional Mobility Goal 1, OT)  walker, rolling  -        [place] Level/Cues Needed (Functional Mobility Goal 1, OT)  contact guard assist  -        Distance Goal 1 (Functional Mobility, OT)  50  -AH        Time Frame (Functional Mobility Goal 1, OT)  by discharge  -        Progress/Outcome (Functional Mobility Goal 1, OT)  goal ongoing  -           Living Environment    Home Accessibility  stairs to enter home;stairs within home  -          User Key  (r) = Recorded By, (t) = Taken By, (c) = Cosigned By    Initials Name Effective Dates    Cynthia Pang 03/07/18 -     Darline Fletcher RN 07/14/16 -     Alfonzo Jacob RN 10/26/16 -                OT Recommendation and Plan  Outcome Summary/Treatment Plan (OT)  Anticipated Discharge Disposition (OT): inpatient rehabilitation facility  Therapy Frequency (OT Eval): daily(Mon-Fri)  Plan of Care Review  Plan of Care Reviewed With: patient  Plan of Care Reviewed With: patient  Outcome Summary: OT evaluation completed as pt is s/p R hip surgery.  Pt noted with decrease independence with self care and functional mobility tasks.  Pt is expected to benefit from  skilled OT to improve her strength and independence with ADL tasks.    Outcome Measures     Row Name 01/01/20 1021             How much help from another is currently needed...    Putting on and taking off regular lower body clothing?  2  -AH      Bathing (including washing, rinsing, and drying)  3  -AH      Toileting (which includes using toilet bed pan or urinal)  2  -AH      Putting on and taking off regular upper body clothing  3  -AH      Taking care of personal grooming (such as brushing teeth)  3  -AH      Eating meals  4  -      AM-PAC 6 Clicks Score (OT)  17  -         Functional Assessment    Outcome Measure Options  AM-PAC 6 Clicks Daily Activity (OT)  -        User Key  (r) = Recorded By, (t) = Taken By, (c) = Cosigned By    Initials Name Provider Type    Cynthia Pang Occupational Therapist          Time Calculation:   Time Calculation- OT     Row Name 01/01/20 1121             Time Calculation- OT    OT Start Time  1021  -      OT Received On  01/01/20  -      OT Goal Re-Cert Due Date  01/11/20  -        User Key  (r) = Recorded By, (t) = Taken By, (c) = Cosigned By    Initials Name Provider Type    Cynthia Pang Occupational Therapist        Therapy Charges for Today     Code Description Service Date Service Provider Modifiers Qty    [id number]  OT EVAL LOW COMPLEXITY 4 1/1/2020 Cynthia Murphy GO 1               Cynthia Murphy  1/1/2020

## 2020-01-01 NOTE — PLAN OF CARE
Problem: Patient Care Overview  Goal: Plan of Care Review  Outcome: Ongoing (interventions implemented as appropriate)  Flowsheets (Taken 1/1/2020 0616)  Progress: improving  Plan of Care Reviewed With: patient  Outcome Summary: VSS; pain controlled with prn pain meds; plan for PT to work with pt

## 2020-01-01 NOTE — PLAN OF CARE
Problem: Patient Care Overview  Goal: Plan of Care Review  Outcome: Ongoing (interventions implemented as appropriate)  Flowsheets (Taken 1/1/2020 1021)  Progress: no change  Plan of Care Reviewed With: patient  Outcome Summary: OT evaluation completed as pt is s/p R hip surgery.  Pt noted with decrease independence with self care and functional mobility tasks.  Pt is expected to benefit from skilled OT to improve her strength and independence with ADL tasks.

## 2020-01-02 ENCOUNTER — APPOINTMENT (OUTPATIENT)
Dept: CARDIOLOGY | Facility: HOSPITAL | Age: 85
End: 2020-01-02

## 2020-01-02 LAB
ANION GAP SERPL CALCULATED.3IONS-SCNC: 9.8 MMOL/L (ref 5–15)
BH CV ECHO MEAS - AO MAX PG: 12 MMHG
BH CV ECHO MEAS - AO MEAN PG: 6 MMHG
BH CV ECHO MEAS - IVSD: 0.9 CM
BH CV ECHO MEAS - LA DIMENSION: 4.4 CM
BH CV ECHO MEAS - LVPWD: 0.9 CM
BH CV ECHO MEAS - RAP SYSTOLE: 3 MMHG
BUN BLD-MCNC: 18 MG/DL (ref 8–23)
BUN/CREAT SERPL: 31.6 (ref 7–25)
CALCIUM SPEC-SCNC: 7.7 MG/DL (ref 8.6–10.5)
CHLORIDE SERPL-SCNC: 100 MMOL/L (ref 98–107)
CO2 SERPL-SCNC: 25.2 MMOL/L (ref 22–29)
CREAT BLD-MCNC: 0.57 MG/DL (ref 0.57–1)
DEPRECATED RDW RBC AUTO: 44 FL (ref 37–54)
ERYTHROCYTE [DISTWIDTH] IN BLOOD BY AUTOMATED COUNT: 13.4 % (ref 12.3–15.4)
GFR SERPL CREATININE-BSD FRML MDRD: 101 ML/MIN/1.73
GLUCOSE BLD-MCNC: 162 MG/DL (ref 65–99)
GLUCOSE BLDC GLUCOMTR-MCNC: 159 MG/DL (ref 70–130)
GLUCOSE BLDC GLUCOMTR-MCNC: 165 MG/DL (ref 70–130)
GLUCOSE BLDC GLUCOMTR-MCNC: 187 MG/DL (ref 70–130)
GLUCOSE BLDC GLUCOMTR-MCNC: 188 MG/DL (ref 70–130)
GLUCOSE BLDC GLUCOMTR-MCNC: 204 MG/DL (ref 70–130)
HCT VFR BLD AUTO: 22 % (ref 34–46.6)
HCT VFR BLD AUTO: 24.1 % (ref 34–46.6)
HCT VFR BLD AUTO: 29.1 % (ref 34–46.6)
HGB BLD-MCNC: 7.3 G/DL (ref 12–15.9)
HGB BLD-MCNC: 7.6 G/DL (ref 12–15.9)
HGB BLD-MCNC: 9.4 G/DL (ref 12–15.9)
LEFT ATRIUM VOLUME INDEX: 39 ML/M2
LV EF 2D ECHO EST: 76 %
MAXIMAL PREDICTED HEART RATE: 136 BPM
MCH RBC QN AUTO: 29 PG (ref 26.6–33)
MCHC RBC AUTO-ENTMCNC: 31.5 G/DL (ref 31.5–35.7)
MCV RBC AUTO: 92 FL (ref 79–97)
PLATELET # BLD AUTO: 166 10*3/MM3 (ref 140–450)
PMV BLD AUTO: 9.2 FL (ref 6–12)
POTASSIUM BLD-SCNC: 3.5 MMOL/L (ref 3.5–5.2)
RBC # BLD AUTO: 2.62 10*6/MM3 (ref 3.77–5.28)
SODIUM BLD-SCNC: 135 MMOL/L (ref 136–145)
STRESS TARGET HR: 116 BPM
WBC NRBC COR # BLD: 9.57 10*3/MM3 (ref 3.4–10.8)

## 2020-01-02 PROCEDURE — 93307 TTE W/O DOPPLER COMPLETE: CPT | Performed by: INTERNAL MEDICINE

## 2020-01-02 PROCEDURE — 97110 THERAPEUTIC EXERCISES: CPT

## 2020-01-02 PROCEDURE — 25010000002 FUROSEMIDE PER 20 MG: Performed by: NURSE PRACTITIONER

## 2020-01-02 PROCEDURE — 80048 BASIC METABOLIC PNL TOTAL CA: CPT | Performed by: NURSE PRACTITIONER

## 2020-01-02 PROCEDURE — 85018 HEMOGLOBIN: CPT | Performed by: FAMILY MEDICINE

## 2020-01-02 PROCEDURE — 99222 1ST HOSP IP/OBS MODERATE 55: CPT | Performed by: INTERNAL MEDICINE

## 2020-01-02 PROCEDURE — 25010000002 MORPHINE PER 10 MG: Performed by: NURSE PRACTITIONER

## 2020-01-02 PROCEDURE — 99232 SBSQ HOSP IP/OBS MODERATE 35: CPT | Performed by: NURSE PRACTITIONER

## 2020-01-02 PROCEDURE — 93306 TTE W/DOPPLER COMPLETE: CPT

## 2020-01-02 PROCEDURE — 85027 COMPLETE CBC AUTOMATED: CPT | Performed by: NURSE PRACTITIONER

## 2020-01-02 PROCEDURE — 85014 HEMATOCRIT: CPT | Performed by: FAMILY MEDICINE

## 2020-01-02 PROCEDURE — 63710000001 INSULIN ASPART PER 5 UNITS: Performed by: NURSE PRACTITIONER

## 2020-01-02 PROCEDURE — 85018 HEMOGLOBIN: CPT | Performed by: NURSE PRACTITIONER

## 2020-01-02 PROCEDURE — P9016 RBC LEUKOCYTES REDUCED: HCPCS

## 2020-01-02 PROCEDURE — 36430 TRANSFUSION BLD/BLD COMPNT: CPT

## 2020-01-02 PROCEDURE — 86900 BLOOD TYPING SEROLOGIC ABO: CPT

## 2020-01-02 PROCEDURE — 85014 HEMATOCRIT: CPT | Performed by: NURSE PRACTITIONER

## 2020-01-02 PROCEDURE — 82962 GLUCOSE BLOOD TEST: CPT

## 2020-01-02 RX ORDER — FUROSEMIDE 10 MG/ML
20 INJECTION INTRAMUSCULAR; INTRAVENOUS ONCE
Status: COMPLETED | OUTPATIENT
Start: 2020-01-02 | End: 2020-01-02

## 2020-01-02 RX ORDER — HYDROCODONE BITARTRATE AND ACETAMINOPHEN 5; 325 MG/1; MG/1
1 TABLET ORAL EVERY 6 HOURS PRN
Status: DISCONTINUED | OUTPATIENT
Start: 2020-01-02 | End: 2020-01-03 | Stop reason: HOSPADM

## 2020-01-02 RX ORDER — FUROSEMIDE 10 MG/ML
40 INJECTION INTRAMUSCULAR; INTRAVENOUS ONCE
Status: DISCONTINUED | OUTPATIENT
Start: 2020-01-02 | End: 2020-01-02

## 2020-01-02 RX ADMIN — FUROSEMIDE 20 MG: 10 INJECTION, SOLUTION INTRAMUSCULAR; INTRAVENOUS at 16:58

## 2020-01-02 RX ADMIN — DOCUSATE SODIUM 100 MG: 100 CAPSULE, LIQUID FILLED ORAL at 21:12

## 2020-01-02 RX ADMIN — SODIUM CHLORIDE, PRESERVATIVE FREE 10 ML: 5 INJECTION INTRAVENOUS at 21:12

## 2020-01-02 RX ADMIN — ATORVASTATIN CALCIUM 10 MG: 10 TABLET, FILM COATED ORAL at 21:12

## 2020-01-02 RX ADMIN — DOCUSATE SODIUM 100 MG: 100 CAPSULE, LIQUID FILLED ORAL at 10:54

## 2020-01-02 RX ADMIN — LISINOPRIL 20 MG: 20 TABLET ORAL at 10:54

## 2020-01-02 RX ADMIN — HYDROCODONE BITARTRATE AND ACETAMINOPHEN 1 TABLET: 5; 325 TABLET ORAL at 12:59

## 2020-01-02 RX ADMIN — MORPHINE SULFATE 2 MG: 2 INJECTION, SOLUTION INTRAMUSCULAR; INTRAVENOUS at 01:42

## 2020-01-02 RX ADMIN — HYDROCODONE BITARTRATE AND ACETAMINOPHEN 1 TABLET: 5; 325 TABLET ORAL at 18:09

## 2020-01-02 RX ADMIN — MORPHINE SULFATE 2 MG: 2 INJECTION, SOLUTION INTRAMUSCULAR; INTRAVENOUS at 05:40

## 2020-01-02 RX ADMIN — HYDROCHLOROTHIAZIDE 12.5 MG: 12.5 CAPSULE ORAL at 10:54

## 2020-01-02 RX ADMIN — INSULIN ASPART 2 UNITS: 100 INJECTION, SOLUTION INTRAVENOUS; SUBCUTANEOUS at 06:38

## 2020-01-02 NOTE — PLAN OF CARE
Problem: Patient Care Overview  Goal: Plan of Care Review  Outcome: Ongoing (interventions implemented as appropriate)  Flowsheets (Taken 1/2/2020 5373)  Progress: improving  Plan of Care Reviewed With: patient  Outcome Summary: Pt sitting up in bed. Pt completed UE therex 2x10 while sitting supported in bed.  Short rest breaks btwn exs.  restricted pt to therapy rx in bed this date due to med status decline. No pain after rx, pulse 98 and o2 at 97%.

## 2020-01-02 NOTE — THERAPY TREATMENT NOTE
Acute Care - Physical Therapy Treatment Note  [facility]     Patient Name: Bri Iniguez  : 1935  MRN: [id number]  Today's Date: 2020  Onset of Illness/Injury or Date of Surgery: 19     Referring Physician: HAWA Camarena    Admit Date: 2019    Visit Dx:    ICD-10-CM ICD-9-CM   1. Impaired mobility and ADLs Z74.09 799.89     Patient Active Problem List   Diagnosis   • Macular degeneration   • Hyperlipidemia   • Essential hypertension   • Deafness   • Diabetes type 2, controlled (CMS/Formerly [facility])   • Primary osteoarthritis of left hip   • Venous insufficiency of both lower extremities   • Status post total replacement of left hip   • Acute blood loss anemia, mild, asymptomatic   • Leukocytosis, mild, likely reactive   • Hip fracture (CMS/Formerly [facility])       Therapy Treatment    Rehabilitation Treatment Summary     Row Name 20 1535 20 1509          Treatment Time/Intention    Discipline  occupational therapist  -RM  physical therapy assistant  -RMA     Document Type  therapy note (daily note)  -RM  therapy note (daily note)  -RMA     Subjective Information  no complaints  -RM  no complaints  -RMA     Mode of Treatment  occupational therapy  -RM  physical therapy  -RMA     Patient/Family Observations  Pt sitting up in bed upon OT entry.    -RM  --     Care Plan Review  care plan/treatment goals reviewed;patient/other agree to care plan  -RM  care plan/treatment goals reviewed  -RMA     Total Minutes, Occupational Therapy Treatment  30  -RM  --     Patient Effort  good  -RM  good  -RMA     Comment  Pt reports feeling very good today  -RM  --     Existing Precautions/Restrictions  fall  -RM  fall  -RMA     Patient Response to Treatment  Pt cooperative and participated OT very well.  -RM  --     Recorded by [RM] Brynn Saez, OT 20 1630 [RMA] Jesse Reyes, PTA 20 1652     Row Name 20 1535             Vital Signs    Pretreatment Heart Rate (beats/min)  100  -RM       Intratreatment Heart Rate (beats/min)  115  -RM      Posttreatment Heart Rate (beats/min)  98  -RM      Pre SpO2 (%)  96  -RM      O2 Delivery Pre Treatment  supplemental O2  -RM      Intra SpO2 (%)  94  -RM      O2 Delivery Intra Treatment  supplemental O2  -RM      Post SpO2 (%)  98  -RM      O2 Delivery Post Treatment  supplemental O2  -RM      Pre Patient Position  -- long sitting in bed  -RM      Intra Patient Position  -- long sitting in bed  -RM      Post Patient Position  -- long sitting in bed  -RM      Recorded by [RM] Brynn Saez, OT 01/02/20 1630      Row Name 01/02/20 1535             Therapeutic Exercise    69993 - OT Therapeutic Exercise Minutes  30  -RM      Recorded by [RM] Brynn Saez, OT 01/02/20 1630      Row Name 01/02/20 1535 01/02/20 1509          Therapeutic Exercise    Upper Extremity (Therapeutic Exercise)  bicep curl, bilateral;tricep extension, bilateral  -RM  --     Upper Extremity Range of Motion (Therapeutic Exercise)  shoulder flexion/extension, bilateral;shoulder abduction/adduction, bilateral;shoulder horizontal abduction/adduction, bilateral;shoulder internal/external rotation, bilateral chest press, bilateral; shld overhead press, bilateral  -RM  --     Lower Extremity (Therapeutic Exercise)  --  gluteal sets;gastroc stretch, bilateral;heel slides, right;quad sets, right  -RMA     Exercise Type (Therapeutic Exercise)  AROM (active range of motion)  -RM  AROM (active range of motion);AAROM (active assistive range of motion)  -RMA     Position (Therapeutic Exercise)  --  supine  -RMA     Sets/Reps (Therapeutic Exercise)  2x10  -RM  1x15  -RMA     Expected Outcome (Therapeutic Exercise)  improve functional tolerance, self-care activity;improve performance, BADLs  -RM  --     Comment (Therapeutic Exercise)  Dr request only bed activity this date due to med status decline  -RM  --     Recorded by [RM] Brynn Saez, OT 01/02/20 1630 [RMA] Jesse Reyes, PTA 01/02/20 3215      Row Name 01/02/20 1535             Static Sitting Balance    Level of [place] (Unsupported Sitting, Static Balance)  independent  -RM      Sitting Position (Unsupported Sitting, Static Balance)  long sitting on bed  -RM      Recorded by [RM] Brynn Saez, OT 01/02/20 1630      Row Name 01/02/20 1535             Dynamic Sitting Balance    Level of [place], Reaches Outside Midline (Sitting, Dynamic Balance)  conditional independence  -RM      Sitting Position, Reaches Outside Midline (Sitting, Dynamic Balance)  long sitting on bed  -RM      Recorded by [RM] Brynn Saez, OT 01/02/20 1630      Row Name 01/02/20 1535 01/02/20 1509          Positioning and Restraints    Pre-Treatment Position  -- long sitting in bed  -RM  in bed  -RMA     Post Treatment Position  bed long sitting in bed  -RM  bed  -RMA     In Bed  supine;call light within reach;encouraged to call for assist;side rails up x2  -RM  supine;call light within reach;encouraged to call for assist;notified nsg  -RMA     Recorded by [RM] Brynn Saez, OT 01/02/20 1630 [RMA] Jesse Reyes, PTA 01/02/20 1652     Row Name 01/02/20 1535 01/02/20 1509          Pain Scale: Numbers Pre/Post-Treatment    Pain Scale: Numbers, Pretreatment  0/10 - no pain  -RM  0/10 - no pain  -RMA     Pain Scale: Numbers, Post-Treatment  0/10 - no pain  -RM  0/10 - no pain  -RMA     Recorded by [RM] Brynn Saez, OT 01/02/20 1630 [RMA] Jesse Reyes, PTA 01/02/20 1652     Row Name                Wound 12/30/19 0800 Left posterior elbow Skin Tear    Wound - Properties Group Date first assessed: 12/30/19 [SP] Time first assessed: 0800 [SP] Side: Left [SP] Orientation: posterior [SP] Location: elbow [SP] Primary Wound Type: Skin tear [SP] Recorded by:  [SP] Alfonzo Colon RN 12/30/19 0948    Row Name                Wound 12/30/19 0800 Right posterior elbow Skin Tear    Wound - Properties Group Date first assessed: 12/30/19 [SP] Time first assessed: 0800 [SP] Side:  Right [SP] Orientation: posterior [SP] Location: elbow [SP] Primary Wound Type: Skin tear [SP] Recorded by:  [SP] Alfonzo Colon RN 12/30/19 0949    Row Name                Wound 12/31/19 Right anterior greater trochanter Incision    Wound - Properties Group Date first assessed: 12/31/19 [JS] Present on Hospital Admission: N [JS] Side: Right [JS] Orientation: anterior [JS] Location: greater trochanter [JS] Primary Wound Type: Incision [JS] Recorded by:  [JS] Darline Collins RN 12/31/19 1809    Row Name 01/02/20 1535             Coping    Observed Emotional State  accepting;calm;cooperative;pleasant  -RM      Verbalized Emotional State  acceptance  -RM      Recorded by [RM] Brynn Saez OT 01/02/20 1630      Row Name 01/02/20 1535 01/02/20 1509          Plan of Care Review    Plan of Care Reviewed With  patient  -RM  patient  -RMA     Progress  improving  -RM  no change  -RMA     Outcome Summary  Pt sitting up in bed. Pt completed UE therex 2x10 while sitting supported in bed.  Short rest breaks btwn exs.  restricted pt to therapy rx in bed this date due to med status decline. No pain after rx, pulse 98 and o2 at 97%.  -RM  Pt found supine in bed recieving blood transfusion.  Pt performed siupine exercise per flowsheet. Therapy will attempt to advance pt activity at next visit.    -RMA     Recorded by [RM] Brynn Saez OT 01/02/20 1630 [RMA] Jesse Reyes, PTA 01/02/20 1652     Row Name 01/02/20 1535             Outcome Summary/Treatment Plan (OT)    Daily Summary of Progress (OT)  progress toward functional goals is good  -RM      Anticipated Discharge Disposition (OT)  inpatient rehabilitation facility  -RM      Recorded by [RM] Brynn Saez OT 01/02/20 1630      Row Name 01/02/20 1509             Outcome Summary/Treatment Plan (PT)    Daily Summary of Progress (PT)  progress toward functional goals is gradual  -RMA      Plan for Continued Treatment (PT)  Cont PT per POC  -RMA      Recorded by [RMA]  Jesse Reyes, PTA 01/02/20 0058        User Key  (r) = Recorded By, (t) = Taken By, (c) = Cosigned By    Initials Name Effective Dates Discipline    Darline Fletcher RN 07/14/16 -  Nurse    Alfonzo Jacob RN 10/26/16 -  Nurse    Jesse Ortiz, PTA 03/07/18 -  PT    RM Brynn Saez, OT 11/05/19 -  OT          Wound 12/30/19 0800 Left posterior elbow Skin Tear (Active)   Dressing Appearance moist drainage;intact 1/2/2020 12:00 PM   Closure None 1/2/2020 12:00 PM   Base pink;bleeding 1/2/2020 12:00 PM   Drainage Characteristics/Odor sanguineous 1/2/2020 12:00 PM   Drainage Amount scant 1/2/2020 12:00 PM   Dressing Care, Wound dressing changed 1/2/2020 12:00 PM       Wound 12/30/19 0800 Right posterior elbow Skin Tear (Active)   Dressing Appearance dry;intact 1/1/2020  8:00 PM   Closure JAYASHREE 1/1/2020  8:00 PM   Base dressing in place, unable to visualize 1/1/2020  8:00 PM       Wound 12/31/19 Right anterior greater trochanter Incision (Active)   Dressing Appearance dry;intact;dried drainage 1/1/2020  8:00 PM   Closure JAYASHREE 1/1/2020  8:00 PM   Base dressing in place, unable to visualize 1/1/2020  8:00 PM               PT Recommendation and Plan     Outcome Summary/Treatment Plan (PT)  Daily Summary of Progress (PT): progress toward functional goals is gradual  Plan for Continued Treatment (PT): Cont PT per POC  Plan of Care Reviewed With: patient  Progress: no change  Outcome Summary: Pt found supine in bed recieving blood transfusion.  Pt performed siupine exercise per flowsheet. Therapy will attempt to advance pt activity at next visit.    Outcome Measures     Row Name 01/02/20 1509 01/01/20 1355 01/01/20 1021       How much help from another person do you currently need...    Turning from your back to your side while in flat bed without using bedrails?  3  -RM  3  -LM  --    Moving from lying on back to sitting on the side of a flat bed without bedrails?  3  -RM  3  -LM  --    Moving to and from a  bed to a chair (including a wheelchair)?  3  -RM  3  -LM  --    Standing up from a chair using your arms (e.g., wheelchair, bedside chair)?  3  -RM  3  -LM  --    Climbing 3-5 steps with a railing?  1  -RM  1  -LM  --    To walk in hospital room?  3  -RM  3  -LM  --    AM-PAC 6 Clicks Score (PT)  16  -RM  16  -LM  --       How much help from another is currently needed...    Putting on and taking off regular lower body clothing?  --  --  2  -AH    Bathing (including washing, rinsing, and drying)  --  --  3  -AH    Toileting (which includes using toilet bed pan or urinal)  --  --  2  -AH    Putting on and taking off regular upper body clothing  --  --  3  -AH    Taking care of personal grooming (such as brushing teeth)  --  --  3  -AH    Eating meals  --  --  4  -AH    AM-PAC 6 Clicks Score (OT)  --  --  17  -AH       Functional Assessment    Outcome Measure Options  AM-PAC 6 Clicks Basic Mobility (PT)  -RM  AM-PAC 6 Clicks Basic Mobility (PT)  -LM  AM-PAC 6 Clicks Daily Activity (OT)  -AH      User Key  (r) = Recorded By, (t) = Taken By, (c) = Cosigned By    Initials Name Provider Type    Cynthia Pang Occupational Therapist    Ruth Davalos, PT Physical Therapist    Jesse Larson, FLY Physical Therapy Assistant         Time Calculation:   PT Charges     Row Name 01/02/20 1653             Time Calculation    Start Time  1509  -RM      Stop Time  1524  -RM      Time Calculation (min)  15 min  -RM      PT Received On  01/02/20  -RM      PT Goal Re-Cert Due Date  01/11/20  -RM         Time Calculation- PT    Total Timed Code Minutes- PT  15 minute(s)  -RM         Timed Charges    33736 - PT Therapeutic Exercise Minutes  15  -RM        User Key  (r) = Recorded By, (t) = Taken By, (c) = Cosigned By    Initials Name Provider Type    Jesse Larson, FLY Physical Therapy Assistant        Therapy Charges for Today     Code Description Service Date Service Provider Modifiers Qty    [id number]  PT THER  PROC EA 15 MIN 1/2/2020 Jesse Reyes, PTA GP 1          PT G-Codes  Outcome Measure Options: AM-PAC 6 Clicks Basic Mobility (PT)  AM-PAC 6 Clicks Score (PT): 16  AM-PAC 6 Clicks Score (OT): 17    Jesse Reyes, FLY  1/2/2020
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
## 2020-01-02 NOTE — PLAN OF CARE
Problem: Patient Care Overview  Goal: Plan of Care Review  Outcome: Ongoing (interventions implemented as appropriate)  Note:   Pt found supine in bed recieving blood transfusion.  Pt performed siupine exercise per flowsheet. Therapy will attempt to advance pt activity at next visit.

## 2020-01-02 NOTE — THERAPY TREATMENT NOTE
"Acute Care - Occupational Therapy Treatment Note  [facility]     Patient Name: Bri Iniguez  : 1935  MRN: [id number]  Today's Date: 2020  Onset of Illness/Injury or Date of Surgery: 19  Date of Referral to OT: 19(surgery completed late on 19)  Referring Physician: HAWA Camarena    Admit Date: 2019       ICD-10-CM ICD-9-CM   1. Impaired mobility and ADLs Z74.09 799.89     Patient Active Problem List   Diagnosis   • Macular degeneration   • Hyperlipidemia   • Essential hypertension   • Deafness   • Diabetes type 2, controlled (CMS/[facility])   • Primary osteoarthritis of left hip   • Venous insufficiency of both lower extremities   • Status post total replacement of left hip   • Acute blood loss anemia, mild, asymptomatic   • Leukocytosis, mild, likely reactive   • Hip fracture (CMS/[facility])     Past Medical History:   Diagnosis Date   • Body piercing     EARS   • Bursitis     trochanteric area   • Colonoscopy refused    • Constipation    • Deaf, left    • Deafness     unspecified   • Diabetes mellitus (CMS/[facility])     PATIENT REPORTS SHE HAS TAKEN METFORMIN IN THE PAST BUT WAS TAKEN OFF OF THIS MEDICATION AROUND 2018.     • Elevated cholesterol    • Essential hypertension    • GERD (gastroesophageal reflux disease)    • Hearing loss, right     PATIENT DOES USE A HEARING AID TO RIGHT SIDE INTERMITTENTLY (REPORTS DEAFNESS IN LEFT EAR)   • Hip pain    • History of UTI     PATIENT REPORTS RESOLVED AFTER HORMONE THERAPY WAS ORDERED   • Hyperlipidemia     other   • Impaired functional mobility, balance, gait, and endurance    • Influenza vaccine administered    • Macular degeneration    • Mammogram declined    • Osteoarthritis of left hip    • Primary osteoarthritis of right knee    • Supraventricular premature beats     REPORTED IN PREADMISSION TESTING VISIT \"IRREGULAR HEART BEAT\".  REPORTS UNSURE THE ACTUAL VERBIAGE OF WHAT SHE WAS TOLD OTHER THAN THIS.    • Wears " partial dentures     REPORTS UPPER AND LOWER PARTIALS. INSTRUCTED NO ADHESIVES THE DOS.     Past Surgical History:   Procedure Laterality Date   • BUNIONECTOMY Right 1/21/2019    Procedure: Right foot bunionectomy with exostectomy, proximal phalanx osteotomy, right foot second and third digit hammertoe correction with interphalangeal joint fusion, right second metatarsophalangeal joint capsular release/capsulotomy;  Surgeon: Klaus Cason DPM;  Location: [facility];  Service: Podiatry   • CATARACT EXTRACTION Bilateral    • CHOLECYSTECTOMY  2002   • COLONOSCOPY     • HERNIA REPAIR  2005    umbilical   • HYSTERECTOMY  1974   • TONSILLECTOMY     • TOTAL HIP ARTHROPLASTY Left 1/23/2018    Procedure: LEFT TOTAL HIP ARTHROPLASTY;  Surgeon: Gideon Son MD;  Location: [facility];  Service:        Therapy Treatment    Rehabilitation Treatment Summary     Row Name 01/02/20 1535             Treatment Time/Intention    Discipline  occupational therapist  -RM      Document Type  therapy note (daily note)  -RM      Subjective Information  no complaints  -RM      Mode of Treatment  occupational therapy  -RM      Patient/Family Observations  Pt sitting up in bed upon OT entry.    -RM      Care Plan Review  care plan/treatment goals reviewed;patient/other agree to care plan  -RM      Total Minutes, Occupational Therapy Treatment  30  -RM      Patient Effort  good  -RM      Comment  Pt reports feeling very good today  -RM      Existing Precautions/Restrictions  fall  -RM      Patient Response to Treatment  Pt cooperative and participated OT very well.  -RM      Recorded by [RM] Brynn Saez, OT 01/02/20 1630      Row Name 01/02/20 1535             Vital Signs    Pretreatment Heart Rate (beats/min)  100  -RM      Intratreatment Heart Rate (beats/min)  115  -RM      Posttreatment Heart Rate (beats/min)  98  -RM      Pre SpO2 (%)  96  -RM      O2 Delivery Pre Treatment  supplemental O2  -RM      Intra SpO2 (%)  94  -RM      O2  Delivery Intra Treatment  supplemental O2  -RM      Post SpO2 (%)  98  -RM      O2 Delivery Post Treatment  supplemental O2  -RM      Pre Patient Position  -- long sitting in bed  -RM      Intra Patient Position  -- long sitting in bed  -RM      Post Patient Position  -- long sitting in bed  -RM      Recorded by [RM] Brynn Saez, OT 01/02/20 1630      Row Name 01/02/20 1535             Therapeutic Exercise    67454 - OT Therapeutic Exercise Minutes  30  -RM      Recorded by [RM] Brynn Saez, OT 01/02/20 1630      Row Name 01/02/20 1535             Therapeutic Exercise    Upper Extremity (Therapeutic Exercise)  bicep curl, bilateral;tricep extension, bilateral  -RM      Upper Extremity Range of Motion (Therapeutic Exercise)  shoulder flexion/extension, bilateral;shoulder abduction/adduction, bilateral;shoulder horizontal abduction/adduction, bilateral;shoulder internal/external rotation, bilateral chest press, bilateral; shld overhead press, bilateral  -RM      Exercise Type (Therapeutic Exercise)  AROM (active range of motion)  -RM      Sets/Reps (Therapeutic Exercise)  2x10  -RM      Expected Outcome (Therapeutic Exercise)  improve functional tolerance, self-care activity;improve performance, BADLs  -RM      Comment (Therapeutic Exercise)  Dr request only bed activity this date due to med status decline  -RM      Recorded by [RM] Brynn Saez, OT 01/02/20 1630      Row Name 01/02/20 1535             Static Sitting Balance    Level of [place] (Unsupported Sitting, Static Balance)  independent  -RM      Sitting Position (Unsupported Sitting, Static Balance)  long sitting on bed  -RM      Recorded by [RM] Brynn Saez, OT 01/02/20 1630      Row Name 01/02/20 1535             Dynamic Sitting Balance    Level of [place], Reaches Outside Midline (Sitting, Dynamic Balance)  conditional independence  -RM      Sitting Position, Reaches Outside Midline (Sitting, Dynamic Balance)  long sitting on bed  -RM       Recorded by [RM] Brynn Saez, OT 01/02/20 1630      Row Name 01/02/20 1535             Positioning and Restraints    Pre-Treatment Position  -- long sitting in bed  -RM      Post Treatment Position  bed long sitting in bed  -RM      In Bed  supine;call light within reach;encouraged to call for assist;side rails up x2  -RM      Recorded by [RM] Brynn Saez, OT 01/02/20 1630      Row Name 01/02/20 1535             Pain Scale: Numbers Pre/Post-Treatment    Pain Scale: Numbers, Pretreatment  0/10 - no pain  -RM      Pain Scale: Numbers, Post-Treatment  0/10 - no pain  -RM      Recorded by [RM] Brynn Saez, OT 01/02/20 1630      Row Name                Wound 12/30/19 0800 Left posterior elbow Skin Tear    Wound - Properties Group Date first assessed: 12/30/19 [SP] Time first assessed: 0800 [SP] Side: Left [SP] Orientation: posterior [SP] Location: elbow [SP] Primary Wound Type: Skin tear [SP] Recorded by:  [SP] Alfonzo Colon RN 12/30/19 0948    Row Name                Wound 12/30/19 0800 Right posterior elbow Skin Tear    Wound - Properties Group Date first assessed: 12/30/19 [SP] Time first assessed: 0800 [SP] Side: Right [SP] Orientation: posterior [SP] Location: elbow [SP] Primary Wound Type: Skin tear [SP] Recorded by:  [SP] Alfonzo Colon RN 12/30/19 0949    Row Name                Wound 12/31/19 Right anterior greater trochanter Incision    Wound - Properties Group Date first assessed: 12/31/19 [JS] Present on Hospital Admission: N [JS] Side: Right [JS] Orientation: anterior [JS] Location: greater trochanter [JS] Primary Wound Type: Incision [JS] Recorded by:  [JS] Darline Collins RN 12/31/19 1809    Row Name 01/02/20 1535             Coping    Observed Emotional State  accepting;calm;cooperative;pleasant  -RM      Verbalized Emotional State  acceptance  -RM      Recorded by [RM] Brynn Saez, OT 01/02/20 1630      Row Name 01/02/20 1535             Plan of Care Review    Plan of Care Reviewed With   patient  -RM      Progress  improving  -RM      Outcome Summary  Pt sitting up in bed. Pt completed UE therex 2x10 while sitting supported in bed.  Short rest breaks btwn exs.  restricted pt to therapy rx in bed this date due to med status decline. No pain after rx, pulse 98 and o2 at 97%.  -RM      Recorded by [RM] Brynn Saez, OT 01/02/20 1630      Row Name 01/02/20 1535             Outcome Summary/Treatment Plan (OT)    Daily Summary of Progress (OT)  progress toward functional goals is good  -RM      Anticipated Discharge Disposition (OT)  inpatient rehabilitation facility  -RM      Recorded by [RM] Brynn Saez, OT 01/02/20 1630        User Key  (r) = Recorded By, (t) = Taken By, (c) = Cosigned By    Initials Name Effective Dates Discipline    JS Darline Collins RN 07/14/16 -  Nurse    Alfonzo Jacob RN 10/26/16 -  Nurse    Brynn Zheng, OT 11/05/19 -  OT        Wound 12/30/19 0800 Left posterior elbow Skin Tear (Active)   Dressing Appearance moist drainage;intact 1/2/2020 12:00 PM   Closure None 1/2/2020 12:00 PM   Base pink;bleeding 1/2/2020 12:00 PM   Drainage Characteristics/Odor sanguineous 1/2/2020 12:00 PM   Drainage Amount scant 1/2/2020 12:00 PM   Dressing Care, Wound dressing changed 1/2/2020 12:00 PM       Wound 12/30/19 0800 Right posterior elbow Skin Tear (Active)   Dressing Appearance dry;intact 1/1/2020  8:00 PM   Closure JAYASHREE 1/1/2020  8:00 PM   Base dressing in place, unable to visualize 1/1/2020  8:00 PM       Wound 12/31/19 Right anterior greater trochanter Incision (Active)   Dressing Appearance dry;intact;dried drainage 1/1/2020  8:00 PM   Closure JAYASHREE 1/1/2020  8:00 PM   Base dressing in place, unable to visualize 1/1/2020  8:00 PM           OT Recommendation and Plan  Outcome Summary/Treatment Plan (OT)  Daily Summary of Progress (OT): progress toward functional goals is good  Anticipated Discharge Disposition (OT): inpatient rehabilitation facility  Daily Summary of Progress  (OT): progress toward functional goals is good  Plan of Care Review  Plan of Care Reviewed With: patient  Plan of Care Reviewed With: patient  Outcome Summary: Pt sitting up in bed. Pt completed UE therex 2x10 while sitting supported in bed.  Short rest breaks btwn exs.  restricted pt to therapy rx in bed this date due to med status decline. No pain after rx, pulse 98 and o2 at 97%.  Outcome Measures     Row Name 01/01/20 1355 01/01/20 1021          How much help from another person do you currently need...    Turning from your back to your side while in flat bed without using bedrails?  3  -LM  --     Moving from lying on back to sitting on the side of a flat bed without bedrails?  3  -LM  --     Moving to and from a bed to a chair (including a wheelchair)?  3  -LM  --     Standing up from a chair using your arms (e.g., wheelchair, bedside chair)?  3  -LM  --     Climbing 3-5 steps with a railing?  1  -LM  --     To walk in hospital room?  3  -LM  --     AM-PAC 6 Clicks Score (PT)  16  -LM  --        How much help from another is currently needed...    Putting on and taking off regular lower body clothing?  --  2  -AH     Bathing (including washing, rinsing, and drying)  --  3  -AH     Toileting (which includes using toilet bed pan or urinal)  --  2  -AH     Putting on and taking off regular upper body clothing  --  3  -AH     Taking care of personal grooming (such as brushing teeth)  --  3  -AH     Eating meals  --  4  -AH     AM-PAC 6 Clicks Score (OT)  --  17  -AH        Functional Assessment    Outcome Measure Options  AM-PAC 6 Clicks Basic Mobility (PT)  -LM  AM-PAC 6 Clicks Daily Activity (OT)  -       User Key  (r) = Recorded By, (t) = Taken By, (c) = Cosigned By    Initials Name Provider Type    Cynthia Pang Occupational Therapist    Ruth Davalos, PT Physical Therapist           Time Calculation:   Time Calculation- OT     Row Name 01/02/20 1634 01/02/20 1535          Time Calculation- OT    OT  Start Time  1535  -RM  --     OT Stop Time  1605  -RM  --     OT Time Calculation (min)  30 min  -RM  --     OT Received On  01/02/20  -RM  --     OT Goal Re-Cert Due Date  01/11/20  -RM  --        Timed Charges    76734 - OT Therapeutic Exercise Minutes  30  -RM  30  -RM       User Key  (r) = Recorded By, (t) = Taken By, (c) = Cosigned By    Initials Name Provider Type     Brynn Saez OT Occupational Therapist        Therapy Charges for Today     Code Description Service Date Service Provider Modifiers Qty    [id number]  OT THER PROC EA 15 MIN 1/2/2020 Brynn Saez OT GO 2               Brynn Saez OT  1/2/2020

## 2020-01-02 NOTE — CONSULTS
[facility]-Cardiology Consult Note    Referring Provider: Kayleigh  Reason for Consultation: Syncope    Patient Care Team:  Clifford Nolasco MD as PCP - General    Chief complaint : Hip pain    Subjective:    History of present illness: This is an 84-year-old female patient who underwent repair of a hip fracture earlier this week.  The patient was recovering from her surgery without complication and had been sitting up in a chair.  The patient indicates that she had just finished using a bedside commode and was in the process of transitioning back to bed when she became extremely weak.  She indicates that she had to sit back on the bedside toilet.  On her next attempt to transition back to bed she had transient loss of consciousness.  Unfortunately, she was not on a cardiac monitor when this occurred.  The patient denied having a prodrome of chest discomfort, shortness of breath, chest tightness, nausea, flushing, diaphoresis or tunnel vision.  The patient indicates she has had no similar episodes in the past.  She has no history of coronary artery disease, prior myocardial infarction or coronary revascularization.  She has a history of hypertension but no history of diabetes or hyper cholesterolemia.  She is a non-smoker.  She has no history of congestive heart failure.  She has no history of valvular heart disease or rheumatic fever as a child.  She has no history of arrhythmia.  She is a non-smoker.  She was noted to have significant postoperative anemia with a hematocrit darcie of 21%.  Arrangements are being made for elective transfusion.  The patient denies having other episodes of postural dizziness.  Her twelve-lead electrocardiogram prior to surgery showed normal sinus rhythm with normal axis and normal intervals.  There was a nonspecific right ventricular conduction delay.  Her repeat twelve-lead electrocardiogram after the event showed no changes compared to her baseline.  She is currently  asymptomatic.    Review of Systems   Review of Systems   Constitution: Negative for chills, diaphoresis, fever, malaise/fatigue, weight gain and weight loss.   HENT: Negative for ear discharge, hearing loss, hoarse voice and nosebleeds.    Eyes: Negative for discharge, double vision, pain and photophobia.   Cardiovascular: Negative for chest pain, claudication, cyanosis, dyspnea on exertion, irregular heartbeat, leg swelling, near-syncope, orthopnea, palpitations, paroxysmal nocturnal dyspnea and syncope.   Respiratory: Negative for cough, hemoptysis, shortness of breath, sputum production and wheezing.    Endocrine: Negative for cold intolerance, heat intolerance, polydipsia, polyphagia and polyuria.   Hematologic/Lymphatic: Negative for adenopathy and bleeding problem. Does not bruise/bleed easily.   Skin: Negative for color change, flushing, itching and rash.   Musculoskeletal: Negative for muscle cramps, muscle weakness, myalgias and stiffness.   Gastrointestinal: Negative for abdominal pain, diarrhea, hematemesis, hematochezia, nausea and vomiting.   Genitourinary: Negative for dysuria, frequency and nocturia.   Neurological: Negative for dizziness, focal weakness, light-headedness, loss of balance, numbness, paresthesias and seizures.   Psychiatric/Behavioral: Negative for altered mental status, hallucinations and suicidal ideas.   Allergic/Immunologic: Negative for HIV exposure, hives and persistent infections.       History  Past Medical History:   Diagnosis Date   • Body piercing     EARS   • Bursitis     trochanteric area   • Colonoscopy refused 2015   • Constipation    • Deaf, left    • Deafness     unspecified   • Diabetes mellitus (CMS/[facility])     PATIENT REPORTS SHE HAS TAKEN METFORMIN IN THE PAST BUT WAS TAKEN OFF OF THIS MEDICATION AROUND OCTOBER 2018.     •     • Essential hypertension    • GERD (gastroesophageal reflux disease)    • Hearing loss, right     PATIENT DOES USE A HEARING AID TO RIGHT SIDE  "INTERMITTENTLY (REPORTS DEAFNESS IN LEFT EAR)   • Hip pain    • History of UTI     PATIENT REPORTS RESOLVED AFTER HORMONE THERAPY WAS ORDERED   • Hyperlipidemia     other   • Impaired functional mobility, balance, gait, and endurance    • Influenza vaccine administered 2014   • Macular degeneration    • Mammogram declined 2015   • Osteoarthritis of left hip    • Primary osteoarthritis of right knee    • Supraventricular premature beats     REPORTED IN PREADMISSION TESTING VISIT \"IRREGULAR HEART BEAT\".  REPORTS UNSURE THE ACTUAL VERBIAGE OF WHAT SHE WAS TOLD OTHER THAN THIS.    • Wears partial dentures     REPORTS UPPER AND LOWER PARTIALS. INSTRUCTED NO ADHESIVES THE DOS.   ,   Past Surgical History:   Procedure Laterality Date   • BUNIONECTOMY Right 1/21/2019    Procedure: Right foot bunionectomy with exostectomy, proximal phalanx osteotomy, right foot second and third digit hammertoe correction with interphalangeal joint fusion, right second metatarsophalangeal joint capsular release/capsulotomy;  Surgeon: Klaus Cason DPM;  Location: [facility];  Service: Podiatry   • CATARACT EXTRACTION Bilateral    • CHOLECYSTECTOMY  2002   • COLONOSCOPY     • HERNIA REPAIR  2005    umbilical   • HYSTERECTOMY  1974   • TONSILLECTOMY     • TOTAL HIP ARTHROPLASTY Left 1/23/2018    Procedure: LEFT TOTAL HIP ARTHROPLASTY;  Surgeon: Gideon Son MD;  [facility];  Service:    ,   Family History   Problem Relation Age of Onset   • Hypertension Other    • Diabetes Other         type 2   • Stroke Mother    • Diabetes Mother    • Hypertension Mother    • Cancer Father    ,   Social History     Tobacco Use   • Smoking status: Never Smoker   • Smokeless tobacco: Never Used   Substance Use Topics   • Alcohol use: No   • Drug use: No   ,   Medications Prior to Admission   Medication Sig Dispense Refill Last Dose   • acetaminophen (TYLENOL) 650 MG 8 hr tablet Take 650 mg by mouth 2 (Two) Times a Day.   Past Month at Unknown " "time   • aspirin 81 MG chewable tablet Chew 1 tablet Every Morning. Resume in 1 month   12/29/2019 at Unknown time   • atorvastatin (LIPITOR) 10 MG tablet Take 1 tablet by mouth Daily. 90 tablet 3 12/29/2019 at Unknown time   • docusate sodium 100 MG capsule Take 1 capsule by mouth 2 (Two) Times a Day As Needed for Constipation. (Patient taking differently: Take 1 capsule by mouth 2 (Two) Times a Day As Needed (CONSTIPATION).) 60 capsule 0 12/29/2019 at Unknown time   • estradiol (ESTRACE) 0.1 MG/GM vaginal cream 3 (Three) Times a Week. Apply using finger technique  6 Past Week at Unknown time   • INTRAROSA 6.5 MG insert Insert 1 suppository into the vagina Every Night.  6 12/29/2019 at Unknown time   • lisinopril-hydrochlorothiazide (PRINZIDE,ZESTORETIC) 20-12.5 MG per tablet Take 1 tablet by mouth Daily. 90 tablet 3 12/29/2019 at Unknown time   • Multiple Vitamins-Minerals (OCUVITE ADULT 50+) capsule Take 1 tablet by mouth Daily.   12/29/2019 at Unknown time   • furosemide (LASIX) 20 MG tablet Take 1 tablet by mouth Daily. (Patient not taking: Reported on 12/31/2019) 10 tablet 0 Not Taking at Unknown time   • HYDROcodone-acetaminophen (NORCO) 7.5-325 MG per tablet Take 1-2 tablets by mouth Every 6 (Six) Hours As Needed for Moderate Pain (Patient not taking: Reported on 12/31/2019) 30 tablet 0 More than a month at Unknown time    and Allergies:  Patient has no known allergies.    Objective:    Vital Sign Min/Max for last 24 hours  Temp  Min: 98.1 °F (36.7 °C)  Max: 98.6 °F (37 °C)   BP  Min: 101/54  Max: 120/50   Pulse  Min: 82  Max: 112   Resp  Min: 15  Max: 18   SpO2  Min: 97 %  Max: 100 %   Flow (L/min)  Min: 2  Max: 2   No data recorded     Flowsheet Rows      First Filed Value   Admission Height  67 cm (26.38\") Documented at 12/30/2019 0401   Admission Weight  84.2 kg (185 lb 11.2 oz) Documented at 12/30/2019 0451             Physical Exam:   Physical Exam   Constitutional: She is oriented to person, place, " and time. She appears well-developed and well-nourished. No distress.   HENT:   Head: Normocephalic and atraumatic.   Mouth/Throat: Oropharynx is clear and moist.   Eyes: Pupils are equal, round, and reactive to light. Conjunctivae and EOM are normal. No scleral icterus.   Neck: Normal range of motion. Neck supple. No JVD present. No tracheal deviation present. No thyromegaly present.   Cardiovascular: Normal rate, regular rhythm, S1 normal, S2 normal, intact distal pulses and normal pulses. PMI is not displaced. Exam reveals no gallop and no friction rub.   Murmur heard.   Low-pitched blowing crescendo-decrescendo early systolic murmur is present with a grade of 2/6 at the upper left sternal border.  Pulmonary/Chest: Effort normal and breath sounds normal. No respiratory distress. She has no wheezes. She has no rales.   Abdominal: Soft. Bowel sounds are normal. She exhibits no distension and no mass. There is no tenderness. There is no rebound and no guarding.   Musculoskeletal: Normal range of motion. She exhibits no edema or deformity.   Neurological: She is alert and oriented to person, place, and time. She displays normal reflexes. No cranial nerve deficit. Coordination normal.   Skin: Skin is warm and dry. No rash noted. She is not diaphoretic. No erythema.   Psychiatric: She has a normal mood and affect. Her behavior is normal. Thought content normal.       Results Review:   I reviewed the patient's new clinical results.  Results from last 7 days   Lab Units 01/02/20  0545 01/02/20  0206 01/01/20  1803 01/01/20  1052 12/31/19  0708   WBC 10*3/mm3 9.57  --   --  10.38 8.10   HEMOGLOBIN g/dL 7.6* 7.3* 8.7* 8.6* 10.1*   HEMATOCRIT % 24.1* 22.0* 27.1* 26.6* 30.5*   PLATELETS 10*3/mm3 166  --   --  194 196     Results from last 7 days   Lab Units 01/02/20  0545 12/31/19  0603 12/30/19  0424   SODIUM mmol/L 135* 137 133*   POTASSIUM mmol/L 3.5 4.4 4.3   CHLORIDE mmol/L 100 99 95*   CO2 mmol/L 25.2 25.9 26.8   BUN  mg/dL 18 12 13   CREATININE mg/dL 0.57 0.51* 0.52*   GLUCOSE mg/dL 162* 134* 179*   CALCIUM mg/dL 7.7* 8.1* 8.7               Assessment/Plan:      Essential hypertension    Diabetes type 2, controlled (CMS/[facility])    Primary osteoarthritis of left hip    Hip fracture (CMS/[facility])    Syncope.  The situation in which her loss of consciousness occurred is suspicious for a vagal event.  This may have also had a postural hypotension component and may be related to her severe postoperative anemia.    I have recommended an echocardiogram.  On hospital discharge the patient should report to our office for placement of a 30-day outpatient [facility].  I agree with plans for transfusion.  The patient has been counseled to increase her fluid intake.  The patient has been educated regarding self protection measures as well as counter pressure maneuvers.  No changes in her medication therapy have been made at today's visit.  Further recommendations will be predicated on the results of her echocardiogram.    I discussed the patients findings and my recommendations with patient, nursing staff and consulting provider    Dwight Lassiter MD  01/02/20  11:31 AM

## 2020-01-02 NOTE — NURSING NOTE
Pt up to BSC and went unresponsive when being assisted to stand.  Pupils fixed, pt not responding to sternal rub-RR called. Multiple staff members lifted pt to bed and pt moaned out in pain. After getting her in bed she began asking if she was able to urinate. VSS FSBG WNL.  Pt alert and oriented at this point. Dr. Gorman at bedside and ordered a NS bolus 500ml and H/H. Pt stable at this time with bolus infusing

## 2020-01-02 NOTE — PLAN OF CARE
Problem: Patient Care Overview  Goal: Plan of Care Review  Outcome: Ongoing (interventions implemented as appropriate)  Flowsheets (Taken 1/2/2020 2343)  Progress: improving  Plan of Care Reviewed With: patient; daughter  Outcome Summary: One unit blood given, pt tolerated well. Able to get up to bedside commode and chair without complications. Continue to monitor dressing and BP.

## 2020-01-02 NOTE — PLAN OF CARE
Problem: Patient Care Overview  Goal: Plan of Care Review  Outcome: Ongoing (interventions implemented as appropriate)  Flowsheets (Taken 1/2/2020 0310)  Progress: improving  Plan of Care Reviewed With: patient  Outcome Summary: VSS; pt had episode of unresponsiveness while on BSC-got pt back to bed and she became alert and oriented-MD aware see note; continue PT

## 2020-01-03 ENCOUNTER — HOSPITAL ENCOUNTER (INPATIENT)
Facility: HOSPITAL | Age: 85
LOS: 7 days | Discharge: HOME | DRG: 560 | End: 2020-01-10
Attending: INTERNAL MEDICINE | Admitting: INTERNAL MEDICINE
Payer: MEDICARE

## 2020-01-03 VITALS
BODY MASS INDEX: 29.84 KG/M2 | SYSTOLIC BLOOD PRESSURE: 110 MMHG | WEIGHT: 185.7 LBS | DIASTOLIC BLOOD PRESSURE: 56 MMHG | OXYGEN SATURATION: 92 % | HEIGHT: 66 IN | HEART RATE: 104 BPM | TEMPERATURE: 98.2 F | RESPIRATION RATE: 16 BRPM

## 2020-01-03 DIAGNOSIS — R55 SYNCOPE, UNSPECIFIED SYNCOPE TYPE: Primary | ICD-10-CM

## 2020-01-03 PROBLEM — R53.81 DECLINING FUNCTIONAL STATUS: Status: ACTIVE | Noted: 2020-01-03

## 2020-01-03 LAB
ABO + RH BLD: NORMAL
ABO + RH BLD: NORMAL
ANION GAP SERPL CALCULATED.3IONS-SCNC: 9.4 MMOL/L (ref 5–15)
BH BB BLOOD EXPIRATION DATE: NORMAL
BH BB BLOOD EXPIRATION DATE: NORMAL
BH BB BLOOD TYPE BARCODE: 5100
BH BB BLOOD TYPE BARCODE: 5100
BH BB DISPENSE STATUS: NORMAL
BH BB DISPENSE STATUS: NORMAL
BH BB PRODUCT CODE: NORMAL
BH BB PRODUCT CODE: NORMAL
BH BB UNIT NUMBER: NORMAL
BH BB UNIT NUMBER: NORMAL
BUN BLD-MCNC: 14 MG/DL (ref 8–23)
BUN/CREAT SERPL: 29.8 (ref 7–25)
CALCIUM SPEC-SCNC: 7.8 MG/DL (ref 8.6–10.5)
CHLORIDE SERPL-SCNC: 99 MMOL/L (ref 98–107)
CO2 SERPL-SCNC: 27.6 MMOL/L (ref 22–29)
CREAT BLD-MCNC: 0.47 MG/DL (ref 0.57–1)
CROSSMATCH INTERPRETATION: NORMAL
CROSSMATCH INTERPRETATION: NORMAL
GFR SERPL CREATININE-BSD FRML MDRD: 126 ML/MIN/1.73
GLUCOSE BLD-MCNC: 140 MG/DL (ref 65–99)
GLUCOSE BLD-MCNC: 219 MG/DL (ref 74–106)
GLUCOSE BLDC GLUCOMTR-MCNC: 171 MG/DL (ref 70–130)
GLUCOSE BLDC GLUCOMTR-MCNC: 182 MG/DL (ref 70–130)
HCT VFR BLD AUTO: 27.7 % (ref 34–46.6)
HGB BLD-MCNC: 9.2 G/DL (ref 12–15.9)
PERFORMED ON: ABNORMAL
POTASSIUM BLD-SCNC: 3.7 MMOL/L (ref 3.5–5.2)
SODIUM BLD-SCNC: 136 MMOL/L (ref 136–145)
UNIT  ABO: NORMAL
UNIT  ABO: NORMAL
UNIT  RH: NORMAL
UNIT  RH: NORMAL

## 2020-01-03 PROCEDURE — 97110 THERAPEUTIC EXERCISES: CPT

## 2020-01-03 PROCEDURE — 97535 SELF CARE MNGMENT TRAINING: CPT

## 2020-01-03 PROCEDURE — 99238 HOSP IP/OBS DSCHRG MGMT 30/<: CPT | Performed by: INTERNAL MEDICINE

## 2020-01-03 PROCEDURE — 1200000002 HC SEMI PRIVATE SWING BED

## 2020-01-03 PROCEDURE — 82962 GLUCOSE BLOOD TEST: CPT

## 2020-01-03 PROCEDURE — 6360000002 HC RX W HCPCS: Performed by: NURSE PRACTITIONER

## 2020-01-03 PROCEDURE — 6370000000 HC RX 637 (ALT 250 FOR IP): Performed by: NURSE PRACTITIONER

## 2020-01-03 PROCEDURE — 97116 GAIT TRAINING THERAPY: CPT

## 2020-01-03 PROCEDURE — 85014 HEMATOCRIT: CPT | Performed by: INTERNAL MEDICINE

## 2020-01-03 PROCEDURE — 80048 BASIC METABOLIC PNL TOTAL CA: CPT | Performed by: INTERNAL MEDICINE

## 2020-01-03 PROCEDURE — 85018 HEMOGLOBIN: CPT | Performed by: INTERNAL MEDICINE

## 2020-01-03 RX ORDER — DEXTROSE MONOHYDRATE 25 G/50ML
12.5 INJECTION, SOLUTION INTRAVENOUS PRN
Status: DISCONTINUED | OUTPATIENT
Start: 2020-01-03 | End: 2020-01-10 | Stop reason: HOSPADM

## 2020-01-03 RX ORDER — ACETAMINOPHEN 325 MG/1
650 TABLET ORAL 2 TIMES DAILY PRN
Status: DISCONTINUED | OUTPATIENT
Start: 2020-01-03 | End: 2020-01-10 | Stop reason: HOSPADM

## 2020-01-03 RX ORDER — SIMETHICONE 80 MG
80 TABLET,CHEWABLE ORAL EVERY 6 HOURS PRN
Status: DISCONTINUED | OUTPATIENT
Start: 2020-01-03 | End: 2020-01-10 | Stop reason: HOSPADM

## 2020-01-03 RX ORDER — NICOTINE POLACRILEX 4 MG
15 LOZENGE BUCCAL PRN
Status: DISCONTINUED | OUTPATIENT
Start: 2020-01-03 | End: 2020-01-10 | Stop reason: HOSPADM

## 2020-01-03 RX ORDER — HYDROCODONE BITARTRATE AND ACETAMINOPHEN 5; 325 MG/1; MG/1
1 TABLET ORAL EVERY 6 HOURS PRN
Status: DISCONTINUED | OUTPATIENT
Start: 2020-01-03 | End: 2020-01-10 | Stop reason: HOSPADM

## 2020-01-03 RX ORDER — BISACODYL 10 MG
10 SUPPOSITORY, RECTAL RECTAL DAILY PRN
Status: DISCONTINUED | OUTPATIENT
Start: 2020-01-03 | End: 2020-01-10 | Stop reason: HOSPADM

## 2020-01-03 RX ORDER — DOCUSATE SODIUM 100 MG/1
100 CAPSULE, LIQUID FILLED ORAL 2 TIMES DAILY PRN
Status: DISCONTINUED | OUTPATIENT
Start: 2020-01-03 | End: 2020-01-04

## 2020-01-03 RX ORDER — ATORVASTATIN CALCIUM 10 MG/1
10 TABLET, FILM COATED ORAL DAILY
Status: DISCONTINUED | OUTPATIENT
Start: 2020-01-03 | End: 2020-01-10 | Stop reason: HOSPADM

## 2020-01-03 RX ORDER — HYDROCODONE BITARTRATE AND ACETAMINOPHEN 5; 325 MG/1; MG/1
1 TABLET ORAL EVERY 6 HOURS PRN
Qty: 12 TABLET | Refills: 0 | Status: SHIPPED | OUTPATIENT
Start: 2020-01-03 | End: 2020-01-06

## 2020-01-03 RX ORDER — DEXTROSE MONOHYDRATE 50 MG/ML
100 INJECTION, SOLUTION INTRAVENOUS PRN
Status: DISCONTINUED | OUTPATIENT
Start: 2020-01-03 | End: 2020-01-10 | Stop reason: HOSPADM

## 2020-01-03 RX ORDER — LISINOPRIL AND HYDROCHLOROTHIAZIDE 20; 12.5 MG/1; MG/1
1 TABLET ORAL DAILY
Status: DISCONTINUED | OUTPATIENT
Start: 2020-01-04 | End: 2020-01-10 | Stop reason: HOSPADM

## 2020-01-03 RX ORDER — BISACODYL 10 MG
10 SUPPOSITORY, RECTAL RECTAL DAILY PRN
Start: 2020-01-03 | End: 2020-01-22

## 2020-01-03 RX ADMIN — DOCUSATE SODIUM 100 MG: 100 CAPSULE, LIQUID FILLED ORAL at 10:23

## 2020-01-03 RX ADMIN — ATORVASTATIN CALCIUM 10 MG: 10 TABLET, FILM COATED ORAL at 20:41

## 2020-01-03 RX ADMIN — HYDROCODONE BITARTRATE AND ACETAMINOPHEN 1 TABLET: 5; 325 TABLET ORAL at 10:22

## 2020-01-03 RX ADMIN — SIMETHICONE CHEW TAB 80 MG 80 MG: 80 TABLET ORAL at 18:59

## 2020-01-03 RX ADMIN — HYDROCODONE BITARTRATE AND ACETAMINOPHEN 1 TABLET: 5; 325 TABLET ORAL at 15:25

## 2020-01-03 RX ADMIN — ENOXAPARIN SODIUM 40 MG: 40 INJECTION SUBCUTANEOUS at 20:41

## 2020-01-03 RX ADMIN — HYDROCODONE BITARTRATE AND ACETAMINOPHEN 1 TABLET: 5; 325 TABLET ORAL at 04:07

## 2020-01-03 RX ADMIN — ACETAMINOPHEN 650 MG: 325 TABLET ORAL at 16:50

## 2020-01-03 RX ADMIN — LISINOPRIL 20 MG: 20 TABLET ORAL at 10:22

## 2020-01-03 RX ADMIN — HYDROCODONE BITARTRATE AND ACETAMINOPHEN 1 TABLET: 5; 325 TABLET ORAL at 21:42

## 2020-01-03 RX ADMIN — ACETAMINOPHEN 650 MG: 325 TABLET ORAL at 12:13

## 2020-01-03 RX ADMIN — INSULIN LISPRO 1 UNITS: 100 INJECTION, SOLUTION INTRAVENOUS; SUBCUTANEOUS at 20:42

## 2020-01-03 ASSESSMENT — PAIN SCALES - GENERAL: PAINLEVEL_OUTOF10: 9

## 2020-01-03 NOTE — PROGRESS NOTES
Case Management Discharge Note      Final Note: Transferred to [facility]          Destination - Selection Complete      Service Provider Request Status Selected Services Address Phone Number Fax Number    [facility] BED UNIT Selected Skilled Nursing [address] [place] [address] [phone]      Durable Medical Equipment      No service has been selected for the patient.      Dialysis/Infusion      No service has been selected for the patient.      Home Medical Care      No service has been selected for the patient.      Therapy      No service has been selected for the patient.      Community Resources      No service has been selected for the patient.        Transportation Services  Ambulance: [facility]    Final Discharge Disposition Code: 61 - hospital-based swing bed

## 2020-01-03 NOTE — DISCHARGE SUMMARY
[facility]   DISCHARGE SUMMARY      Name:  Bri Iniguez   Age:  84 y.o.  Sex:  female  :  1935  MRN:  [id number]   Visit Number:  [id number]    Admission Date:  2019  Date of Discharge:  1/3/2020  Primary Care Physician:  Clifford Nolasco MD    Important issues to note:  You will need to follow-up with Dr. Lassiter after discharge from short-term rehab to obtain a cardiac monitor.  He will also need to follow-up with Dr. Barba within 2 weeks of discharge for wound check and staple removal.    Discharge Diagnoses:   1.  Right intertrochanteric hip fracture secondary to mechanical fall, status post ORIF on 19 by Dr. Barba  2.  Syncopal episode in the morning of 2020, likely vasovagal  3.  Acute blood loss anemia, postoperatively, status post 1 unit of packed red blood cells  4.  Chronic essential hypertension, stable  5.  Hyperlipidemia  6.  Borderline type 2 diabetes mellitus, blood sugar stable  7.  Short runs of SVT noted on telemetry, will need to follow up with Dr. Lassiter for 30-day monitor after discharge from short term rehab    Problem List:       Essential hypertension    Diabetes type 2, controlled (CMS/[facility])    Primary osteoarthritis of left hip    Hip fracture (CMS/[facility])      Presenting Problem:    Hip fx (CMS/[facility]) [S72.009A]  Hip fracture (CMS/[facility]) [S72.009A]     Consults:     Consults     Date and Time Order Name Status Description    2020 0729 Inpatient Cardiology Consult Completed     2019 0402 Inpatient Orthopedic Surgery Consult Completed         Consulting Physician(s)     Provider Relationship Specialty    Dwight Lassiter MD Consulting Physician Cardiology    Tristan Lsat MD Consulting Physician Orthopedic Surgery          Procedures Performed:    Procedure(s):  HIP RIGHT OPEN REDUCTION INTERNAL FIXATION, INTERMEDULLARY  NAIL       History of presenting illness/Hospital Course:  Patient is an 84-year-old female with  medical history of hypertension and hyperlipidemia who was transferred here from an outlying facility after sustaining a mechanical fall resulting in a right hip fracture.  Patient apparently was going down to her basement and was not using her cane which she usually does and lost her footing landing on her right hip.  She did not lose consciousness or hit her head.  X-ray of the right hip showed closed intertrochanteric fracture and therefore she was transferred to our facility for orthopedic evaluation.  Patient underwent ORIF with intramedullary nail of the right hip by Dr. Barba.  She tolerated the procedure without any complications.  On the early morning of 1/2/2019 patient had an episode of unresponsiveness for which a rapid response was called.  Patient was noted to be frequently anemic during work-up with a hemoglobin of 7.3.  She received 1 unit of packed red blood cell with improvement in her hemoglobin and hematocrit.  This was trended and remained stable.  Patient had no evidence of acute bleed with the exception of minimal drainage from her surgery site.  Patient was not on telemetry at the time but was later placed on telemetry.  I did review of her 24-hour telemetry which shows very short runs of SVT.  This was discussed with Dr. Lassiter who evaluated her after her syncopal episode.  He felt that the patient's volume depletion and acute blood loss anemia was likely contributing to the vasovagal syncope and recommended that the patient follow-up with him after she is discharged from short-term rehab to obtain a 30-day cardiac monitor.  He did not recommend any further intervention at this time.    At this time, patient reports no acute complaints.  Of her pain is better controlled.  Denies any shortness of breath, chest pain or palpitations.  Denies any bright red blood per rectum or melena.  Her last bowel movement was 2 days ago but felt that she needed to have another bowel movement during my  encounter.  She is currently hemodynamically stable and will be [facility]  She will be continued on Lovenox 40 mg subcu for another 2 weeks.  She will need to follow-up with Dr. Barba within 2 weeks of discharge and her primary care physician within 1 week of discharge.  She will need to see Dr. Lassiter once she is discharged from short-term rehab.      Vital Signs:    Temp:  [97.8 °F (36.6 °C)-98.5 °F (36.9 °C)] 98.2 °F (36.8 °C)  Heart Rate:  [] 104  Resp:  [16-19] 16  BP: (110-150)/(49-68) 110/56    Physical Exam:    General Appearance:  Alert and cooperative, not in any acute distress.   Head:  Atraumatic and normocephalic, without obvious abnormality.   Eyes:          PERRLA, conjunctivae and sclerae normal, no Icterus. No pallor. Extraocular movements are within normal limits.   Ears:  Ears appear intact with no abnormalities noted.   Throat: No oral lesions, no thrush, oral mucosa moist.   Neck: Supple, trachea midline, no thyromegaly, no carotid bruit.   Back:   No kyphoscoliosis present. No tenderness to palpation,   range of motion normal.   Lungs:   Chest shape is normal. Breath sounds heard bilaterally equally.  No crackles or wheezing. No Pleural rub or bronchial breathing.   Heart:  Normal S1 and S2, no murmur, no gallop, no rub. No JVD.   Abdomen:   Normal bowel sounds, no masses, no organomegaly. Soft, non-tender, non-distended, no guarding, no rebound tenderness.   Extremities: Moves all extremities well, swelling in the right thigh with dressing overlying surgical scar with minimal bleeding, no cyanosis, no clubbing.   Pulses: Pulses palpable and equal bilaterally.   Skin: No bleeding, bruising or rash.   Lymph nodes: No palpable adenopathy.   Neurologic: Alert and oriented x 3. Moves all four limbs equally. No tremors. No facial asymmetry.     Pertinent Lab Results:     Results from last 7 days   Lab Units 01/03/20  0830 01/02/20  9099  12/31/19  0603   SODIUM mmol/L 136 135* 137   POTASSIUM mmol/L 3.7 3.5 4.4   CHLORIDE mmol/L 99 100 99   CO2 mmol/L 27.6 25.2 25.9   BUN mg/dL 14 18 12   CREATININE mg/dL 0.47* 0.57 0.51*   CALCIUM mg/dL 7.8* 7.7* 8.1*   BILIRUBIN mg/dL  --   --  0.5   ALK PHOS U/L  --   --  63   ALT (SGPT) U/L  --   --  13   AST (SGOT) U/L  --   --  20   GLUCOSE mg/dL 140* 162* 134*     Results from last 7 days   Lab Units 01/03/20  0830 01/02/20  1829 01/02/20  0545  01/01/20  1052 12/31/19  0708   WBC 10*3/mm3  --   --  9.57  --  10.38 8.10   HEMOGLOBIN g/dL 9.2* 9.4* 7.6*   < > 8.6* 10.1*   HEMATOCRIT % 27.7* 29.1* 24.1*   < > 26.6* 30.5*   PLATELETS 10*3/mm3  --   --  166  --  194 196    < > = values in this interval not displayed.     Results from last 7 days   Lab Units 12/31/19  0603   INR  1.06                               Invalid input(s): USDES,  BLOODU, NITRITITE, BACT, EP  Pain Management Panel     Pain Management Panel 8/18/2016    CREATININE               Pertinent Radiology Results:    Imaging Results (All)     Procedure Component Value Units Date/Time    FL C Arm During Surgery [[id number]] Resulted:  12/31/19 1910     Updated:  12/31/19 1910    Narrative:       This procedure was auto-finalized with no dictation required.          Condition on Discharge:      Stable.    Code status during the hospital stay:    Code Status and Medical Interventions:   Ordered at: 01/02/20 1444     Level Of Support Discussed With:    Patient     Code Status:    CPR     Medical Interventions (Level of Support Prior to Arrest):    Full       Discharge Disposition:    Rehab Facility or Unit (DC - External)    Discharge Medications:       Discharge Medications      New Medications      Instructions Start Date   bisacodyl 10 MG suppository  Commonly known as:  DULCOLAX   10 mg, Rectal, Daily PRN      enoxaparin 40 MG/0.4ML solution syringe  Commonly known as:  LOVENOX   40 mg, Subcutaneous, Every 24 Hours       HYDROcodone-acetaminophen 5-325 MG per tablet  Commonly known as:  NORCO  Replaces:  HYDROcodone-acetaminophen 7.5-325 MG per tablet   1 tablet, Oral, Every 6 Hours PRN         Changes to Medications      Instructions Start Date   STOOL SOFTENER 100 MG capsule  Generic drug:  docusate sodium  What changed:  reasons to take this   1 capsule, Oral, 2 Times Daily PRN         Continue These Medications      Instructions Start Date   acetaminophen 650 MG 8 hr tablet  Commonly known as:  TYLENOL   650 mg, Oral, 2 Times Daily - RT      aspirin 81 MG chewable tablet   81 mg, Oral, Every Morning, Resume in 1 month      atorvastatin 10 MG tablet  Commonly known as:  LIPITOR   10 mg, Oral, Daily      estradiol 0.1 MG/GM vaginal cream  Commonly known as:  ESTRACE   3 Times Weekly, Apply using finger technique      INTRAROSA 6.5 MG insert  Generic drug:  Prasterone   1 suppository, Vaginal, Nightly      lisinopril-hydrochlorothiazide 20-12.5 MG per tablet  Commonly known as:  PRINZIDE,ZESTORETIC   1 tablet, Oral, Daily      OCUVITE ADULT 50+ capsule   1 tablet, Oral, Daily         Stop These Medications    furosemide 20 MG tablet  Commonly known as:  LASIX     HYDROcodone-acetaminophen 7.5-325 MG per tablet  Commonly known as:  NORCO  Replaced by:  HYDROcodone-acetaminophen 5-325 MG per tablet            Discharge Diet:     Diet Instructions     Diet: Cardiac      Discharge Diet:  Cardiac          Activity at Discharge:     Activity Instructions     Activity as Tolerated            Follow-up Appointments:    Additional Instructions for the Follow-ups that You Need to Schedule     Discharge Follow-up with PCP   As directed       Currently Documented PCP:    Clifford Nolasco MD    PCP Phone Number:    [phone]     Follow Up Details:  1 week         Discharge Follow-up with Specified Provider: Dr. Barba; 2 Weeks   As directed      To:  Dr. Barba    Follow Up:  2 Weeks            Contact information for follow-up  providers     Clifford Nolasco MD .    Specialty:  Family Medicine  Why:  1 week  Contact information:  [address] [place] [address] [address] [id number]  [phone]                   Contact information for after-discharge care     Destination     [facility] SWING BED UNIT .    Service:  Skilled Nursing  Contact information:  [address] [facility] [address] [phone]                             No future appointments.    Additional Instructions for the Follow-ups that You Need to Schedule     Discharge Follow-up with PCP   As directed       Currently Documented PCP:    Clifford Nolasco MD    PCP Phone Number:    [phone]     Follow Up Details:  1 week         Discharge Follow-up with Specified Provider: Dr. Barba; 2 Weeks   As directed      To:  Dr. Barba    Follow Up:  2 Weeks               Test Results Pending at Discharge:           Karlee Keating MD  01/03/20  1:28 PM    Time spent: 25 minutes    Dictated utilizing Dragon dictation.

## 2020-01-03 NOTE — FLOWSHEET NOTE
01/03/20 1838   Assessment   Charting Type Admission   Neurological   Neuro (WDL) WDL   Level of Consciousness 0   Charly Coma Scale   Eye Opening 4   Best Verbal Response 5   Best Motor Response 6   [place] Coma Scale Score 15   NIH/MNHISS Stroke Scale   NIH/MNIHSS Stroke Scale Assessed No   HEENT   HEENT (WDL) X   Right Eye Intact   Left Eye Intact   Voice Normal   Mucous Membrane Moist;Pink; Intact   Teeth Missing teeth   Respiratory   Respiratory (WDL) WDL   Cardiac   Cardiac (WDL) X   Cardiac Regularity Irregular   Cardiac Monitor   Telemetry Monitor On No   Gastrointestinal   Abdominal (WDL) WDL   RUQ Bowel Sounds Active   LUQ Bowel Sounds Active   RLQ Bowel Sounds Active   LLQ Bowel Sounds Active   Abdomen Inspection Rounded   GI Symptoms Flatus   Peripheral Vascular   Peripheral Vascular (WDL) WDL   Edema None   Skin Color/Condition   Skin Color/Condition (WDL) X   Skin Color Pale   Skin Condition/Temp Warm   Skin Integrity   Skin Integrity (WDL) X   Skin Integrity Bruising   Location BUE   Skin Fold Management No   Multiple Skin Integrity Sites Yes   Skin Integrity Site 2   Skin Integrity Location 2 Bruising   Location 2 LLE   Skin Integrity Site 3   Skin Integrity Location 3 Other (Comment)  (sx incision)    Location 3 R hip   Skin Integrity Site 4   Skin Integrity Location 4 Tear   Location 4 bilateral elbows    Preventative Dressing No   Musculoskeletal   Musculoskeletal (WDL) X   RUE Full movement   LUE Full movement   RL Extremity Injury/trauma; Surgery; Swelling   LL Extremity Full movement   Genitourinary   Genitourinary (WDL) WDL   Psychosocial   Psychosocial (WDL) WDL   Patient admitted to 8-1 from [facility]. Patient slipped and fell in water at her home. At [facility] patient had right hip surgery. On arrival dressing is clean, dry, and intact. Patient does have swelling noted to TAM lower extremities. Right greater than left.  Multiple scattered skin tears and bruising related to fall. Patient is A&O x4. Oriented to room and call light. Will continue to monitor.

## 2020-01-03 NOTE — PROGRESS NOTES
Continued Stay Note  [facility]     Patient Name: Bri Iniguez  MRN: [id number]  Today's Date: 1/3/2020    Admit Date: 12/30/2019    Discharge Plan     Row Name 01/03/20 0817       Plan    Plan Comments  pt has been accepted to [facility] when ready for Discharge  Nursing to call report to [phone] fax [phone] Spoke to pt and daughter pt is on oxygen here and is not on oxygen at home She also is comaplinign of pain with movement and wants to go with EMS Called [facility] -[phone] they will transport when ready  Expoinaied to pt and daughter that there is no guarantee of payment for EMS         Discharge Codes    No documentation.       Expected Discharge Date and Time     Expected Discharge Date Expected Discharge Time    Ezio 3, 2020             Noemi Kebede RN

## 2020-01-03 NOTE — PLAN OF CARE
Problem: Patient Care Overview  Goal: Plan of Care Review  Outcome: Ongoing (interventions implemented as appropriate)  Flowsheets (Taken 1/3/2020 8359)  Progress: improving  Plan of Care Reviewed With: patient  Outcome Summary: VSS; no episodes during shift; pain controlled with po pain meds; plan for possible dc to rehab in am

## 2020-01-03 NOTE — PLAN OF CARE
Problem: Patient Care Overview  Goal: Plan of Care Review  Outcome: Ongoing (interventions implemented as appropriate)  Note:   Pt tolerated treatment well ambulating 40' cga with rw  as well as bed mobility.  See flowsheet for details.

## 2020-01-03 NOTE — THERAPY TREATMENT NOTE
"Acute Care - Occupational Therapy Treatment Note  [facility]     Patient Name: Bri Iniguez  : 1935  MRN: [id number]  Today's Date: 1/3/2020  Onset of Illness/Injury or Date of Surgery: 19  Date of Referral to OT: 19(surgery completed late on 19)  Referring Physician: HAWA Camraena    Admit Date: 2019       ICD-10-CM ICD-9-CM   1. Impaired mobility and ADLs Z74.09 799.89     Patient Active Problem List   Diagnosis   • Macular degeneration   • Hyperlipidemia   • Essential hypertension   • Deafness   • Diabetes type 2, controlled (CMS/[facility])   • Primary osteoarthritis of left hip   • Venous insufficiency of both lower extremities   • Status post total replacement of left hip   • Acute blood loss anemia, mild, asymptomatic   • Leukocytosis, mild, likely reactive   • Hip fracture (CMS/[facility])     Past Medical History:   Diagnosis Date   • Body piercing     EARS   • Bursitis     trochanteric area   • Colonoscopy refused    • Constipation    • Deaf, left    • Deafness     unspecified   • Diabetes mellitus (CMS/[facility])     PATIENT REPORTS SHE HAS TAKEN METFORMIN IN THE PAST BUT WAS TAKEN OFF OF THIS MEDICATION AROUND 2018.     • Elevated cholesterol    • Essential hypertension    • GERD (gastroesophageal reflux disease)    • Hearing loss, right     PATIENT DOES USE A HEARING AID TO RIGHT SIDE INTERMITTENTLY (REPORTS DEAFNESS IN LEFT EAR)   • Hip pain    • History of UTI     PATIENT REPORTS RESOLVED AFTER HORMONE THERAPY WAS ORDERED   • Hyperlipidemia     other   • Impaired functional mobility, balance, gait, and endurance    • Influenza vaccine administered    • Macular degeneration    • Mammogram declined    • Osteoarthritis of left hip    • Primary osteoarthritis of right knee    • Supraventricular premature beats     REPORTED IN PREADMISSION TESTING VISIT \"IRREGULAR HEART BEAT\".  REPORTS UNSURE THE ACTUAL VERBIAGE OF WHAT SHE WAS TOLD OTHER THAN THIS.    • Wears " partial dentures     REPORTS UPPER AND LOWER PARTIALS. INSTRUCTED NO ADHESIVES THE DOS.     Past Surgical History:   Procedure Laterality Date   • BUNIONECTOMY Right 1/21/2019    Procedure: Right foot bunionectomy with exostectomy, proximal phalanx osteotomy, right foot second and third digit hammertoe correction with interphalangeal joint fusion, right second metatarsophalangeal joint capsular release/capsulotomy;  Surgeon: Klaus Cason DPM;  Location: [facility];  Service: Podiatry   • CATARACT EXTRACTION Bilateral    • CHOLECYSTECTOMY  2002   • COLONOSCOPY     • HERNIA REPAIR  2005    umbilical   • HYSTERECTOMY  1974   • TONSILLECTOMY     • TOTAL HIP ARTHROPLASTY Left 1/23/2018    Procedure: LEFT TOTAL HIP ARTHROPLASTY;  Surgeon: Gideon Son MD;  Location: [facility];  Service:        Therapy Treatment    Rehabilitation Treatment Summary     Row Name 01/03/20 1100             Treatment Time/Intention    Discipline  occupational therapist  -RM      Document Type  therapy note (daily note)  -RM      Subjective Information  complains of;pain  -RM      Mode of Treatment  occupational therapy  -RM      Patient/Family Observations  Pt sitting up in chair at bedside with dtr in room upon OT entry.  -RM      Care Plan Review  care plan/treatment goals reviewed;patient/other agree to care plan;risks/benefits reviewed  -RM      Care Plan Review, Other Participant(s)  daughter  -RM      Total Minutes, Occupational Therapy Treatment  25  -RM      Patient Effort  excellent  -RM      Comment  Pt report feeling very good today.  -RM      Existing Precautions/Restrictions  fall  -RM      Patient Response to Treatment  Pt very cooperative and pleasant. Pt motivated to improve function.  -RM      Recorded by [RM] Brynn Saez, OT 01/03/20 1221      Row Name 01/03/20 1100             Vital Signs    Pretreatment Heart Rate (beats/min)  108  -RM      Intratreatment Heart Rate (beats/min)  115  -RM      Posttreatment Heart  Rate (beats/min)  98  -RM      Pre SpO2 (%)  97  -RM      O2 Delivery Pre Treatment  supplemental O2  -RM      Intra SpO2 (%)  98  -RM      O2 Delivery Intra Treatment  supplemental O2  -RM      Post SpO2 (%)  97  -RM      O2 Delivery Post Treatment  supplemental O2  -RM      Pre Patient Position  Sitting  -RM      Intra Patient Position  Sitting  -RM      Post Patient Position  Sitting  -RM      Recorded by [RM] Brynn Saez, OT 01/03/20 1221      Row Name 01/03/20 1100             ADL Assessment/Intervention    07701 - OT Self Care/Mgmt Minutes  10  -RM      Recorded by [RM] Brynn Saez, OT 01/03/20 1221      Row Name 01/03/20 1100             Upper Body Dressing Assessment/Training    Upper Body Dressing [place] Level  set up  -RM      Recorded by [RM] Brynn Saez, OT 01/03/20 1221      Row Name 01/03/20 1100             Lower Body Dressing Assessment/Training    Lower Body Dressing [place] Level  doff;don;socks SBA  -RM      Recorded by [RM] Brynn Saez, OT 01/03/20 1221      Row Name 01/03/20 1100             BADL Safety/Performance    Impairments, BADL Safety/Performance  endurance/activity tolerance;pain;strength  -RM      Skilled BADL Treatment/Intervention  adaptive equipment training  -RM      Progress in BADL Status  improvement noted  -RM      Recorded by [RM] Brynn Saez, OT 01/03/20 1221      Row Name 01/03/20 1100             Therapeutic Exercise    75884 - OT Therapeutic Exercise Minutes  15  -RM      Recorded by [RM] Brynn Saez, OT 01/03/20 1221      Row Name 01/03/20 1100             Therapeutic Exercise    Upper Extremity (Therapeutic Exercise)  bicep curl, bilateral;tricep extension, bilateral  -RM      Upper Extremity Range of Motion (Therapeutic Exercise)  shoulder flexion/extension, bilateral;shoulder abduction/adduction, bilateral;shoulder horizontal abduction/adduction, bilateral chest press, bilateral; shld overhead press, bilateral  -RM      Exercise Type  (Therapeutic Exercise)  AROM (active range of motion)  -RM      Position (Therapeutic Exercise)  seated  -RM      Sets/Reps (Therapeutic Exercise)  1x15  -RM      Expected Outcome (Therapeutic Exercise)  improve functional tolerance, self-care activity;improve performance, BADLs  -RM      Recorded by [RM] Brynn Saez, OT 01/03/20 1221      Row Name 01/03/20 1100             Static Sitting Balance    Level of [place] (Unsupported Sitting, Static Balance)  independent  -RM      Sitting Position (Unsupported Sitting, Static Balance)  sitting in chair  -RM      Recorded by [RM] Brynn Saez, OT 01/03/20 1221      Row Name 01/03/20 1100             Dynamic Sitting Balance    Level of [place], Reaches Outside Midline (Sitting, Dynamic Balance)  independent  -RM      Sitting Position, Reaches Outside Midline (Sitting, Dynamic Balance)  sitting in chair  -RM      Recorded by [RM] Brynn Saez, OT 01/03/20 1221      Row Name 01/03/20 1100             Positioning and Restraints    Pre-Treatment Position  sitting in chair/recliner  -RM      Post Treatment Position  chair  -RM      In Chair  reclined;call light within reach;with family/caregiver  -RM      Recorded by [RM] Brynn Saez, OT 01/03/20 1221      Row Name 01/03/20 1100             Pain Scale: Numbers Pre/Post-Treatment    Pain Scale: Numbers, Pretreatment  6/10  -RM      Pain Scale: Numbers, Post-Treatment  5/10  -RM      Recorded by [RM] Brynn Saez, OT 01/03/20 1221      Row Name                Wound 12/30/19 0800 Left posterior elbow Skin Tear    Wound - Properties Group Date first assessed: 12/30/19 [SP] Time first assessed: 0800 [SP] Side: Left [SP] Orientation: posterior [SP] Location: elbow [SP] Primary Wound Type: Skin tear [SP] Recorded by:  [SP] Alfonzo Colon RN 12/30/19 0948    Row Name                Wound 12/30/19 0800 Right posterior elbow Skin Tear    Wound - Properties Group Date first assessed: 12/30/19 [SP] Time first  assessed: 0800 [SP] Side: Right [SP] Orientation: posterior [SP] Location: elbow [SP] Primary Wound Type: Skin tear [SP] Recorded by:  [SP] Alfonzo Colon RN 12/30/19 0949    Row Name                Wound 12/31/19 Right anterior greater trochanter Incision    Wound - Properties Group Date first assessed: 12/31/19 [JS] Present on Hospital Admission: N [JS] Side: Right [JS] Orientation: anterior [JS] Location: greater trochanter [JS] Primary Wound Type: Incision [JS] Recorded by:  [JS] Darline Collins RN 12/31/19 1809    Row Name 01/03/20 1100             Coping    Observed Emotional State  calm;cooperative;happy  -RM      Verbalized Emotional State  acceptance  -RM      Recorded by [RM] Brynn Saez, OT 01/03/20 1221      Row Name 01/03/20 1100             Plan of Care Review    Plan of Care Reviewed With  patient;daughter  -RM      Progress  improving  -RM      Outcome Summary  Pt up in bedside upon OT entry. Just completed PT. R hip pain 6/10. Completed HEP 1x15 with demo and very short rest breaks.  Educated per AE with pt report that she has kit at home. Reviewed use of reacher to doff sock and pt doffed with reacher s/u and donned B socks with sock aide ind after set up. Pain 5/10; pulse 98 and O2 97% upon completion.  -RM      Recorded by [RM] Brynn Saez, OT 01/03/20 1221      Row Name 01/03/20 1100             Outcome Summary/Treatment Plan (OT)    Daily Summary of Progress (OT)  progress toward functional goals is good  -RM      Anticipated Discharge Disposition (OT)  inpatient rehabilitation facility  -RM      Recorded by [RM] Brynn Saez, OT 01/03/20 1221        User Key  (r) = Recorded By, (t) = Taken By, (c) = Cosigned By    Initials Name Effective Dates Discipline    Darline Fletcher RN 07/14/16 -  Nurse    Alfnozo Jacob RN 10/26/16 -  Nurse    Brynn Zheng, OT 11/05/19 -  OT        Wound 12/30/19 0800 Left posterior elbow Skin Tear (Active)   Dressing Appearance dry;intact 1/2/2020   8:00 PM   Closure JAYASHREE 1/2/2020  8:00 PM   Base dressing in place, unable to visualize 1/2/2020  8:00 PM   Drainage Characteristics/Odor sanguineous 1/2/2020  4:00 PM   Drainage Amount scant 1/2/2020  4:00 PM       Wound 12/30/19 0800 Right posterior elbow Skin Tear (Active)   Dressing Appearance dry;intact 1/2/2020  8:00 PM   Closure JAYASHREE 1/2/2020  8:00 PM   Base dressing in place, unable to visualize 1/2/2020  8:00 PM       Wound 12/31/19 Right anterior greater trochanter Incision (Active)   Dressing Appearance dry;intact 1/2/2020  8:00 PM   Closure JAYASHREE 1/2/2020  8:00 PM   Base dressing in place, unable to visualize 1/2/2020  8:00 PM   Drainage Amount small 1/2/2020  4:00 PM           OT Recommendation and Plan  Outcome Summary/Treatment Plan (OT)  Daily Summary of Progress (OT): progress toward functional goals is good  Anticipated Discharge Disposition (OT): inpatient rehabilitation facility  Daily Summary of Progress (OT): progress toward functional goals is good  Plan of Care Review  Plan of Care Reviewed With: patient, daughter  Plan of Care Reviewed With: patient, daughter  Outcome Summary: Pt up in bedside upon OT entry. Just completed PT. R hip pain 6/10. Completed HEP 1x15 with demo and very short rest breaks.  Educated per AE with pt report that she has kit at home. Reviewed use of reacher to doff sock and pt doffed with reacher s/u and donned B socks with sock aide ind after set up. Pain 5/10; pulse 98 and O2 97% upon completion.  Outcome Measures     Row Name 01/03/20 1100 01/02/20 1509 01/01/20 7745       How much help from another person do you currently need...    Turning from your back to your side while in flat bed without using bedrails?  --  3  -RM  3  -LM    Moving from lying on back to sitting on the side of a flat bed without bedrails?  --  3  -RM  3  -LM    Moving to and from a bed to a chair (including a wheelchair)?  --  3  -RM  3  -LM    Standing up from a chair using your arms (e.g.,  wheelchair, bedside chair)?  --  3  -RM  3  -LM    Climbing 3-5 steps with a railing?  --  1  -RM  1  -LM    To walk in hospital room?  --  3  -RM  3  -LM    AM-PAC 6 Clicks Score (PT)  --  16  -RM  16  -LM       How much help from another is currently needed...    Putting on and taking off regular lower body clothing?  3  -RMA  --  --    Bathing (including washing, rinsing, and drying)  3  -RMA  --  --    Toileting (which includes using toilet bed pan or urinal)  3  -RMA  --  --    Putting on and taking off regular upper body clothing  4  -RMA  --  --    Taking care of personal grooming (such as brushing teeth)  4  -RMA  --  --    Eating meals  4  -RMA  --  --    AM-PAC 6 Clicks Score (OT)  21  -RMA  --  --       Functional Assessment    Outcome Measure Options  --  AM-PAC 6 Clicks Basic Mobility (PT)  -  AM-PAC 6 Clicks Basic Mobility (PT)  -LM    Row Name 01/01/20 1021             How much help from another is currently needed...    Putting on and taking off regular lower body clothing?  2  -AH      Bathing (including washing, rinsing, and drying)  3  -AH      Toileting (which includes using toilet bed pan or urinal)  2  -AH      Putting on and taking off regular upper body clothing  3  -AH      Taking care of personal grooming (such as brushing teeth)  3  -AH      Eating meals  4  -AH      AM-PAC 6 Clicks Score (OT)  17  -         Functional Assessment    Outcome Measure Options  AM-PAC 6 Clicks Daily Activity (OT)  -        User Key  (r) = Recorded By, (t) = Taken By, (c) = Cosigned By    Initials Name Provider Type    Cynthia Pang Occupational Therapist    Ruth Davalos, PT Physical Therapist    RM Jesse Reyes, PTA Physical Therapy Assistant    Brynn Rosa, OT Occupational Therapist           Time Calculation:   Time Calculation- OT     Row Name 01/03/20 1224 01/03/20 1100          Time Calculation- OT    OT Start Time  1100  -RM  --     OT Stop Time  1125  -RM  --     OT Time  Calculation (min)  25 min  -RM  --     OT Received On  01/03/20  -RM  --     OT Goal Re-Cert Due Date  01/11/20  -RM  --        Timed Charges    25921 - OT Therapeutic Exercise Minutes  15  -RM  15  -RM     61980 - OT Self Care/Mgmt Minutes  10  -RM  10  -RM       User Key  (r) = Recorded By, (t) = Taken By, (c) = Cosigned By    Initials Name Provider Type    Brynn Zheng OT Occupational Therapist        Therapy Charges for Today     Code Description Service Date Service Provider Modifiers Qty    [id number]  OT THER PROC EA 15 MIN 1/2/2020 Brynn Saez OT GO 2    [id number]  OT THER PROC EA 15 MIN 1/3/2020 Brynn Saez OT GO 1    [id number]  OT SELF CARE/MGMT/TRAIN EA 15 MIN 1/3/2020 Brynn Saez OT GO 1               Brynn Saez OT  1/3/2020

## 2020-01-03 NOTE — THERAPY TREATMENT NOTE
Acute Care - Physical Therapy Treatment Note   [place]     Patient Name: Bri Iniguez  : 1935  MRN: [id number]  Today's Date: 1/3/2020  Onset of Illness/Injury or Date of Surgery: 19     Referring Physician: HAWA Camarena    Admit Date: 2019    Visit Dx:    ICD-10-CM ICD-9-CM   1. Impaired mobility and ADLs Z74.09 799.89     Patient Active Problem List   Diagnosis   • Macular degeneration   • Hyperlipidemia   • Essential hypertension   • Deafness   • Diabetes type 2, controlled (CMS/[facility])   • Primary osteoarthritis of left hip   • Venous insufficiency of both lower extremities   • Status post total replacement of left hip   • Acute blood loss anemia, mild, asymptomatic   • Leukocytosis, mild, likely reactive   • Hip fracture (CMS/[facility])       Therapy Treatment    Rehabilitation Treatment Summary     Row Name 20 1100 20 1046          Treatment Time/Intention    Discipline  occupational therapist  -RM  physical therapy assistant  -RMA     Document Type  therapy note (daily note)  -RM  therapy note (daily note)  -RMA     Subjective Information  complains of;pain  -RM  complains of;weakness;pain  -RMA     Mode of Treatment  occupational therapy  -RM  physical therapy  -RMA     Patient/Family Observations  Pt sitting up in chair at bedside with dtr in room upon OT entry.  -RM  --     Care Plan Review  care plan/treatment goals reviewed;patient/other agree to care plan;risks/benefits reviewed  -RM  care plan/treatment goals reviewed  -RMA     Care Plan Review, Other Participant(s)  daughter  -RM  --     Total Minutes, Occupational Therapy Treatment  25  -RM  --     Patient Effort  excellent  -RM  good  -RMA     Comment  Pt report feeling very good today.  -RM  --     Existing Precautions/Restrictions  fall  -RM  fall  -RMA     Patient Response to Treatment  Pt very cooperative and pleasant. Pt motivated to improve function.  -RM  --     Recorded by [RM] Brynn Saez, OT  01/03/20 1221 [RMA] Jesse Reyes, PTA 01/03/20 1229     Row Name 01/03/20 1100 01/03/20 1046          Vital Signs    Pretreatment Heart Rate (beats/min)  108  -RM  --     Intratreatment Heart Rate (beats/min)  115  -RM  --     Posttreatment Heart Rate (beats/min)  98  -RM  --     Pre SpO2 (%)  97  -RM  93  -RMA     O2 Delivery Pre Treatment  supplemental O2  -RM  room air  -RMA     Intra SpO2 (%)  98  -RM  86  -RMA     O2 Delivery Intra Treatment  supplemental O2  -RM  room air  -RMA     Post SpO2 (%)  97  -RM  93  -RMA     O2 Delivery Post Treatment  supplemental O2  -RM  room air  -RMA     Pre Patient Position  Sitting  -RM  Supine  -RMA     Intra Patient Position  Sitting  -RM  Standing  -RMA     Post Patient Position  Sitting  -RM  Sitting  -RMA     Recorded by [RM] Brynn Saez, OT 01/03/20 1221 [RMA] Jesse Reyes, PTA 01/03/20 1229     Row Name 01/03/20 1046             Safety Issues, Functional Mobility    Safety Issues Affecting Function (Mobility)  positioning of assistive device;sequencing abilities;safety precautions follow-through/compliance  -RMA      Impairments Affecting Function (Mobility)  balance;endurance/activity tolerance;pain;strength  -RMA      Recorded by [RMA] Jesse Reyes, PTA 01/03/20 1229      Row Name 01/03/20 1046             Mobility Assessment/Intervention    Right Lower Extremity (Weight-bearing Status)  weight-bearing as tolerated (WBAT)  -RMA      Recorded by [RMA] Jesse Reyes, PTA 01/03/20 1229      Row Name 01/03/20 1046             Bed Mobility Assessment/Treatment    Supine-Sit [place] (Bed Mobility)  contact guard;minimum assist (75% patient effort);verbal cues  -RMA      Bed Mobility, Safety Issues  decreased use of legs for bridging/pushing  -RMA      Assistive Device (Bed Mobility)  bed rails;head of bed elevated  -RMA      Recorded by [RMA] Jesse Reyes, PTA 01/03/20 1229      Row Name 01/03/20 1046             Sit-Stand Transfer     Sit-Stand [place] (Transfers)  contact guard;verbal cues  -RMA      Assistive Device (Sit-Stand Transfers)  walker, front-wheeled  -RMA      Recorded by [RMA] Jesse Reyes, PTA 01/03/20 1229      Row Name 01/03/20 1046             Stand-Sit Transfer    Stand-Sit [place] (Transfers)  contact guard;verbal cues  -RMA      Assistive Device (Stand-Sit Transfers)  walker, front-wheeled  -RMA      Recorded by [RMA] Jesse Reyes, PTA 01/03/20 1229      Row Name 01/03/20 1046             Gait/Stairs Assessment/Training    [place] Level (Gait)  contact guard;verbal cues  -RMA      Assistive Device (Gait)  walker, front-wheeled  -RMA      Distance in Feet (Gait)  40  -RMA      Pattern (Gait)  step-to;step-through  -RMA      Deviations/Abnormal Patterns (Gait)  antalgic;base of support, wide;stride length decreased  -RMA      Bilateral Gait Deviations  heel strike decreased;forward flexed posture;weight shift ability decreased  -RMA      Recorded by [RMA] Jesse Reyes, PTA 01/03/20 1229      Row Name 01/03/20 1100             ADL Assessment/Intervention    96464 - OT Self Care/Mgmt Minutes  10  -RM      Recorded by [RM] Brynn Saez, OT 01/03/20 1221      Row Name 01/03/20 1100             Upper Body Dressing Assessment/Training    Upper Body Dressing [place] Level  set up  -RM      Recorded by [RM] Brynn Saez, OT 01/03/20 1221      Row Name 01/03/20 1100             Lower Body Dressing Assessment/Training    Lower Body Dressing [place] Level  doff;don;socks SBA  -RM      Recorded by [RM] Brynn Saez, OT 01/03/20 1221      Row Name 01/03/20 1100             BADL Safety/Performance    Impairments, BADL Safety/Performance  endurance/activity tolerance;pain;strength  -RM      Skilled BADL Treatment/Intervention  adaptive equipment training  -RM      Progress in BADL Status  improvement noted  -RM      Recorded by [RM] Brynn Saez, OT 01/03/20 1221      Row Name 01/03/20 1100              Therapeutic Exercise    44598 - OT Therapeutic Exercise Minutes  15  -RM      Recorded by [RM] Brynn Saez OT 01/03/20 1221      Row Name 01/03/20 1100             Therapeutic Exercise    Upper Extremity (Therapeutic Exercise)  bicep curl, bilateral;tricep extension, bilateral  -RM      Upper Extremity Range of Motion (Therapeutic Exercise)  shoulder flexion/extension, bilateral;shoulder abduction/adduction, bilateral;shoulder horizontal abduction/adduction, bilateral chest press, bilateral; shld overhead press, bilateral  -RM      Exercise Type (Therapeutic Exercise)  AROM (active range of motion)  -RM      Position (Therapeutic Exercise)  seated  -RM      Sets/Reps (Therapeutic Exercise)  1x15  -RM      Expected Outcome (Therapeutic Exercise)  improve functional tolerance, self-care activity;improve performance, BADLs  -RM      Recorded by [RM] Brynn Saez OT 01/03/20 1221      Row Name 01/03/20 1100             Static Sitting Balance    Level of [place] (Unsupported Sitting, Static Balance)  independent  -RM      Sitting Position (Unsupported Sitting, Static Balance)  sitting in chair  -RM      Recorded by [RM] Brynn Saez OT 01/03/20 1221      Row Name 01/03/20 1100             Dynamic Sitting Balance    Level of [place], Reaches Outside Midline (Sitting, Dynamic Balance)  independent  -RM      Sitting Position, Reaches Outside Midline (Sitting, Dynamic Balance)  sitting in chair  -RM      Recorded by [RM] Brynn Saez OT 01/03/20 1221      Row Name 01/03/20 1100 01/03/20 1046          Positioning and Restraints    Pre-Treatment Position  sitting in chair/recliner  -RM  in bed  -RMA     Post Treatment Position  chair  -RM  chair  -RMA     In Chair  reclined;call light within reach;with family/caregiver  -RM  reclined;call light within reach;encouraged to call for assist;with family/caregiver;notified nsg  -RMA     Recorded by [RM] Brynn Saez OT 01/03/20 1221 [RMA] Amy  Jesse VARMA, PTA 01/03/20 1229     Row Name 01/03/20 1100 01/03/20 1046          Pain Scale: Numbers Pre/Post-Treatment    Pain Scale: Numbers, Pretreatment  6/10  -RM  6/10  -RMA     Pain Scale: Numbers, Post-Treatment  5/10  -RM  5/10  -RMA     Recorded by [RM] Brynn Saez, OT 01/03/20 1221 [RMA] Jesse Reyes, PTA 01/03/20 1229     Row Name                Wound 12/30/19 0800 Left posterior elbow Skin Tear    Wound - Properties Group Date first assessed: 12/30/19 [SP] Time first assessed: 0800 [SP] Side: Left [SP] Orientation: posterior [SP] Location: elbow [SP] Primary Wound Type: Skin tear [SP] Recorded by:  [SP] Alfonzo Colon RN 12/30/19 0948    Row Name                Wound 12/30/19 0800 Right posterior elbow Skin Tear    Wound - Properties Group Date first assessed: 12/30/19 [SP] Time first assessed: 0800 [SP] Side: Right [SP] Orientation: posterior [SP] Location: elbow [SP] Primary Wound Type: Skin tear [SP] Recorded by:  [SP] Alfonzo Colon RN 12/30/19 0949    Row Name                Wound 12/31/19 Right anterior greater trochanter Incision    Wound - Properties Group Date first assessed: 12/31/19 [JS] Present on Hospital Admission: N [JS] Side: Right [JS] Orientation: anterior [JS] Location: greater trochanter [JS] Primary Wound Type: Incision [JS] Recorded by:  [JS] Darline Collins RN 12/31/19 1809    Row Name 01/03/20 1100             Coping    Observed Emotional State  calm;cooperative;happy  -RM      Verbalized Emotional State  acceptance  -RM      Recorded by [RM] Brynn Saez, OT 01/03/20 1221      Row Name 01/03/20 1100 01/03/20 1046          Plan of Care Review    Plan of Care Reviewed With  patient;daughter  -RM  patient  -RMA     Progress  improving  -RM  improving  -RMA     Outcome Summary  Pt up in bedside upon OT entry. Just completed PT. R hip pain 6/10. Completed HEP 1x15 with demo and very short rest breaks.  Educated per AE with pt report that she has kit at home. Reviewed use of  reacher to doff sock and pt doffed with reacher s/u and donned B socks with sock aide ind after set up. Pain 5/10; pulse 98 and O2 97% upon completion.  -RM  Pt tolerated treatment well ambulating 40' cga with rw  as well as bed mobility.  See flowsheet for details.   -RMA     Recorded by [RM] Brynn Saez, OT 01/03/20 1221 [RMA] Jesse Reyes, PTA 01/03/20 1229     Row Name 01/03/20 1100             Outcome Summary/Treatment Plan (OT)    Daily Summary of Progress (OT)  progress toward functional goals is good  -RM      Anticipated Discharge Disposition (OT)  inpatient rehabilitation facility  -RM      Recorded by [RM] Brynn Saez, OT 01/03/20 1221      Row Name 01/03/20 1046             Outcome Summary/Treatment Plan (PT)    Daily Summary of Progress (PT)  progress toward functional goals is good  -RMA      Plan for Continued Treatment (PT)  Cont PT per POC.  -RMA      Recorded by [RMA] Jesse Reyes, PTA 01/03/20 1229        User Key  (r) = Recorded By, (t) = Taken By, (c) = Cosigned By    Initials Name Effective Dates Discipline    JS Darline Collins RN 07/14/16 -  Nurse    Alfonzo Jacob RN 10/26/16 -  Nurse    Jesse Ortiz, PTA 03/07/18 -  PT    RM Brynn Saez, OT 11/05/19 -  OT          Wound 12/30/19 0800 Left posterior elbow Skin Tear (Active)   Dressing Appearance dry;intact 1/2/2020  8:00 PM   Closure JAYASHREE 1/2/2020  8:00 PM   Base dressing in place, unable to visualize 1/2/2020  8:00 PM   Drainage Characteristics/Odor sanguineous 1/2/2020  4:00 PM   Drainage Amount scant 1/2/2020  4:00 PM       Wound 12/30/19 0800 Right posterior elbow Skin Tear (Active)   Dressing Appearance dry;intact 1/2/2020  8:00 PM   Closure JAYASHREE 1/2/2020  8:00 PM   Base dressing in place, unable to visualize 1/2/2020  8:00 PM       Wound 12/31/19 Right anterior greater trochanter Incision (Active)   Dressing Appearance dry;intact 1/2/2020  8:00 PM   Closure JAYASHREE 1/2/2020  8:00 PM   Base dressing in place,  unable to visualize 1/2/2020  8:00 PM   Drainage Amount small 1/2/2020  4:00 PM               PT Recommendation and Plan     Outcome Summary/Treatment Plan (PT)  Daily Summary of Progress (PT): progress toward functional goals is good  Plan for Continued Treatment (PT): Cont PT per POC.  Plan of Care Reviewed With: patient  Progress: improving  Outcome Summary: Pt tolerated treatment well ambulating 40' cga with rw  as well as bed mobility.  See flowsheet for details.   Outcome Measures     Row Name 01/03/20 1100 01/03/20 1046 01/02/20 1509       How much help from another person do you currently need...    Turning from your back to your side while in flat bed without using bedrails?  --  3  -RM  3  -RM    Moving from lying on back to sitting on the side of a flat bed without bedrails?  --  3  -RM  3  -RM    Moving to and from a bed to a chair (including a wheelchair)?  --  3  -RM  3  -RM    Standing up from a chair using your arms (e.g., wheelchair, bedside chair)?  --  3  -RM  3  -RM    Climbing 3-5 steps with a railing?  --  2  -RM  1  -RM    To walk in hospital room?  --  3  -RM  3  -RM    AM-PAC 6 Clicks Score (PT)  --  17  -RM  16  -RM       How much help from another is currently needed...    Putting on and taking off regular lower body clothing?  3  -RMA  --  --    Bathing (including washing, rinsing, and drying)  3  -RMA  --  --    Toileting (which includes using toilet bed pan or urinal)  3  -RMA  --  --    Putting on and taking off regular upper body clothing  4  -RMA  --  --    Taking care of personal grooming (such as brushing teeth)  4  -RMA  --  --    Eating meals  4  -RMA  --  --    AM-PAC 6 Clicks Score (OT)  21  -RMA  --  --       Functional Assessment    Outcome Measure Options  --  AM-PAC 6 Clicks Basic Mobility (PT)  -RM  AM-PAC 6 Clicks Basic Mobility (PT)  -RM    Row Name 01/01/20 1355 01/01/20 1021          How much help from another person do you currently need...    Turning from your back  to your side while in flat bed without using bedrails?  3  -LM  --     Moving from lying on back to sitting on the side of a flat bed without bedrails?  3  -LM  --     Moving to and from a bed to a chair (including a wheelchair)?  3  -LM  --     Standing up from a chair using your arms (e.g., wheelchair, bedside chair)?  3  -LM  --     Climbing 3-5 steps with a railing?  1  -LM  --     To walk in hospital room?  3  -LM  --     AM-PAC 6 Clicks Score (PT)  16  -LM  --        How much help from another is currently needed...    Putting on and taking off regular lower body clothing?  --  2  -AH     Bathing (including washing, rinsing, and drying)  --  3  -AH     Toileting (which includes using toilet bed pan or urinal)  --  2  -AH     Putting on and taking off regular upper body clothing  --  3  -AH     Taking care of personal grooming (such as brushing teeth)  --  3  -AH     Eating meals  --  4  -AH     AM-PAC 6 Clicks Score (OT)  --  17  -AH        Functional Assessment    Outcome Measure Options  AM-PAC 6 Clicks Basic Mobility (PT)  -LM  AM-PAC 6 Clicks Daily Activity (OT)  -AH       User Key  (r) = Recorded By, (t) = Taken By, (c) = Cosigned By    Initials Name Provider Type    Cynthia Pang Occupational Therapist    Ruth Davalos, PT Physical Therapist    Jesse Larson, FLY Physical Therapy Assistant    Brynn Rosa, OT Occupational Therapist         Time Calculation:   PT Charges     Row Name 01/03/20 1231             Time Calculation    Start Time  1046  -RM      Stop Time  1101  -RM      Time Calculation (min)  15 min  -RM      PT Received On  01/03/20  -RM      PT Goal Re-Cert Due Date  01/11/20  -RM         Time Calculation- PT    Total Timed Code Minutes- PT  15 minute(s)  -RM         Timed Charges    34012 - Gait Training Minutes   15  -RM        User Key  (r) = Recorded By, (t) = Taken By, (c) = Cosigned By    Initials Name Provider Type    Jesse Larson, FLY Physical Therapy  Assistant        Therapy Charges for Today     Code Description Service Date Service Provider Modifiers Qty    35790902322 HC PT THER PROC EA 15 MIN 1/2/2020 Jesse Reyes, PTA GP 1    60918330926 HC GAIT TRAINING EA 15 MIN 1/3/2020 Jesse Reyes, PTA GP 1          PT G-Codes  Outcome Measure Options: AM-PAC 6 Clicks Basic Mobility (PT)  AM-PAC 6 Clicks Score (PT): 17  AM-PAC 6 Clicks Score (OT): 21    Jesse Reyes PTA  1/3/2020

## 2020-01-04 PROBLEM — S72.001S CLOSED RIGHT HIP FRACTURE, SEQUELA: Status: ACTIVE | Noted: 2020-01-04

## 2020-01-04 PROBLEM — M19.90 ARTHRITIS: Status: ACTIVE | Noted: 2020-01-04

## 2020-01-04 PROBLEM — I10 ESSENTIAL HYPERTENSION: Status: ACTIVE | Noted: 2020-01-04

## 2020-01-04 PROBLEM — E78.49 OTHER HYPERLIPIDEMIA: Status: ACTIVE | Noted: 2020-01-04

## 2020-01-04 PROBLEM — Z86.79 H/O SUPRAVENTRICULAR TACHYCARDIA: Status: ACTIVE | Noted: 2020-01-04

## 2020-01-04 PROBLEM — E11.9 TYPE 2 DIABETES MELLITUS WITHOUT COMPLICATION, WITHOUT LONG-TERM CURRENT USE OF INSULIN (HCC): Status: ACTIVE | Noted: 2020-01-04

## 2020-01-04 PROBLEM — Z87.81 S/P ORIF (OPEN REDUCTION INTERNAL FIXATION) FRACTURE: Status: ACTIVE | Noted: 2020-01-04

## 2020-01-04 PROBLEM — Z98.890 S/P ORIF (OPEN REDUCTION INTERNAL FIXATION) FRACTURE: Status: ACTIVE | Noted: 2020-01-04

## 2020-01-04 LAB
ANION GAP SERPL CALCULATED.3IONS-SCNC: 8 MMOL/L (ref 3–16)
BASOPHILS ABSOLUTE: 0 K/UL (ref 0–0.1)
BASOPHILS RELATIVE PERCENT: 0.2 %
BUN BLDV-MCNC: 17 MG/DL (ref 6–20)
CALCIUM SERPL-MCNC: 8.2 MG/DL (ref 8.5–10.5)
CHLORIDE BLD-SCNC: 99 MMOL/L (ref 98–107)
CO2: 30 MMOL/L (ref 20–30)
CREAT SERPL-MCNC: 0.6 MG/DL (ref 0.4–1.2)
EOSINOPHILS ABSOLUTE: 0.2 K/UL (ref 0–0.4)
EOSINOPHILS RELATIVE PERCENT: 2.7 %
GFR AFRICAN AMERICAN: >59
GFR NON-AFRICAN AMERICAN: >60
GLUCOSE BLD-MCNC: 149 MG/DL (ref 74–106)
GLUCOSE BLD-MCNC: 162 MG/DL (ref 74–106)
GLUCOSE BLD-MCNC: 172 MG/DL (ref 74–106)
GLUCOSE BLD-MCNC: 189 MG/DL (ref 74–106)
GLUCOSE BLD-MCNC: 277 MG/DL (ref 74–106)
HCT VFR BLD CALC: 28.2 % (ref 37–47)
HEMOGLOBIN: 8.9 G/DL (ref 11.5–16.5)
IMMATURE GRANULOCYTES #: 0.1 K/UL
IMMATURE GRANULOCYTES %: 0.6 % (ref 0–5)
LYMPHOCYTES ABSOLUTE: 1.5 K/UL (ref 1.5–4)
LYMPHOCYTES RELATIVE PERCENT: 18.6 %
MCH RBC QN AUTO: 28.9 PG (ref 27–32)
MCHC RBC AUTO-ENTMCNC: 31.6 G/DL (ref 31–35)
MCV RBC AUTO: 91.6 FL (ref 80–100)
MONOCYTES ABSOLUTE: 1 K/UL (ref 0.2–0.8)
MONOCYTES RELATIVE PERCENT: 12 %
NEUTROPHILS ABSOLUTE: 5.5 K/UL (ref 2–7.5)
NEUTROPHILS RELATIVE PERCENT: 65.9 %
PDW BLD-RTO: 14.7 % (ref 11–16)
PERFORMED ON: ABNORMAL
PLATELET # BLD: 200 K/UL (ref 150–400)
PMV BLD AUTO: 9.8 FL (ref 6–10)
POTASSIUM SERPL-SCNC: 3.9 MMOL/L (ref 3.4–5.1)
RBC # BLD: 3.08 M/UL (ref 3.8–5.8)
SODIUM BLD-SCNC: 137 MMOL/L (ref 136–145)
WBC # BLD: 8.3 K/UL (ref 4–11)

## 2020-01-04 PROCEDURE — 1200000002 HC SEMI PRIVATE SWING BED

## 2020-01-04 PROCEDURE — 85025 COMPLETE CBC W/AUTO DIFF WBC: CPT

## 2020-01-04 PROCEDURE — 36415 COLL VENOUS BLD VENIPUNCTURE: CPT

## 2020-01-04 PROCEDURE — 93226 XTRNL ECG REC<48 HR SCAN A/R: CPT

## 2020-01-04 PROCEDURE — 99305 1ST NF CARE MODERATE MDM 35: CPT | Performed by: PEDIATRICS

## 2020-01-04 PROCEDURE — 6370000000 HC RX 637 (ALT 250 FOR IP): Performed by: PEDIATRICS

## 2020-01-04 PROCEDURE — 80048 BASIC METABOLIC PNL TOTAL CA: CPT

## 2020-01-04 PROCEDURE — 2700000000 HC OXYGEN THERAPY PER DAY

## 2020-01-04 PROCEDURE — 6360000002 HC RX W HCPCS: Performed by: NURSE PRACTITIONER

## 2020-01-04 PROCEDURE — 93005 ELECTROCARDIOGRAM TRACING: CPT

## 2020-01-04 PROCEDURE — 6370000000 HC RX 637 (ALT 250 FOR IP): Performed by: NURSE PRACTITIONER

## 2020-01-04 PROCEDURE — 93225 XTRNL ECG REC<48 HRS REC: CPT

## 2020-01-04 RX ORDER — ONDANSETRON 4 MG/1
4 TABLET, FILM COATED ORAL EVERY 8 HOURS PRN
Status: DISCONTINUED | OUTPATIENT
Start: 2020-01-04 | End: 2020-01-10 | Stop reason: HOSPADM

## 2020-01-04 RX ORDER — BACLOFEN 10 MG/1
10 TABLET ORAL 2 TIMES DAILY PRN
Status: DISCONTINUED | OUTPATIENT
Start: 2020-01-04 | End: 2020-01-10 | Stop reason: HOSPADM

## 2020-01-04 RX ORDER — M-VIT,TX,IRON,MINS/CALC/FOLIC 27MG-0.4MG
1 TABLET ORAL DAILY
Status: DISCONTINUED | OUTPATIENT
Start: 2020-01-04 | End: 2020-01-10 | Stop reason: HOSPADM

## 2020-01-04 RX ORDER — DOCUSATE SODIUM 100 MG/1
100 CAPSULE, LIQUID FILLED ORAL DAILY
Status: DISCONTINUED | OUTPATIENT
Start: 2020-01-04 | End: 2020-01-10 | Stop reason: HOSPADM

## 2020-01-04 RX ORDER — PANTOPRAZOLE SODIUM 40 MG/1
40 TABLET, DELAYED RELEASE ORAL
Status: DISCONTINUED | OUTPATIENT
Start: 2020-01-05 | End: 2020-01-10 | Stop reason: HOSPADM

## 2020-01-04 RX ADMIN — ACETAMINOPHEN 650 MG: 325 TABLET, FILM COATED ORAL at 13:59

## 2020-01-04 RX ADMIN — ONDANSETRON HYDROCHLORIDE 4 MG: 4 TABLET, FILM COATED ORAL at 18:24

## 2020-01-04 RX ADMIN — MULTIPLE VITAMINS W/ MINERALS TAB 1 TABLET: TAB at 09:33

## 2020-01-04 RX ADMIN — METOPROLOL TARTRATE 12.5 MG: 25 TABLET ORAL at 15:36

## 2020-01-04 RX ADMIN — SIMETHICONE CHEW TAB 80 MG 80 MG: 80 TABLET ORAL at 21:31

## 2020-01-04 RX ADMIN — HYDROCODONE BITARTRATE AND ACETAMINOPHEN 1 TABLET: 5; 325 TABLET ORAL at 21:30

## 2020-01-04 RX ADMIN — INSULIN LISPRO 1 UNITS: 100 INJECTION, SOLUTION INTRAVENOUS; SUBCUTANEOUS at 11:04

## 2020-01-04 RX ADMIN — INSULIN LISPRO 1 UNITS: 100 INJECTION, SOLUTION INTRAVENOUS; SUBCUTANEOUS at 07:59

## 2020-01-04 RX ADMIN — SIMETHICONE CHEW TAB 80 MG 80 MG: 80 TABLET ORAL at 15:40

## 2020-01-04 RX ADMIN — DOCUSATE SODIUM 100 MG: 100 CAPSULE, LIQUID FILLED ORAL at 09:33

## 2020-01-04 RX ADMIN — BISACODYL 10 MG: 10 SUPPOSITORY RECTAL at 02:30

## 2020-01-04 RX ADMIN — ACETAMINOPHEN 650 MG: 325 TABLET, FILM COATED ORAL at 02:32

## 2020-01-04 RX ADMIN — ATORVASTATIN CALCIUM 10 MG: 10 TABLET, FILM COATED ORAL at 07:59

## 2020-01-04 RX ADMIN — INSULIN LISPRO 3 UNITS: 100 INJECTION, SOLUTION INTRAVENOUS; SUBCUTANEOUS at 16:33

## 2020-01-04 RX ADMIN — LISINOPRIL AND HYDROCHLOROTHIAZIDE 1 TABLET: 12.5; 2 TABLET ORAL at 07:59

## 2020-01-04 RX ADMIN — HYDROCODONE BITARTRATE AND ACETAMINOPHEN 1 TABLET: 5; 325 TABLET ORAL at 15:32

## 2020-01-04 RX ADMIN — INSULIN LISPRO 1 UNITS: 100 INJECTION, SOLUTION INTRAVENOUS; SUBCUTANEOUS at 21:32

## 2020-01-04 RX ADMIN — ENOXAPARIN SODIUM 40 MG: 40 INJECTION SUBCUTANEOUS at 07:59

## 2020-01-04 RX ADMIN — HYDROCODONE BITARTRATE AND ACETAMINOPHEN 1 TABLET: 5; 325 TABLET ORAL at 09:31

## 2020-01-04 ASSESSMENT — PAIN DESCRIPTION - ORIENTATION
ORIENTATION: RIGHT
ORIENTATION: RIGHT

## 2020-01-04 ASSESSMENT — PAIN SCALES - GENERAL
PAINLEVEL_OUTOF10: 5
PAINLEVEL_OUTOF10: 4
PAINLEVEL_OUTOF10: 2
PAINLEVEL_OUTOF10: 4
PAINLEVEL_OUTOF10: 0
PAINLEVEL_OUTOF10: 5
PAINLEVEL_OUTOF10: 0
PAINLEVEL_OUTOF10: 5
PAINLEVEL_OUTOF10: 7
PAINLEVEL_OUTOF10: 0
PAINLEVEL_OUTOF10: 0
PAINLEVEL_OUTOF10: 5

## 2020-01-04 ASSESSMENT — PAIN DESCRIPTION - PAIN TYPE
TYPE: ACUTE PAIN
TYPE: ACUTE PAIN

## 2020-01-04 ASSESSMENT — PAIN DESCRIPTION - LOCATION
LOCATION: HIP
LOCATION: HIP

## 2020-01-04 NOTE — H&P
History and Physical    Patient:  Emilia Ask    CHIEF COMPLAINT:    Declining functional status      HISTORY OF PRESENT ILLNESS:   The patient is a 80 y.o. female with PMH HLD, OA, HTN, DM who direct admit to swing bed s/p ORIF Right intertrochanteric hip fracture secondary to mechanical fall, on 12/31/19 by Dr. Charles Morel for continued PT/OT. Associated symptoms include intermittent right hip pain that is controlled on current regimen. Last BM yesterday. She has chronic right ankle swelling/pain and right knee pain followed by Dr. Cat Merino. During hospitalization at [facility], she had near syncopal episode with short run SVT and needs f/u Dr Patito Raya for 30 day cardiac monitor. Patient sinus arrhythmia with few PVCs/PACs on EKG on arrival to floor with no palpitations, SOB, LE edema or dizziness. Required 1 unit PRBC post op for acute blood loss. Will also need f/u Dr. De Guzman Flair 2 weeks for wound check and staple removal. Patient reports she is feeling good but needs therapy to help improve her overall strength and mobility. Direct admit to swing bed for further rehabilitation and medical management. Past Medical History:      Diagnosis Date    Deaf, left     Hyperlipidemia     Hypertension     Macular degeneration        Past Surgical History:      Procedure Laterality Date    HIP SURGERY Left        Medications Prior to Admission:    Prior to Admission medications    Medication Sig Start Date End Date Taking?  Authorizing Provider   aspirin 81 MG tablet Take 81 mg by mouth daily   Yes Historical Provider, MD   simvastatin (ZOCOR) 10 MG tablet Take 10 mg by mouth nightly   Yes Historical Provider, MD   estradiol (ESTRACE) 0.1 MG/GM vaginal cream Place 2 g vaginally daily   Yes Historical Provider, MD   docusate sodium (STOOL SOFTENER) 100 MG capsule Take 100 mg by mouth 2 times daily   Yes Historical Provider, MD   Multiple Vitamins-Minerals (OCUVITE EXTRA PO) Take by mouth   Yes Historical Provider, MD Allergies:  Patient has no known allergies. Social History:   TOBACCO:   reports that she has never smoked. She has never used smokeless tobacco.  ETOH:   reports no history of alcohol use. OCCUPATION:  None     Family History:   History reviewed. No pertinent family history. Review of system   Constitutional:  Denies fever or chills. Reports weakness. Eyes:  Denies eye pain or redness  HENT:  Denies nasal congestion or sore throat   Respiratory:  Denies cough or shortness of breath   Cardiovascular:  Denies chest pain or edema   GI:  Denies abdominal pain, nausea, vomiting, bloody stools or diarrhea   Musculoskeletal:  Intermittent right hip, knee and ankle pain (chronic) and s/p ORIF right hip. Denies acute neck pain or body aches  Integument:  Denies rash or itching  Neurologic:  Denies headache, dizziness, numbness, tingling or unilateral weakness  Psychiatric:  Denies acute depression or acute anxiety      Vitals:    01/04/20 0753   BP: (!) 113/47   Pulse: 67   Resp: 16   Temp: 98.8 °F (37.1 °C)   SpO2: 95%       Physical exam  Constitutional:  Elderly, Well developed, well nourished, no acute distress  Eyes:  PERRL, no scleral icterus, conjunctiva normal   HENT:  Atraumatic, external ears normal, nose normal, oropharynx moist, no pharyngeal exudates. Neck- supple, no JVD. Respiratory:  No respiratory distress, no wheezing, rales or rhonchi detected  Cardiovascular:  Irregularly irregular. Normal rate, no murmurs, no edema   GI:  Soft, nondistended, normal bowel sounds, nontender, no voluntary guarding  Musculoskeletal:  No cyanosis or obvious acute deformity. Right hip dressing intact with small amount serosanguinous drainage-no crepitus, purulent drainage or concerns noted. Dressing not removed at this time. Chronic joint swelling right knee and ankle-no TTP. Moving all extremities with LROM right hip. Bilateral pedal pulses intact. Integument:  Warm and dry.   Neurologic:  Alert & oriented

## 2020-01-04 NOTE — FLOWSHEET NOTE
01/04/20 0830   Assessment   Charting Type Admission   Neurological   Neuro (WDL) WDL   Level of Consciousness 0   Swallow Screening   Is the patient able to remain alert for testing? Yes   [place] Coma Scale   Eye Opening 4   Best Verbal Response 5   Best Motor Response 6   [place] Coma Scale Score 15   NIH/MNHISS Stroke Scale   NIH/MNIHSS Stroke Scale Assessed No   HEENT   HEENT (WDL) X   Right Eye Intact   Left Eye Intact   Right Ear Impaired hearing   Left Ear Impaired hearing   Voice Normal   Mucous Membrane Moist;Pink; Intact   Teeth Missing teeth   Respiratory   Respiratory (WDL) WDL   Cardiac   Cardiac (WDL) X  (in and out of afib (EKG sinus rythm PAC-LBBB))   Cardiac Regularity Irregular   Heart Sounds Other (Comment)  (irregular)   Cardiac Rhythm PAC   Rhythm Interpretation   Lead Monitored Lead II   Rhythm Left bundle branch block   Cardiac Monitor   Telemetry Monitor On No   Gastrointestinal   Abdominal (WDL) WDL   RUQ Bowel Sounds Active   LUQ Bowel Sounds Active   RLQ Bowel Sounds Active   LLQ Bowel Sounds Active   Abdomen Inspection Rounded   GI Symptoms Flatus   Peripheral Vascular   Peripheral Vascular (WDL) X   Edema Right lower extremity   RLE Edema +1;Pitting   RLE Neurovascular Assessment   R Pedal Pulse +2   Skin Color/Condition   Skin Color/Condition (WDL) X   Skin Color Pale   Skin Condition/Temp Warm   Skin Integrity   Skin Integrity (WDL) X   Skin Integrity Bruising   Location BUE   Preventative Dressing Yes   Skin Fold Management No   Multiple Skin Integrity Sites Yes   Dressing Site Elbow   Assessed this shift?  Yes   Skin Integrity Site 2   Skin Integrity Location 2 Bruising   Location 2 LLE   Skin Integrity Site 3   Skin Integrity Location 3 Other (Comment)    Location 3 Right hip   Preventative Dressing Yes  (covaderm intact)   Skin Integrity Site 4   Skin Integrity Location 4 Tear   Location 4 eloise elbow   Preventative Dressing Yes  (right elbow)   Musculoskeletal   Musculoskeletal (WDL) X   RUE Full movement   LUE Full movement   RL Extremity Swelling;Limited movement; Injury/trauma; Surgery   LL Extremity Full movement   Genitourinary   Genitourinary (WDL) WDL   Anus/Rectum   Anus/Rectum (WDL) WDL   Psychosocial   Psychosocial (WDL) WDL   Pt. Alert times 4 this am- Noted irrregular heart beat-Pt. States, \" I have had this since I can remember. \" EKG done tele placed this am - Sinus rhythm with PAC's -LBBB on EKG- Pt. Given pain pill for right hip pain- Pt.  Able to take pills whole- Right hip dressing clean dry and intact- Will continue to monitor-Call bell within reach

## 2020-01-04 NOTE — PLAN OF CARE
Problem:  Activity:  Goal: Ability to ambulate will improve  Description  Ability to ambulate will improve  Outcome: Ongoing     Problem: Pain:  Goal: Pain level will decrease  Description  Pain level will decrease  Outcome: Ongoing

## 2020-01-05 LAB
ANION GAP SERPL CALCULATED.3IONS-SCNC: 7 MMOL/L (ref 3–16)
BASOPHILS ABSOLUTE: 0 K/UL (ref 0–0.1)
BASOPHILS RELATIVE PERCENT: 0.4 %
BUN BLDV-MCNC: 12 MG/DL (ref 6–20)
CALCIUM SERPL-MCNC: 8.1 MG/DL (ref 8.5–10.5)
CHLORIDE BLD-SCNC: 100 MMOL/L (ref 98–107)
CO2: 29 MMOL/L (ref 20–30)
CREAT SERPL-MCNC: <0.5 MG/DL (ref 0.4–1.2)
EOSINOPHILS ABSOLUTE: 0.2 K/UL (ref 0–0.4)
EOSINOPHILS RELATIVE PERCENT: 2 %
GFR AFRICAN AMERICAN: >59
GFR NON-AFRICAN AMERICAN: >60
GLUCOSE BLD-MCNC: 140 MG/DL (ref 74–106)
GLUCOSE BLD-MCNC: 165 MG/DL (ref 74–106)
GLUCOSE BLD-MCNC: 189 MG/DL (ref 74–106)
GLUCOSE BLD-MCNC: 228 MG/DL (ref 74–106)
GLUCOSE BLD-MCNC: 234 MG/DL (ref 74–106)
HBA1C MFR BLD: 6 %
HCT VFR BLD CALC: 27.5 % (ref 37–47)
HEMOGLOBIN: 9.1 G/DL (ref 11.5–16.5)
IMMATURE GRANULOCYTES #: 0.1 K/UL
IMMATURE GRANULOCYTES %: 0.7 % (ref 0–5)
LYMPHOCYTES ABSOLUTE: 2 K/UL (ref 1.5–4)
LYMPHOCYTES RELATIVE PERCENT: 24.5 %
MAGNESIUM: 1.9 MG/DL (ref 1.7–2.4)
MCH RBC QN AUTO: 29.6 PG (ref 27–32)
MCHC RBC AUTO-ENTMCNC: 33.1 G/DL (ref 31–35)
MCV RBC AUTO: 89.6 FL (ref 80–100)
MONOCYTES ABSOLUTE: 0.9 K/UL (ref 0.2–0.8)
MONOCYTES RELATIVE PERCENT: 11 %
NEUTROPHILS ABSOLUTE: 5 K/UL (ref 2–7.5)
NEUTROPHILS RELATIVE PERCENT: 61.4 %
PDW BLD-RTO: 14.6 % (ref 11–16)
PERFORMED ON: ABNORMAL
PLATELET # BLD: 235 K/UL (ref 150–400)
PMV BLD AUTO: 8.4 FL (ref 6–10)
POTASSIUM SERPL-SCNC: 3.5 MMOL/L (ref 3.4–5.1)
RBC # BLD: 3.07 M/UL (ref 3.8–5.8)
SODIUM BLD-SCNC: 136 MMOL/L (ref 136–145)
TSH SERPL DL<=0.05 MIU/L-ACNC: 5.19 UIU/ML (ref 0.35–5.5)
WBC # BLD: 8.2 K/UL (ref 4–11)

## 2020-01-05 PROCEDURE — 1200000002 HC SEMI PRIVATE SWING BED

## 2020-01-05 PROCEDURE — 36415 COLL VENOUS BLD VENIPUNCTURE: CPT

## 2020-01-05 PROCEDURE — 2700000000 HC OXYGEN THERAPY PER DAY

## 2020-01-05 PROCEDURE — 83735 ASSAY OF MAGNESIUM: CPT

## 2020-01-05 PROCEDURE — 83036 HEMOGLOBIN GLYCOSYLATED A1C: CPT

## 2020-01-05 PROCEDURE — 85025 COMPLETE CBC W/AUTO DIFF WBC: CPT

## 2020-01-05 PROCEDURE — 80048 BASIC METABOLIC PNL TOTAL CA: CPT

## 2020-01-05 PROCEDURE — 84443 ASSAY THYROID STIM HORMONE: CPT

## 2020-01-05 PROCEDURE — 6370000000 HC RX 637 (ALT 250 FOR IP): Performed by: NURSE PRACTITIONER

## 2020-01-05 RX ADMIN — INSULIN LISPRO 2 UNITS: 100 INJECTION, SOLUTION INTRAVENOUS; SUBCUTANEOUS at 16:34

## 2020-01-05 RX ADMIN — INSULIN LISPRO 1 UNITS: 100 INJECTION, SOLUTION INTRAVENOUS; SUBCUTANEOUS at 20:44

## 2020-01-05 RX ADMIN — PANTOPRAZOLE SODIUM 40 MG: 40 TABLET, DELAYED RELEASE ORAL at 06:27

## 2020-01-05 RX ADMIN — METOPROLOL TARTRATE 12.5 MG: 25 TABLET ORAL at 08:06

## 2020-01-05 RX ADMIN — INSULIN LISPRO 1 UNITS: 100 INJECTION, SOLUTION INTRAVENOUS; SUBCUTANEOUS at 11:41

## 2020-01-05 RX ADMIN — ACETAMINOPHEN 650 MG: 325 TABLET, FILM COATED ORAL at 16:32

## 2020-01-05 RX ADMIN — HYDROCODONE BITARTRATE AND ACETAMINOPHEN 1 TABLET: 5; 325 TABLET ORAL at 20:36

## 2020-01-05 RX ADMIN — ACETAMINOPHEN 650 MG: 325 TABLET, FILM COATED ORAL at 02:54

## 2020-01-05 RX ADMIN — DICLOFENAC 4 G: 10 GEL TOPICAL at 08:12

## 2020-01-05 RX ADMIN — INSULIN LISPRO 1 UNITS: 100 INJECTION, SOLUTION INTRAVENOUS; SUBCUTANEOUS at 08:07

## 2020-01-05 RX ADMIN — HYDROCODONE BITARTRATE AND ACETAMINOPHEN 1 TABLET: 5; 325 TABLET ORAL at 14:12

## 2020-01-05 RX ADMIN — METOPROLOL TARTRATE 12.5 MG: 25 TABLET, FILM COATED ORAL at 20:39

## 2020-01-05 RX ADMIN — LISINOPRIL AND HYDROCHLOROTHIAZIDE 1 TABLET: 12.5; 2 TABLET ORAL at 08:06

## 2020-01-05 RX ADMIN — ATORVASTATIN CALCIUM 10 MG: 10 TABLET, FILM COATED ORAL at 08:06

## 2020-01-05 RX ADMIN — HYDROCODONE BITARTRATE AND ACETAMINOPHEN 1 TABLET: 5; 325 TABLET ORAL at 08:12

## 2020-01-05 RX ADMIN — MULTIPLE VITAMINS W/ MINERALS TAB 1 TABLET: TAB at 08:06

## 2020-01-05 RX ADMIN — BACLOFEN 10 MG: 10 TABLET ORAL at 23:58

## 2020-01-05 RX ADMIN — DOCUSATE SODIUM 100 MG: 100 CAPSULE, LIQUID FILLED ORAL at 08:06

## 2020-01-05 RX ADMIN — SIMETHICONE CHEW TAB 80 MG 80 MG: 80 TABLET ORAL at 20:37

## 2020-01-05 RX ADMIN — SIMETHICONE CHEW TAB 80 MG 80 MG: 80 TABLET ORAL at 14:12

## 2020-01-05 RX ADMIN — SIMETHICONE CHEW TAB 80 MG 80 MG: 80 TABLET ORAL at 08:07

## 2020-01-05 ASSESSMENT — PAIN DESCRIPTION - PAIN TYPE
TYPE: ACUTE PAIN
TYPE: ACUTE PAIN

## 2020-01-05 ASSESSMENT — PAIN SCALES - GENERAL
PAINLEVEL_OUTOF10: 6
PAINLEVEL_OUTOF10: 5
PAINLEVEL_OUTOF10: 0
PAINLEVEL_OUTOF10: 3
PAINLEVEL_OUTOF10: 0
PAINLEVEL_OUTOF10: 6
PAINLEVEL_OUTOF10: 9
PAINLEVEL_OUTOF10: 0
PAINLEVEL_OUTOF10: 4
PAINLEVEL_OUTOF10: 5
PAINLEVEL_OUTOF10: 5

## 2020-01-05 ASSESSMENT — PAIN DESCRIPTION - LOCATION
LOCATION: HIP
LOCATION: HIP

## 2020-01-05 ASSESSMENT — PAIN DESCRIPTION - ORIENTATION
ORIENTATION: RIGHT
ORIENTATION: RIGHT

## 2020-01-05 NOTE — PLAN OF CARE
Problem: Activity:  Goal: Ability to ambulate will improve  Description  Ability to ambulate will improve  1/5/2020 0938 by Praveena Pitt RN  Outcome: Ongoing  1/5/2020 0008 by Shruthi Pretty RN  Outcome: Ongoing     Problem:  Activity:  Goal: Ability to perform activities at highest level will improve  Description  Ability to perform activities at highest level will improve  1/5/2020 0938 by Praveena Pitt RN  Outcome: Ongoing  1/5/2020 0008 by Shruthi Pretty RN  Outcome: Ongoing     Problem: Pain:  Goal: Pain level will decrease  Description  Pain level will decrease  1/5/2020 0008 by Shruthi Pretty RN  Outcome: Ongoing

## 2020-01-05 NOTE — FLOWSHEET NOTE
01/03/20   Assessed this shift? Yes   Musculoskeletal   Musculoskeletal (WDL) X   RUE Full movement   LUE Full movement   RL Extremity Swelling;Limited movement; Injury/trauma; Surgery   LL Extremity Full movement   Genitourinary   Genitourinary (WDL) WDL   Anus/Rectum   Anus/Rectum (WDL) WDL   Psychosocial   Psychosocial (WDL) WDL   Pt. Alert times 4 this am-Pt.  Took am meds well whole -Pain medication given for pain diclofenac gel to right hip applied- simethicone given for complaints of gas- Will monitor- Call bell within reach- Will monitor-MCRN

## 2020-01-06 LAB
GLUCOSE BLD-MCNC: 164 MG/DL (ref 74–106)
GLUCOSE BLD-MCNC: 169 MG/DL (ref 74–106)
GLUCOSE BLD-MCNC: 215 MG/DL (ref 74–106)
GLUCOSE BLD-MCNC: 271 MG/DL (ref 74–106)
PERFORMED ON: ABNORMAL

## 2020-01-06 PROCEDURE — 6370000000 HC RX 637 (ALT 250 FOR IP): Performed by: NURSE PRACTITIONER

## 2020-01-06 PROCEDURE — 97530 THERAPEUTIC ACTIVITIES: CPT

## 2020-01-06 PROCEDURE — 1200000002 HC SEMI PRIVATE SWING BED

## 2020-01-06 PROCEDURE — 97165 OT EVAL LOW COMPLEX 30 MIN: CPT

## 2020-01-06 PROCEDURE — 6360000002 HC RX W HCPCS: Performed by: NURSE PRACTITIONER

## 2020-01-06 PROCEDURE — 99308 SBSQ NF CARE LOW MDM 20: CPT | Performed by: INTERNAL MEDICINE

## 2020-01-06 PROCEDURE — 97161 PT EVAL LOW COMPLEX 20 MIN: CPT

## 2020-01-06 PROCEDURE — 97535 SELF CARE MNGMENT TRAINING: CPT

## 2020-01-06 RX ADMIN — INSULIN LISPRO 2 UNITS: 100 INJECTION, SOLUTION INTRAVENOUS; SUBCUTANEOUS at 21:54

## 2020-01-06 RX ADMIN — HYDROCODONE BITARTRATE AND ACETAMINOPHEN 1 TABLET: 5; 325 TABLET ORAL at 21:51

## 2020-01-06 RX ADMIN — HYDROCODONE BITARTRATE AND ACETAMINOPHEN 1 TABLET: 5; 325 TABLET ORAL at 15:15

## 2020-01-06 RX ADMIN — ATORVASTATIN CALCIUM 10 MG: 10 TABLET, FILM COATED ORAL at 08:31

## 2020-01-06 RX ADMIN — DOCUSATE SODIUM 100 MG: 100 CAPSULE, LIQUID FILLED ORAL at 08:31

## 2020-01-06 RX ADMIN — PANTOPRAZOLE SODIUM 40 MG: 40 TABLET, DELAYED RELEASE ORAL at 06:09

## 2020-01-06 RX ADMIN — METOPROLOL TARTRATE 12.5 MG: 25 TABLET, FILM COATED ORAL at 21:51

## 2020-01-06 RX ADMIN — SIMETHICONE CHEW TAB 80 MG 80 MG: 80 TABLET ORAL at 10:26

## 2020-01-06 RX ADMIN — INSULIN LISPRO 2 UNITS: 100 INJECTION, SOLUTION INTRAVENOUS; SUBCUTANEOUS at 17:11

## 2020-01-06 RX ADMIN — HYDROCODONE BITARTRATE AND ACETAMINOPHEN 1 TABLET: 5; 325 TABLET ORAL at 03:33

## 2020-01-06 RX ADMIN — LISINOPRIL AND HYDROCHLOROTHIAZIDE 1 TABLET: 12.5; 2 TABLET ORAL at 08:31

## 2020-01-06 RX ADMIN — INSULIN LISPRO 1 UNITS: 100 INJECTION, SOLUTION INTRAVENOUS; SUBCUTANEOUS at 08:32

## 2020-01-06 RX ADMIN — ENOXAPARIN SODIUM 40 MG: 40 INJECTION SUBCUTANEOUS at 09:59

## 2020-01-06 RX ADMIN — SIMETHICONE CHEW TAB 80 MG 80 MG: 80 TABLET ORAL at 21:51

## 2020-01-06 RX ADMIN — DICLOFENAC 4 G: 10 GEL TOPICAL at 21:55

## 2020-01-06 RX ADMIN — MULTIPLE VITAMINS W/ MINERALS TAB 1 TABLET: TAB at 08:31

## 2020-01-06 RX ADMIN — METOPROLOL TARTRATE 12.5 MG: 25 TABLET, FILM COATED ORAL at 08:31

## 2020-01-06 RX ADMIN — INSULIN LISPRO 1 UNITS: 100 INJECTION, SOLUTION INTRAVENOUS; SUBCUTANEOUS at 11:46

## 2020-01-06 ASSESSMENT — PAIN SCALES - GENERAL
PAINLEVEL_OUTOF10: 6

## 2020-01-06 NOTE — PROGRESS NOTES
Physical Therapy    Facility/Department: [facility] MED SURG  Initial Assessment    NAME: Ángela Waldrop  : 1935  MRN: [id number]    Date of Service: 2020    Discharge Recommendations:  Home with Home health PT, Home with assist PRN        Assessment   Body structures, Functions, Activity limitations: Decreased functional mobility ; Decreased high-level IADLs;Decreased strength;Decreased balance  Assessment: s/p right hip fracture with ORIF; right LE weakness; functional decline, difficulty walking  Treatment Diagnosis: s/p right hip fracture with ORIF; right LE weakness; functional decline, difficulty walking  Prognosis: Excellent  Decision Making: Low Complexity  PT Education: PT Role;Precautions;Transfer Training  REQUIRES PT FOLLOW UP: Yes  Activity Tolerance  Activity Tolerance: Patient Tolerated treatment well       Patient Diagnosis(es): There were no encounter diagnoses. has a past medical history of Deaf, left, Hyperlipidemia, Hypertension, and Macular degeneration. has a past surgical history that includes hip surgery (Left).     Restrictions  Restrictions/Precautions  Restrictions/Precautions: Weight Bearing, General Precautions, Surgical Protocols  Required Braces or Orthoses?: No  Lower Extremity Weight Bearing Restrictions  Right Lower Extremity Weight Bearing: Weight Bearing As Tolerated  Vision/Hearing  Vision: Within Functional Limits  Hearing: Within functional limits     Subjective  General  Chart Reviewed: Yes  Patient assessed for rehabilitation services?: Yes  Response To Previous Treatment: Not applicable  Family / Caregiver Present: No  Referring Practitioner: Kayla Serrano MD  Diagnosis: Right hip fx -s/p ORIF  WBAT according to [facility] notes  Subjective  Subjective: Had a slip and fall at home - went to [facility] for ER, surgery; stayed 5 days at [facility]; doing well overall; mild pain; general weakness; notes prior left THR x 1 year; prior to incident, was using a cane for ambulation.   Pain Screening  Patient Currently in Pain: Denies  Vital Signs  Patient Currently in Pain: Denies       Orientation  Orientation  Overall Orientation Status: Within Normal Limits  Social/Functional History  Social/Functional History  Lives With: Spouse  Type of Home: House  Home Layout: One level  Home Access: Stairs to enter with rails  Entrance Stairs - Number of Steps: 1  Bathroom Shower/Tub: Tub/Shower unit  Bathroom Toilet: Handicap height  Bathroom Equipment: Shower chair  Home Equipment: Cane, Rolling walker  ADL Assistance: Independent  Homemaking Assistance: Independent  Ambulation Assistance: Independent  Transfer Assistance: Independent  Active : Yes  Mode of Transportation: Car  Cognition        Objective     Observation/Palpation  Posture: Fair  Observation: in bed, NAD, pleasant and cooperative; right hip: dressing intact, yellow drainage through to bed    AROM RLE (degrees)  RLE AROM: WFL  AROM LLE (degrees)  LLE AROM : WFL  AROM RUE (degrees)  RUE AROM : WFL  AROM LUE (degrees)  LUE AROM : WFL  Strength RLE  R Hip Flexion: 3-/5  R Hip ABduction: 3-/5  R Hip ADduction: 3-/5  R Knee Flexion: 4-/5  R Knee Extension: 4-/5  R Ankle Dorsiflexion: 4+/5  Strength LLE  Strength LLE: WFL  Strength RUE  Strength RUE: WFL  Strength LUE  Strength LUE: WFL  Tone RLE  RLE Tone: Normotonic  Tone LLE  LLE Tone: Normotonic  Motor Control  Gross Motor?: WFL  Sensation  Overall Sensation Status: WFL  Bed mobility  Rolling to Left: Modified independent  Supine to Sit: Minimal assistance  Sit to Supine: Minimal assistance  Transfers  Sit to Stand: Contact guard assistance  Stand to sit: Contact guard assistance  Bed to Chair: Contact guard assistance  Ambulation  Ambulation?: Yes  Ambulation 1  Surface: level tile  Device: Rolling Walker  Assistance: Contact guard assistance  Gait Deviations: Slow Elsa;Decreased step length;Decreased step height  Distance: 5 feet     Balance  Posture: Fair  Sitting - Static: Good  Sitting - Dynamic: Good;-  Standing - Static: Fair  Standing - Dynamic: Fair;-        Plan   Plan  Times per week: 4-5 x week  Plan weeks: 1-2 weeks  Current Treatment Recommendations: Strengthening, Balance Training, Functional Mobility Training, Transfer Training, Neuromuscular Re-education, Gait Training, Patient/Caregiver Education & Training, Safety Education & Training, Home Exercise Program  Safety Devices  Type of devices: Left in chair, Call light within reach, Nurse notified               Goals  Long term goals  Time Frame for Long term goals : 1-2 weeks  Long term goal 1: Achieve mod I for bed mobility. Long term goal 2: Achieve basic transfers with Supervision. Long term goal 3: Ambulate 150 feet with SBA x 1 with RW or st. cane with good safety awareness. Long term goal 4: Patient-family education as needed. Long term goal 5: Achieve 4-/5 right hip strength. Therapy Time   Individual Concurrent Group Co-treatment   Time In           Time Out           Minutes                   Electronically signed by Sabrina Talamantes PT on 1/6/2020 at 7:25 PM      Certification of Medical Necessity: It will be understood that this treatment plan is certified medically necessary by the documenting therapist and referring physician mentioned in this report. Unless the physician indicated otherwise through written correspondence with our office, all further referrals will act as certification of medical necessity on this treatment plan. Thank you for this referral.  If you have questions regarding this plan of care, please call [phone].           Revisions to this plan (optional):                     Please sign and return this plan to:   FAX: [phone]      Signature:                                 Date:

## 2020-01-06 NOTE — PROGRESS NOTES
Occupational Therapy   Occupational Therapy Initial Assessment  Date: 2020   Patient Name: Bella Hale  MRN: [id number]     : 1935    Date of Service: 2020    Discharge Recommendations:  Home with Home health OT       Assessment   Performance deficits / Impairments: Decreased functional mobility ; Decreased high-level IADLs;Decreased endurance;Decreased ADL status  Assessment: Deficits noted in ability to complete bed mobility and LB dressing. Pt with decreased activity tolerance secondary to fatigue and pain. Pt will benefit from skilled OT services while IP. Pt education provided to ask for assistance wtih all transfers and ambulation, up for all meals and to not use bed pan. Prognosis: Good  Decision Making: Low Complexity  OT Education: OT Role;Plan of Care;Transfer Training  REQUIRES OT FOLLOW UP: Yes  Activity Tolerance  Activity Tolerance: Patient Tolerated treatment well           Patient Diagnosis(es): There were no encounter diagnoses. has a past medical history of Deaf, left, Hyperlipidemia, Hypertension, and Macular degeneration. has a past surgical history that includes hip surgery (Left). Restrictions  Restrictions/Precautions  Restrictions/Precautions: Weight Bearing, General Precautions, Surgical Protocols  Required Braces or Orthoses?: No  Lower Extremity Weight Bearing Restrictions  Right Lower Extremity Weight Bearing: Weight Bearing As Tolerated    Subjective   General  Chart Reviewed: Yes  Patient assessed for rehabilitation services?: Yes  Family / Caregiver Present: No  Referring Practitioner: Ivette Hooks   Diagnosis: RLE ORIF intertrochanteric fx. Subjective  Subjective: Pt reports she was down in basement and fell as water was coming in basement. Surgery on  ORIF. Pt agreeable to OT services.    Patient Currently in Pain: Yes  Pain Assessment  Pain Level: 6  Vital Signs  Patient Currently in Pain: Yes  Social/Functional History  Social/Functional

## 2020-01-06 NOTE — PROGRESS NOTES
Dressing to patient's right  Hip changed at this time. Large amount of serosanguinous drainage note on old dressing   Three incisions noted. Upper incision has 13 staples; middle incision has 6 staples; and lower incision has 11 staples. Incisions covered with nonadherent pad and Covaderm.

## 2020-01-07 LAB
GLUCOSE BLD-MCNC: 154 MG/DL (ref 74–106)
GLUCOSE BLD-MCNC: 175 MG/DL (ref 74–106)
GLUCOSE BLD-MCNC: 220 MG/DL (ref 74–106)
GLUCOSE BLD-MCNC: 439 MG/DL (ref 74–106)
PERFORMED ON: ABNORMAL

## 2020-01-07 PROCEDURE — 97110 THERAPEUTIC EXERCISES: CPT

## 2020-01-07 PROCEDURE — 97535 SELF CARE MNGMENT TRAINING: CPT

## 2020-01-07 PROCEDURE — 1200000002 HC SEMI PRIVATE SWING BED

## 2020-01-07 PROCEDURE — 6370000000 HC RX 637 (ALT 250 FOR IP): Performed by: NURSE PRACTITIONER

## 2020-01-07 PROCEDURE — 6360000002 HC RX W HCPCS: Performed by: NURSE PRACTITIONER

## 2020-01-07 PROCEDURE — 97802 MEDICAL NUTRITION INDIV IN: CPT

## 2020-01-07 PROCEDURE — 97116 GAIT TRAINING THERAPY: CPT

## 2020-01-07 RX ADMIN — HYDROCODONE BITARTRATE AND ACETAMINOPHEN 1 TABLET: 5; 325 TABLET ORAL at 08:34

## 2020-01-07 RX ADMIN — METOPROLOL TARTRATE 12.5 MG: 25 TABLET, FILM COATED ORAL at 21:16

## 2020-01-07 RX ADMIN — ATORVASTATIN CALCIUM 10 MG: 10 TABLET, FILM COATED ORAL at 08:34

## 2020-01-07 RX ADMIN — MULTIPLE VITAMINS W/ MINERALS TAB 1 TABLET: TAB at 08:34

## 2020-01-07 RX ADMIN — DOCUSATE SODIUM 100 MG: 100 CAPSULE, LIQUID FILLED ORAL at 08:34

## 2020-01-07 RX ADMIN — PANTOPRAZOLE SODIUM 40 MG: 40 TABLET, DELAYED RELEASE ORAL at 06:16

## 2020-01-07 RX ADMIN — METOPROLOL TARTRATE 12.5 MG: 25 TABLET, FILM COATED ORAL at 08:35

## 2020-01-07 RX ADMIN — HYDROCODONE BITARTRATE AND ACETAMINOPHEN 1 TABLET: 5; 325 TABLET ORAL at 17:04

## 2020-01-07 RX ADMIN — INSULIN LISPRO 2 UNITS: 100 INJECTION, SOLUTION INTRAVENOUS; SUBCUTANEOUS at 13:05

## 2020-01-07 RX ADMIN — LISINOPRIL AND HYDROCHLOROTHIAZIDE 1 TABLET: 12.5; 2 TABLET ORAL at 08:35

## 2020-01-07 RX ADMIN — HYDROCODONE BITARTRATE AND ACETAMINOPHEN 1 TABLET: 5; 325 TABLET ORAL at 23:25

## 2020-01-07 RX ADMIN — INSULIN LISPRO 3 UNITS: 100 INJECTION, SOLUTION INTRAVENOUS; SUBCUTANEOUS at 21:25

## 2020-01-07 RX ADMIN — SIMETHICONE CHEW TAB 80 MG 80 MG: 80 TABLET ORAL at 23:25

## 2020-01-07 RX ADMIN — INSULIN LISPRO 1 UNITS: 100 INJECTION, SOLUTION INTRAVENOUS; SUBCUTANEOUS at 08:37

## 2020-01-07 RX ADMIN — SIMETHICONE CHEW TAB 80 MG 80 MG: 80 TABLET ORAL at 08:34

## 2020-01-07 RX ADMIN — ENOXAPARIN SODIUM 40 MG: 40 INJECTION SUBCUTANEOUS at 08:35

## 2020-01-07 RX ADMIN — INSULIN LISPRO 1 UNITS: 100 INJECTION, SOLUTION INTRAVENOUS; SUBCUTANEOUS at 16:51

## 2020-01-07 RX ADMIN — BACLOFEN 10 MG: 10 TABLET ORAL at 06:16

## 2020-01-07 RX ADMIN — BACLOFEN 10 MG: 10 TABLET ORAL at 21:16

## 2020-01-07 ASSESSMENT — PAIN SCALES - GENERAL
PAINLEVEL_OUTOF10: 6
PAINLEVEL_OUTOF10: 5

## 2020-01-07 NOTE — PROGRESS NOTES
Modified independent  Transfers  Sit to Stand: Contact guard assistance  Stand to sit: Contact guard assistance  Bed to Chair: Contact guard assistance  Ambulation  Ambulation?: Yes  Ambulation 1  Surface: level tile  Device: Rolling Walker  Assistance: Contact guard assistance  Gait Deviations: Slow Elsa;Decreased step length;Decreased step height  Distance: 100 feet     Balance  Posture: Fair  Sitting - Static: Good  Sitting - Dynamic: Good;-  Standing - Static: Fair  Standing - Dynamic: Fair;-  Exercises  Heelslides: seated x20  Hip Flexion: seated marching x20  Knee Long Arc Quad: x20 B  Other exercises  Other exercises?: Yes  Other exercises 1: sit to stand 2x5  Other exercises 2: iso hip abd/add x20 each      Goals  Long term goals  Time Frame for Long term goals : 1-2 weeks  Long term goal 1: Achieve mod I for bed mobility. Long term goal 2: Achieve basic transfers with Supervision. Long term goal 3: Ambulate 150 feet with SBA x 1 with RW or st. cane with good safety awareness. Long term goal 4: Patient-family education as needed. Long term goal 5: Achieve 4-/5 right hip strength. Plan    Plan  Times per week: 4-5 x week  Plan weeks: 1-2 weeks  Current Treatment Recommendations: Strengthening, Balance Training, Functional Mobility Training, Transfer Training, Neuromuscular Re-education, Gait Training, Patient/Caregiver Education & Training, Safety Education & Training, Home Exercise Program  Safety Devices  Type of devices: Left in bed, Call light within reach     Therapy Time   Individual Concurrent Group Co-treatment   Time In 6493         Time Out 1506         Minutes 24                 [place], [place]     This note serves as D/C summary if patient is discharged prior to next visit.

## 2020-01-07 NOTE — PLAN OF CARE
Problem: Falls - Risk of:  Goal: Absence of physical injury  Description  Absence of physical injury  Outcome: Ongoing     Problem: Safety:  Goal: Ability to remain free from injury will improve  Description  Ability to remain free from injury will improve  Outcome: Ongoing Sinus bernadine noted, pt alert and oriented, skin pwd, no respiratory distress. Pt remains on 2lpm nc. Pt eating dinner, tolerating well. Fall precautions maintained, family at bedside.     Lyla Longoria RN  12/12/18 7936

## 2020-01-07 NOTE — PROGRESS NOTES
Occupational Therapy  Facility/Department: [address] [place] [address] MED SURG  Daily Treatment Note  NAME: Giovanny Lemus  : 1935  MRN: [id number]    Date of Service: 2020    Discharge Recommendations:  Home with Home health OT       Assessment   Assessment: Pt agreeable to OT services. Pt demo ability to come to sit at EOB with SBA. Pt demo ability to complete tub transfer on shower chair with CGA demo good safety awareness. Pt completed UB bathing with PERALTA. Pt demo ability to complete LB bathing with exception of requiring assistance with below knee washing of RLE. Pt donned LB clothign using AE. Pt reports she has all AE at home needed including reacher, sock aide and LHS. Pt declined to sit upright in chair and assisted to supine requiring minimal assist to raise RLE into bed. Patient Diagnosis(es): There were no encounter diagnoses. has a past medical history of Deaf, left, Hyperlipidemia, Hypertension, and Macular degeneration. has a past surgical history that includes hip surgery (Left). Restrictions  Restrictions/Precautions  Restrictions/Precautions: Weight Bearing, General Precautions, Surgical Protocols  Required Braces or Orthoses?: No  Lower Extremity Weight Bearing Restrictions  Right Lower Extremity Weight Bearing: Weight Bearing As Tolerated  Subjective   General  Chart Reviewed: Yes  Patient assessed for rehabilitation services?: Yes  Family / Caregiver Present: No  Referring Practitioner: Ivette Hooks   Diagnosis: RLE ORIF intertrochanteric fx. Subjective  Subjective: Pt reports she was down in basement and fell as water was coming in basement. Surgery on  ORIF. Pt agreeable to OT services.        Orientation     Objective    ADL  Grooming: Modified independent   UE Bathing: Setup  LE Bathing: Stand by assistance;Minimal assistance(Min assist for below Rknee otherwise SBA)  UE Dressing: Setup  LE Dressing: Stand by assistance        Toilet Transfers  Toilet - Technique: Ambulating  Equipment Used: Raised toilet seat with rails  Toilet Transfer: Stand by assistance  Tub Transfers  Tub - Transfer From: Rolling walker  Tub - Transfer Type: To and From  Tub - Transfer To: Transfer tub bench  Tub - Technique: Ambulating  Tub Transfers: Contact guard  Bed mobility  Supine to Sit: Stand by assistance  Sit to Supine: Minimal assistance  Scooting: Modified independent  Transfers  Stand Pivot Transfers: Stand by assistance  Sit to stand: Stand by assistance         Plan   Plan  Times per week: 3-5  Times per day: Daily  Plan weeks: 1  Current Treatment Recommendations: Strengthening, Endurance Training, Self-Care / ADL, Safety Education & Training, Balance Training, Patient/Caregiver Education & Training, Equipment Evaluation, Education, & procurement       Goals  Short term goals  Time Frame for Short term goals: 1 week  Short term goal 1: Pt to complete LB dressing with MOD I using AE as needed. Short term goal 2: Pt to complete tub transfer with MOD I using good safety. Short term goal 3: Pt to complete bathing with MOD I>   Short term goal 4: Pt to complete hygiene/grooming in standing with MOD I. Therapy Time   Individual Concurrent Group Co-treatment   Time In 0103         Time Out 0145         Minutes 42              This note serves as a DC summary in the event of pt discharge.      Mary Toney, OTR/L

## 2020-01-08 ENCOUNTER — OUTSIDE FACILITY SERVICE (OUTPATIENT)
Dept: CARDIOLOGY | Facility: CLINIC | Age: 85
End: 2020-01-08

## 2020-01-08 LAB
GLUCOSE BLD-MCNC: 139 MG/DL (ref 74–106)
GLUCOSE BLD-MCNC: 147 MG/DL (ref 74–106)
GLUCOSE BLD-MCNC: 209 MG/DL (ref 74–106)
GLUCOSE BLD-MCNC: 256 MG/DL (ref 74–106)
PERFORMED ON: ABNORMAL

## 2020-01-08 PROCEDURE — 97110 THERAPEUTIC EXERCISES: CPT | Performed by: PHYSICAL THERAPY ASSISTANT

## 2020-01-08 PROCEDURE — 93227 XTRNL ECG REC<48 HR R&I: CPT | Performed by: INTERNAL MEDICINE

## 2020-01-08 PROCEDURE — 99222 1ST HOSP IP/OBS MODERATE 55: CPT | Performed by: INTERNAL MEDICINE

## 2020-01-08 PROCEDURE — 6360000002 HC RX W HCPCS: Performed by: NURSE PRACTITIONER

## 2020-01-08 PROCEDURE — 97530 THERAPEUTIC ACTIVITIES: CPT

## 2020-01-08 PROCEDURE — 6370000000 HC RX 637 (ALT 250 FOR IP): Performed by: PHYSICIAN ASSISTANT

## 2020-01-08 PROCEDURE — 1200000002 HC SEMI PRIVATE SWING BED

## 2020-01-08 PROCEDURE — 97535 SELF CARE MNGMENT TRAINING: CPT

## 2020-01-08 PROCEDURE — 97110 THERAPEUTIC EXERCISES: CPT

## 2020-01-08 PROCEDURE — 2700000000 HC OXYGEN THERAPY PER DAY

## 2020-01-08 PROCEDURE — 6370000000 HC RX 637 (ALT 250 FOR IP): Performed by: PEDIATRICS

## 2020-01-08 PROCEDURE — 97116 GAIT TRAINING THERAPY: CPT | Performed by: PHYSICAL THERAPY ASSISTANT

## 2020-01-08 PROCEDURE — 6370000000 HC RX 637 (ALT 250 FOR IP): Performed by: NURSE PRACTITIONER

## 2020-01-08 RX ORDER — POLYETHYLENE GLYCOL 3350 17 G/17G
17 POWDER, FOR SOLUTION ORAL 2 TIMES DAILY
Status: DISCONTINUED | OUTPATIENT
Start: 2020-01-08 | End: 2020-01-10 | Stop reason: HOSPADM

## 2020-01-08 RX ADMIN — INSULIN LISPRO 1 UNITS: 100 INJECTION, SOLUTION INTRAVENOUS; SUBCUTANEOUS at 16:31

## 2020-01-08 RX ADMIN — POLYETHYLENE GLYCOL 3350 17 G: 17 POWDER, FOR SOLUTION ORAL at 20:16

## 2020-01-08 RX ADMIN — METOPROLOL TARTRATE 12.5 MG: 25 TABLET, FILM COATED ORAL at 20:16

## 2020-01-08 RX ADMIN — ONDANSETRON HYDROCHLORIDE 4 MG: 4 TABLET, FILM COATED ORAL at 08:19

## 2020-01-08 RX ADMIN — ENOXAPARIN SODIUM 40 MG: 40 INJECTION SUBCUTANEOUS at 08:19

## 2020-01-08 RX ADMIN — PANTOPRAZOLE SODIUM 40 MG: 40 TABLET, DELAYED RELEASE ORAL at 05:53

## 2020-01-08 RX ADMIN — INSULIN LISPRO 2 UNITS: 100 INJECTION, SOLUTION INTRAVENOUS; SUBCUTANEOUS at 11:15

## 2020-01-08 RX ADMIN — SIMETHICONE CHEW TAB 80 MG 80 MG: 80 TABLET ORAL at 08:18

## 2020-01-08 RX ADMIN — METOPROLOL TARTRATE 12.5 MG: 25 TABLET, FILM COATED ORAL at 11:48

## 2020-01-08 RX ADMIN — POLYETHYLENE GLYCOL 3350 17 G: 17 POWDER, FOR SOLUTION ORAL at 11:44

## 2020-01-08 RX ADMIN — MULTIPLE VITAMINS W/ MINERALS TAB 1 TABLET: TAB at 08:18

## 2020-01-08 RX ADMIN — ATORVASTATIN CALCIUM 10 MG: 10 TABLET, FILM COATED ORAL at 08:19

## 2020-01-08 RX ADMIN — INSULIN LISPRO 2 UNITS: 100 INJECTION, SOLUTION INTRAVENOUS; SUBCUTANEOUS at 20:17

## 2020-01-08 RX ADMIN — HYDROCODONE BITARTRATE AND ACETAMINOPHEN 1 TABLET: 5; 325 TABLET ORAL at 08:18

## 2020-01-08 RX ADMIN — DOCUSATE SODIUM 100 MG: 100 CAPSULE, LIQUID FILLED ORAL at 08:19

## 2020-01-08 RX ADMIN — HYDROCODONE BITARTRATE AND ACETAMINOPHEN 1 TABLET: 5; 325 TABLET ORAL at 16:59

## 2020-01-08 ASSESSMENT — PAIN SCALES - GENERAL
PAINLEVEL_OUTOF10: 5
PAINLEVEL_OUTOF10: 5

## 2020-01-08 NOTE — CONSULTS
01/08/20 0819    lisinopril-hydrochlorothiazide (PRINZIDE;ZESTORETIC) 20-12.5 MG per tablet 1 tablet  1 tablet Oral Daily BRIAN Rothman CNP   Stopped at 01/08/20 0819    simethicone (MYLICON) chewable tablet 80 mg  80 mg Oral Q6H PRN BRIAN Rothman - CNP   80 mg at 01/08/20 0818       Review of Systems:  Review of systems is as detailed above and otherwise negative to a 12 point review. Physical Exam:   /63   Pulse 120   Temp 98.2 °F (36.8 °C) (Temporal)   Resp 18   Ht 5' 6\" (1.676 m)   Wt 196 lb 3.2 oz (89 kg)   SpO2 95%   BMI 31.67 kg/m²   Wt Readings from Last 3 Encounters:   01/08/20 196 lb 3.2 oz (89 kg)   12/30/19 179 lb (81.2 kg)     Constitutional: She is oriented to person, place, and time. She appears well-developed and well-nourished. In no acute distress. Head: Normocephalic and atraumatic. Pupils equal and round. Neck: Neck supple. No JVP or carotid bruit appreciated. No mass and no thyromegaly present. No lymphadenopathy present. Cardiovascular: Normal rate. Normal heart sounds. Exam reveals no gallop and no friction rub. No murmur heard. Pulmonary/Chest: Effort normal and breath sounds normal. No respiratory distress. She has no wheezes, rhonchi or rales. Abdominal: Soft, non-tender. Bowel sounds are normal. She exhibits no organomegaly, mass or bruit. Extremities: No edema, cyanosis, or clubbing. Pulses are 2+ radial/dorsalis pedis/posterior tibial/carotid bilaterally. Neurological: No gross cranial nerve deficit. Coordination normal.   Skin: Skin is warm and dry. There is no rash or diaphoresis. Psychiatric: She has a normal mood and affect. Her speech is normal and behavior is normal.     Lab Review:   FLP:  No results found for: TRIG, HDL, LDLCALC, LDLDIRECT, LABVLDL  BUN/Creatinine:    Lab Results   Component Value Date    BUN 12 01/05/2020    CREATININE <0.5 01/05/2020     EKG Interpretation: Normal sinus rhythm with PACs.   Nonspecific ST-T wave changes. Telemetry monitoring shows brief paroxysms of nonsustained atrial fibrillation. Image Review: Agree with radiologist interpretation    Assessment/Plan:   Nonsustained episodes of brief paroxysmal atrial fibrillation. Asymptomatic. Essential hypertension. Well controlled. Recent hip fracture after fall. S/P ORIF. Uncomplicated convalescence. Recommendations:  Discontinuation of lisinopril hydrochlorothiazide. Up titration of Lopressor. Eliquis 5.0 mg orally twice per day. Antiarrhythmic drug therapy not indicated. Thank you very much for allowing me to participate in the care of your patient. Please do not hesitate to contact me if you have any questions.     Electronically Signed by Mary Ellen Boateng MD on 1/8/2020 at 12:59 PM

## 2020-01-08 NOTE — PROGRESS NOTES
Physical Therapy  Facility/Department: [facility] MED SURG  Daily Treatment Note  NAME: Lona Shrestha  : 1935  MRN: [id number]    Date of Service: 2020    Discharge Recommendations:  Home with Home health PT, Home with assist PRN        Assessment   Assessment: Patient agreeable to PT skilled services. Patient demonstrated good tolerance to gait training activity with increased distance and reps noted. Good participation with graded therex completing activities with min ucing for pacing and full rom. Patient left upright in recliner, no needs noted. Activity Tolerance  Activity Tolerance: Patient Tolerated treatment well     Patient Diagnosis(es): There were no encounter diagnoses. has a past medical history of Deaf, left, Hyperlipidemia, Hypertension, and Macular degeneration. has a past surgical history that includes hip surgery (Left). Restrictions  Restrictions/Precautions  Restrictions/Precautions: Weight Bearing, General Precautions, Surgical Protocols  Required Braces or Orthoses?: No  Lower Extremity Weight Bearing Restrictions  Right Lower Extremity Weight Bearing: Weight Bearing As Tolerated  Subjective   General  Chart Reviewed: Yes  Response To Previous Treatment: Patient with no complaints from previous session. Family / Caregiver Present: No  Subjective  Subjective: Patient agreeable to PT skilled services, denies pain.        Objective      Ambulation  Ambulation?: Yes  Ambulation 1  Surface: level tile  Device: Rolling Walker  Assistance: Contact guard assistance  Gait Deviations: Slow Elsa;Decreased step length;Decreased step height  Distance: [id number]        Exercises  Heelslides: [id number] B  Hip Flexion: [id number] B  Knee Long Arc Quad: [id number] B  Comments: HS curls with manual resistance [id number] B   AROM RLE (degrees)  RLE AROM: WFL  AROM LLE (degrees)  LLE AROM : WFL        Goals  Long term goals  Time Frame for Long term goals : 1-2 weeks  Long term goal 1: Achieve mod I for bed mobility. Long term goal 2: Achieve basic transfers with Supervision. Long term goal 3: Ambulate 150 feet with SBA x 1 with RW or st. cane with good safety awareness. Long term goal 4: Patient-family education as needed. Long term goal 5: Achieve 4-/5 right hip strength. Plan    Plan  Times per week: 4-5 x week  Plan weeks: 1-2 weeks  Current Treatment Recommendations: Strengthening, Balance Training, Functional Mobility Training, Transfer Training, Neuromuscular Re-education, Gait Training, Patient/Caregiver Education & Training, Safety Education & Training, Home Exercise Program  Safety Devices  Type of devices: Call light within reach, Left in chair     Therapy Time   Individual Concurrent Group Co-treatment   Time In 1030         Time Out 1055         Minutes [address] [facility], PTA     This note serves a discharge summary in the event of patient discharge.

## 2020-01-08 NOTE — PROGRESS NOTES
Occupational Therapy  Facility/Department: [facility] MED SURG  Daily Treatment Note  NAME: Angella Hudson  : 1935  MRN: [id number]    Date of Service: 2020    Discharge Recommendations:  Home with Home health OT       Assessment   Assessment: Pt seen for OT session on this date. Pt seated upright in chair. Pt completed UE ther ex using red theraband tolerating well without difficulty. Pt tolerated x15 reps with no rest breaks needed. Pt left seated upright and sesison ended as lunch tray was delivered. Will attempt BID session to address ADL retraining in PM.             Patient Diagnosis(es): There were no encounter diagnoses. has a past medical history of Deaf, left, Hyperlipidemia, Hypertension, and Macular degeneration. has a past surgical history that includes hip surgery (Left). Restrictions  Restrictions/Precautions  Restrictions/Precautions: Weight Bearing, General Precautions, Surgical Protocols  Required Braces or Orthoses?: No  Lower Extremity Weight Bearing Restrictions  Right Lower Extremity Weight Bearing: Weight Bearing As Tolerated  Subjective   General  Chart Reviewed: Yes  Patient assessed for rehabilitation services?: Yes  Family / Caregiver Present: No  Referring Practitioner: Ivette Hooks   Diagnosis: RLE ORIF intertrochanteric fx. Subjective  Subjective: Pt reports she was down in basement and fell as water was coming in basement. Surgery on  ORIF. Pt agreeable to OT services.        Orientation     Objective                                                                Type of ROM/Therapeutic Exercise  Type of ROM/Therapeutic Exercise: Resistive Bands  Comment: Red theraband  Exercises  Scapular Retraction: x15  Shoulder Flexion: x15  Shoulder Extension: x15  Horizontal ABduction: x15  Elbow Flexion: x15  Elbow Extension: x15                    Plan   Plan  Times per week: 3-5  Times per day: Daily  Plan weeks: 1  Current Treatment Recommendations: Strengthening, Endurance Training, Self-Care / ADL, Safety Education & Training, Balance Training, Patient/Caregiver Education & Training, Equipment Evaluation, Education, & procurement  G-Code     OutComes Score                                                  AM-PAC Score             Goals  Short term goals  Time Frame for Short term goals: 1 week  Short term goal 1: Pt to complete LB dressing with MOD I using AE as needed. Short term goal 2: Pt to complete tub transfer with MOD I using good safety. Short term goal 3: Pt to complete bathing with MOD I>   Short term goal 4: Pt to complete hygiene/grooming in standing with MOD I. Therapy Time   Individual Concurrent Group Co-treatment   Time In 4444         Time Out 1141         Minutes 15              This note serves as a DC summary in the event of pt discharge.      Mary Toney OTR/L

## 2020-01-08 NOTE — PLAN OF CARE
Problem:  Activity:  Goal: Ability to ambulate will improve  Description  Ability to ambulate will improve  Outcome: Ongoing     Problem: Safety:  Goal: Ability to remain free from injury will improve  Description  Ability to remain free from injury will improve  Outcome: Ongoing

## 2020-01-08 NOTE — PLAN OF CARE
Problem:  Activity:  Goal: Ability to ambulate will improve  Description  Ability to ambulate will improve  1/8/2020 0155 by Dean Saeed RN  Outcome: Ongoing  1/8/2020 0152 by Dean Saeed RN  Outcome: Ongoing     Problem: Safety:  Goal: Ability to remain free from injury will improve  Description  Ability to remain free from injury will improve  Outcome: Ongoing     Problem: Self-Care:  Goal: Ability to meet self-care needs will improve  Description  Ability to meet self-care needs will improve  Outcome: Ongoing

## 2020-01-08 NOTE — FLOWSHEET NOTE
Extremity Swelling;Limited movement; Injury/trauma; Surgery   LL Extremity Full movement   Genitourinary   Genitourinary (WDL) WDL   Anus/Rectum   Anus/Rectum (WDL) WDL   Incision 01/04/20 Hip Right;Upper   Date First Assessed/Time First Assessed: 01/04/20 2039   Present on Hospital Admission: Yes  Primary Wound Type: (c) Incision  Location: Hip  Wound Location Orientation: Right;Upper  Wound Description (Comments): Surgical inc. 30 Staples    Wound Assessment Swelling;Drainage   Closure Staples   Drainage Amount Large   Drainage Description Serosanguinous   Odor None   Dressing/Treatment Other (comment)  (covaderms intact)   Dressing Changed Changed/New   Dressing Change Due 01/09/20   Psychosocial   Psychosocial (WDL) WDL   Pt awake in bed. Pt alert and oriented. Pt appears in no acute distress. Pt currently on RA. Pt currently on telemetry monitor. Pt lung sounds clear eloise. Pt encouraged to cough and deep breathe. Pt given prune juice for bm. Pt dressings all CDI. Pt has trace edema in LLE and +2 non pitting in RLE. Pt BP meds held due to BP low, will consult with MD. Pt states pain, see emAR for intervention. Pt call bell and bedside table within reach. Will continue to monitor pt.

## 2020-01-09 LAB
GLUCOSE BLD-MCNC: 129 MG/DL (ref 74–106)
GLUCOSE BLD-MCNC: 184 MG/DL (ref 74–106)
GLUCOSE BLD-MCNC: 216 MG/DL (ref 74–106)
GLUCOSE BLD-MCNC: 318 MG/DL (ref 74–106)
PERFORMED ON: ABNORMAL

## 2020-01-09 PROCEDURE — 1200000002 HC SEMI PRIVATE SWING BED

## 2020-01-09 PROCEDURE — 97110 THERAPEUTIC EXERCISES: CPT | Performed by: PHYSICAL THERAPY ASSISTANT

## 2020-01-09 PROCEDURE — 6370000000 HC RX 637 (ALT 250 FOR IP): Performed by: NURSE PRACTITIONER

## 2020-01-09 PROCEDURE — 6360000002 HC RX W HCPCS: Performed by: NURSE PRACTITIONER

## 2020-01-09 PROCEDURE — 97116 GAIT TRAINING THERAPY: CPT | Performed by: PHYSICAL THERAPY ASSISTANT

## 2020-01-09 PROCEDURE — 97535 SELF CARE MNGMENT TRAINING: CPT

## 2020-01-09 PROCEDURE — 6370000000 HC RX 637 (ALT 250 FOR IP): Performed by: PHYSICIAN ASSISTANT

## 2020-01-09 RX ADMIN — PANTOPRAZOLE SODIUM 40 MG: 40 TABLET, DELAYED RELEASE ORAL at 06:26

## 2020-01-09 RX ADMIN — BISACODYL 10 MG: 10 SUPPOSITORY RECTAL at 14:59

## 2020-01-09 RX ADMIN — SIMETHICONE CHEW TAB 80 MG 80 MG: 80 TABLET ORAL at 08:38

## 2020-01-09 RX ADMIN — INSULIN LISPRO 1 UNITS: 100 INJECTION, SOLUTION INTRAVENOUS; SUBCUTANEOUS at 16:29

## 2020-01-09 RX ADMIN — METOPROLOL TARTRATE 12.5 MG: 25 TABLET, FILM COATED ORAL at 08:37

## 2020-01-09 RX ADMIN — DOCUSATE SODIUM 100 MG: 100 CAPSULE, LIQUID FILLED ORAL at 08:38

## 2020-01-09 RX ADMIN — INSULIN LISPRO 2 UNITS: 100 INJECTION, SOLUTION INTRAVENOUS; SUBCUTANEOUS at 11:14

## 2020-01-09 RX ADMIN — INSULIN LISPRO 2 UNITS: 100 INJECTION, SOLUTION INTRAVENOUS; SUBCUTANEOUS at 20:45

## 2020-01-09 RX ADMIN — METOPROLOL TARTRATE 12.5 MG: 25 TABLET, FILM COATED ORAL at 20:41

## 2020-01-09 RX ADMIN — ATORVASTATIN CALCIUM 10 MG: 10 TABLET, FILM COATED ORAL at 08:38

## 2020-01-09 RX ADMIN — ENOXAPARIN SODIUM 40 MG: 40 INJECTION SUBCUTANEOUS at 08:38

## 2020-01-09 RX ADMIN — HYDROCODONE BITARTRATE AND ACETAMINOPHEN 1 TABLET: 5; 325 TABLET ORAL at 01:07

## 2020-01-09 RX ADMIN — MULTIPLE VITAMINS W/ MINERALS TAB 1 TABLET: TAB at 08:37

## 2020-01-09 RX ADMIN — HYDROCODONE BITARTRATE AND ACETAMINOPHEN 1 TABLET: 5; 325 TABLET ORAL at 20:44

## 2020-01-09 RX ADMIN — POLYETHYLENE GLYCOL 3350 17 G: 17 POWDER, FOR SOLUTION ORAL at 08:38

## 2020-01-09 RX ADMIN — HYDROCODONE BITARTRATE AND ACETAMINOPHEN 1 TABLET: 5; 325 TABLET ORAL at 15:09

## 2020-01-09 RX ADMIN — HYDROCODONE BITARTRATE AND ACETAMINOPHEN 1 TABLET: 5; 325 TABLET ORAL at 08:38

## 2020-01-09 ASSESSMENT — PAIN SCALES - GENERAL
PAINLEVEL_OUTOF10: 6
PAINLEVEL_OUTOF10: 7
PAINLEVEL_OUTOF10: 6
PAINLEVEL_OUTOF10: 5

## 2020-01-09 NOTE — FLOWSHEET NOTE
LUE Full movement   RL Extremity Swelling;Limited movement; Injury/trauma; Surgery   LL Extremity Full movement   Genitourinary   Genitourinary (WDL) WDL   Anus/Rectum   Anus/Rectum (WDL) WDL   Incision 01/04/20 Hip Right;Upper   Date First Assessed/Time First Assessed: 01/04/20 2039   Present on Hospital Admission: Yes  Primary Wound Type: (c) Incision  Location: Hip  Wound Location Orientation: Right;Upper  Wound Description (Comments): Surgical inc. 30 Staples    Closure Staples   Odor None   Dressing/Treatment Other (comment)  (covaderms intact)   Dressing Status Clean;Dry; Intact   Dressing Change Due 01/09/20   Psychosocial   Psychosocial (WDL) WDL   Pt awake in chair. Pt alert and oriented. Pt appears in no acute distress. Pt currently on RA. Pt currently on telemetry monitoring showing ST with PVCs. Pt lung sounds clear eloise. Pt encouraged to cough and deep breathe. Pt has +2 piting edema in RLE. Pt has trace edema in LLE. Pt dressing on eloise elbows and rt hip CDI. Pt states pain, see emAR for intervention. Pt call bell and bedside table within reach. Will continue to monitor pt.

## 2020-01-09 NOTE — CARE COORDINATION
Interdisciplinary rounding completed. Yesica Gary (), Faustina Samano (nursing), Gibran (PT), Mary (OT), Anshul Marshall (RT), Janene (pharmacy), and Lisset (dietitian) all involved. Activities reviewed with OT.      lives alone. Has family assist PRN. Has WC, RW SC, at home. HH at [place]. pt cleared for home safety with HH. lat wt at review: 198.5lb; cardiac, carb control, low sodium diet. ONS BID.  +2 pitting edema, surgical wound. Has MVI in place and only eating fair-good (54-78%) No current antibiotics. Lovenox 40mg subq daily for VTE prophylaxis. pt is requesting home on Friday. No DME needs noted.   Will need HH at [facility]

## 2020-01-10 ENCOUNTER — APPOINTMENT (OUTPATIENT)
Dept: ULTRASOUND IMAGING | Facility: HOSPITAL | Age: 85
DRG: 560 | End: 2020-01-10
Attending: INTERNAL MEDICINE
Payer: MEDICARE

## 2020-01-10 VITALS
WEIGHT: 196.2 LBS | HEIGHT: 66 IN | HEART RATE: 113 BPM | TEMPERATURE: 97.6 F | SYSTOLIC BLOOD PRESSURE: 113 MMHG | OXYGEN SATURATION: 95 % | DIASTOLIC BLOOD PRESSURE: 50 MMHG | RESPIRATION RATE: 18 BRPM | BODY MASS INDEX: 31.53 KG/M2

## 2020-01-10 LAB
GLUCOSE BLD-MCNC: 186 MG/DL (ref 74–106)
GLUCOSE BLD-MCNC: 243 MG/DL (ref 74–106)
PERFORMED ON: ABNORMAL
PERFORMED ON: ABNORMAL

## 2020-01-10 PROCEDURE — 97116 GAIT TRAINING THERAPY: CPT | Performed by: PHYSICAL THERAPY ASSISTANT

## 2020-01-10 PROCEDURE — 99315 NF DSCHRG MGMT 30 MIN/LESS: CPT | Performed by: INTERNAL MEDICINE

## 2020-01-10 PROCEDURE — 6370000000 HC RX 637 (ALT 250 FOR IP): Performed by: NURSE PRACTITIONER

## 2020-01-10 PROCEDURE — 6370000000 HC RX 637 (ALT 250 FOR IP): Performed by: PHYSICIAN ASSISTANT

## 2020-01-10 PROCEDURE — 6360000002 HC RX W HCPCS: Performed by: NURSE PRACTITIONER

## 2020-01-10 PROCEDURE — 93971 EXTREMITY STUDY: CPT

## 2020-01-10 RX ORDER — BISACODYL 10 MG
10 SUPPOSITORY, RECTAL RECTAL DAILY PRN
Qty: 30 SUPPOSITORY | Refills: 0 | Status: SHIPPED | OUTPATIENT
Start: 2020-01-10 | End: 2020-02-09

## 2020-01-10 RX ADMIN — INSULIN LISPRO 1 UNITS: 100 INJECTION, SOLUTION INTRAVENOUS; SUBCUTANEOUS at 08:31

## 2020-01-10 RX ADMIN — MULTIPLE VITAMINS W/ MINERALS TAB 1 TABLET: TAB at 08:28

## 2020-01-10 RX ADMIN — INSULIN LISPRO 6 UNITS: 100 INJECTION, SOLUTION INTRAVENOUS; SUBCUTANEOUS at 11:08

## 2020-01-10 RX ADMIN — PANTOPRAZOLE SODIUM 40 MG: 40 TABLET, DELAYED RELEASE ORAL at 05:38

## 2020-01-10 RX ADMIN — HYDROCODONE BITARTRATE AND ACETAMINOPHEN 1 TABLET: 5; 325 TABLET ORAL at 10:44

## 2020-01-10 RX ADMIN — DOCUSATE SODIUM 100 MG: 100 CAPSULE, LIQUID FILLED ORAL at 08:28

## 2020-01-10 RX ADMIN — ATORVASTATIN CALCIUM 10 MG: 10 TABLET, FILM COATED ORAL at 08:27

## 2020-01-10 RX ADMIN — ENOXAPARIN SODIUM 40 MG: 40 INJECTION SUBCUTANEOUS at 08:28

## 2020-01-10 RX ADMIN — LISINOPRIL AND HYDROCHLOROTHIAZIDE 1 TABLET: 12.5; 2 TABLET ORAL at 08:26

## 2020-01-10 RX ADMIN — METFORMIN HYDROCHLORIDE 500 MG: 500 TABLET ORAL at 10:44

## 2020-01-10 RX ADMIN — METOPROLOL TARTRATE 12.5 MG: 25 TABLET, FILM COATED ORAL at 08:26

## 2020-01-10 ASSESSMENT — PAIN SCALES - GENERAL: PAINLEVEL_OUTOF10: 6

## 2020-01-10 NOTE — CARE COORDINATION
The Plan for Transition of Care is related to the following treatment goals: home with Christophe Rincon    The Patient was provided with a choice of provider and agrees   with the discharge plan. [x] Yes [] No    Freedom of choice list was provided with basic dialogue that supports the patient's individualized plan of care/goals, treatment preferences and shares the quality data associated with the providers. [x] Yes [] No      Pt chose (1) [facility] (2) [facility] (3) [facility]. Contact Catina from [email or web address],  They do not cover [facility]. Orders sent to [facility] with [facility]. No DME needs as she states she has all equipment at home.

## 2020-01-10 NOTE — DISCHARGE SUMMARY
Discharge Summary      Patient ID: Jacob Singh      Patient's PCP: Danica Alberts    Admit Date: 1/3/2020     Discharge Date:  1/10/2020    Admitting Provider: Cailin Remy MD    Discharging Provider: BIN Blackman     Reason for this admission:   Declining functional status s/p hip fracture    Discharge Diagnoses: Active Hospital Problems    Diagnosis Date Noted    S/P ORIF (open reduction internal fixation) fracture [Z98.890, Z87.81] 01/04/2020    Closed right hip fracture, sequela [S72.001S] 01/04/2020    Essential hypertension [I10] 01/04/2020    Arthritis [M19.90] 01/04/2020    Type 2 diabetes mellitus without complication, without long-term current use of insulin ([facility] [address] [E11.9] 01/04/2020    Other hyperlipidemia [E78.49] 01/04/2020    H/O supraventricular tachycardia [Z86.79] 01/04/2020    Declining functional status [R53.81] 01/03/2020       Procedures:  No orders to display         Consults:   IP CONSULT TO CARDIOLOGY  IP CONSULT TO DIETITIAN  IP CONSULT TO HOME CARE NEEDS  PT/OT      Briefly:   80 y.o. female with PMH HLD, OA, HTN, DM who was a direct admit to swing bed s/p ORIF Right intertrochanteric hip fracture secondary to mechanical fall, on 12/31/19 by Dr. Marie Shukla for continued PT/OT. Hospital Course:       Active Hospital Problems    Diagnosis Date Noted    S/P ORIF (open reduction internal fixation) fracture [U58.480, Z87.81]  - ORIF on 12/31/19 by   - did well with PT/OT; now safe to go home with home health  - Dressing change and staple removal at 2 week follow up with Ortho  - continue holding home ASA for 1 month per Ortho recs   01/04/2020    Closed right hip fracture, sequela [S72.001S]  - as above   01/04/2020    Essential hypertension [I10]  - continue metoprolol  - home lisinopril-HCTZ discontinued while inpatient   01/04/2020    Arthritis [M19.90]  - stable  - continue topical gel PRN and APAP tylenol   01/04/2020    Type 2 diabetes mellitus without complication, without long-term current use of insulin (HCC) [E11.9]  - A1c 6.0 on 1/5/2020  - elevated readings noted during hospitalization  - added metformin   01/04/2020    Other hyperlipidemia [E78.49]  - stable, continue home statin   01/04/2020    Nonsustained episodes of brief paroxysmal atrial fibrillation  - noted on telemetry, then placed on holter monitor and evaluated by   - per , add Eliquis 5 mg po BID and continue lopressor. Titrate BB as need for rate control as long as BP tolerates. - rate controlled and BP stable on current dose      01/04/2020    Declining functional status [R53.81]  - secondary to recent hip fracture; doing better and closer to baseline    DVT of RLE  - Duplex RLE with DVT involving the peroneal and posterior tibial veins. No DVT of the above the knees structures. - spoke with pharmacy. Eliquis $50/month copay for pt. Xarelto on 340B plan and $20 for 3 month supply. Pt opted for Xarelto. 01/03/2020       Disposition: home    Discharged Condition: Stable    Vital Signs  Temp: 97.6 °F (36.4 °C)  Pulse: 113  Resp: 18  BP: (!) 113/50  SpO2: 95 %  O2 Device: None (Room air)  O2 Flow Rate (L/min): 2 L/min    Vital signs reviewed in electronic chart. Physical exam  Constitutional:  Elderly, Well developed, well nourished, no acute distress  Eyes:  PERRL, no scleral icterus, conjunctiva normal   HENT:  Atraumatic, external ears normal, nose normal, oropharynx moist, no pharyngeal exudates. Neck- supple, no JVD. Respiratory:  No respiratory distress, no wheezing, rales or rhonchi detected  Cardiovascular:  Irregularly irregular. Normal rate, no murmurs, no edema   GI:  Soft, nondistended, normal bowel sounds, nontender, no voluntary guarding  Musculoskeletal:  No cyanosis or obvious acute deformity. Right hip dressing intact with small amount serosanguinous drainage-no crepitus, purulent drainage or concerns noted.  Dressing not removed at this time. Chronic joint swelling right knee and ankle-no TTP.  Moving all extremities with LROM right hip. Bilateral pedal pulses intact. Slight edema to RLE. Integument:  Warm and dry. Neurologic:  Alert & oriented x 3, no apparent focal deficits noted   Psychiatric:  Speech and behavior appropriate   .      Activity: activity as tolerated  Diet: diabetic diet  Follow Up: Primary Care Physician in 2 week and with Ortho as scheduled  Labs:  Repeat CBC, BMP in 2 weeks. Labs: For convenience and continuity at follow-up the following most recent labs are provided:    CBC:   Lab Results   Component Value Date    WBC 8.2 01/05/2020    HGB 9.1 01/05/2020    HCT 27.5 01/05/2020     01/05/2020       RENAL:   Lab Results   Component Value Date     01/05/2020    K 3.5 01/05/2020    K 3.6 12/30/2019     01/05/2020    CO2 29 01/05/2020    BUN 12 01/05/2020    CREATININE <0.5 01/05/2020         Discharge Medications:     Current Discharge Medication List           Details   metFORMIN (GLUCOPHAGE) 500 MG tablet Take 1 tablet by mouth 2 times daily (with meals)  Qty: 60 tablet, Refills: 3      metoprolol tartrate (LOPRESSOR) 25 MG tablet Take 0.5 tablets by mouth 2 times daily  Qty: 60 tablet, Refills: 3      diclofenac sodium 1 % GEL Apply 4 g topically 4 times daily as needed for Pain  Qty: 1 Tube, Refills: 3      bisacodyl (DULCOLAX) 10 MG suppository Place 1 suppository rectally daily as needed for Constipation  Qty: 30 suppository, Refills: 0      rivaroxaban 15 & 20 MG Starter Pack Take as directed on package.   Qty: 1 Package, Refills: 0      rivaroxaban (XARELTO) 20 MG TABS tablet Take 1 tablet by mouth daily (with breakfast)  Qty: 90 tablet, Refills: 0              Details   simvastatin (ZOCOR) 10 MG tablet Take 10 mg by mouth nightly      estradiol (ESTRACE) 0.1 MG/GM vaginal cream Place 2 g vaginally daily      docusate sodium (STOOL SOFTENER) 100 MG capsule Take 100 mg by mouth 2 times daily Multiple Vitamins-Minerals (OCUVITE EXTRA PO) Take by mouth              Patient was seen and examined by Dr. Segun Ramirez and plan of care reviewed. Signed:  Electronically signed by Fran Bence, PA on 1/10/2020 at 10:54 AM       Thank you Igor Mak for the opportunity to be involved in this patient's care. If you have any questions or concerns please feel free to contact me at [phone].

## 2020-01-10 NOTE — PROGRESS NOTES
PROM: WFL  AROM RLE (degrees)  RLE AROM: WFL  PROM LLE (degrees)  LLE PROM: WFL  AROM LLE (degrees)  LLE AROM : WFL         Goals  Long term goals  Time Frame for Long term goals : 1-2 weeks  Long term goal 1: Achieve mod I for bed mobility. Long term goal 2: Achieve basic transfers with Supervision. Long term goal 3: Ambulate 150 feet with SBA x 1 with RW or st. cane with good safety awareness. Long term goal 4: Patient-family education as needed. Long term goal 5: Achieve 4-/5 right hip strength. Plan    Plan  Times per week: 4-5 x week  Plan weeks: 1-2 weeks  Current Treatment Recommendations: Strengthening, Balance Training, Functional Mobility Training, Transfer Training, Neuromuscular Re-education, Gait Training, Patient/Caregiver Education & Training, Safety Education & Training, Home Exercise Program  Safety Devices  Type of devices: Left in chair, Call light within reach     Therapy Time   Individual Concurrent Group Co-treatment   Time In 1112         Time Out 1129         Minutes [address] [place] [address]     This note serves a discharge summary in the event of patient discharge.

## 2020-01-11 PROBLEM — I82.4Z1 ACUTE DEEP VEIN THROMBOSIS (DVT) OF DISTAL VEIN OF RIGHT LOWER EXTREMITY (HCC): Status: ACTIVE | Noted: 2020-01-11

## 2020-01-22 ENCOUNTER — OFFICE VISIT (OUTPATIENT)
Dept: CARDIOLOGY | Facility: CLINIC | Age: 85
End: 2020-01-22

## 2020-01-22 VITALS
HEART RATE: 91 BPM | HEIGHT: 66 IN | WEIGHT: 188 LBS | OXYGEN SATURATION: 96 % | BODY MASS INDEX: 30.22 KG/M2 | DIASTOLIC BLOOD PRESSURE: 60 MMHG | SYSTOLIC BLOOD PRESSURE: 130 MMHG | RESPIRATION RATE: 20 BRPM

## 2020-01-22 DIAGNOSIS — I48.19 OTHER PERSISTENT ATRIAL FIBRILLATION (HCC): ICD-10-CM

## 2020-01-22 DIAGNOSIS — I87.2 VENOUS INSUFFICIENCY OF BOTH LOWER EXTREMITIES: ICD-10-CM

## 2020-01-22 DIAGNOSIS — I10 ESSENTIAL HYPERTENSION: Primary | ICD-10-CM

## 2020-01-22 DIAGNOSIS — E11.9 CONTROLLED TYPE 2 DIABETES MELLITUS WITHOUT COMPLICATION, WITHOUT LONG-TERM CURRENT USE OF INSULIN (HCC): ICD-10-CM

## 2020-01-22 DIAGNOSIS — I35.0 NONRHEUMATIC AORTIC VALVE STENOSIS: ICD-10-CM

## 2020-01-22 PROCEDURE — 99214 OFFICE O/P EST MOD 30 MIN: CPT | Performed by: INTERNAL MEDICINE

## 2020-01-22 RX ORDER — SIMVASTATIN 10 MG
10 TABLET ORAL DAILY
COMMUNITY
End: 2020-06-01

## 2020-01-22 RX ORDER — HYDROCODONE BITARTRATE AND ACETAMINOPHEN 5; 325 MG/1; MG/1
TABLET ORAL AS NEEDED
COMMUNITY
Start: 2020-01-15 | End: 2020-06-01

## 2020-01-22 NOTE — PROGRESS NOTES
"    Subjective:     Encounter Date:01/22/2020      Patient ID: Bri Iniguez is a 84 y.o. female.    Chief Complaint: Atrial fibrillation  HPI  This is an 84-year-old female patient who recently had surgery on her right hip for fall resulting in fracture.  The patient developed a deep vein thrombosis in her right leg.  She is currently on Xarelto and has noticed increased tendency for nosebleeds from the left nostril particularly when she \"blows her nose\".  She has had no other bleeding complications or increased bruising.  She has had no signs or symptoms to suggest stroke, TIA or other cardioembolic phenomenon.  She has been recognized to have atrial fibrillation.  She is concerned that the Lopressor is causing her stomach to be upset.  An echocardiogram performed in the hospital demonstrated mild concentric left ventricular hypertrophy with an ejection fraction of greater than 70%.  There were no regional wall motion abnormalities.  She was demonstrated to have mild valvular aortic stenosis.  There was no evidence of mitral valve disease.  She demonstrated mild left atrial enlargement with grade 1 diastolic dysfunction.  There were no features to suggest pericardial disease or significant secondary pulmonary hypertension.  The patient has no chest discomfort at rest or with activity.  There is no exertional chest arm neck jaw shoulder or back discomfort.  There is no orthopnea PND or lower extremity edema.  There is no dizziness palpitations or syncope.  She has no shortness of breath at rest or with activity.  She continues to do home-based physical therapy with home health nursing.  She is currently ambulating with a walker.  She is a lifelong non-smoker.  She reports compliance with her medications.  The following portions of the patient's history were reviewed and updated as appropriate: allergies, current medications, past family history, past medical history, past social history, past surgical history and " problem  Review of Systems   Constitution: Negative for chills, diaphoresis, fever, malaise/fatigue, weight gain and weight loss.   HENT: Positive for nosebleeds. Negative for ear discharge, hearing loss and hoarse voice.    Eyes: Negative for discharge, double vision, pain and photophobia.   Cardiovascular: Negative for chest pain, claudication, cyanosis, dyspnea on exertion, irregular heartbeat, leg swelling, near-syncope, orthopnea, palpitations, paroxysmal nocturnal dyspnea and syncope.   Respiratory: Negative for cough, hemoptysis, shortness of breath, sputum production and wheezing.    Endocrine: Negative for cold intolerance, heat intolerance, polydipsia, polyphagia and polyuria.   Hematologic/Lymphatic: Negative for adenopathy and bleeding problem. Does not bruise/bleed easily.   Skin: Negative for color change, flushing, itching and rash.   Musculoskeletal: Negative for muscle cramps, muscle weakness, myalgias and stiffness.   Gastrointestinal: Negative for abdominal pain, diarrhea, hematemesis, hematochezia, nausea and vomiting.   Genitourinary: Negative for dysuria, frequency and nocturia.   Neurological: Negative for dizziness, focal weakness, light-headedness, loss of balance, numbness, paresthesias and seizures.   Psychiatric/Behavioral: Negative for altered mental status, hallucinations and suicidal ideas.   Allergic/Immunologic: Negative for HIV exposure, hives and persistent infections.           Current Outpatient Medications:   •  acetaminophen (TYLENOL) 650 MG 8 hr tablet, Take 650 mg by mouth 2 (Two) Times a Day., Disp: , Rfl:   •  diclofenac (VOLTAREN) 1 % gel gel, 4 (Four) Times a Day As Needed., Disp: , Rfl:   •  docusate sodium 100 MG capsule, Take 1 capsule by mouth 2 (Two) Times a Day As Needed for Constipation. (Patient taking differently: Take 1 capsule by mouth 2 (Two) Times a Day As Needed (CONSTIPATION).), Disp: 60 capsule, Rfl: 0  •  estradiol (ESTRACE) 0.1 MG/GM vaginal cream, 3  (Three) Times a Week. Apply using finger technique, Disp: , Rfl: 6  •  HYDROcodone-acetaminophen (NORCO) 5-325 MG per tablet, As Needed., Disp: , Rfl:   •  INTRAROSA 6.5 MG insert, Insert 1 suppository into the vagina Every Night., Disp: , Rfl: 6  •  metoprolol tartrate (LOPRESSOR) 25 MG tablet, 0.5 tablets 2 (Two) Times a Day., Disp: , Rfl:   •  Multiple Vitamins-Minerals (OCUVITE ADULT 50+) capsule, Take 1 tablet by mouth Daily., Disp: , Rfl:   •  simvastatin (ZOCOR) 10 MG tablet, Take 10 mg by mouth Daily., Disp: , Rfl:     Objective:   Physical Exam   Constitutional: She is oriented to person, place, and time. She appears well-developed and well-nourished. No distress.   HENT:   Head: Normocephalic and atraumatic.   Mouth/Throat: Oropharynx is clear and moist.   Eyes: Pupils are equal, round, and reactive to light. Conjunctivae and EOM are normal. No scleral icterus.   Neck: Normal range of motion. Neck supple. No JVD present. No tracheal deviation present. No thyromegaly present.   Cardiovascular: Normal rate, S1 normal, S2 normal, intact distal pulses and normal pulses. An irregularly irregular rhythm present. PMI is not displaced. Exam reveals no gallop and no friction rub.   Murmur heard.   Harsh crescendo-decrescendo midsystolic murmur is present with a grade of 2/6 at the upper right sternal border radiating to the neck.  Pulmonary/Chest: Effort normal and breath sounds normal. No respiratory distress. She has no wheezes. She has no rales.   Abdominal: Soft. Bowel sounds are normal. She exhibits no distension and no mass. There is no tenderness. There is no rebound and no guarding.   Musculoskeletal: Normal range of motion. She exhibits edema. She exhibits no deformity.   Moderate swelling of the right foot ankle and calf.   Neurological: She is alert and oriented to person, place, and time. She displays normal reflexes. No cranial nerve deficit. Coordination normal.   Skin: Skin is warm and dry. No rash  "noted. She is not diaphoretic. No erythema.   Psychiatric: She has a normal mood and affect. Her behavior is normal. Thought content normal.     Blood pressure 130/60, pulse 91, resp. rate 20, height 167.6 cm (65.98\"), weight 85.3 kg (188 lb), SpO2 96 %, not currently breastfeeding.   Lab Review:     Assessment:       1. Essential hypertension  Acceptable blood pressure control.    2. Venous insufficiency of both lower extremities  Right lower extremity deep vein thrombosis.  Evidence of chronic thrombophlebitis.    3. Controlled type 2 diabetes mellitus without complication, without long-term current use of insulin (CMS/[facility])  This is followed closely by her primary care provider.    4. Other persistent atrial fibrillation  Rate control and anticoagulation strategy.  The patient has been having some nosebleeds while on Eliquis therapy.  Asymptomatic.    5. Nonrheumatic aortic valve stenosis  Mild by echo criteria.  Normal ejection fraction with mild concentric left ventricular hypertrophy and grade 1 diastolic dysfunction.  Asymptomatic.    Procedures    Plan:     We are arranging for the patient to have an outpatient 30-day cardiac monitor.  I have recommended changing her Xarelto to Eliquis 5 mg orally twice a day.  The rationale being that Eliquis has the lowest incidence of bleeding related side effects.  I have recommended changing her Lopressor to a different beta-blocker formulation such as atenolol 25 mg orally twice per day.  The patient is agreeable to change her Xarelto to Eliquis but does not wish to change Lopressor at this time.  The patient has been advised that if she continues to have an upset stomach after taking Lopressor to contact our office and we can initiate atenolol therapy as outlined above.  The patient has been advised that given her elevated Chads 2 vascular score she will require lifelong anticoagulation therapy with a direct oral anticoagulant regardless of her DVT status.  No " additional cardiovascular testing is indicated at this time.  Further recommendations will be predicated on her heart monitor findings.  The patient has been advised to discontinue Xarelto for 48 hours then initiate Eliquis therapy.  Given that her GFR is greater than 60 and her weight is greater than 60 kg she does not qualify for the decreased 2.5 mg twice per day dose of Eliquis.  The most appropriate dosing would be 5 mg orally twice per day.  The patient has been advised that if her co-pay is excessively high to contact our office for free samples of Eliquis.  The patient and her daughter has been educated that Eliquis like Xarelto is a blood thinner and there is a risk of bleeding complication.  However, they have been educated that Eliquis has the lowest incidence of bleeding side effects particularly when compared to Coumadin.  The patient has been counseled to keep her right leg elevated as much as possible throughout the course of the day.  If her right lower extremity swelling persist she would be a candidate for compression stockings.  She has been counseled regarding the importance of dietary sodium intake restriction.  She has been educated as to foods and beverages that are high in sodium content with instructions to avoid these.  I would recommend a dietary sodium restriction of 1500 mg/day.

## 2020-06-01 ENCOUNTER — OFFICE VISIT (OUTPATIENT)
Dept: ORTHOPEDIC SURGERY | Facility: CLINIC | Age: 85
End: 2020-06-01

## 2020-06-01 VITALS — OXYGEN SATURATION: 98 % | WEIGHT: 188 LBS | HEIGHT: 66 IN | BODY MASS INDEX: 30.22 KG/M2 | HEART RATE: 95 BPM

## 2020-06-01 DIAGNOSIS — M17.0 PRIMARY OSTEOARTHRITIS OF BOTH KNEES: Primary | ICD-10-CM

## 2020-06-01 PROCEDURE — 99214 OFFICE O/P EST MOD 30 MIN: CPT | Performed by: ORTHOPAEDIC SURGERY

## 2020-06-01 PROCEDURE — 20610 DRAIN/INJ JOINT/BURSA W/O US: CPT | Performed by: ORTHOPAEDIC SURGERY

## 2020-06-01 RX ORDER — TRIAMCINOLONE ACETONIDE 40 MG/ML
40 INJECTION, SUSPENSION INTRA-ARTICULAR; INTRAMUSCULAR
Status: COMPLETED | OUTPATIENT
Start: 2020-06-01 | End: 2020-06-01

## 2020-06-01 RX ORDER — CITALOPRAM 20 MG/1
TABLET ORAL
COMMUNITY
Start: 2020-05-12 | End: 2020-06-01

## 2020-06-01 RX ORDER — ATORVASTATIN CALCIUM 10 MG/1
10 TABLET, FILM COATED ORAL NIGHTLY
COMMUNITY
Start: 2020-04-13

## 2020-06-01 RX ORDER — ROPIVACAINE HYDROCHLORIDE 5 MG/ML
4 INJECTION, SOLUTION EPIDURAL; INFILTRATION; PERINEURAL
Status: COMPLETED | OUTPATIENT
Start: 2020-06-01 | End: 2020-06-01

## 2020-06-01 RX ORDER — TRIAMTERENE AND HYDROCHLOROTHIAZIDE 37.5; 25 MG/1; MG/1
CAPSULE ORAL
COMMUNITY
Start: 2020-04-13 | End: 2020-09-23

## 2020-06-01 RX ADMIN — TRIAMCINOLONE ACETONIDE 40 MG: 40 INJECTION, SUSPENSION INTRA-ARTICULAR; INTRAMUSCULAR at 14:10

## 2020-06-01 RX ADMIN — ROPIVACAINE HYDROCHLORIDE 4 ML: 5 INJECTION, SOLUTION EPIDURAL; INFILTRATION; PERINEURAL at 14:10

## 2020-06-01 NOTE — PROGRESS NOTES
[facility] Orthopaedic Surgery Clinic Note    Subjective     Chief Complaint   Patient presents with   • Left Knee - Pain   • Right Knee - Pain     5 months follow up for Primary osteoarthritis of right knee        HPI    It has been 5  month(s) since Ms. Iniguez's last visit. She returns to clinic today for follow-up of bilateral knee pain. She rates her pain a 5/10 on the pain scale. Previous/current treatments: cane/walker, NSAIDS and physical therapy. Current symptoms: pain, swelling and stiffness. The pain is worse with walking, standing, sitting and climbing stairs; resting and pain medication and/or NSAID improve the pain. Overall, she is doing the same.  Previous injections have helped.  She would like injections today if possible.    I have reviewed the following portions of the patient's history:History of Present Illness     Patient Active Problem List   Diagnosis   • Macular degeneration   • Hyperlipidemia   • Essential hypertension   • Deafness   • Diabetes type 2, controlled (CMS/HCC)   • Primary osteoarthritis of left hip   • Venous insufficiency of both lower extremities   • Status post total replacement of left hip   • Acute blood loss anemia, mild, asymptomatic   • Leukocytosis, mild, likely reactive   • Hip fracture (CMS/HCC)   • Other persistent atrial fibrillation   • Nonrheumatic aortic valve stenosis     Past Medical History:   Diagnosis Date   • Body piercing     EARS   • Bursitis     trochanteric area   • Colonoscopy refused 2015   • Constipation    • Deaf, left    • Deafness     unspecified   • Diabetes mellitus (CMS/HCC)     PATIENT REPORTS SHE HAS TAKEN METFORMIN IN THE PAST BUT WAS TAKEN OFF OF THIS MEDICATION AROUND OCTOBER 2018.     • Elevated cholesterol    • Essential hypertension    • GERD (gastroesophageal reflux disease)    • Hearing loss, right     PATIENT DOES USE A HEARING AID TO RIGHT SIDE INTERMITTENTLY (REPORTS DEAFNESS IN LEFT EAR)   • Hip pain    • History of UTI     PATIENT  "REPORTS RESOLVED AFTER HORMONE THERAPY WAS ORDERED   • Hyperlipidemia     other   • Impaired functional mobility, balance, gait, and endurance    • Influenza vaccine administered 2014   • Macular degeneration    • Mammogram declined 2015   • Osteoarthritis of left hip    • Primary osteoarthritis of right knee    • Supraventricular premature beats     REPORTED IN PREADMISSION TESTING VISIT \"IRREGULAR HEART BEAT\".  REPORTS UNSURE THE ACTUAL VERBIAGE OF WHAT SHE WAS TOLD OTHER THAN THIS.    • Wears partial dentures     REPORTS UPPER AND LOWER PARTIALS. INSTRUCTED NO ADHESIVES THE DOS.      Past Surgical History:   Procedure Laterality Date   • BUNIONECTOMY Right 1/21/2019    Procedure: Right foot bunionectomy with exostectomy, proximal phalanx osteotomy, right foot second and third digit hammertoe correction with interphalangeal joint fusion, right second metatarsophalangeal joint capsular release/capsulotomy;  Surgeon: Klaus Cason DPM;  Location: [facility] OR;  Service: Podiatry   • CATARACT EXTRACTION Bilateral    • CHOLECYSTECTOMY  2002   • COLONOSCOPY     • HERNIA REPAIR  2005    umbilical   • HIP INTERTROCHANTERIC NAILING Right 12/31/2019    Procedure: HIP RIGHT OPEN REDUCTION INTERNAL FIXATION, INTERMEDULLARY  NAIL;  Surgeon: Carlos Barba Jr., MD;  Location: [facility] OR;  Service: Orthopedics   • HYSTERECTOMY  1974   • TONSILLECTOMY     • TOTAL HIP ARTHROPLASTY Left 1/23/2018    Procedure: LEFT TOTAL HIP ARTHROPLASTY;  Surgeon: Gideon Son MD;  Location: [facility] OR;  Service:       Family History   Problem Relation Age of Onset   • Hypertension Other    • Diabetes Other         type 2   • Stroke Mother    • Diabetes Mother    • Hypertension Mother    • Cancer Father      Social History     Socioeconomic History   • Marital status:      Spouse name: Not on file   • Number of children: Not on file   • Years of education: Not on file   • Highest education level: Not on file   Tobacco Use   • Smoking " status: Never Smoker   • Smokeless tobacco: Never Used   Substance and Sexual Activity   • Alcohol use: No   • Drug use: No   • Sexual activity: Defer      Current Outpatient Medications on File Prior to Visit   Medication Sig Dispense Refill   • acetaminophen (TYLENOL) 650 MG 8 hr tablet Take 650 mg by mouth 2 (Two) Times a Day.     • atorvastatin (LIPITOR) 10 MG tablet      • diclofenac (VOLTAREN) 1 % gel gel 4 (Four) Times a Day As Needed.     • estradiol (ESTRACE) 0.1 MG/GM vaginal cream 3 (Three) Times a Week. Apply using finger technique  6   • INTRAROSA 6.5 MG insert Insert 1 suppository into the vagina Every Night.  6   • metoprolol tartrate (LOPRESSOR) 25 MG tablet 0.5 tablets 2 (Two) Times a Day.     • Multiple Vitamins-Minerals (OCUVITE ADULT 50+) capsule Take 1 tablet by mouth Daily.     • triamterene-hydrochlorothiazide (DYAZIDE) 37.5-25 MG per capsule      • apixaban (ELIQUIS) 5 MG tablet tablet Take 1 tablet by mouth Every 12 (Twelve) Hours. Begin 48 hours after discontinuation of Xarelto 60 tablet 11   • [DISCONTINUED] citalopram (CeleXA) 20 MG tablet      • [DISCONTINUED] docusate sodium 100 MG capsule Take 1 capsule by mouth 2 (Two) Times a Day As Needed for Constipation. (Patient taking differently: Take 1 capsule by mouth 2 (Two) Times a Day As Needed (CONSTIPATION).) 60 capsule 0   • [DISCONTINUED] HYDROcodone-acetaminophen (NORCO) 5-325 MG per tablet As Needed.     • [DISCONTINUED] simvastatin (ZOCOR) 10 MG tablet Take 10 mg by mouth Daily.       No current facility-administered medications on file prior to visit.       No Known Allergies     Review of Systems   Constitutional: Negative.    HENT: Negative.    Eyes: Negative.    Respiratory: Negative.    Cardiovascular: Negative.    Gastrointestinal: Negative.    Endocrine: Negative.    Genitourinary: Negative.    Musculoskeletal: Positive for arthralgias.   Skin: Negative.    Allergic/Immunologic: Negative.    Neurological: Negative.     "  Hematological: Negative.    Psychiatric/Behavioral: Negative.         Objective      Physical Exam  Pulse 95   Ht 167.6 cm (65.98\")   Wt 85.3 kg (188 lb)   LMP  (LMP Unknown) Comment: HYSTERECTOMY  SpO2 98%   BMI 30.36 kg/m²     Body mass index is 30.36 kg/m².    General:   Mental Status:  Alert   Appearance: Cooperative, in no acute distress   Build and Nutrition: Well-nourished well-developed female   Orientation: Alert and oriented to person, place and time   Posture: Normal   Gait: Normal    Integument:   Right knee: no skin lesions, no rash, no ecchymosis, pitting edema below the knee   Left knee: no skin lesions, no rash, no ecchymosis, pitting edema below the knee    Lower Extremities:   Right Knee:    Tenderness:  Medial and lateral joint line tenderness    Effusion:  None    Swelling:  None    Crepitus:  Positive    Atrophy:  None    Range of motion:  Extension: 0°       Flexion: 120°  Instability:  No varus laxity, no valgus laxity, negative anterior drawer  Deformities:  Varus   Left Knee:    Tenderness:  Medial and lateral joint line tenderness    Effusion:  None    Swelling:  None    Crepitus: Positive    Atrophy:  None    Range of motion:  Extension: 0°       Flexion: 120°  Instability:  No varus laxity, no valgus laxity, negative anterior drawer  Deformities:  None      Imaging/Studies  See x-ray report for details.  Bilateral knee arthritis, with no acute bony abnormalities.      Assessment and Plan     Bri was seen today for pain and pain.    Diagnoses and all orders for this visit:    Primary osteoarthritis of both knees  -     XR Knee 4+ View Bilateral; Future  -     Large Joint Arthrocentesis: R knee  -     Large Joint Arthrocentesis: L knee        1. Primary osteoarthritis of both knees        I reviewed my findings with the patient today.  She has bilateral knee arthritis, right greater than left, and she opts for conservative treatment with injections today.  Please see my procedure " note for details.  I will see her back in 4 months, but sooner for any problems.    Of note, she had 75% improvement just a few minutes following the injections today.    Return in about 4 months (around 10/1/2020).    Medical Decision Making  Management Options : prescription/IM medicine  Data/Risk: radiology tests and independent visualization of imaging, lab tests, or EMG/NCV      Gideon Son MD  06/01/20  14:27    Dragon disclaimer:  Much of this encounter note is an electronic transcription/translation of spoken language to printed text. The electronic translation of spoken language may permit erroneous, or at times, nonsensical words or phrases to be inadvertently transcribed; Although I have reviewed the note for such errors, some may still exist.

## 2020-06-01 NOTE — PROGRESS NOTES
Procedure   Large Joint Arthrocentesis: R knee  Date/Time: 6/1/2020 2:10 PM  Consent given by: patient  Site marked: site marked  Timeout: Immediately prior to procedure a time out was called to verify the correct patient, procedure, equipment, support staff and site/side marked as required   Supporting Documentation  Indications: pain   Procedure Details  Location: knee - R knee  Preparation: Patient was prepped and draped in the usual sterile fashion  Needle size: 22 G  Approach: anterolateral  Medications administered: 40 mg triamcinolone acetonide 40 MG/ML; 4 mL ropivacaine 0.5 %  Patient tolerance: patient tolerated the procedure well with no immediate complications    Large Joint Arthrocentesis: L knee  Date/Time: 6/1/2020 2:10 PM  Consent given by: patient  Site marked: site marked  Timeout: Immediately prior to procedure a time out was called to verify the correct patient, procedure, equipment, support staff and site/side marked as required   Supporting Documentation  Indications: pain   Procedure Details  Location: knee - L knee  Preparation: Patient was prepped and draped in the usual sterile fashion  Needle size: 22 G  Approach: anterolateral  Medications administered: 40 mg triamcinolone acetonide 40 MG/ML; 4 mL ropivacaine 0.5 %  Patient tolerance: patient tolerated the procedure well with no immediate complications

## 2020-09-23 ENCOUNTER — OFFICE VISIT (OUTPATIENT)
Dept: ORTHOPEDIC SURGERY | Facility: CLINIC | Age: 85
End: 2020-09-23

## 2020-09-23 VITALS — WEIGHT: 188 LBS | BODY MASS INDEX: 30.22 KG/M2 | OXYGEN SATURATION: 99 % | HEART RATE: 89 BPM | HEIGHT: 66 IN

## 2020-09-23 DIAGNOSIS — M17.0 PRIMARY OSTEOARTHRITIS OF BOTH KNEES: ICD-10-CM

## 2020-09-23 DIAGNOSIS — M16.7 OTHER SECONDARY OSTEOARTHRITIS OF RIGHT HIP: Primary | ICD-10-CM

## 2020-09-23 PROBLEM — M19.90 DJD (DEGENERATIVE JOINT DISEASE): Status: ACTIVE | Noted: 2020-09-23

## 2020-09-23 PROCEDURE — 99214 OFFICE O/P EST MOD 30 MIN: CPT | Performed by: ORTHOPAEDIC SURGERY

## 2020-09-23 RX ORDER — CITALOPRAM 40 MG/1
40 TABLET ORAL DAILY
COMMUNITY
Start: 2020-08-26

## 2020-09-23 RX ORDER — FUROSEMIDE 20 MG/1
20 TABLET ORAL DAILY
Status: ON HOLD | COMMUNITY
Start: 2020-08-27 | End: 2020-10-02

## 2020-09-23 RX ORDER — ACETAMINOPHEN 325 MG/1
1000 TABLET ORAL ONCE
Status: CANCELLED | OUTPATIENT
Start: 2020-09-23 | End: 2020-09-23

## 2020-09-23 RX ORDER — PREGABALIN 150 MG/1
150 CAPSULE ORAL ONCE
Status: CANCELLED | OUTPATIENT
Start: 2020-09-23 | End: 2020-09-23

## 2020-09-23 RX ORDER — MELOXICAM 7.5 MG/1
15 TABLET ORAL ONCE
Status: CANCELLED | OUTPATIENT
Start: 2020-09-23 | End: 2020-09-23

## 2020-09-23 RX ORDER — TRIAMTERENE AND HYDROCHLOROTHIAZIDE 37.5; 25 MG/1; MG/1
TABLET ORAL
COMMUNITY
Start: 2020-08-18 | End: 2020-09-23

## 2020-09-23 RX ORDER — TRAMADOL HYDROCHLORIDE 50 MG/1
50 TABLET ORAL NIGHTLY PRN
COMMUNITY
Start: 2020-09-15 | End: 2020-12-07

## 2020-09-23 NOTE — PROGRESS NOTES
[facility] Orthopaedic Surgery Clinic Note    Subjective     Chief Complaint   Patient presents with   • Follow-up     Primary osteoarthritis of both knees, last Cortisone injection 06.01.2020        HPI    It has been 4  month(s) since Ms. Iniguez's last visit. She returns to clinic today for follow-up of bilateral knee pain. She rates her pain a 6/10 on the pain scale. Previous/current treatments: cane/walker and NSAIDS. Current symptoms: pain, swelling, popping, stiffness and giving way/buckling. The pain is worse with any movement of the joint; resting and pain medication and/or NSAID improve the pain. Overall, she is doing worse.     However, most of her pain is in the right hip.  She had an intertrochanteric fracture in December 2019, fixed with an intramedullary device, by Dr. Barba in [place], [place].  She has been developing progressive right hip pain, and is unable to bear weight.  She has pain and popping in the right hip, which is getting worse.  She said that Dr. Barba told her everything was fine on her last visit.    I have reviewed the following portions of the patient's history and agree with: History of Present Illness and Review of Systems    Patient Active Problem List   Diagnosis   • Macular degeneration   • Hyperlipidemia   • Essential hypertension   • Deafness   • Diabetes type 2, controlled (CMS/HCC)   • Primary osteoarthritis of left hip   • Venous insufficiency of both lower extremities   • Status post total replacement of left hip   • Acute blood loss anemia, mild, asymptomatic   • Leukocytosis, mild, likely reactive   • Hip fracture (CMS/HCC)   • Other persistent atrial fibrillation (CMS/HCC)   • Nonrheumatic aortic valve stenosis   • DJD (degenerative joint disease)     Past Medical History:   Diagnosis Date   • Body piercing     EARS   • Bursitis     trochanteric area   • Colonoscopy refused 2015   • Constipation    • Deaf, left    • Deafness     unspecified   • Diabetes mellitus  "(CMS/[facility])     PATIENT REPORTS SHE HAS TAKEN METFORMIN IN THE PAST BUT WAS TAKEN OFF OF THIS MEDICATION AROUND OCTOBER 2018.     • Elevated cholesterol    • Essential hypertension    • GERD (gastroesophageal reflux disease)    • Hearing loss, right     PATIENT DOES USE A HEARING AID TO RIGHT SIDE INTERMITTENTLY (REPORTS DEAFNESS IN LEFT EAR)   • Hip pain    • History of UTI     PATIENT REPORTS RESOLVED AFTER HORMONE THERAPY WAS ORDERED   • Hyperlipidemia     other   • Impaired functional mobility, balance, gait, and endurance    • Influenza vaccine administered 2014   • Macular degeneration    • Mammogram declined 2015   • Osteoarthritis of left hip    • Primary osteoarthritis of right knee    • Supraventricular premature beats     REPORTED IN PREADMISSION TESTING VISIT \"IRREGULAR HEART BEAT\".  REPORTS UNSURE THE ACTUAL VERBIAGE OF WHAT SHE WAS TOLD OTHER THAN THIS.    • Wears partial dentures     REPORTS UPPER AND LOWER PARTIALS. INSTRUCTED NO ADHESIVES THE DOS.      Past Surgical History:   Procedure Laterality Date   • BUNIONECTOMY Right 1/21/2019    Procedure: Right foot bunionectomy with exostectomy, proximal phalanx osteotomy, right foot second and third digit hammertoe correction with interphalangeal joint fusion, right second metatarsophalangeal joint capsular release/capsulotomy;  Surgeon: Klaus Cason DPM;  Location: [facility] OR;  Service: Podiatry   • CATARACT EXTRACTION Bilateral    • CHOLECYSTECTOMY  2002   • COLONOSCOPY     • HERNIA REPAIR  2005    umbilical   • HIP INTERTROCHANTERIC NAILING Right 12/31/2019    Procedure: HIP RIGHT OPEN REDUCTION INTERNAL FIXATION, INTERMEDULLARY  NAIL;  Surgeon: Carlos Barba Jr., MD;  Location: [facility] OR;  Service: Orthopedics   • HYSTERECTOMY  1974   • TONSILLECTOMY     • TOTAL HIP ARTHROPLASTY Left 1/23/2018    Procedure: LEFT TOTAL HIP ARTHROPLASTY;  Surgeon: Gideon Son MD;  Location: [facility] OR;  Service:       Family History   Problem Relation Age of " Onset   • Hypertension Other    • Diabetes Other         type 2   • Stroke Mother    • Diabetes Mother    • Hypertension Mother    • Cancer Father      Social History     Socioeconomic History   • Marital status:      Spouse name: Not on file   • Number of children: Not on file   • Years of education: Not on file   • Highest education level: Not on file   Tobacco Use   • Smoking status: Never Smoker   • Smokeless tobacco: Never Used   Substance and Sexual Activity   • Alcohol use: No   • Drug use: No   • Sexual activity: Defer      Current Outpatient Medications on File Prior to Visit   Medication Sig Dispense Refill   • acetaminophen (TYLENOL) 650 MG 8 hr tablet Take 650 mg by mouth 2 (Two) Times a Day.     • apixaban (ELIQUIS) 5 MG tablet tablet Take 1 tablet by mouth Every 12 (Twelve) Hours. Begin 48 hours after discontinuation of Xarelto 60 tablet 11   • atorvastatin (LIPITOR) 10 MG tablet      • citalopram (CeleXA) 40 MG tablet      • diclofenac (VOLTAREN) 1 % gel gel 4 (Four) Times a Day As Needed.     • diclofenac (VOLTAREN) 50 MG EC tablet      • estradiol (ESTRACE) 0.1 MG/GM vaginal cream 3 (Three) Times a Week. Apply using finger technique  6   • furosemide (LASIX) 20 MG tablet      • INTRAROSA 6.5 MG insert Insert 1 suppository into the vagina Every Night.  6   • metoprolol tartrate (LOPRESSOR) 25 MG tablet 0.5 tablets 2 (Two) Times a Day.     • Multiple Vitamins-Minerals (OCUVITE ADULT 50+) capsule Take 1 tablet by mouth Daily.     • traMADol (ULTRAM) 50 MG tablet      • [DISCONTINUED] triamterene-hydrochlorothiazide (DYAZIDE) 37.5-25 MG per capsule      • [DISCONTINUED] triamterene-hydrochlorothiazide (MAXZIDE-25) 37.5-25 MG per tablet        No current facility-administered medications on file prior to visit.       No Known Allergies     Review of Systems   Constitutional: Positive for activity change and fatigue.   HENT: Negative.    Eyes: Negative.    Respiratory: Positive for shortness of  "breath.    Cardiovascular: Positive for leg swelling.   Gastrointestinal: Positive for abdominal distention.   Endocrine: Negative.    Genitourinary: Negative.    Musculoskeletal: Positive for arthralgias.   Skin: Negative.    Allergic/Immunologic: Negative.    Neurological: Positive for weakness and numbness.   Hematological: Bruises/bleeds easily.   Psychiatric/Behavioral: Negative.         Objective      Physical Exam  Pulse 89   Ht 167.6 cm (65.98\")   Wt 85.3 kg (188 lb)   LMP  (LMP Unknown) Comment: HYSTERECTOMY  SpO2 99%   BMI 30.36 kg/m²     Body mass index is 30.36 kg/m².    General:   Mental Status:  Alert   Appearance: Cooperative, in no acute distress   Build and Nutrition: Deconditioned female   Orientation: Alert and oriented to person, place and time   Posture: Sitting in a wheelchair   Gait: Unable to stand without significant pain    Lower Extremity:   Right Hip:    Tenderness:  None    Swelling:  None    Crepitus:  None    Range of motion:  External Rotation: 10°       Internal Rotation: 10°       Flexion:  90°    Functional testing: Positive [facility]    Short on the right compared to the left              Right Knee:                          Tenderness:    Medial and lateral joint line tenderness                          Effusion:          None                          Swelling:          None                          Crepitus:          Positive                          Atrophy:           None                          Range of motion:        Extension:       0°                                                              Flexion:           120°  Instability:        No varus laxity, no valgus laxity, negative anterior drawer  Deformities:     Varus              Left Knee:                          Tenderness:    Medial and lateral joint line tenderness                          Effusion:          None                          Swelling:          None                          Crepitus:          " Positive                          Atrophy:           None                          Range of motion:        Extension:       0°                                                              Flexion:           120°  Instability:        No varus laxity, no valgus laxity, negative anterior drawer  Deformities:     None    Imaging/Studies  Imaging Results (Last 24 Hours)     Procedure Component Value Units Date/Time    XR Hip With or Without Pelvis 2 - 3 View Right [[id number]] Resulted: 09/23/20 1230     Updated: 09/23/20 1231    Narrative:      Right Hip Radiographs  Indication: right hip pain  Views: low AP pelvis and lateral of the right hip    Comparison: no prior studies available for review    Findings:   Evidence of previous intertrochanteric fracture, which appears to be   healed, but there is penetration of the blade through the head into the   acetabulum, with cement seen in the femoral head, with no signs of   hardware loosening.            Assessment and Plan     Bri was seen today for follow-up.    Diagnoses and all orders for this visit:    Other secondary osteoarthritis of right hip  -     XR Hip With or Without Pelvis 2 - 3 View Right  -     Case Request; Standing  -     Instructions on coughing, deep breathing, and incentive spirometry.; Future  -     CBC and Differential; Future  -     Basic metabolic panel; Future  -     Protime-INR; Future  -     APTT; Future  -     Hemoglobin A1c; Future  -     Sedimentation rate; Future  -     C-reactive protein; Future  -     Case Request    Primary osteoarthritis of both knees    Other orders  -     Follow Anesthesia Guidelines / Standing Orders; Future  -     Obtain informed consent  -     Provide instructions to patient regarding NPO status  -     Chlorhexidine Skin Prep - Educate and Review With Patient; Future  -     Provide Patient With ERAS Hydration Instructions  -     Provide Patient With Enhanced Recovery Booklet(s) or Handout  -     Provide  Instructions/Handout For Benzoyl Peroxide 5% Wash If Having Shoulder/Arm Surgery (If Prescribed)  -     Provide Instructions/Handout For Bactroban And Chlorhexidine Shower (If Prescribed)  -     Perform A Memory Screening On All Hip/Knee Replacement Patients >Or Equal To 65 Years Or Older  -     Complete A PROMIS And HOOS Or KOOS Survey If Having Hip Or Knee Replacement  -     Provide Patient With Carbo Loading Instructions  -     Provide Patient With ERAS Booklet(s)/Handout  -     mupirocin (Bactroban Nasal) 2 % nasal ointment; into the nostril(s) as directed by provider 2 (Two) Times a Day.  -     chlorhexidine (HIBICLENS) 4 % external liquid; Apply  topically to the appropriate area as directed Daily. Shower with hibiclens solution as directed for 5 days prior to surgery        1. Other secondary osteoarthritis of right hip    2. Primary osteoarthritis of both knees        I reviewed my findings with the patient and her daughter today.  She had an intertrochanteric fracture fixed by Dr. Barba in December 2019, and the blade has penetrated through the head, which is causing significant pain.  Options were discussed, and total hip replacement surgery was recommended.  She would like to proceed.  Please see my counseling note for details.  Risk, benefits, and alternatives of the procedure have been discussed.  No further intervention for her knees at this time, until we get her hip taken care of.    Surgical Counseling     I have informed the patient of the diagnosis and the prognosis.  The symptoms have progressed to the point of daily pain and inability to perform activities of daily living without significant pain.  The patient has reached the point of desiring to proceed with total hip arthroplasty after discussing the risks, benefits and alternatives to the procedure.  The surgical procedure itself was discussed in detail.  Risks of the procedure were discussed, which included but are not limited to,  bleeding, infection, damage to blood vessels and nerves, incomplete pain relief, loosening of the prosthesis (early or late), deep infection (early or late), need for further surgery, leg length discrepancy, hip dislocation, loss of limb, deep venous thrombosis, pulmonary embolus, death, heart attack, stroke, kidney failure, liver failure, and anesthetic complications.  In addition, the potential for deep infection developing in the future was discussed, which could require further surgery.  The hip would have to be re-opened, debrided, and potentially remove the prosthesis, which may or may not be replaced in the future.  Also, the possibility for loosening of the prosthesis has been mentioned.  If the prosthesis loosened, a revision arthroplasty could be performed, with results that are not as predictable compared to the original procedure.  The typical rehabilitative course has also been discussed, and full recovery may take up to a year to see the maximum benefit.  The importance of patient cooperation in the rehabilitative efforts has also been discussed.  No guarantees were given.  The patient understands the potential risks versus the benefits and desires to proceed with total hip arthroplasty at a mutually convenient time.      Return for surgery.    Medical Decision Making  Management Options : major surgery with risk factors  Data/Risk: radiology tests and independent visualization of imaging, lab tests, or EMG/NCV      Gideon Son MD  09/23/20  17:59 EDT    Dragon disclaimer:  Much of this encounter note is an electronic transcription/translation of spoken language to printed text. The electronic translation of spoken language may permit erroneous, or at times, nonsensical words or phrases to be inadvertently transcribed; Although I have reviewed the note for such errors, some may still exist.

## 2020-09-25 ENCOUNTER — APPOINTMENT (OUTPATIENT)
Dept: PREADMISSION TESTING | Facility: HOSPITAL | Age: 85
End: 2020-09-25

## 2020-09-25 VITALS — HEIGHT: 66 IN | BODY MASS INDEX: 27.32 KG/M2 | WEIGHT: 170 LBS

## 2020-09-25 DIAGNOSIS — M16.7 OTHER SECONDARY OSTEOARTHRITIS OF RIGHT HIP: ICD-10-CM

## 2020-09-25 LAB
ANION GAP SERPL CALCULATED.3IONS-SCNC: 11 MMOL/L (ref 5–15)
APTT PPP: 24.2 SECONDS (ref 24–37)
BASOPHILS # BLD AUTO: 0.03 10*3/MM3 (ref 0–0.2)
BASOPHILS NFR BLD AUTO: 0.4 % (ref 0–1.5)
BUN SERPL-MCNC: 10 MG/DL (ref 8–23)
BUN/CREAT SERPL: 14.9 (ref 7–25)
CALCIUM SPEC-SCNC: 9.3 MG/DL (ref 8.6–10.5)
CHLORIDE SERPL-SCNC: 94 MMOL/L (ref 98–107)
CO2 SERPL-SCNC: 29 MMOL/L (ref 22–29)
CREAT SERPL-MCNC: 0.67 MG/DL (ref 0.57–1)
CRP SERPL-MCNC: 0.3 MG/DL (ref 0–0.5)
DEPRECATED RDW RBC AUTO: 46 FL (ref 37–54)
EOSINOPHIL # BLD AUTO: 0.08 10*3/MM3 (ref 0–0.4)
EOSINOPHIL NFR BLD AUTO: 0.9 % (ref 0.3–6.2)
ERYTHROCYTE [DISTWIDTH] IN BLOOD BY AUTOMATED COUNT: 14.1 % (ref 12.3–15.4)
ERYTHROCYTE [SEDIMENTATION RATE] IN BLOOD: 33 MM/HR (ref 0–30)
GFR SERPL CREATININE-BSD FRML MDRD: 84 ML/MIN/1.73
GLUCOSE SERPL-MCNC: 136 MG/DL (ref 65–99)
HBA1C MFR BLD: 7 % (ref 4.8–5.6)
HCT VFR BLD AUTO: 39.2 % (ref 34–46.6)
HGB BLD-MCNC: 12.7 G/DL (ref 12–15.9)
IMM GRANULOCYTES # BLD AUTO: 0.08 10*3/MM3 (ref 0–0.05)
IMM GRANULOCYTES NFR BLD AUTO: 0.9 % (ref 0–0.5)
INR PPP: 1.04 (ref 0.85–1.16)
LYMPHOCYTES # BLD AUTO: 2.05 10*3/MM3 (ref 0.7–3.1)
LYMPHOCYTES NFR BLD AUTO: 24 % (ref 19.6–45.3)
MCH RBC QN AUTO: 29 PG (ref 26.6–33)
MCHC RBC AUTO-ENTMCNC: 32.4 G/DL (ref 31.5–35.7)
MCV RBC AUTO: 89.5 FL (ref 79–97)
MONOCYTES # BLD AUTO: 0.91 10*3/MM3 (ref 0.1–0.9)
MONOCYTES NFR BLD AUTO: 10.6 % (ref 5–12)
NEUTROPHILS NFR BLD AUTO: 5.4 10*3/MM3 (ref 1.7–7)
NEUTROPHILS NFR BLD AUTO: 63.2 % (ref 42.7–76)
NRBC BLD AUTO-RTO: 0 /100 WBC (ref 0–0.2)
PLATELET # BLD AUTO: 278 10*3/MM3 (ref 140–450)
PMV BLD AUTO: 9.4 FL (ref 6–12)
POTASSIUM SERPL-SCNC: 3.7 MMOL/L (ref 3.5–5.2)
PROTHROMBIN TIME: 13.3 SECONDS (ref 11.5–14)
RBC # BLD AUTO: 4.38 10*6/MM3 (ref 3.77–5.28)
SODIUM SERPL-SCNC: 134 MMOL/L (ref 136–145)
WBC # BLD AUTO: 8.55 10*3/MM3 (ref 3.4–10.8)

## 2020-09-25 PROCEDURE — 93005 ELECTROCARDIOGRAM TRACING: CPT

## 2020-09-25 PROCEDURE — 85730 THROMBOPLASTIN TIME PARTIAL: CPT | Performed by: ORTHOPAEDIC SURGERY

## 2020-09-25 PROCEDURE — 83036 HEMOGLOBIN GLYCOSYLATED A1C: CPT | Performed by: ORTHOPAEDIC SURGERY

## 2020-09-25 PROCEDURE — 93010 ELECTROCARDIOGRAM REPORT: CPT | Performed by: INTERNAL MEDICINE

## 2020-09-25 PROCEDURE — 85652 RBC SED RATE AUTOMATED: CPT | Performed by: ORTHOPAEDIC SURGERY

## 2020-09-25 PROCEDURE — 85610 PROTHROMBIN TIME: CPT | Performed by: ORTHOPAEDIC SURGERY

## 2020-09-25 PROCEDURE — 85025 COMPLETE CBC W/AUTO DIFF WBC: CPT | Performed by: ORTHOPAEDIC SURGERY

## 2020-09-25 PROCEDURE — 86140 C-REACTIVE PROTEIN: CPT | Performed by: ORTHOPAEDIC SURGERY

## 2020-09-25 PROCEDURE — 36415 COLL VENOUS BLD VENIPUNCTURE: CPT

## 2020-09-25 PROCEDURE — 80048 BASIC METABOLIC PNL TOTAL CA: CPT | Performed by: ORTHOPAEDIC SURGERY

## 2020-09-25 ASSESSMENT — HOOS JR
HOOS JR SCORE: 8.104
HOOS JR SCORE: 23

## 2020-09-25 NOTE — PAT
EKG SHOWS AFIB - PATIENT HAS HISTORY OF AFIB - ASYMPTOMATIC.  PREVIOUSLY ON ELIQUIS FOR LE DVT.  PER PATIENT AND DAUGHTER PATIENT'S PCP STOPPED ELIQUIS AFTER DVT RESOLVED.      The following information and instructions were given:    Do not eat, drink, smoke or chew gum after midnight the night before surgery. This includes no mints.  Take all routine, prescribed medications including heart and blood pressure medicines with a sip of water unless otherwise instructed by your physician.   Do NOT take diabetic medication unless instructed by your physician.    DO NOT shave for two days before your procedure.  Do not wear makeup.      DO NOT wear fingernail polish (gel/regular) and/or acrylic/artificial nails on the day of surgery.   If you had a recent manicure and would rather not remove polish or artificial nails, the minimum requirement is that the polish/artificial nails must be removed from the middle finger on each hand.      If you are having surgery/procedure on an upper extremity, fingernail polish/artificial fingernails must be removed for surgery.  NO EXCEPTIONS.      If you are having surgery/procedure on a lower extremity, toenail polish on both extremities must be removed for surgery.  NO EXCEPTIONS.    Remove all jewelry (advise to go to jeweler if unable to remove).  Jewelry, especially rings, can no longer be taped for surgery.    Leave anything you consider valuable at home.    Leave your suitcase in the car until after your surgery.    Bring the following with you the day of your procedure (when applicable):       -Picture ID and insurance cards       -Co-pay/deductible required by insurance       -Medications in the original bottles (not a list) including all over-the-counter medications if not brought to PAT       -Copy of advance directive, living will or power of  documents if not brought to PAT       -CPAP or BIPAP mask and tubing (do not bring machine)       -Skin prep instruction(s)  sheet       -PAT Pass    Education booklet, brochure, handout or binder related to procedure given to patient.  Education booklet also includes general information related to their recovery that mentions signs and symptoms of infection and when to call the doctor.    When applicable, an ERAS handout/booklet was given to patient.    Pain Control After Surgery handout given to patient.    Respirex use (handout given to patient) and pneumonia prevention education provided.    Signs and Symptoms of infection discussed with patient in Pre Admission Testing.  Patient instructed to call their doctor if any of the following symptoms are noted during recovery:  Fever of 100.4 F or higher, incision that is warm or has increasing bleeding, redness or drainage.    DVT Prevention instructions given verbally during Pre Admission Testing appointment that stress the importance of ambulation to improve blood circulation.  Also encouraged patient to perform foot exercises when in bed and application of a sequential device may be applied to lower extremities to improve circulation.      Please apply Chlorhexidine wipes to surgical area (if instructed) the night before procedure and the AM of procedure and document date/time of applications on skin prep instruction sheet.        Patient instructed to drink 20 ounces (or until full) of Gatorade and it needs to be completed 1 hour before given arrival time for procedure (NO RED Gatorade)    Patient verbalized understanding.

## 2020-09-28 ENCOUNTER — TELEPHONE (OUTPATIENT)
Dept: ORTHOPEDIC SURGERY | Facility: CLINIC | Age: 85
End: 2020-09-28

## 2020-09-28 NOTE — TELEPHONE ENCOUNTER
----- Message from Gideon Son MD sent at 9/28/2020 12:39 PM EDT -----  Should be ok.  ----- Message -----  From: Amina Khan  Sent: 9/28/2020   9:12 AM EDT  To: Gideon Son MD    SED RATE 33    A1C 7.0

## 2020-09-29 ENCOUNTER — APPOINTMENT (OUTPATIENT)
Dept: PREADMISSION TESTING | Facility: HOSPITAL | Age: 85
End: 2020-09-29

## 2020-09-29 PROCEDURE — U0004 COV-19 TEST NON-CDC HGH THRU: HCPCS

## 2020-09-29 PROCEDURE — C9803 HOPD COVID-19 SPEC COLLECT: HCPCS

## 2020-09-30 LAB — SARS-COV-2 RNA NOSE QL NAA+PROBE: NOT DETECTED

## 2020-10-01 ENCOUNTER — ANESTHESIA EVENT (OUTPATIENT)
Dept: PERIOP | Facility: HOSPITAL | Age: 85
End: 2020-10-01

## 2020-10-01 RX ORDER — SODIUM CHLORIDE 0.9 % (FLUSH) 0.9 %
10 SYRINGE (ML) INJECTION AS NEEDED
Status: CANCELLED | OUTPATIENT
Start: 2020-10-01

## 2020-10-01 RX ORDER — SODIUM CHLORIDE 0.9 % (FLUSH) 0.9 %
10 SYRINGE (ML) INJECTION EVERY 12 HOURS SCHEDULED
Status: CANCELLED | OUTPATIENT
Start: 2020-10-01

## 2020-10-01 RX ORDER — FAMOTIDINE 10 MG/ML
20 INJECTION, SOLUTION INTRAVENOUS ONCE
Status: CANCELLED | OUTPATIENT
Start: 2020-10-01 | End: 2020-10-01

## 2020-10-02 ENCOUNTER — APPOINTMENT (OUTPATIENT)
Dept: GENERAL RADIOLOGY | Facility: HOSPITAL | Age: 85
End: 2020-10-02

## 2020-10-02 ENCOUNTER — HOSPITAL ENCOUNTER (INPATIENT)
Facility: HOSPITAL | Age: 85
LOS: 3 days | Discharge: NURSING, REHAB OR LONG-TERM CARE | End: 2020-10-05
Attending: ORTHOPAEDIC SURGERY | Admitting: ORTHOPAEDIC SURGERY

## 2020-10-02 ENCOUNTER — ANESTHESIA (OUTPATIENT)
Dept: PERIOP | Facility: HOSPITAL | Age: 85
End: 2020-10-02

## 2020-10-02 DIAGNOSIS — M16.7 OTHER SECONDARY OSTEOARTHRITIS OF RIGHT HIP: ICD-10-CM

## 2020-10-02 PROBLEM — Z96.641 STATUS POST TOTAL REPLACEMENT OF RIGHT HIP: Status: ACTIVE | Noted: 2020-10-02

## 2020-10-02 PROBLEM — I35.8 NONRHEUMATIC AORTIC VALVE SCLEROSIS: Status: ACTIVE | Noted: 2020-01-22

## 2020-10-02 LAB
ABO GROUP BLD: NORMAL
BLD GP AB SCN SERPL QL: NEGATIVE
GLUCOSE BLDC GLUCOMTR-MCNC: 154 MG/DL (ref 70–130)
GLUCOSE BLDC GLUCOMTR-MCNC: 165 MG/DL (ref 70–130)
GLUCOSE BLDC GLUCOMTR-MCNC: 239 MG/DL (ref 70–130)
GLUCOSE BLDC GLUCOMTR-MCNC: 358 MG/DL (ref 70–130)
GLUCOSE BLDC GLUCOMTR-MCNC: 414 MG/DL (ref 70–130)
GLUCOSE BLDC GLUCOMTR-MCNC: 436 MG/DL (ref 70–130)
HCT VFR BLD AUTO: 26.6 % (ref 34–46.6)
HCT VFR BLD AUTO: 28.7 % (ref 34–46.6)
HGB BLD-MCNC: 8.4 G/DL (ref 12–15.9)
HGB BLD-MCNC: 9.1 G/DL (ref 12–15.9)
POTASSIUM SERPL-SCNC: 3.7 MMOL/L (ref 3.5–5.2)
RH BLD: POSITIVE
T&S EXPIRATION DATE: NORMAL

## 2020-10-02 PROCEDURE — 63710000001 INSULIN LISPRO (HUMAN) PER 5 UNITS: Performed by: ORTHOPAEDIC SURGERY

## 2020-10-02 PROCEDURE — 0QP604Z REMOVAL OF INTERNAL FIXATION DEVICE FROM RIGHT UPPER FEMUR, OPEN APPROACH: ICD-10-PCS | Performed by: ORTHOPAEDIC SURGERY

## 2020-10-02 PROCEDURE — 25010000003 CEFAZOLIN IN DEXTROSE 2-4 GM/100ML-% SOLUTION: Performed by: ORTHOPAEDIC SURGERY

## 2020-10-02 PROCEDURE — 73502 X-RAY EXAM HIP UNI 2-3 VIEWS: CPT

## 2020-10-02 PROCEDURE — 82962 GLUCOSE BLOOD TEST: CPT

## 2020-10-02 PROCEDURE — C1713 ANCHOR/SCREW BN/BN,TIS/BN: HCPCS | Performed by: ORTHOPAEDIC SURGERY

## 2020-10-02 PROCEDURE — C1776 JOINT DEVICE (IMPLANTABLE): HCPCS | Performed by: ORTHOPAEDIC SURGERY

## 2020-10-02 PROCEDURE — 25010000002 DEXAMETHASONE PER 1 MG: Performed by: NURSE ANESTHETIST, CERTIFIED REGISTERED

## 2020-10-02 PROCEDURE — P9041 ALBUMIN (HUMAN),5%, 50ML: HCPCS | Performed by: NURSE ANESTHETIST, CERTIFIED REGISTERED

## 2020-10-02 PROCEDURE — 86850 RBC ANTIBODY SCREEN: CPT | Performed by: ORTHOPAEDIC SURGERY

## 2020-10-02 PROCEDURE — 27132 TOTAL HIP ARTHROPLASTY: CPT | Performed by: ORTHOPAEDIC SURGERY

## 2020-10-02 PROCEDURE — 25010000003 CEFAZOLIN PER 500 MG: Performed by: NURSE ANESTHETIST, CERTIFIED REGISTERED

## 2020-10-02 PROCEDURE — 63710000001 INSULIN LISPRO (HUMAN) PER 5 UNITS: Performed by: NURSE PRACTITIONER

## 2020-10-02 PROCEDURE — 25010000002 ONDANSETRON PER 1 MG: Performed by: NURSE ANESTHETIST, CERTIFIED REGISTERED

## 2020-10-02 PROCEDURE — 25010000002 INFLUENZA VAC SPLIT QUAD 0.5 ML SUSPENSION PREFILLED SYRINGE: Performed by: INTERNAL MEDICINE

## 2020-10-02 PROCEDURE — 0SR90JA REPLACEMENT OF RIGHT HIP JOINT WITH SYNTHETIC SUBSTITUTE, UNCEMENTED, OPEN APPROACH: ICD-10-PCS | Performed by: ORTHOPAEDIC SURGERY

## 2020-10-02 PROCEDURE — 25010000002 PROPOFOL 10 MG/ML EMULSION: Performed by: NURSE ANESTHETIST, CERTIFIED REGISTERED

## 2020-10-02 PROCEDURE — 25010000002 ROPIVACAINE PER 1 MG: Performed by: ORTHOPAEDIC SURGERY

## 2020-10-02 PROCEDURE — 86901 BLOOD TYPING SEROLOGIC RH(D): CPT | Performed by: ORTHOPAEDIC SURGERY

## 2020-10-02 PROCEDURE — 27132 TOTAL HIP ARTHROPLASTY: CPT | Performed by: PHYSICIAN ASSISTANT

## 2020-10-02 PROCEDURE — G0008 ADMIN INFLUENZA VIRUS VAC: HCPCS | Performed by: INTERNAL MEDICINE

## 2020-10-02 PROCEDURE — 85018 HEMOGLOBIN: CPT | Performed by: ORTHOPAEDIC SURGERY

## 2020-10-02 PROCEDURE — 86923 COMPATIBILITY TEST ELECTRIC: CPT

## 2020-10-02 PROCEDURE — 90686 IIV4 VACC NO PRSV 0.5 ML IM: CPT | Performed by: INTERNAL MEDICINE

## 2020-10-02 PROCEDURE — 25010000002 ALBUMIN HUMAN 5% PER 50 ML: Performed by: NURSE ANESTHETIST, CERTIFIED REGISTERED

## 2020-10-02 PROCEDURE — 85014 HEMATOCRIT: CPT | Performed by: ORTHOPAEDIC SURGERY

## 2020-10-02 PROCEDURE — 84132 ASSAY OF SERUM POTASSIUM: CPT | Performed by: ANESTHESIOLOGY

## 2020-10-02 PROCEDURE — 86900 BLOOD TYPING SEROLOGIC ABO: CPT | Performed by: ORTHOPAEDIC SURGERY

## 2020-10-02 PROCEDURE — 25010000002 PHENYLEPHRINE PER 1 ML: Performed by: NURSE ANESTHETIST, CERTIFIED REGISTERED

## 2020-10-02 PROCEDURE — 76000 FLUOROSCOPY <1 HR PHYS/QHP: CPT

## 2020-10-02 DEVICE — IMPLANTABLE DEVICE: Type: IMPLANTABLE DEVICE | Site: HIP | Status: FUNCTIONAL

## 2020-10-02 DEVICE — R3 US DELTA HEAD 36 +0
Type: IMPLANTABLE DEVICE | Site: HIP | Status: FUNCTIONAL
Brand: BIOLOX DELTA

## 2020-10-02 DEVICE — R3 0 DEGREE XLPE ACETABULAR LINER                                    36MM ID X OD 54MM
Type: IMPLANTABLE DEVICE | Site: HIP | Status: FUNCTIONAL
Brand: R3

## 2020-10-02 DEVICE — DBM 006010 PROGENIX PLUS 10CC
Type: IMPLANTABLE DEVICE | Site: HIP | Status: FUNCTIONAL
Brand: PROGENIX® PUTTY AND PROGENIX® PLUS

## 2020-10-02 DEVICE — REFLECTION SPHERICAL HEAD SCREW 25MM
Type: IMPLANTABLE DEVICE | Site: HIP | Status: FUNCTIONAL
Brand: REFLECTION

## 2020-10-02 DEVICE — KNOTLESS TISSUE CONTROL DEVICE, UNDYED UNIDIRECTIONAL (ANTIBACTERIAL) SYNTHETIC ABSORBABLE DEVICE
Type: IMPLANTABLE DEVICE | Site: HIP | Status: FUNCTIONAL
Brand: STRATAFIX

## 2020-10-02 DEVICE — REDAPT 190MM SLEEVELESS REVISION                                    STEM SIZE 17 STANDARD OFFSET
Type: IMPLANTABLE DEVICE | Site: HIP | Status: FUNCTIONAL
Brand: REDAPT

## 2020-10-02 DEVICE — REFLECTION SPHERICAL HEAD SCREW 30MM
Type: IMPLANTABLE DEVICE | Site: HIP | Status: FUNCTIONAL
Brand: REFLECTION

## 2020-10-02 DEVICE — DEV CONTRL TISS STRATAFIX SYMM PDS PLUS VIL CT-1 45CM: Type: IMPLANTABLE DEVICE | Site: HIP | Status: FUNCTIONAL

## 2020-10-02 DEVICE — BONE CANC CHIPS 1/4MM 15CC: Type: IMPLANTABLE DEVICE | Site: HIP | Status: FUNCTIONAL

## 2020-10-02 DEVICE — R3 3 HOLE ACETABULAR SHELL 54MM
Type: IMPLANTABLE DEVICE | Site: HIP | Status: FUNCTIONAL
Brand: R3 ACETABULAR

## 2020-10-02 RX ORDER — ACETAMINOPHEN 325 MG/1
650 TABLET ORAL EVERY 4 HOURS PRN
Status: DISCONTINUED | OUTPATIENT
Start: 2020-10-02 | End: 2020-10-05 | Stop reason: HOSPADM

## 2020-10-02 RX ORDER — FAMOTIDINE 20 MG/1
20 TABLET, FILM COATED ORAL ONCE
Status: COMPLETED | OUTPATIENT
Start: 2020-10-02 | End: 2020-10-02

## 2020-10-02 RX ORDER — CEFAZOLIN SODIUM 1 G/3ML
INJECTION, POWDER, FOR SOLUTION INTRAMUSCULAR; INTRAVENOUS AS NEEDED
Status: DISCONTINUED | OUTPATIENT
Start: 2020-10-02 | End: 2020-10-02 | Stop reason: SURG

## 2020-10-02 RX ORDER — LABETALOL HYDROCHLORIDE 5 MG/ML
10 INJECTION, SOLUTION INTRAVENOUS EVERY 4 HOURS PRN
Status: DISCONTINUED | OUTPATIENT
Start: 2020-10-02 | End: 2020-10-05 | Stop reason: HOSPADM

## 2020-10-02 RX ORDER — DEXTROSE MONOHYDRATE 25 G/50ML
25 INJECTION, SOLUTION INTRAVENOUS
Status: DISCONTINUED | OUTPATIENT
Start: 2020-10-02 | End: 2020-10-05 | Stop reason: HOSPADM

## 2020-10-02 RX ORDER — ONDANSETRON 2 MG/ML
4 INJECTION INTRAMUSCULAR; INTRAVENOUS ONCE AS NEEDED
Status: DISCONTINUED | OUTPATIENT
Start: 2020-10-02 | End: 2020-10-02 | Stop reason: HOSPADM

## 2020-10-02 RX ORDER — SODIUM CHLORIDE, SODIUM LACTATE, POTASSIUM CHLORIDE, CALCIUM CHLORIDE 600; 310; 30; 20 MG/100ML; MG/100ML; MG/100ML; MG/100ML
9 INJECTION, SOLUTION INTRAVENOUS CONTINUOUS
Status: DISCONTINUED | OUTPATIENT
Start: 2020-10-02 | End: 2020-10-02

## 2020-10-02 RX ORDER — CITALOPRAM 40 MG/1
40 TABLET ORAL DAILY
Status: DISCONTINUED | OUTPATIENT
Start: 2020-10-02 | End: 2020-10-05 | Stop reason: HOSPADM

## 2020-10-02 RX ORDER — ONDANSETRON 4 MG/1
4 TABLET, FILM COATED ORAL EVERY 6 HOURS PRN
Status: DISCONTINUED | OUTPATIENT
Start: 2020-10-02 | End: 2020-10-05 | Stop reason: HOSPADM

## 2020-10-02 RX ORDER — ROPIVACAINE HYDROCHLORIDE 5 MG/ML
INJECTION, SOLUTION EPIDURAL; INFILTRATION; PERINEURAL AS NEEDED
Status: DISCONTINUED | OUTPATIENT
Start: 2020-10-02 | End: 2020-10-02 | Stop reason: HOSPADM

## 2020-10-02 RX ORDER — ONDANSETRON 2 MG/ML
INJECTION INTRAMUSCULAR; INTRAVENOUS AS NEEDED
Status: DISCONTINUED | OUTPATIENT
Start: 2020-10-02 | End: 2020-10-02 | Stop reason: SURG

## 2020-10-02 RX ORDER — NALOXONE HCL 0.4 MG/ML
0.4 VIAL (ML) INJECTION AS NEEDED
Status: DISCONTINUED | OUTPATIENT
Start: 2020-10-02 | End: 2020-10-02 | Stop reason: HOSPADM

## 2020-10-02 RX ORDER — MAGNESIUM HYDROXIDE 1200 MG/15ML
LIQUID ORAL AS NEEDED
Status: DISCONTINUED | OUTPATIENT
Start: 2020-10-02 | End: 2020-10-02 | Stop reason: HOSPADM

## 2020-10-02 RX ORDER — ALBUMIN, HUMAN INJ 5% 5 %
SOLUTION INTRAVENOUS CONTINUOUS PRN
Status: DISCONTINUED | OUTPATIENT
Start: 2020-10-02 | End: 2020-10-02 | Stop reason: SURG

## 2020-10-02 RX ORDER — LABETALOL HYDROCHLORIDE 5 MG/ML
5 INJECTION, SOLUTION INTRAVENOUS
Status: DISCONTINUED | OUTPATIENT
Start: 2020-10-02 | End: 2020-10-02 | Stop reason: HOSPADM

## 2020-10-02 RX ORDER — ASPIRIN 325 MG
325 TABLET, DELAYED RELEASE (ENTERIC COATED) ORAL DAILY
Status: DISCONTINUED | OUTPATIENT
Start: 2020-10-03 | End: 2020-10-05 | Stop reason: HOSPADM

## 2020-10-02 RX ORDER — CEFAZOLIN SODIUM 2 G/100ML
2 INJECTION, SOLUTION INTRAVENOUS EVERY 8 HOURS
Status: COMPLETED | OUTPATIENT
Start: 2020-10-02 | End: 2020-10-03

## 2020-10-02 RX ORDER — MEPERIDINE HYDROCHLORIDE 25 MG/ML
12.5 INJECTION INTRAMUSCULAR; INTRAVENOUS; SUBCUTANEOUS
Status: DISCONTINUED | OUTPATIENT
Start: 2020-10-02 | End: 2020-10-02 | Stop reason: HOSPADM

## 2020-10-02 RX ORDER — SODIUM CHLORIDE, SODIUM LACTATE, POTASSIUM CHLORIDE, CALCIUM CHLORIDE 600; 310; 30; 20 MG/100ML; MG/100ML; MG/100ML; MG/100ML
100 INJECTION, SOLUTION INTRAVENOUS CONTINUOUS
Status: DISCONTINUED | OUTPATIENT
Start: 2020-10-02 | End: 2020-10-02

## 2020-10-02 RX ORDER — ATORVASTATIN CALCIUM 10 MG/1
10 TABLET, FILM COATED ORAL NIGHTLY
Status: DISCONTINUED | OUTPATIENT
Start: 2020-10-02 | End: 2020-10-05 | Stop reason: HOSPADM

## 2020-10-02 RX ORDER — DEXAMETHASONE SODIUM PHOSPHATE 4 MG/ML
INJECTION, SOLUTION INTRA-ARTICULAR; INTRALESIONAL; INTRAMUSCULAR; INTRAVENOUS; SOFT TISSUE AS NEEDED
Status: DISCONTINUED | OUTPATIENT
Start: 2020-10-02 | End: 2020-10-02 | Stop reason: SURG

## 2020-10-02 RX ORDER — PREGABALIN 150 MG/1
150 CAPSULE ORAL ONCE
Status: COMPLETED | OUTPATIENT
Start: 2020-10-02 | End: 2020-10-02

## 2020-10-02 RX ORDER — LIDOCAINE HYDROCHLORIDE 10 MG/ML
INJECTION, SOLUTION EPIDURAL; INFILTRATION; INTRACAUDAL; PERINEURAL AS NEEDED
Status: DISCONTINUED | OUTPATIENT
Start: 2020-10-02 | End: 2020-10-02 | Stop reason: SURG

## 2020-10-02 RX ORDER — EPHEDRINE SULFATE 50 MG/ML
5 INJECTION, SOLUTION INTRAVENOUS ONCE AS NEEDED
Status: DISCONTINUED | OUTPATIENT
Start: 2020-10-02 | End: 2020-10-02 | Stop reason: HOSPADM

## 2020-10-02 RX ORDER — ACETAMINOPHEN 650 MG/1
650 SUPPOSITORY RECTAL EVERY 4 HOURS PRN
Status: DISCONTINUED | OUTPATIENT
Start: 2020-10-02 | End: 2020-10-05 | Stop reason: HOSPADM

## 2020-10-02 RX ORDER — MORPHINE SULFATE 2 MG/ML
1 INJECTION, SOLUTION INTRAMUSCULAR; INTRAVENOUS
Status: DISCONTINUED | OUTPATIENT
Start: 2020-10-02 | End: 2020-10-05 | Stop reason: HOSPADM

## 2020-10-02 RX ORDER — ACETAMINOPHEN 500 MG
1000 TABLET ORAL ONCE
Status: COMPLETED | OUTPATIENT
Start: 2020-10-02 | End: 2020-10-02

## 2020-10-02 RX ORDER — MELOXICAM 7.5 MG/1
15 TABLET ORAL DAILY
Status: DISCONTINUED | OUTPATIENT
Start: 2020-10-03 | End: 2020-10-05 | Stop reason: HOSPADM

## 2020-10-02 RX ORDER — CEFAZOLIN SODIUM 2 G/100ML
2 INJECTION, SOLUTION INTRAVENOUS ONCE
Status: COMPLETED | OUTPATIENT
Start: 2020-10-02 | End: 2020-10-02

## 2020-10-02 RX ORDER — SODIUM CHLORIDE 9 MG/ML
120 INJECTION, SOLUTION INTRAVENOUS CONTINUOUS
Status: DISCONTINUED | OUTPATIENT
Start: 2020-10-02 | End: 2020-10-05 | Stop reason: HOSPADM

## 2020-10-02 RX ORDER — NICOTINE POLACRILEX 4 MG
15 LOZENGE BUCCAL
Status: DISCONTINUED | OUTPATIENT
Start: 2020-10-02 | End: 2020-10-05 | Stop reason: HOSPADM

## 2020-10-02 RX ORDER — MELOXICAM 15 MG/1
15 TABLET ORAL ONCE
Status: COMPLETED | OUTPATIENT
Start: 2020-10-02 | End: 2020-10-02

## 2020-10-02 RX ORDER — OXYCODONE HYDROCHLORIDE 5 MG/1
5 TABLET ORAL EVERY 4 HOURS PRN
Status: DISCONTINUED | OUTPATIENT
Start: 2020-10-02 | End: 2020-10-05 | Stop reason: HOSPADM

## 2020-10-02 RX ORDER — LIDOCAINE HYDROCHLORIDE 10 MG/ML
0.5 INJECTION, SOLUTION EPIDURAL; INFILTRATION; INTRACAUDAL; PERINEURAL ONCE AS NEEDED
Status: COMPLETED | OUTPATIENT
Start: 2020-10-02 | End: 2020-10-02

## 2020-10-02 RX ORDER — SODIUM CHLORIDE 0.9 % (FLUSH) 0.9 %
1-10 SYRINGE (ML) INJECTION AS NEEDED
Status: DISCONTINUED | OUTPATIENT
Start: 2020-10-02 | End: 2020-10-05 | Stop reason: HOSPADM

## 2020-10-02 RX ORDER — ONDANSETRON 2 MG/ML
4 INJECTION INTRAMUSCULAR; INTRAVENOUS EVERY 6 HOURS PRN
Status: DISCONTINUED | OUTPATIENT
Start: 2020-10-02 | End: 2020-10-05 | Stop reason: HOSPADM

## 2020-10-02 RX ORDER — PROPOFOL 10 MG/ML
VIAL (ML) INTRAVENOUS AS NEEDED
Status: DISCONTINUED | OUTPATIENT
Start: 2020-10-02 | End: 2020-10-02 | Stop reason: SURG

## 2020-10-02 RX ORDER — BUPIVACAINE HYDROCHLORIDE 5 MG/ML
INJECTION, SOLUTION PERINEURAL
Status: COMPLETED | OUTPATIENT
Start: 2020-10-02 | End: 2020-10-02

## 2020-10-02 RX ORDER — NALOXONE HCL 0.4 MG/ML
0.4 VIAL (ML) INJECTION
Status: DISCONTINUED | OUTPATIENT
Start: 2020-10-02 | End: 2020-10-05 | Stop reason: HOSPADM

## 2020-10-02 RX ORDER — SODIUM CHLORIDE 0.9 % (FLUSH) 0.9 %
3 SYRINGE (ML) INJECTION EVERY 12 HOURS SCHEDULED
Status: DISCONTINUED | OUTPATIENT
Start: 2020-10-02 | End: 2020-10-05 | Stop reason: HOSPADM

## 2020-10-02 RX ORDER — FENTANYL CITRATE 50 UG/ML
50 INJECTION, SOLUTION INTRAMUSCULAR; INTRAVENOUS
Status: DISCONTINUED | OUTPATIENT
Start: 2020-10-02 | End: 2020-10-02 | Stop reason: HOSPADM

## 2020-10-02 RX ADMIN — FAMOTIDINE 20 MG: 20 TABLET, FILM COATED ORAL at 06:39

## 2020-10-02 RX ADMIN — LIDOCAINE HYDROCHLORIDE 0.2 ML: 10 INJECTION, SOLUTION EPIDURAL; INFILTRATION; INTRACAUDAL; PERINEURAL at 06:20

## 2020-10-02 RX ADMIN — CEFAZOLIN SODIUM 2 G: 2 INJECTION, SOLUTION INTRAVENOUS at 15:15

## 2020-10-02 RX ADMIN — ATORVASTATIN CALCIUM 10 MG: 10 TABLET, FILM COATED ORAL at 20:33

## 2020-10-02 RX ADMIN — DEXAMETHASONE SODIUM PHOSPHATE 4 MG: 4 INJECTION, SOLUTION INTRAMUSCULAR; INTRAVENOUS at 10:40

## 2020-10-02 RX ADMIN — PROPOFOL 50 MCG/KG/MIN: 10 INJECTION, EMULSION INTRAVENOUS at 07:46

## 2020-10-02 RX ADMIN — INSULIN LISPRO 6 UNITS: 100 INJECTION, SOLUTION INTRAVENOUS; SUBCUTANEOUS at 18:11

## 2020-10-02 RX ADMIN — PROPOFOL 30 MG: 10 INJECTION, EMULSION INTRAVENOUS at 07:40

## 2020-10-02 RX ADMIN — SODIUM CHLORIDE, POTASSIUM CHLORIDE, SODIUM LACTATE AND CALCIUM CHLORIDE 9 ML/HR: 600; 310; 30; 20 INJECTION, SOLUTION INTRAVENOUS at 06:20

## 2020-10-02 RX ADMIN — SODIUM CHLORIDE, POTASSIUM CHLORIDE, SODIUM LACTATE AND CALCIUM CHLORIDE: 600; 310; 30; 20 INJECTION, SOLUTION INTRAVENOUS at 10:23

## 2020-10-02 RX ADMIN — BUPIVACAINE HYDROCHLORIDE 2 ML: 5 INJECTION, SOLUTION PERINEURAL at 07:43

## 2020-10-02 RX ADMIN — SODIUM CHLORIDE 120 ML/HR: 9 INJECTION, SOLUTION INTRAVENOUS at 14:06

## 2020-10-02 RX ADMIN — CEFAZOLIN 2 G: 1 INJECTION, POWDER, FOR SOLUTION INTRAVENOUS at 11:25

## 2020-10-02 RX ADMIN — EPHEDRINE SULFATE 10 MG: 50 INJECTION INTRAMUSCULAR; INTRAVENOUS; SUBCUTANEOUS at 08:03

## 2020-10-02 RX ADMIN — EPHEDRINE SULFATE 10 MG: 50 INJECTION INTRAMUSCULAR; INTRAVENOUS; SUBCUTANEOUS at 08:31

## 2020-10-02 RX ADMIN — CEFAZOLIN SODIUM 2 G: 2 INJECTION, SOLUTION INTRAVENOUS at 07:35

## 2020-10-02 RX ADMIN — LIDOCAINE HYDROCHLORIDE 2 ML: 10 INJECTION, SOLUTION EPIDURAL; INFILTRATION; INTRACAUDAL; PERINEURAL at 07:40

## 2020-10-02 RX ADMIN — MUPIROCIN 1 APPLICATION: 20 OINTMENT TOPICAL at 06:39

## 2020-10-02 RX ADMIN — PHENYLEPHRINE HYDROCHLORIDE 0.5 MCG/KG/MIN: 10 INJECTION INTRAVENOUS at 09:08

## 2020-10-02 RX ADMIN — MELOXICAM 15 MG: 15 TABLET ORAL at 06:39

## 2020-10-02 RX ADMIN — ALBUMIN HUMAN: 0.05 INJECTION, SOLUTION INTRAVENOUS at 10:09

## 2020-10-02 RX ADMIN — TRANEXAMIC ACID 1000 MG: 100 INJECTION, SOLUTION INTRAVENOUS at 07:50

## 2020-10-02 RX ADMIN — CITALOPRAM 40 MG: 40 TABLET, FILM COATED ORAL at 15:03

## 2020-10-02 RX ADMIN — ONDANSETRON 4 MG: 2 INJECTION INTRAMUSCULAR; INTRAVENOUS at 10:40

## 2020-10-02 RX ADMIN — TRANEXAMIC ACID 1000 MG: 100 INJECTION, SOLUTION INTRAVENOUS at 10:24

## 2020-10-02 RX ADMIN — INSULIN LISPRO 7 UNITS: 100 INJECTION, SOLUTION INTRAVENOUS; SUBCUTANEOUS at 21:28

## 2020-10-02 RX ADMIN — METOPROLOL TARTRATE 12.5 MG: 25 TABLET, FILM COATED ORAL at 20:33

## 2020-10-02 RX ADMIN — PREGABALIN 150 MG: 150 CAPSULE ORAL at 06:39

## 2020-10-02 RX ADMIN — INFLUENZA VIRUS VACCINE 0.5 ML: 15; 15; 15; 15 SUSPENSION INTRAMUSCULAR at 21:29

## 2020-10-02 RX ADMIN — ACETAMINOPHEN 1000 MG: 500 TABLET ORAL at 06:39

## 2020-10-02 RX ADMIN — ALBUMIN HUMAN: 0.05 INJECTION, SOLUTION INTRAVENOUS at 10:36

## 2020-10-02 NOTE — OP NOTE
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 proximal femur fracture, with acetabular defect secondary to perforated femoral head secondary to a prominent blade    PROCEDURE PERFORMED: Trochanteric fixation nail removal, with conversion to left total hip arthroplasty with [employer] components    IMPLANTS: #54 press-fit R3 cup with 30 mm and 25 mm screws with neutral polyethylene liner with #17 standard offset press-fit redapt 190 mm length stem with +0 x 36 Bio-lox ceramic head    SURGEON: Gideon Son MD    ASSISTANT: Lima Wu PA-C and Estrella Page  (Lima Wu PA-C and Estrella Page PA-C was present and necessary for positioning, draping, retraction, instrumentation and closure.)    SPECIMENS: None    IMPLANTS:   Implants     Implant        Sut Contrl Tiss Stratafix Spiral Mncryl Ud 3/0 Pls 60cm - [id number] - Implanted   (Right) Hip    Inventory item: SUT CONTRL TISS STRATAFIX SPIRAL MNCRYL UD 3/0 PLS 60CM Model/Cat number: [id number]    : [employer]  [employer] J Lot number: [id number]    Device identifier: [id number] Device identifier type: GS1    As of 10/2/2020     Status: Implanted                  Sut Contrl Tiss Stratafix Symm Pds Plus Johnny Ct-1 45cm - [id number] - Implanted   (Right) Hip    Inventory item: SUT CONTRL TISS STRATAFIX SYMM PDS PLUS JOHNNY CT-1 45CM Model/Cat number: [id number]    : [employer]      As of 10/2/2020     Status: Implanted                  Putty Dbm Progenix 10cc - [id number] - [id number] - Implanted   (Right) Hip    Inventory item: PUTTY DBM PROGENIX  10CC Model/Cat number: [id number]    Serial number: [id number] : [employer] A [facility]    Device identifier: [id number] Device identifier type: GS1    As of 10/2/2020     Status: Implanted                  Shll Acet R3 3h Std 54mm - [id number] - Implanted   (Right) Hip    Inventory item: SHLL ACET R3 3H STD 54MM Model/Cat number: [id number]    : [employer] Lot number: [id number]    Device identifier: [id number] Device identifier type: GS1    As of 10/2/2020     Status: Implanted                  Bone Canc Chips 1/4mm 15cc - [id number] - [id number] - Implanted   (Right) Hip    Inventory item: BONE CANC CHIPS 1/4MM 15CC Model/Cat number: [id number]    Serial number: [id number] : [employer] A [facility]    Device identifier: [id number] Device identifier type: GS1    As of 10/2/2020     Status: Implanted                  Scrw Sph Hd Reflection 6.5x30mm - [id number] - Implanted   (Right) Hip    Inventory item: SCRW SPH HD REFLECTION 6.5X30MM Model/Cat number: [id number]    : [employer] Lot number: [id number]    Device identifier: [id number] Device identifier type: GS1    As of 10/2/2020     Status: Implanted                  Scrw Sph Hd Reflection 6.5x25mm - [id number] - Implanted   (Right) Hip    Inventory item: SCRW SPH HD REFLECTION 6.5X25MM Model/Cat number: [id number]    : [employer] Lot number: +    Device identifier: [id number] Device identifier type: GS1    As of 10/2/2020     Status: Implanted                  Liner Acet R3 Xlpe 0d [id number] - [id number] - Implanted   (Right) Hip    Inventory item: LINER ACET R3 XLPE [id number] [id number] Model/Cat number: [id number]    : [employer] Lot number: [id number]    Device identifier: [id number] Device identifier type: GS1    As of 10/2/2020     Status: Implanted                  Stem Fem/Hip Rev Redapt Slvls Std Offst Sz17 190mm - [id number] -  Implanted   (Right) Hip    Inventory item: STEM FEM/HIP REV REDAPT SLVLS STD OFFST SZ17 190MM Model/Cat number: [id number]    : [employer] Lot number: [id number]    As of 10/2/2020     Status: Implanted                  Hd Fem/Hip Bioloxdelta R3 12/14 Sm 36mm Pls0 - [id number] - Implanted   (Right) Hip    Inventory item: HD FEM/HIP BIOLOXDELTA R3 12/14 SM 36MM PLS0 Model/Cat number: [id number]    : [employer] Lot number: [id number]    As of 10/2/2020     Status: Implanted                         ANESTHESIA:  Choice    STAFF:  Circulator: Hermelinda Rodriguez RN; Annie Hayes RN  Physician Assistant: Estrella Page PA-C  Scrub Person: Radha Steward  Vendor Representative: Jeovanny Hinson; Dimas Prescott; Bradley Raygoza  Nursing Assistant: Thomas Mueller  Assistant: Lima Wu PA-C    ESTIMATED BLOOD LOSS: 900 mL     COMPLICATIONS: None    PREOPERATIVE ANTIBIOTICS: Ancef 2 g    INDICATIONS: The patient is a 84 y.o. 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 Barba). The patient opted for a left total hip arthroplasty at this time and consented for the procedure. Please see my office notes for details with regard to preoperative counseling and operative rationale.     DESCRIPTION OF PROCEDURE: The patient was positively identified in the preoperative holding area, brought to the operative suite, and placed in a supine position. After adequate spinal anesthetic had been achieved, the patient was placed in lateral decubitus position with the left side up. The patient was well padded on the pegboard table. After sterile prep and drape of the left hip and lower extremity, a timeout procedure was performed to confirm  the operative site, as well as the other parameters.     A skin incision was made on the lateral aspect of the hip for a modified direct lateral approach. Following a sharp skin incision, dissection was carried down to the level of the fascia which was incised in line with its fibers.  Insertion point of the nail, the blade, and the distal screw were all carefully located, and the extraction device attached to the proximal aspect of the golden.  Extraction device for the blade was then attached, and the locking device disengaged from the blade proximally, followed by extraction of the blade, followed by the distal screw, both without difficulty.  Golden was then easily removed.  There were no gross signs of infection, with only small amount of fibrous tissue noted within the canal.  Dissection was then carried out anteriorly between the anterior one third and posterior two thirds of the gluteus medius, and lesser trochanter was not healed to the shaft, was a separate fragment.  Hip capsule was then incised in a T-shaped fashion and the head dislocated from the acetabulum. The head was then resected with the aid of an oscillating saw blade.  Acetabular exposure was then performed.    Description of acetabulum: A large cavity centimeter deep and 2 x 2 cm wide in the superior dome was noted, with healthy-appearing bone in the base, corresponding to the area where the blade had penetrated through the head. The acetabulum was sequentially reamed up to 54 to accommodate a 54 press-fit R3 cup, and the acetabular defect was packed with a combination of cancellus bone graft chips and pro-Genex putty in a layered fashion, which nicely packed the defect, and the 54 press-fit R3 cup was then placed which had good press-fit characteristics and a neutral polyethylene liner was placed after 2 screws measuring 30 mm and 25 mm in length were placed both which had good purchase.     Attention was then redirected towards the femoral aspect.   Medial calcar fragment was not healed, therefore it was deemed to be appropriate for a distal engaging femoral stem, and a re-dap stem was selected, 190 mm in length was deemed to be appropriate, which would bypass the distal screw hole from the previous intermediate length trochanteric fixation nail.  Sequential reaming was performed up to 17, which had good scratch fit in the femoral canal.  Trial reduction was performed both standard offset and high offset necks, with a +0 36 head, and good stability was noted throughout the full arc of motion with both, but the standard offset seem to re-create her natural offset best.  However, it was difficult to regain her full length, as the +0 36 head was a limit of what she can tolerate with her right soft tissues, secondary to the preoperative shortening following her fracture nonunion.  Therefore, the hip was again dislocated and the final #17 readapt standard offset press-fit stem was placed, followed by +0 x 36 ceramic head (after a trial with the same), and the same reduction characteristics were noted as with the trial with no dislocation throughout the full arc of motion.  Therefore, the hip was copiously irrigated and attention was directed towards closure.     The hip capsule was closed with #1 Vicryl in an interrupted figure-of-eight fashion, followed by closure of the gluteus medius and vastus lateralis sleeve as a full thickness sleeve repair with #1 Vicryl in an interrupted figure-of-eight fashion, followed by closure of the fascia amara with #1 Vicryl in an interrupted figure-of-eight fashion in 3 strategic locations both proximally, distally and in the central portion, followed by oversewing this from distal to proximal with a #1 StrataFix symmetric, which nicely sealed that layer, followed by closure of the deep fascial layer with #1 Vicryl in a buried interrupted fashion, followed by closure of the subcutaneous layer with 2-0 Vicryl and the skin with 3-0  StrataFix in a running subcuticular fashion. Prineo wound closure dressing was applied followed by a sterile dressing with 4 x 4s secured with micropore tape.  The patient tolerated the procedure well and was brought to the recovery room in good condition.     POSTOPERATIVE PLAN:  1. The patient will begin early range of motion and weight-bearing per the post total hip arthroplasty protocol.   2. I anticipate brief hospitalization for initial rehabilitation and pain control followed by continued rehabilitation in a rehab setting.   3. Postoperative medical management with Dr. Cormier.  4. Postoperative DVT prophylaxis with aspirin unless there is a contraindication.  5. Postoperative IV antibiotics with Ancef for 24 hours.      Gideon Son MD  10/02/20  13:09 EDT    Dragon disclaimer:  Much of this encounter note is an electronic transcription/translation of spoken language to printed text. The electronic translation of spoken language may permit erroneous, or at times, nonsensical words or phrases to be inadvertently transcribed; Although I have reviewed the note for such errors, some may still exist.

## 2020-10-02 NOTE — INTERVAL H&P NOTE
"  H&P reviewed. The patient was examined and there are no changes to the H&P.     /90 (BP Location: Right arm, Patient Position: Sitting)   Pulse 82   Temp 97.5 °F (36.4 °C) (Temporal)   Resp 18   Ht 167.6 cm (66\")   Wt 77.1 kg (170 lb)   LMP  (LMP Unknown) Comment: HYSTERECTOMY  SpO2 94%   Breastfeeding No   BMI 27.44 kg/m²   Lungs clear   Heart  Irregular rhythm without murmur abdomen soft without tenderness  Immunizations   pneumo up to date   Flu 2010  Tetanus?  Tobacco neg   ETOH neg  Patient has no known allergies.  No allergy to latex or contrast dye  Lima Wu PA-C  10/2/2020  6:57    Agree with above.  Plan for hardware removal and conversion to total hip arthroplasty.    Gideon Son MD  10/02/20  "

## 2020-10-02 NOTE — ANESTHESIA PREPROCEDURE EVALUATION
Anesthesia Evaluation     Patient summary reviewed and Nursing notes reviewed   NPO Solid Status: > 8 hours  NPO Liquid Status: > 2 hours           Airway   Mallampati: I  TM distance: >3 FB  Neck ROM: full  No difficulty expected  Dental    (+) edentulous, lower dentures and upper dentures    Pulmonary    (-) asthma, shortness of breath, not a smoker  Cardiovascular     ECG reviewed    (+) hypertension, dysrhythmias (NO RX ) Atrial Fib, hyperlipidemia,   (-) valvular problems/murmurs (AORTIC SCLEROSIS  No STENOSIS  ), CAD, angina    ROS comment: ECG  Atrial fibrillation LAD     ECHO EF .70%  No aortic valve stenosis is present. Aortic V  maximum pressure gradient is 12.0 mmHg mean 6.0 .     Neuro/Psych  (-) seizures, CVA  GI/Hepatic/Renal/Endo    (+)  GERD,  diabetes mellitus (-320 -A1C 7.0 NO RX ),   (-) no renal disease, no thyroid disorder    Musculoskeletal     Abdominal    Substance History      OB/GYN          Other   arthritis,      ROS/Med Hx Other: NO anticoagulation                 Anesthesia Plan    ASA 3     spinal   (Propofol Infusion as part of Anti PONV tech )    Anesthetic plan, all risks, benefits, and alternatives have been provided, discussed and informed consent has been obtained with: patient.    Plan discussed with CRNA.

## 2020-10-02 NOTE — H&P
Patient Name: Bri Iniguez  MRN: [id number]  : 1935  DOS: 10/2/2020    Attending: Gideon Son MD    Primary Care Provider: Clifford Nolasco MD      Chief complaint: Right hip pain    Subjective   Patient is a pleasant 84 y.o. female presented for scheduled surgery by Dr. Son.  She underwent right total hip arthroplasty with hardware removal under spinal anesthesia.  She tolerated surgery well and was admitted for further medical management.    She is known to us from previous admission in 2018 for left total hip replacement; which she recovered well.  She underwent ORIF for right intratrochanteric hip fracture on 2019 due to a mechanical fall.  Her hip has been painful since.  She uses a cane or walker for ambulation.    When seen postop she is doing well.  She denies pain.  She denies nausea, shortness of breath or chest pain.  No history of DVT or PE.    She has history of hypertension, hyperlipidemia and atrial fibrillation.  She is followed by cardiology, Dr. Lassiter.    (Above is noted, agree.  Doing well when seen in her room afterwards.  Good pain control with no nausea, vomiting, or shortness of breath.  Up with person assist to bedside commode using a walker earlier.)wy    Allergies:  No Known Allergies    Meds:  Medications Prior to Admission   Medication Sig Dispense Refill Last Dose   • atorvastatin (LIPITOR) 10 MG tablet Take 10 mg by mouth Every Night.   10/1/2020 at 1900   • citalopram (CeleXA) 40 MG tablet Take 40 mg by mouth Daily.   10/1/2020 at 1900   • diclofenac (VOLTAREN) 1 % gel gel 4 (Four) Times a Day As Needed.   10/1/2020 at Unknown time   • DOCUSATE SODIUM PO Take  by mouth Daily.   10/1/2020 at Unknown time   • estradiol (ESTRACE) 0.1 MG/GM vaginal cream Insert 1 g into the vagina 3 (Three) Times a Week. Apply using finger technique  6 10/1/2020 at Unknown time   • INTRAROSA 6.5 MG insert Insert 1 suppository into the vagina Every Night.  6 10/1/2020 at 2000  "  • metoprolol tartrate (LOPRESSOR) 25 MG tablet 0.5 tablets 2 (Two) Times a Day.   10/1/2020 at 1900   • Multiple Vitamins-Minerals (OCUVITE ADULT 50+) capsule Take 1 tablet by mouth Daily.   10/1/2020 at Unknown time   • traMADol (ULTRAM) 50 MG tablet Take 50 mg by mouth At Night As Needed.   9/29/2020         History:   Past Medical History:   Diagnosis Date   • A-fib (CMS/[facility])    • Bursitis     trochanteric area   • Colonoscopy refused 2015   • Constipation    • Deaf, left    • Deafness     unspecified   • Diabetes mellitus (CMS/[facility])     PATIENT REPORTS SHE HAS TAKEN METFORMIN IN THE PAST BUT WAS TAKEN OFF OF THIS MEDICATION AROUND OCTOBER 2018.     • Elevated cholesterol    • Essential hypertension    • GERD (gastroesophageal reflux disease)    • Hearing loss, right     PATIENT DOES USE A HEARING AID TO RIGHT SIDE INTERMITTENTLY (REPORTS DEAFNESS IN LEFT EAR)   • Hip pain    • History of UTI     PATIENT REPORTS RESOLVED AFTER HORMONE THERAPY WAS ORDERED   • Hyperlipidemia     other   • Impaired functional mobility, balance, gait, and endurance    • Influenza vaccine administered 2014   • Macular degeneration    • Mammogram declined 2015   • Osteoarthritis of left hip    • Primary osteoarthritis of right knee    • Supraventricular premature beats     REPORTED IN PREADMISSION TESTING VISIT \"IRREGULAR HEART BEAT\".  REPORTS UNSURE THE ACTUAL VERBIAGE OF WHAT SHE WAS TOLD OTHER THAN THIS.    • Wears partial dentures     REPORTS UPPER AND LOWER PARTIALS. INSTRUCTED NO ADHESIVES THE DOS.     Past Surgical History:   Procedure Laterality Date   • BUNIONECTOMY Right 1/21/2019    Procedure: Right foot bunionectomy with exostectomy, proximal phalanx osteotomy, right foot second and third digit hammertoe correction with interphalangeal joint fusion, right second metatarsophalangeal joint capsular release/capsulotomy;  Surgeon: Klaus Cason DPM;  Location: [facility];  Service: Podiatry   • CATARACT EXTRACTION Bilateral " "   • CHOLECYSTECTOMY  2002   • COLONOSCOPY     • HERNIA REPAIR  2005    umbilical   • HIP INTERTROCHANTERIC NAILING Right 12/31/2019    Procedure: HIP RIGHT OPEN REDUCTION INTERNAL FIXATION, INTERMEDULLARY  NAIL;  Surgeon: Carlos Barba Jr., MD;  Location: [facility] OR;  Service: Orthopedics   • HYSTERECTOMY  1974   • TONSILLECTOMY     • TOTAL HIP ARTHROPLASTY Left 1/23/2018    Procedure: LEFT TOTAL HIP ARTHROPLASTY;  Surgeon: Gideon Son MD;  Location: [facility];  Service:      Family History   Problem Relation Age of Onset   • Hypertension Other    • Diabetes Other         type 2   • Stroke Mother    • Diabetes Mother    • Hypertension Mother    • Cancer Father      Social History     Tobacco Use   • Smoking status: Never Smoker   • Smokeless tobacco: Never Used   Substance Use Topics   • Alcohol use: No   • Drug use: No   She is  with 2 children. She is a retired .    Review of Systems  Pertinent items are noted in HPI, all other systems reviewed and negative    Vital Signs  BP 99/49 (BP Location: Right arm, Patient Position: Lying)   Pulse 71   Temp 97.7 °F (36.5 °C) (Oral)   Resp 18   Ht 167.6 cm (66\")   Wt 77.1 kg (170 lb)   LMP  (LMP Unknown) Comment: HYSTERECTOMY  SpO2 100%   Breastfeeding No   BMI 27.44 kg/m²     Physical Exam:    General Appearance:    Alert, cooperative, in no acute distress   Head:    Normocephalic, without obvious abnormality, atraumatic   Eyes:            Lids and lashes normal, conjunctivae and sclerae normal, no   icterus, no pallor, corneas clear,    Ears:    Ears appear intact with no abnormalities noted   Throat:   No oral lesions, no thrush, oral mucosa moist   Neck:   No adenopathy, supple, trachea midline, no thyromegaly    Lungs:     Clear to auscultation,respirations regular, even and unlabored    Heart:    Regular rhythm and normal rate, normal S1 and S2, no murmur, no gallop   Abdomen:     Normal bowel sounds, no masses, no " organomegaly, soft non-tender, non-distended, no guarding, no rebound  tenderness   Genitalia:    Deferred   Extremities:  Right hip bulky surgical dressing clean dry intact.   Pulses:   Pulses palpable and equal bilaterally   Skin:   No bleeding, bruising or rash   Neurologic:   Cranial nerves 2 - 12 grossly intact. Flexion and dorsiflexion intact bilateral feet.        I reviewed the patient's new clinical results.       Results from last 7 days   Lab Units 10/02/20  1149   HEMOGLOBIN g/dL 9.1*   HEMATOCRIT % 28.7*     Results from last 7 days   Lab Units 10/02/20  0616   POTASSIUM mmol/L 3.7     Lab Results   Component Value Date    HGBA1C 7.00 (H) 09/25/2020         Assessment and Plan:     Status post total replacement of right hip    DJD (degenerative joint disease)    Hyperlipidemia    Essential hypertension    Deafness    Diabetes type 2, controlled (CMS/[facility])      Plan  1. PT/OT- WBAT RLE  2. Pain control-prns   3. IS-encourage  4. DVT proph- Mechs/ASA  5. Bowel regimen  6. Resume home medications as appropriate  7. Monitor post-op labs  8. DC planning for home    HTN, Hyperlipidemia  - Continue home Lopressor and statin  - Monitor BP   - Holding parameters for BP meds  - Labetalol PRN for SBP>170    DM  - hgb A1c on 9/25/20 7.0  - Accu-Chek AC and HS with low dose SSI      HAWA Casanova  10/02/20  13:54 EDT     I have personally performed the evaluation on this patient. My history is consistent  with HPI obtained. My exam findings are listed above. I have personally reviewed and discussed the above formulated treatment plan with HAWA.wy

## 2020-10-02 NOTE — ANESTHESIA PROCEDURE NOTES
Spinal Block      Patient reassessed immediately prior to procedure    Patient location during procedure: OR  Indication:at surgeon's request  Performed By  CRNA: Ron Barragan CRNA  Preanesthetic Checklist  Completed: patient identified, site marked, surgical consent, pre-op evaluation, timeout performed, IV checked, risks and benefits discussed and monitors and equipment checked  Spinal Block Prep:  Patient Position:sitting  Sterile Tech:cap, gloves, sterile barriers and mask  Prep:Chloraprep  Patient Monitoring:blood pressure monitoring, continuous pulse oximetry and EKG  Spinal Block Procedure  Approach:midline  Guidance:landmark technique and palpation technique  Location:L4-L5  Needle Type:Sprotte  Needle Gauge:22 G  Placement of Spinal needle event:cerebrospinal fluid aspirated  Paresthesia: no  Fluid Appearance:clear  Medications: bupivacaine (MARCAINE) 0.5 % injection, 2 mL  Med Administered at 10/2/2020 7:43 AM   Post Assessment  Patient Tolerance:patient tolerated the procedure well with no apparent complications  Complications no  Additional Notes  Procedure:  Pt assisted to sitting position, with legs in position of comfort over side of bed.  Pt. instructed in optimal spine presentation, the spine was prepped/ Draped and the skin at insertion site was anesthetized with 1% Lidocaine 2 ml.  The spinal needle was then advanced until CSF flow was obtained and LA was injected:

## 2020-10-02 NOTE — ANESTHESIA POSTPROCEDURE EVALUATION
Patient: Bri Iniguez    Procedure Summary     Date: 10/02/20 Room / Location:  [facility] /  [facility]    Anesthesia Start: 0735 Anesthesia Stop:     Procedures:       TOTAL HIP ARTHROPLASTY (Right Hip)      HIP HARDWARE REMOVAL (Right Hip) Diagnosis:       Other secondary osteoarthritis of right hip      (Other secondary osteoarthritis of right hip [M16.7])    Surgeon: Gideon Son MD Provider: Peña Kendall MD    Anesthesia Type: spinal ASA Status: 3          Anesthesia Type: spinal    Vitals  Vitals Value Taken Time   BP     Temp     Pulse     Resp     SpO2 85 % 10/02/20 1133   Vitals shown include unvalidated device data.        Post Anesthesia Care and Evaluation    Patient location during evaluation: PACU  Patient participation: complete - patient participated  Level of consciousness: awake and alert  Pain score: 0  Pain management: adequate  Airway patency: patent  Anesthetic complications: No anesthetic complications  PONV Status: none  Cardiovascular status: hemodynamically stable and acceptable  Respiratory status: nonlabored ventilation, acceptable and nasal cannula  Hydration status: acceptable

## 2020-10-02 NOTE — PLAN OF CARE
Goal Outcome Evaluation:  Plan of Care Reviewed With: patient  Progress: improving  Outcome Summary: Pt had right hip replacement today, gauze CDI. She is A&OX4. She is up with 2 assist walker/gb to BSC. No c/o pain as of yet. Plan for rehab then home at d/c. Pt wants flu vaccine while here, ordered. Will monitor.

## 2020-10-03 PROBLEM — D62 ACUTE BLOOD LOSS ANEMIA: Status: ACTIVE | Noted: 2020-10-03

## 2020-10-03 LAB
ANION GAP SERPL CALCULATED.3IONS-SCNC: 9 MMOL/L (ref 5–15)
BUN SERPL-MCNC: 11 MG/DL (ref 8–23)
BUN/CREAT SERPL: 21.2 (ref 7–25)
CALCIUM SPEC-SCNC: 7.9 MG/DL (ref 8.6–10.5)
CHLORIDE SERPL-SCNC: 99 MMOL/L (ref 98–107)
CO2 SERPL-SCNC: 25 MMOL/L (ref 22–29)
CREAT SERPL-MCNC: 0.52 MG/DL (ref 0.57–1)
DEPRECATED RDW RBC AUTO: 46.5 FL (ref 37–54)
ERYTHROCYTE [DISTWIDTH] IN BLOOD BY AUTOMATED COUNT: 14 % (ref 12.3–15.4)
GFR SERPL CREATININE-BSD FRML MDRD: 112 ML/MIN/1.73
GLUCOSE BLDC GLUCOMTR-MCNC: 172 MG/DL (ref 70–130)
GLUCOSE BLDC GLUCOMTR-MCNC: 174 MG/DL (ref 70–130)
GLUCOSE BLDC GLUCOMTR-MCNC: 215 MG/DL (ref 70–130)
GLUCOSE BLDC GLUCOMTR-MCNC: 268 MG/DL (ref 70–130)
GLUCOSE SERPL-MCNC: 155 MG/DL (ref 65–99)
HCT VFR BLD AUTO: 22.3 % (ref 34–46.6)
HCT VFR BLD AUTO: 27.5 % (ref 34–46.6)
HGB BLD-MCNC: 7 G/DL (ref 12–15.9)
HGB BLD-MCNC: 9 G/DL (ref 12–15.9)
MCH RBC QN AUTO: 28.7 PG (ref 26.6–33)
MCHC RBC AUTO-ENTMCNC: 31.4 G/DL (ref 31.5–35.7)
MCV RBC AUTO: 91.4 FL (ref 79–97)
PLATELET # BLD AUTO: 166 10*3/MM3 (ref 140–450)
PMV BLD AUTO: 9.7 FL (ref 6–12)
POTASSIUM SERPL-SCNC: 3.5 MMOL/L (ref 3.5–5.2)
RBC # BLD AUTO: 2.44 10*6/MM3 (ref 3.77–5.28)
SODIUM SERPL-SCNC: 133 MMOL/L (ref 136–145)
WBC # BLD AUTO: 10.55 10*3/MM3 (ref 3.4–10.8)

## 2020-10-03 PROCEDURE — P9016 RBC LEUKOCYTES REDUCED: HCPCS

## 2020-10-03 PROCEDURE — 25010000002 FUROSEMIDE PER 20 MG: Performed by: INTERNAL MEDICINE

## 2020-10-03 PROCEDURE — 63710000001 INSULIN LISPRO (HUMAN) PER 5 UNITS: Performed by: ORTHOPAEDIC SURGERY

## 2020-10-03 PROCEDURE — 85014 HEMATOCRIT: CPT | Performed by: INTERNAL MEDICINE

## 2020-10-03 PROCEDURE — 85027 COMPLETE CBC AUTOMATED: CPT | Performed by: ORTHOPAEDIC SURGERY

## 2020-10-03 PROCEDURE — 99024 POSTOP FOLLOW-UP VISIT: CPT | Performed by: ORTHOPAEDIC SURGERY

## 2020-10-03 PROCEDURE — 82962 GLUCOSE BLOOD TEST: CPT

## 2020-10-03 PROCEDURE — 25010000003 CEFAZOLIN IN DEXTROSE 2-4 GM/100ML-% SOLUTION: Performed by: ORTHOPAEDIC SURGERY

## 2020-10-03 PROCEDURE — 36430 TRANSFUSION BLD/BLD COMPNT: CPT

## 2020-10-03 PROCEDURE — 86900 BLOOD TYPING SEROLOGIC ABO: CPT

## 2020-10-03 PROCEDURE — 97530 THERAPEUTIC ACTIVITIES: CPT

## 2020-10-03 PROCEDURE — 97161 PT EVAL LOW COMPLEX 20 MIN: CPT

## 2020-10-03 PROCEDURE — 85018 HEMOGLOBIN: CPT | Performed by: INTERNAL MEDICINE

## 2020-10-03 PROCEDURE — 80048 BASIC METABOLIC PNL TOTAL CA: CPT | Performed by: NURSE PRACTITIONER

## 2020-10-03 PROCEDURE — 97166 OT EVAL MOD COMPLEX 45 MIN: CPT

## 2020-10-03 RX ORDER — ACETAMINOPHEN 160 MG/5ML
650 SOLUTION ORAL ONCE
Status: COMPLETED | OUTPATIENT
Start: 2020-10-03 | End: 2020-10-03

## 2020-10-03 RX ORDER — FUROSEMIDE 10 MG/ML
20 INJECTION INTRAMUSCULAR; INTRAVENOUS ONCE
Status: COMPLETED | OUTPATIENT
Start: 2020-10-03 | End: 2020-10-03

## 2020-10-03 RX ORDER — ACETAMINOPHEN 325 MG/1
650 TABLET ORAL ONCE
Status: COMPLETED | OUTPATIENT
Start: 2020-10-03 | End: 2020-10-03

## 2020-10-03 RX ORDER — ACETAMINOPHEN 650 MG/1
650 SUPPOSITORY RECTAL ONCE
Status: COMPLETED | OUTPATIENT
Start: 2020-10-03 | End: 2020-10-03

## 2020-10-03 RX ADMIN — CEFAZOLIN SODIUM 2 G: 2 INJECTION, SOLUTION INTRAVENOUS at 00:28

## 2020-10-03 RX ADMIN — MELOXICAM 15 MG: 7.5 TABLET ORAL at 09:45

## 2020-10-03 RX ADMIN — FUROSEMIDE 20 MG: 10 INJECTION, SOLUTION INTRAMUSCULAR; INTRAVENOUS at 11:58

## 2020-10-03 RX ADMIN — ACETAMINOPHEN 650 MG: 325 TABLET, FILM COATED ORAL at 09:45

## 2020-10-03 RX ADMIN — OXYCODONE 5 MG: 5 TABLET ORAL at 15:07

## 2020-10-03 RX ADMIN — INSULIN LISPRO 4 UNITS: 100 INJECTION, SOLUTION INTRAVENOUS; SUBCUTANEOUS at 20:41

## 2020-10-03 RX ADMIN — CITALOPRAM 40 MG: 40 TABLET, FILM COATED ORAL at 09:45

## 2020-10-03 RX ADMIN — ATORVASTATIN CALCIUM 10 MG: 10 TABLET, FILM COATED ORAL at 20:41

## 2020-10-03 RX ADMIN — ASPIRIN 325 MG: 325 TABLET, COATED ORAL at 09:45

## 2020-10-03 RX ADMIN — INSULIN LISPRO 3 UNITS: 100 INJECTION, SOLUTION INTRAVENOUS; SUBCUTANEOUS at 11:58

## 2020-10-03 RX ADMIN — INSULIN LISPRO 2 UNITS: 100 INJECTION, SOLUTION INTRAVENOUS; SUBCUTANEOUS at 09:45

## 2020-10-03 RX ADMIN — SODIUM CHLORIDE 120 ML/HR: 9 INJECTION, SOLUTION INTRAVENOUS at 15:02

## 2020-10-03 RX ADMIN — METOPROLOL TARTRATE 12.5 MG: 25 TABLET, FILM COATED ORAL at 20:41

## 2020-10-03 RX ADMIN — INSULIN LISPRO 2 UNITS: 100 INJECTION, SOLUTION INTRAVENOUS; SUBCUTANEOUS at 17:10

## 2020-10-03 RX ADMIN — METOPROLOL TARTRATE 12.5 MG: 25 TABLET, FILM COATED ORAL at 09:45

## 2020-10-03 NOTE — PROGRESS NOTES
"      [facility] Note    Subjective      LOS: 1 day   Patient Care Team:  Clifford Nolasco MD as PCP - General  Clifford Nolasco MD as PCP - Claims Attributed    CC: Right hip pain    Interval History:   Patient resting comfortably in bed.  No complaints.  Hip feels better than before surgery.    Objective      Vital Signs  Temp (24hrs), Av.9 °F (36.6 °C), Min:97.5 °F (36.4 °C), Max:98.4 °F (36.9 °C)      /66   Pulse 114   Temp 98.1 °F (36.7 °C) (Oral)   Resp 16   Ht 167.6 cm (66\")   Wt 77.1 kg (170 lb)   LMP  (LMP Unknown) Comment: HYSTERECTOMY  SpO2 98%   Breastfeeding No   BMI 27.44 kg/m²     Examination:   Examination of the right hip: The dressing is clean, dry, and intact.  Ankle dorsiflexion, ankle plantar flexion, and EHL are intact.  Sensation intact in the foot to light touch.   Thigh is soft and nontender.      Labs:  Results from last 7 days   Lab Units 10/03/20  0437 10/02/20  1747 10/02/20  1149   WBC 10*3/mm3 10.55  --   --    HEMOGLOBIN g/dL 7.0* 8.4* 9.1*   HEMATOCRIT % 22.3* 26.6* 28.7*   MCV fL 91.4  --   --    PLATELETS 10*3/mm3 166  --   --        Radiology:  Imaging Results (Last 24 Hours)     Procedure Component Value Units Date/Time    FL C Arm During Surgery [[id number]] Collected: 10/02/20 1117     Updated: 10/02/20 1708    Narrative:      EXAMINATION: FL C ARM DURING SURGERY- 10/02/2020     INDICATION: hip ; M16.7-Other unilateral secondary osteoarthritis of hip        TECHNIQUE: Intraoperative fluoroscopy for improved localization and  treatment planning     COMPARISON: NONE     FINDINGS: Intraoperative fluoroscopy with total fluoroscopic time usage  13 seconds and 3 images saved during right total hip arthroplasty.       Impression:      Intraoperative fluoroscopy was utilized during left total  hip arthroplasty.     D:  10/02/2020  E:  10/02/2020         This report was finalized on 10/2/2020 5:05 PM by Dr. Jules Whatley.       XR Hip With " or Without Pelvis 2 - 3 View Right [[id number]] Collected: 10/02/20 1242     Updated: 10/02/20 1332    Narrative:      EXAMINATION: XR HIP W OR WO PELVIS 2-3 VIEW RIGHT- 10/02/2020     INDICATION: post-op right DEMARCO; M16.7-Other unilateral secondary  osteoarthritis of hip     COMPARISON: 09/23/2020 AP pelvis and right lateral hip     FINDINGS: Left hip prosthesis is again seen in anatomic alignment. There  has been revision of the patient's right-sided hardware, with removal of  interlocking nail hardware, and placement of a total right hip  prosthesis. Bony alignment appears anatomic. Myositis ossificans is  again seen medial of the hip joint. No acute bony abnormality is  identified.       Impression:      Right hip prosthesis in anatomic alignment.     D:  10/02/2020  E:  10/02/2020                 PT:  Physical Therapy - Plan of Care Review - Outcome Summary:  Outcome Summary: PT evaluation complete. Patient presents post op R DEMARCO, lateral approach, WBAT. Patient is alert and oriented x4. Session/assessment limited by sx of dizziness as related to low hemoglobin levels this AM. Deficits noted in functional mobility, RLE strength, functional endurance.  Performs transfers ( edge of bed<> BSC)  with FWW with modA x2 with vc for hand and B LE positioning. Significant unsteadiness with transfers this AM. Patient reports significant dizziness on BSC and immediately transferred back to bed. BP taken in supine in bed ( 119/50). Per RN, patient to recieve transfusion this AM. Will attempt to see patient again for PM session. Patient will benefit from skilled IP PT services to address impairments in order to return to PLOF. Recommend IPR at this time at NM, but will monitor progress. (10/03/20 0781)]       Results Review:     I reviewed the patient's new clinical results.    Assessment and Plan     Assessment:   Status post conversion to total hip arthroplasty, right hip    Weightbearing as tolerated right lower extremity  with a walker  Acute blood loss anemia-receiving 2 units today  Continue rehabilitation      Status post total replacement of right hip    Hyperlipidemia    Essential hypertension    Deafness    Diabetes type 2, controlled (CMS/[facility])    DJD (degenerative joint disease)      Plan for disposition: Plan for discharge when stable medically and cleared by physical therapy, most likely to a rehabilitation facility.      Future Appointments   Date Time Provider Department Center   10/26/2020 10:30 AM Gideon Son MD [facility]   1/20/2021  1:30 PM Dwight Lassiter MD [facility] None           Gideon Son MD  10/03/20  10:01 EDT

## 2020-10-03 NOTE — PLAN OF CARE
Problem: Adult Inpatient Plan of Care  Goal: Plan of Care Review  Recent Flowsheet Documentation  Taken 10/3/2020 0948 by Tory Rivas OT  Plan of Care Reviewed With: patient  Outcome Summary: OT IE completed. Pt alert, Ox4 and motivated to work with OT. Pt presents with mild pain, significant balance deficits in standing, generalized weakness and dizziness limiting mobility and self-care at this time. Pt is Mod A x2 for bed mobility, STS and BSC transfers using RW. OT will follow IP to advance ADL performance and BR transfers as able. Pt has used AE with past hip surgeries and has AE at home. Denies DME needs. Recommend IRF at d/c for best outcome.

## 2020-10-03 NOTE — DISCHARGE PLACEMENT REQUEST
"Hira Vi G (84 y.o. Female)      will be Sonja Kellerman  [phone]        Date of Birth Social Security Number Address Home Phone MRN    [id number]  [id number] [facility] [id number] [phone] [id number]    Orthodoxy Marital Status          Protestant        Admission Date Admission Type Admitting Provider Attending Provider Department, Room/Bed    10/2/20 Elective Gideon Son MD Kirk, Michael E, MD [facility] 3H, S379/1    Discharge Date Discharge Disposition Discharge Destination                       Attending Provider: Gideon Son MD    Allergies: No Known Allergies    Isolation: None   Infection: None   Code Status: CPR    Ht: 167.6 cm (66\")   Wt: 77.1 kg (170 lb)    Admission Cmt: None   Principal Problem: Status post total replacement of right hip [Z96.641]                 Active Insurance as of 10/2/2020     Primary Coverage     Payor Plan Insurance Group Employer/Plan Group    MEDICARE MEDICARE A & B      Payor Plan Address Payor Plan Phone Number Payor Plan Fax Number Effective Dates    [address] [phone]  11/1/2000 - None Entered    [facility] [id number]       Subscriber Name Subscriber Birth Date Member ID       VI INIGUEZ 1935 [id number]           Secondary Coverage     Payor Plan Insurance Group Employer/Plan Group    [place] [id number]     Payor Plan Address Payor Plan Phone Number Payor Plan Fax Number Effective Dates    [address]   1/1/2016 - None Entered    [facility] [id number]       Subscriber Name Subscriber Birth Date Member ID       VI INIGUEZ 1935 [id number]                 Emergency Contacts      (Rel.) Home Phone Work Phone Mobile Phone    Sophia Iniguez (Daughter) [phone] -- [phone]    Kristine Iniguez (Daughter) [phone] -- --            Insurance Information                MEDICARE/MEDICARE A & B Phone: [phone]    Subscriber: Vi Iniguez Subscriber#: " [id number]    Group#:  Precert#:         GLADYS [facility]/ANTHEM Formerly named [facility] Phone:     Subscriber: Bri Iniguez Subscriber#: [id number]    Group#: [id number] Precert#:              History & Physical      Malachi Cormier MD at 10/02/20 1259          Patient Name: Bri Iniguez  MRN: [id number]  : 1935  DOS: 10/2/2020    Attending: Gideon Son MD    Primary Care Provider: Clifford Nolasco MD      Chief complaint: Right hip pain    Subjective   Patient is a pleasant 84 y.o. female presented for scheduled surgery by Dr. Son.  She underwent right total hip arthroplasty with hardware removal under spinal anesthesia.  She tolerated surgery well and was admitted for further medical management.    She is known to us from previous admission in 2018 for left total hip replacement; which she recovered well.  She underwent ORIF for right intratrochanteric hip fracture on 2019 due to a mechanical fall.  Her hip has been painful since.  She uses a cane or walker for ambulation.    When seen postop she is doing well.  She denies pain.  She denies nausea, shortness of breath or chest pain.  No history of DVT or PE.    She has history of hypertension, hyperlipidemia and atrial fibrillation.  She is followed by cardiology, Dr. Lassiter.    (Above is noted, agree.  Doing well when seen in her room afterwards.  Good pain control with no nausea, vomiting, or shortness of breath.  Up with person assist to bedside commode using a walker earlier.)wy    Allergies:  No Known Allergies    Meds:  Medications Prior to Admission   Medication Sig Dispense Refill Last Dose   • atorvastatin (LIPITOR) 10 MG tablet Take 10 mg by mouth Every Night.   10/1/2020 at 1900   • citalopram (CeleXA) 40 MG tablet Take 40 mg by mouth Daily.   10/1/2020 at 1900   • diclofenac (VOLTAREN) 1 % gel gel 4 (Four) Times a Day As Needed.   10/1/2020 at Unknown time   • DOCUSATE SODIUM PO Take  by mouth Daily.   10/1/2020 at Unknown time   •  "estradiol (ESTRACE) 0.1 MG/GM vaginal cream Insert 1 g into the vagina 3 (Three) Times a Week. Apply using finger technique  6 10/1/2020 at Unknown time   • INTRAROSA 6.5 MG insert Insert 1 suppository into the vagina Every Night.  6 10/1/2020 at 2000   • metoprolol tartrate (LOPRESSOR) 25 MG tablet 0.5 tablets 2 (Two) Times a Day.   10/1/2020 at 1900   • Multiple Vitamins-Minerals (OCUVITE ADULT 50+) capsule Take 1 tablet by mouth Daily.   10/1/2020 at Unknown time   • traMADol (ULTRAM) 50 MG tablet Take 50 mg by mouth At Night As Needed.   9/29/2020         History:   Past Medical History:   Diagnosis Date   • A-fib (CMS/[facility])    • Bursitis     trochanteric area   • Colonoscopy refused 2015   • Constipation    • Deaf, left    • Deafness     unspecified   • Diabetes mellitus (CMS/[facility])     PATIENT REPORTS SHE HAS TAKEN METFORMIN IN THE PAST BUT WAS TAKEN OFF OF THIS MEDICATION AROUND OCTOBER 2018.     • Elevated cholesterol    • Essential hypertension    • GERD (gastroesophageal reflux disease)    • Hearing loss, right     PATIENT DOES USE A HEARING AID TO RIGHT SIDE INTERMITTENTLY (REPORTS DEAFNESS IN LEFT EAR)   • Hip pain    • History of UTI     PATIENT REPORTS RESOLVED AFTER HORMONE THERAPY WAS ORDERED   • Hyperlipidemia     other   • Impaired functional mobility, balance, gait, and endurance    • Influenza vaccine administered 2014   • Macular degeneration    • Mammogram declined 2015   • Osteoarthritis of left hip    • Primary osteoarthritis of right knee    • Supraventricular premature beats     REPORTED IN PREADMISSION TESTING VISIT \"IRREGULAR HEART BEAT\".  REPORTS UNSURE THE ACTUAL VERBIAGE OF WHAT SHE WAS TOLD OTHER THAN THIS.    • Wears partial dentures     REPORTS UPPER AND LOWER PARTIALS. INSTRUCTED NO ADHESIVES THE DOS.     Past Surgical History:   Procedure Laterality Date   • BUNIONECTOMY Right 1/21/2019    Procedure: Right foot bunionectomy with exostectomy, proximal phalanx osteotomy, right foot " "second and third digit hammertoe correction with interphalangeal joint fusion, right second metatarsophalangeal joint capsular release/capsulotomy;  Surgeon: Klaus Cason DPM;  Location: [facility] OR;  Service: Podiatry   • CATARACT EXTRACTION Bilateral    • CHOLECYSTECTOMY  2002   • COLONOSCOPY     • HERNIA REPAIR  2005    umbilical   • HIP INTERTROCHANTERIC NAILING Right 12/31/2019    Procedure: HIP RIGHT OPEN REDUCTION INTERNAL FIXATION, INTERMEDULLARY  NAIL;  Surgeon: Carlos Barba Jr., MD;  Location: [facility] OR;  Service: Orthopedics   • HYSTERECTOMY  1974   • TONSILLECTOMY     • TOTAL HIP ARTHROPLASTY Left 1/23/2018    Procedure: LEFT TOTAL HIP ARTHROPLASTY;  Surgeon: Gideon Son MD;  Location: [facility] OR;  Service:      Family History   Problem Relation Age of Onset   • Hypertension Other    • Diabetes Other         type 2   • Stroke Mother    • Diabetes Mother    • Hypertension Mother    • Cancer Father      Social History     Tobacco Use   • Smoking status: Never Smoker   • Smokeless tobacco: Never Used   Substance Use Topics   • Alcohol use: No   • Drug use: No   She is  with 2 children. She is a retired .    Review of Systems  Pertinent items are noted in HPI, all other systems reviewed and negative    Vital Signs  BP 99/49 (BP Location: Right arm, Patient Position: Lying)   Pulse 71   Temp 97.7 °F (36.5 °C) (Oral)   Resp 18   Ht 167.6 cm (66\")   Wt 77.1 kg (170 lb)   LMP  (LMP Unknown) Comment: HYSTERECTOMY  SpO2 100%   Breastfeeding No   BMI 27.44 kg/m²     Physical Exam:    General Appearance:    Alert, cooperative, in no acute distress   Head:    Normocephalic, without obvious abnormality, atraumatic   Eyes:            Lids and lashes normal, conjunctivae and sclerae normal, no   icterus, no pallor, corneas clear,    Ears:    Ears appear intact with no abnormalities noted   Throat:   No oral lesions, no thrush, oral mucosa moist   Neck:   No adenopathy, supple, " trachea midline, no thyromegaly    Lungs:     Clear to auscultation,respirations regular, even and unlabored    Heart:    Regular rhythm and normal rate, normal S1 and S2, no murmur, no gallop   Abdomen:     Normal bowel sounds, no masses, no organomegaly, soft non-tender, non-distended, no guarding, no rebound  tenderness   Genitalia:    Deferred   Extremities:  Right hip bulky surgical dressing clean dry intact.   Pulses:   Pulses palpable and equal bilaterally   Skin:   No bleeding, bruising or rash   Neurologic:   Cranial nerves 2 - 12 grossly intact. Flexion and dorsiflexion intact bilateral feet.        I reviewed the patient's new clinical results.       Results from last 7 days   Lab Units 10/02/20  1149   HEMOGLOBIN g/dL 9.1*   HEMATOCRIT % 28.7*     Results from last 7 days   Lab Units 10/02/20  0616   POTASSIUM mmol/L 3.7     Lab Results   Component Value Date    HGBA1C 7.00 (H) 09/25/2020         Assessment and Plan:     Status post total replacement of right hip    DJD (degenerative joint disease)    Hyperlipidemia    Essential hypertension    Deafness    Diabetes type 2, controlled (CMS/[facility])      Plan  1. PT/OT- WBAT RLE  2. Pain control-prns   3. IS-encourage  4. DVT proph- Mechs/ASA  5. Bowel regimen  6. Resume home medications as appropriate  7. Monitor post-op labs  8. DC planning for home    HTN, Hyperlipidemia  - Continue home Lopressor and statin  - Monitor BP   - Holding parameters for BP meds  - Labetalol PRN for SBP>170    DM  - hgb A1c on 9/25/20 7.0  - Accu-Chek AC and HS with low dose SSI      HAWA Casanova  10/02/20  13:54 EDT     I have personally performed the evaluation on this patient. My history is consistent  with HPI obtained. My exam findings are listed above. I have personally reviewed and discussed the above formulated treatment plan with Rodolfo      Electronically signed by Malachi Cormier MD at 10/02/20 9107     Gideon Son MD at 10/02/20 2381          H&P  "reviewed. The patient was examined and there are no changes to the H&P.     /90 (BP Location: Right arm, Patient Position: Sitting)   Pulse 82   Temp 97.5 °F (36.4 °C) (Temporal)   Resp 18   Ht 167.6 cm (66\")   Wt 77.1 kg (170 lb)   LMP  (LMP Unknown) Comment: HYSTERECTOMY  SpO2 94%   Breastfeeding No   BMI 27.44 kg/m²   Lungs clear   Heart  Irregular rhythm without murmur abdomen soft without tenderness  Immunizations   pneumo up to date   Flu 2010  Tetanus?  Tobacco neg   ETOH neg  Patient has no known allergies.  No allergy to latex or contrast dye  Lima Wu PA-C  10/2/2020  6:57    Agree with above.  Plan for hardware removal and conversion to total hip arthroplasty.    Gideon Son MD  10/02/20    Electronically signed by Gideon Son MD at 10/02/20 3321   Source Note              [facility] Note    Subjective     Chief Complaint   Patient presents with   • Follow-up     Primary osteoarthritis of both knees, last Cortisone injection 06.01.2020        HPI    It has been 4  month(s) since Ms. Iniguez's last visit. She returns to clinic today for follow-up of bilateral knee pain. She rates her pain a 6/10 on the pain scale. Previous/current treatments: cane/walker and NSAIDS. Current symptoms: pain, swelling, popping, stiffness and giving way/buckling. The pain is worse with any movement of the joint; resting and pain medication and/or NSAID improve the pain. Overall, she is doing worse.     However, most of her pain is in the right hip.  She had an intertrochanteric fracture in December 2019, fixed with an intramedullary device, by Dr. Barba in [place], [place].  She has been developing progressive right hip pain, and is unable to bear weight.  She has pain and popping in the right hip, which is getting worse.  She said that Dr. Barba told her everything was fine on her last visit.    I have reviewed the following portions of the patient's history and " "agree with: History of Present Illness and Review of Systems    Patient Active Problem List   Diagnosis   • Macular degeneration   • Hyperlipidemia   • Essential hypertension   • Deafness   • Diabetes type 2, controlled (CMS/[facility])   • Primary osteoarthritis of left hip   • Venous insufficiency of both lower extremities   • Status post total replacement of left hip   • Acute blood loss anemia, mild, asymptomatic   • Leukocytosis, mild, likely reactive   • Hip fracture (CMS/[facility])   • Other persistent atrial fibrillation (CMS/[facility])   • Nonrheumatic aortic valve stenosis   • DJD (degenerative joint disease)     Past Medical History:   Diagnosis Date   • Body piercing     EARS   • Bursitis     trochanteric area   • Colonoscopy refused 2015   • Constipation    • Deaf, left    • Deafness     unspecified   • Diabetes mellitus (CMS/[facility])     PATIENT REPORTS SHE HAS TAKEN METFORMIN IN THE PAST BUT WAS TAKEN OFF OF THIS MEDICATION AROUND OCTOBER 2018.     • Elevated cholesterol    • Essential hypertension    • GERD (gastroesophageal reflux disease)    • Hearing loss, right     PATIENT DOES USE A HEARING AID TO RIGHT SIDE INTERMITTENTLY (REPORTS DEAFNESS IN LEFT EAR)   • Hip pain    • History of UTI     PATIENT REPORTS RESOLVED AFTER HORMONE THERAPY WAS ORDERED   • Hyperlipidemia     other   • Impaired functional mobility, balance, gait, and endurance    • Influenza vaccine administered 2014   • Macular degeneration    • Mammogram declined 2015   • Osteoarthritis of left hip    • Primary osteoarthritis of right knee    • Supraventricular premature beats     REPORTED IN PREADMISSION TESTING VISIT \"IRREGULAR HEART BEAT\".  REPORTS UNSURE THE ACTUAL VERBIAGE OF WHAT SHE WAS TOLD OTHER THAN THIS.    • Wears partial dentures     REPORTS UPPER AND LOWER PARTIALS. INSTRUCTED NO ADHESIVES THE DOS.      Past Surgical History:   Procedure Laterality Date   • BUNIONECTOMY Right 1/21/2019    Procedure: Right foot bunionectomy with exostectomy, " proximal phalanx osteotomy, right foot second and third digit hammertoe correction with interphalangeal joint fusion, right second metatarsophalangeal joint capsular release/capsulotomy;  Surgeon: Klaus Cason DPM;  Location: [facility] OR;  Service: Podiatry   • CATARACT EXTRACTION Bilateral    • CHOLECYSTECTOMY  2002   • COLONOSCOPY     • HERNIA REPAIR  2005    umbilical   • HIP INTERTROCHANTERIC NAILING Right 12/31/2019    Procedure: HIP RIGHT OPEN REDUCTION INTERNAL FIXATION, INTERMEDULLARY  NAIL;  Surgeon: Carlos Barba Jr., MD;  Location: [facility] OR;  Service: Orthopedics   • HYSTERECTOMY  1974   • TONSILLECTOMY     • TOTAL HIP ARTHROPLASTY Left 1/23/2018    Procedure: LEFT TOTAL HIP ARTHROPLASTY;  Surgeon: Gideon Son MD;  Location: [facility] OR;  Service:       Family History   Problem Relation Age of Onset   • Hypertension Other    • Diabetes Other         type 2   • Stroke Mother    • Diabetes Mother    • Hypertension Mother    • Cancer Father      Social History     Socioeconomic History   • Marital status:      Spouse name: Not on file   • Number of children: Not on file   • Years of education: Not on file   • Highest education level: Not on file   Tobacco Use   • Smoking status: Never Smoker   • Smokeless tobacco: Never Used   Substance and Sexual Activity   • Alcohol use: No   • Drug use: No   • Sexual activity: Defer      Current Outpatient Medications on File Prior to Visit   Medication Sig Dispense Refill   • acetaminophen (TYLENOL) 650 MG 8 hr tablet Take 650 mg by mouth 2 (Two) Times a Day.     • apixaban (ELIQUIS) 5 MG tablet tablet Take 1 tablet by mouth Every 12 (Twelve) Hours. Begin 48 hours after discontinuation of Xarelto 60 tablet 11   • atorvastatin (LIPITOR) 10 MG tablet      • citalopram (CeleXA) 40 MG tablet      • diclofenac (VOLTAREN) 1 % gel gel 4 (Four) Times a Day As Needed.     • diclofenac (VOLTAREN) 50 MG EC tablet      • estradiol (ESTRACE) 0.1 MG/GM vaginal cream 3  "(Three) Times a Week. Apply using finger technique  6   • furosemide (LASIX) 20 MG tablet      • INTRAROSA 6.5 MG insert Insert 1 suppository into the vagina Every Night.  6   • metoprolol tartrate (LOPRESSOR) 25 MG tablet 0.5 tablets 2 (Two) Times a Day.     • Multiple Vitamins-Minerals (OCUVITE ADULT 50+) capsule Take 1 tablet by mouth Daily.     • traMADol (ULTRAM) 50 MG tablet      • [DISCONTINUED] triamterene-hydrochlorothiazide (DYAZIDE) 37.5-25 MG per capsule      • [DISCONTINUED] triamterene-hydrochlorothiazide (MAXZIDE-25) 37.5-25 MG per tablet        No current facility-administered medications on file prior to visit.       No Known Allergies     Review of Systems   Constitutional: Positive for activity change and fatigue.   HENT: Negative.    Eyes: Negative.    Respiratory: Positive for shortness of breath.    Cardiovascular: Positive for leg swelling.   Gastrointestinal: Positive for abdominal distention.   Endocrine: Negative.    Genitourinary: Negative.    Musculoskeletal: Positive for arthralgias.   Skin: Negative.    Allergic/Immunologic: Negative.    Neurological: Positive for weakness and numbness.   Hematological: Bruises/bleeds easily.   Psychiatric/Behavioral: Negative.         Objective      Physical Exam  Pulse 89   Ht 167.6 cm (65.98\")   Wt 85.3 kg (188 lb)   LMP  (LMP Unknown) Comment: HYSTERECTOMY  SpO2 99%   BMI 30.36 kg/m²     Body mass index is 30.36 kg/m².    General:   Mental Status:  Alert   Appearance: Cooperative, in no acute distress   Build and Nutrition: Deconditioned female   Orientation: Alert and oriented to person, place and time   Posture: Sitting in a wheelchair   Gait: Unable to stand without significant pain    Lower Extremity:   Right Hip:    Tenderness:  None    Swelling:  None    Crepitus:  None    Range of motion:  External Rotation: 10°       Internal Rotation: 10°       Flexion:  90°    Functional testing: [facility]    Short on the right compared to " the left              Right Knee:                          Tenderness:    Medial and lateral joint line tenderness                          Effusion:          None                          Swelling:          None                          Crepitus:          Positive                          Atrophy:           None                          Range of motion:        Extension:       0°                                                              Flexion:           120°  Instability:        No varus laxity, no valgus laxity, negative anterior drawer  Deformities:     Varus              Left Knee:                          Tenderness:    Medial and lateral joint line tenderness                          Effusion:          None                          Swelling:          None                          Crepitus:          Positive                          Atrophy:           None                          Range of motion:        Extension:       0°                                                              Flexion:           120°  Instability:        No varus laxity, no valgus laxity, negative anterior drawer  Deformities:     None    Imaging/Studies  Imaging Results (Last 24 Hours)     Procedure Component Value Units Date/Time    XR Hip With or Without Pelvis 2 - 3 View Right [[id number]] Resulted: 09/23/20 1230     Updated: 09/23/20 1231    Narrative:      Right Hip Radiographs  Indication: right hip pain  Views: low AP pelvis and lateral of the right hip    Comparison: no prior studies available for review    Findings:   Evidence of previous intertrochanteric fracture, which appears to be   healed, but there is penetration of the blade through the head into the   acetabulum, with cement seen in the femoral head, with no signs of   hardware loosening.            Assessment and Plan     Bri was seen today for follow-up.    Diagnoses and all orders for this visit:    Other secondary osteoarthritis of right hip  -     XR Hip  With or Without Pelvis 2 - 3 View Right  -     Case Request; Standing  -     Instructions on coughing, deep breathing, and incentive spirometry.; Future  -     CBC and Differential; Future  -     Basic metabolic panel; Future  -     Protime-INR; Future  -     APTT; Future  -     Hemoglobin A1c; Future  -     Sedimentation rate; Future  -     C-reactive protein; Future  -     Case Request    Primary osteoarthritis of both knees    Other orders  -     Follow Anesthesia Guidelines / Standing Orders; Future  -     Obtain informed consent  -     Provide instructions to patient regarding NPO status  -     Chlorhexidine Skin Prep - Educate and Review With Patient; Future  -     Provide Patient With ERAS Hydration Instructions  -     Provide Patient With Enhanced Recovery Booklet(s) or Handout  -     Provide Instructions/Handout For Benzoyl Peroxide 5% Wash If Having Shoulder/Arm Surgery (If Prescribed)  -     Provide Instructions/Handout For Bactroban And Chlorhexidine Shower (If Prescribed)  -     Perform A Memory Screening On All Hip/Knee Replacement Patients >Or Equal To 65 Years Or Older  -     Complete A PROMIS And HOOS Or KOOS Survey If Having Hip Or Knee Replacement  -     Provide Patient With Carbo Loading Instructions  -     Provide Patient With ERAS Booklet(s)/Handout  -     mupirocin (Bactroban Nasal) 2 % nasal ointment; into the nostril(s) as directed by provider 2 (Two) Times a Day.  -     chlorhexidine (HIBICLENS) 4 % external liquid; Apply  topically to the appropriate area as directed Daily. Shower with hibiclens solution as directed for 5 days prior to surgery        1. Other secondary osteoarthritis of right hip    2. Primary osteoarthritis of both knees        I reviewed my findings with the patient and her daughter today.  She had an intertrochanteric fracture fixed by Dr. Barba in December 2019, and the blade has penetrated through the head, which is causing significant pain.  Options were  discussed, and total hip replacement surgery was recommended.  She would like to proceed.  Please see my counseling note for details.  Risk, benefits, and alternatives of the procedure have been discussed.  No further intervention for her knees at this time, until we get her hip taken care of.    Surgical Counseling     I have informed the patient of the diagnosis and the prognosis.  The symptoms have progressed to the point of daily pain and inability to perform activities of daily living without significant pain.  The patient has reached the point of desiring to proceed with total hip arthroplasty after discussing the risks, benefits and alternatives to the procedure.  The surgical procedure itself was discussed in detail.  Risks of the procedure were discussed, which included but are not limited to, bleeding, infection, damage to blood vessels and nerves, incomplete pain relief, loosening of the prosthesis (early or late), deep infection (early or late), need for further surgery, leg length discrepancy, hip dislocation, loss of limb, deep venous thrombosis, pulmonary embolus, death, heart attack, stroke, kidney failure, liver failure, and anesthetic complications.  In addition, the potential for deep infection developing in the future was discussed, which could require further surgery.  The hip would have to be re-opened, debrided, and potentially remove the prosthesis, which may or may not be replaced in the future.  Also, the possibility for loosening of the prosthesis has been mentioned.  If the prosthesis loosened, a revision arthroplasty could be performed, with results that are not as predictable compared to the original procedure.  The typical rehabilitative course has also been discussed, and full recovery may take up to a year to see the maximum benefit.  The importance of patient cooperation in the rehabilitative efforts has also been discussed.  No guarantees were given.  The patient understands the  potential risks versus the benefits and desires to proceed with total hip arthroplasty at a mutually convenient time.      Return for surgery.    Medical Decision Making  Management Options : major surgery with risk factors  Data/Risk: radiology tests and independent visualization of imaging, lab tests, or EMG/NCV      Gideon Sno MD  09/23/20  17:59 EDT    Dragon disclaimer:  Much of this encounter note is an electronic transcription/translation of spoken language to printed text. The electronic translation of spoken language may permit erroneous, or at times, nonsensical words or phrases to be inadvertently transcribed; Although I have reviewed the note for such errors, some may still exist.    Electronically signed by Gideon Son MD at 09/23/20 1800             Gideon Son MD at 09/23/20 1120              [facility]    Subjective     Chief Complaint   Patient presents with   • Follow-up     Primary osteoarthritis of both knees, last Cortisone injection 06.01.2020        HPI    It has been 4  month(s) since Ms. Iniguez's last visit. She returns to clinic today for follow-up of bilateral knee pain. She rates her pain a 6/10 on the pain scale. Previous/current treatments: cane/walker and NSAIDS. Current symptoms: pain, swelling, popping, stiffness and giving way/buckling. The pain is worse with any movement of the joint; resting and pain medication and/or NSAID improve the pain. Overall, she is doing worse.     However, most of her pain is in the right hip.  She had an intertrochanteric fracture in December 2019, fixed with an intramedullary device, by Dr. Barba in [place], [place].  She has been developing progressive right hip pain, and is unable to bear weight.  She has pain and popping in the right hip, which is getting worse.  She said that Dr. Barba told her everything was fine on her last visit.    I have reviewed the following portions of the patient's history  "and agree with: History of Present Illness and Review of Systems    Patient Active Problem List   Diagnosis   • Macular degeneration   • Hyperlipidemia   • Essential hypertension   • Deafness   • Diabetes type 2, controlled (CMS/[facility])   • Primary osteoarthritis of left hip   • Venous insufficiency of both lower extremities   • Status post total replacement of left hip   • Acute blood loss anemia, mild, asymptomatic   • Leukocytosis, mild, likely reactive   • Hip fracture (CMS/[facility])   • Other persistent atrial fibrillation (CMS/[facility])   • Nonrheumatic aortic valve stenosis   • DJD (degenerative joint disease)     Past Medical History:   Diagnosis Date   • Body piercing     EARS   • Bursitis     trochanteric area   • Colonoscopy refused 2015   • Constipation    • Deaf, left    • Deafness     unspecified   • Diabetes mellitus (CMS/[facility])     PATIENT REPORTS SHE HAS TAKEN METFORMIN IN THE PAST BUT WAS TAKEN OFF OF THIS MEDICATION AROUND OCTOBER 2018.     • Elevated cholesterol    • Essential hypertension    • GERD (gastroesophageal reflux disease)    • Hearing loss, right     PATIENT DOES USE A HEARING AID TO RIGHT SIDE INTERMITTENTLY (REPORTS DEAFNESS IN LEFT EAR)   • Hip pain    • History of UTI     PATIENT REPORTS RESOLVED AFTER HORMONE THERAPY WAS ORDERED   • Hyperlipidemia     other   • Impaired functional mobility, balance, gait, and endurance    • Influenza vaccine administered 2014   • Macular degeneration    • Mammogram declined 2015   • Osteoarthritis of left hip    • Primary osteoarthritis of right knee    • Supraventricular premature beats     REPORTED IN PREADMISSION TESTING VISIT \"IRREGULAR HEART BEAT\".  REPORTS UNSURE THE ACTUAL VERBIAGE OF WHAT SHE WAS TOLD OTHER THAN THIS.    • Wears partial dentures     REPORTS UPPER AND LOWER PARTIALS. INSTRUCTED NO ADHESIVES THE DOS.      Past Surgical History:   Procedure Laterality Date   • BUNIONECTOMY Right 1/21/2019    Procedure: Right foot bunionectomy with " exostectomy, proximal phalanx osteotomy, right foot second and third digit hammertoe correction with interphalangeal joint fusion, right second metatarsophalangeal joint capsular release/capsulotomy;  Surgeon: Klaus Cason DPM;  Location: [facility] OR;  Service: Podiatry   • CATARACT EXTRACTION Bilateral    • CHOLECYSTECTOMY  2002   • COLONOSCOPY     • HERNIA REPAIR  2005    umbilical   • HIP INTERTROCHANTERIC NAILING Right 12/31/2019    Procedure: HIP RIGHT OPEN REDUCTION INTERNAL FIXATION, INTERMEDULLARY  NAIL;  Surgeon: Carlos Barba Jr., MD;  Location: [facility] OR;  Service: Orthopedics   • HYSTERECTOMY  1974   • TONSILLECTOMY     • TOTAL HIP ARTHROPLASTY Left 1/23/2018    Procedure: LEFT TOTAL HIP ARTHROPLASTY;  Surgeon: Gideon Son MD;  Location: [facility] OR;  Service:       Family History   Problem Relation Age of Onset   • Hypertension Other    • Diabetes Other         type 2   • Stroke Mother    • Diabetes Mother    • Hypertension Mother    • Cancer Father      Social History     Socioeconomic History   • Marital status:      Spouse name: Not on file   • Number of children: Not on file   • Years of education: Not on file   • Highest education level: Not on file   Tobacco Use   • Smoking status: Never Smoker   • Smokeless tobacco: Never Used   Substance and Sexual Activity   • Alcohol use: No   • Drug use: No   • Sexual activity: Defer      Current Outpatient Medications on File Prior to Visit   Medication Sig Dispense Refill   • acetaminophen (TYLENOL) 650 MG 8 hr tablet Take 650 mg by mouth 2 (Two) Times a Day.     • apixaban (ELIQUIS) 5 MG tablet tablet Take 1 tablet by mouth Every 12 (Twelve) Hours. Begin 48 hours after discontinuation of Xarelto 60 tablet 11   • atorvastatin (LIPITOR) 10 MG tablet      • citalopram (CeleXA) 40 MG tablet      • diclofenac (VOLTAREN) 1 % gel gel 4 (Four) Times a Day As Needed.     • diclofenac (VOLTAREN) 50 MG EC tablet      • estradiol (ESTRACE) 0.1 MG/GM  "vaginal cream 3 (Three) Times a Week. Apply using finger technique  6   • furosemide (LASIX) 20 MG tablet      • INTRAROSA 6.5 MG insert Insert 1 suppository into the vagina Every Night.  6   • metoprolol tartrate (LOPRESSOR) 25 MG tablet 0.5 tablets 2 (Two) Times a Day.     • Multiple Vitamins-Minerals (OCUVITE ADULT 50+) capsule Take 1 tablet by mouth Daily.     • traMADol (ULTRAM) 50 MG tablet      • [DISCONTINUED] triamterene-hydrochlorothiazide (DYAZIDE) 37.5-25 MG per capsule      • [DISCONTINUED] triamterene-hydrochlorothiazide (MAXZIDE-25) 37.5-25 MG per tablet        No current facility-administered medications on file prior to visit.       No Known Allergies     Review of Systems   Constitutional: Positive for activity change and fatigue.   HENT: Negative.    Eyes: Negative.    Respiratory: Positive for shortness of breath.    Cardiovascular: Positive for leg swelling.   Gastrointestinal: Positive for abdominal distention.   Endocrine: Negative.    Genitourinary: Negative.    Musculoskeletal: Positive for arthralgias.   Skin: Negative.    Allergic/Immunologic: Negative.    Neurological: Positive for weakness and numbness.   Hematological: Bruises/bleeds easily.   Psychiatric/Behavioral: Negative.         Objective      Physical Exam  Pulse 89   Ht 167.6 cm (65.98\")   Wt 85.3 kg (188 lb)   LMP  (LMP Unknown) Comment: HYSTERECTOMY  SpO2 99%   BMI 30.36 kg/m²     Body mass index is 30.36 kg/m².    General:   Mental Status:  Alert   Appearance: Cooperative, in no acute distress   Build and Nutrition: Deconditioned female   Orientation: Alert and oriented to person, place and time   Posture: Sitting in a wheelchair   Gait: Unable to stand without significant pain    Lower Extremity:   Right Hip:    Tenderness:  None    Swelling:  None    Crepitus:  None    Range of motion:  External Rotation: 10°       Internal Rotation: 10°       Flexion:  90°    Functional testing: Positive Audie Saab on the " right compared to the left              Right Knee:                          Tenderness:    Medial and lateral joint line tenderness                          Effusion:          None                          Swelling:          None                          Crepitus:          Positive                          Atrophy:           None                          Range of motion:        Extension:       0°                                                              Flexion:           120°  Instability:        No varus laxity, no valgus laxity, negative anterior drawer  Deformities:     Varus              Left Knee:                          Tenderness:    Medial and lateral joint line tenderness                          Effusion:          None                          Swelling:          None                          Crepitus:          Positive                          Atrophy:           None                          Range of motion:        Extension:       0°                                                              Flexion:           120°  Instability:        No varus laxity, no valgus laxity, negative anterior drawer  Deformities:     None    Imaging/Studies  Imaging Results (Last 24 Hours)     Procedure Component Value Units Date/Time    XR Hip With or Without Pelvis 2 - 3 View Right [[id number]] Resulted: 09/23/20 1230     Updated: 09/23/20 1231    Narrative:      Right Hip Radiographs  Indication: right hip pain  Views: low AP pelvis and lateral of the right hip    Comparison: no prior studies available for review    Findings:   Evidence of previous intertrochanteric fracture, which appears to be   healed, but there is penetration of the blade through the head into the   acetabulum, with cement seen in the femoral head, with no signs of   hardware loosening.            Assessment and Plan     Bri was seen today for follow-up.    Diagnoses and all orders for this visit:    Other secondary osteoarthritis of right  hip  -     XR Hip With or Without Pelvis 2 - 3 View Right  -     Case Request; Standing  -     Instructions on coughing, deep breathing, and incentive spirometry.; Future  -     CBC and Differential; Future  -     Basic metabolic panel; Future  -     Protime-INR; Future  -     APTT; Future  -     Hemoglobin A1c; Future  -     Sedimentation rate; Future  -     C-reactive protein; Future  -     Case Request    Primary osteoarthritis of both knees    Other orders  -     Follow Anesthesia Guidelines / Standing Orders; Future  -     Obtain informed consent  -     Provide instructions to patient regarding NPO status  -     Chlorhexidine Skin Prep - Educate and Review With Patient; Future  -     Provide Patient With ERAS Hydration Instructions  -     Provide Patient With Enhanced Recovery Booklet(s) or Handout  -     Provide Instructions/Handout For Benzoyl Peroxide 5% Wash If Having Shoulder/Arm Surgery (If Prescribed)  -     Provide Instructions/Handout For Bactroban And Chlorhexidine Shower (If Prescribed)  -     Perform A Memory Screening On All Hip/Knee Replacement Patients >Or Equal To 65 Years Or Older  -     Complete A PROMIS And HOOS Or KOOS Survey If Having Hip Or Knee Replacement  -     Provide Patient With Carbo Loading Instructions  -     Provide Patient With ERAS Booklet(s)/Handout  -     mupirocin (Bactroban Nasal) 2 % nasal ointment; into the nostril(s) as directed by provider 2 (Two) Times a Day.  -     chlorhexidine (HIBICLENS) 4 % external liquid; Apply  topically to the appropriate area as directed Daily. Shower with hibiclens solution as directed for 5 days prior to surgery        1. Other secondary osteoarthritis of right hip    2. Primary osteoarthritis of both knees        I reviewed my findings with the patient and her daughter today.  She had an intertrochanteric fracture fixed by Dr. Barba in December 2019, and the blade has penetrated through the head, which is causing significant pain.   Options were discussed, and total hip replacement surgery was recommended.  She would like to proceed.  Please see my counseling note for details.  Risk, benefits, and alternatives of the procedure have been discussed.  No further intervention for her knees at this time, until we get her hip taken care of.    Surgical Counseling     I have informed the patient of the diagnosis and the prognosis.  The symptoms have progressed to the point of daily pain and inability to perform activities of daily living without significant pain.  The patient has reached the point of desiring to proceed with total hip arthroplasty after discussing the risks, benefits and alternatives to the procedure.  The surgical procedure itself was discussed in detail.  Risks of the procedure were discussed, which included but are not limited to, bleeding, infection, damage to blood vessels and nerves, incomplete pain relief, loosening of the prosthesis (early or late), deep infection (early or late), need for further surgery, leg length discrepancy, hip dislocation, loss of limb, deep venous thrombosis, pulmonary embolus, death, heart attack, stroke, kidney failure, liver failure, and anesthetic complications.  In addition, the potential for deep infection developing in the future was discussed, which could require further surgery.  The hip would have to be re-opened, debrided, and potentially remove the prosthesis, which may or may not be replaced in the future.  Also, the possibility for loosening of the prosthesis has been mentioned.  If the prosthesis loosened, a revision arthroplasty could be performed, with results that are not as predictable compared to the original procedure.  The typical rehabilitative course has also been discussed, and full recovery may take up to a year to see the maximum benefit.  The importance of patient cooperation in the rehabilitative efforts has also been discussed.  No guarantees were given.  The patient  understands the potential risks versus the benefits and desires to proceed with total hip arthroplasty at a mutually convenient time.      Return for surgery.    Medical Decision Making  Management Options : major surgery with risk factors  Data/Risk: radiology tests and independent visualization of imaging, lab tests, or EMG/NCV      Gideon Son MD  09/23/20  17:59 EDT    Dragon disclaimer:  Much of this encounter note is an electronic transcription/translation of spoken language to printed text. The electronic translation of spoken language may permit erroneous, or at times, nonsensical words or phrases to be inadvertently transcribed; Although I have reviewed the note for such errors, some may still exist.    Electronically signed by Gideon Son MD at 09/23/20 1800         Current Facility-Administered Medications   Medication Dose Route Frequency Provider Last Rate Last Dose   • acetaminophen (TYLENOL) tablet 650 mg  650 mg Oral Q4H PRN Gideon Son MD        Or   • acetaminophen (TYLENOL) suppository 650 mg  650 mg Rectal Q4H PRN Gideon Son MD       • aspirin EC tablet 325 mg  325 mg Oral Daily Gideon Son MD   325 mg at 10/03/20 0945   • atorvastatin (LIPITOR) tablet 10 mg  10 mg Oral Nightly Celeste Barba APRN   10 mg at 10/02/20 2033   • citalopram (CeleXA) tablet 40 mg  40 mg Oral Daily Celeste Barba APRN   40 mg at 10/03/20 0945   • dextrose (D50W) 25 g/ 50mL Intravenous Solution 25 g  25 g Intravenous Q15 Min PRN Celeste Barba APRN       • dextrose (GLUTOSE) oral gel 15 g  15 g Oral Q15 Min PRN Celeste Barba APRN       • glucagon (human recombinant) (GLUCAGEN DIAGNOSTIC) injection 1 mg  1 mg Subcutaneous Q15 Min PRN Celeste Barba APRSUSAN       • insulin lispro (humaLOG) injection 0-7 Units  0-7 Units Subcutaneous 4x Daily With Meals & Nightly Gideon Son MD   3 Units at 10/03/20 1158   • labetalol (NORMODYNE,TRANDATE) injection 10 mg  10 mg Intravenous Q4H  "PRN Jameson, Celeste, APRN       • meloxicam (MOBIC) tablet 15 mg  15 mg Oral Daily Gideon Son MD   15 mg at 10/03/20 0945   • metoprolol tartrate (LOPRESSOR) half tablet 12.5 mg  12.5 mg Oral Q12H Celeste Barba, APRN   12.5 mg at 10/03/20 0945   • morphine injection 1 mg  1 mg Intravenous Q2H PRN Gideon Son MD        And   • naloxone (NARCAN) injection 0.4 mg  0.4 mg Intravenous Q5 Min PRN Gideon Son MD       • ondansetron (ZOFRAN) tablet 4 mg  4 mg Oral Q6H PRN Gideon Son MD        Or   • ondansetron (ZOFRAN) injection 4 mg  4 mg Intravenous Q6H PRN Gideon Son MD       • oxyCODONE (ROXICODONE) immediate release tablet 5 mg  5 mg Oral Q4H PRN Gideon Son MD       • sodium chloride 0.9 % bolus 500 mL  500 mL Intravenous TID PRN Celeste Barba, APRSUSAN       • sodium chloride 0.9 % flush 1-10 mL  1-10 mL Intravenous PRN Gideon Son MD       • sodium chloride 0.9 % flush 3 mL  3 mL Intravenous Q12H Gideon Son MD       • sodium chloride 0.9 % infusion  120 mL/hr Intravenous Continuous Gideon Son  mL/hr at 10/02/20 1406 120 mL/hr at 10/02/20 1406        Physician Progress Notes (last 24 hours) (Notes from 10/02/20 1353 through 10/03/20 1353)      Malachi Cormier MD at 10/03/20 1105          IM progress note      Bri LUBIN Hira  [id number]  1935     LOS: 1 day     Attending: Gideon Son MD    Primary Care Provider: Clifford Nolasco MD      Chief Complaint/Reason for visit:  No chief complaint on file.      Subjective   Doing fairly well with good pain control.  No nausea, vomiting, or shortness of breath.  Receiving first unit of packed red blood cells due to acute blood loss anemia.    Objective        Visit Vitals  /61 (BP Location: Right arm, Patient Position: Lying)   Pulse 102   Temp 98.2 °F (36.8 °C) (Oral)   Resp 16   Ht 167.6 cm (66\")   Wt 77.1 kg (170 lb)   LMP  (LMP Unknown) Comment: HYSTERECTOMY   SpO2 98%   Breastfeeding No "   BMI 27.44 kg/m²     Temp (24hrs), Av.9 °F (36.6 °C), Min:97.5 °F (36.4 °C), Max:98.4 °F (36.9 °C)      Intake/Output:    Intake/Output Summary (Last 24 hours) at 10/3/2020 1107  Last data filed at 10/3/2020 0900  Gross per 24 hour   Intake 900 ml   Output 750 ml   Net 150 ml        Physical Therapy: Pending    Physical Exam:     General Appearance:    Alert, cooperative, in no acute distress   Head:    Normocephalic, without obvious abnormality, atraumatic    Lungs:     Normal effort, symmetric chest rise,  clear to      auscultation bilaterally              Heart:    Regular rhythm and normal rate, normal S1 and S2    Abdomen:     Normal bowel sounds, no masses, no organomegaly, soft        non-tender, non-distended, no guarding, no rebound                tenderness   Extremities:  Clean dry and intact dressing over right hip incision.  Intact flexion dorsiflexion bilateral feet.  No clubbing, cyanosis or edema.  No deformities.    Pulses:   Pulses palpable and equal bilaterally   Skin:   No bleeding, bruising or rash          Results Review:     I reviewed the patient's new clinical results.   Results from last 7 days   Lab Units 10/03/20  0437 10/02/20  1747 10/02/20  1149   WBC 10*3/mm3 10.55  --   --    HEMOGLOBIN g/dL 7.0* 8.4* 9.1*   HEMATOCRIT % 22.3* 26.6* 28.7*   PLATELETS 10*3/mm3 166  --   --      Results from last 7 days   Lab Units 10/03/20  0437 10/02/20  0616   SODIUM mmol/L 133*  --    POTASSIUM mmol/L 3.5 3.7   CHLORIDE mmol/L 99  --    CO2 mmol/L 25.0  --    BUN mg/dL 11  --    CREATININE mg/dL 0.52*  --    CALCIUM mg/dL 7.9*  --    GLUCOSE mg/dL 155*  --      Results for VI VALENCIA AMEE (MRN [id number]) as of 10/3/2020 11:08   Ref. Range 10/3/2020 04:37 10/3/2020 07:16   Glucose Latest Ref Range: 70 - 130 mg/dL 155 (H) 174 (H)       I reviewed the patient's new imaging including images and reports.    All medications reviewed.   aspirin, 325 mg, Oral, Daily  atorvastatin, 10 mg, Oral,  Nightly  citalopram, 40 mg, Oral, Daily  furosemide, 20 mg, Intravenous, Once  insulin lispro, 0-7 Units, Subcutaneous, 4x Daily With Meals & Nightly  meloxicam, 15 mg, Oral, Daily  metoprolol tartrate, 12.5 mg, Oral, Q12H  sodium chloride, 3 mL, Intravenous, Q12H      acetaminophen, 650 mg, Q4H PRN    Or  acetaminophen, 650 mg, Q4H PRN  dextrose, 25 g, Q15 Min PRN  dextrose, 15 g, Q15 Min PRN  glucagon (human recombinant), 1 mg, Q15 Min PRN  labetalol, 10 mg, Q4H PRN  Morphine, 1 mg, Q2H PRN    And  naloxone, 0.4 mg, Q5 Min PRN  ondansetron, 4 mg, Q6H PRN    Or  ondansetron, 4 mg, Q6H PRN  oxyCODONE, 5 mg, Q4H PRN  sodium chloride, 500 mL, TID PRN  sodium chloride, 1-10 mL, PRN        Assessment/Plan       Status post total replacement of right hip    Hyperlipidemia    Essential hypertension    Deafness    Diabetes type 2, controlled (CMS/[facility])    DJD (degenerative joint disease)    Acute blood loss anemia         Plan    1. PT/OT weightbearing as tolerated right lower extremity with a walker.  2. Pain control-prns   3. IS-encouraged  4. DVT proph-mechanical and aspirin.  5. Bowel regimen  6. Monitor post-op labs  7. DC planning.  Possibly to rehab.    HTN, Hyperlipidemia  - Continue home Lopressor and statin  - Monitor BP   - Holding parameters for BP meds  - Labetalol PRN for SBP>170     DM  - hgb A1c on 9/25/20 7.0  - Accu-Chek AC and HS with low dose SSI    Acute blood loss anemia  -2 units of packed red blood cells transfusion 10/3/2020    Malachi Cormier MD  10/03/20  11:07 EDT      Electronically signed by Malachi Cormier MD at 10/03/20 1109     Gideon Son MD at 10/03/20 0949                [facility]    Subjective      LOS: 1 day   Patient Care Team:  Clifford Nolasco MD as PCP - General  Clifford Nolasco MD as PCP - Claims Attributed    CC: Right hip pain    Interval History:   Patient resting comfortably in bed.  No complaints.  Hip feels better than before  "surgery.    Objective      Vital Signs  Temp (24hrs), Av.9 °F (36.6 °C), Min:97.5 °F (36.4 °C), Max:98.4 °F (36.9 °C)      /66   Pulse 114   Temp 98.1 °F (36.7 °C) (Oral)   Resp 16   Ht 167.6 cm (66\")   Wt 77.1 kg (170 lb)   LMP  (LMP Unknown) Comment: HYSTERECTOMY  SpO2 98%   Breastfeeding No   BMI 27.44 kg/m²     Examination:   Examination of the right hip: The dressing is clean, dry, and intact.  Ankle dorsiflexion, ankle plantar flexion, and EHL are intact.  Sensation intact in the foot to light touch.   Thigh is soft and nontender.      Labs:  Results from last 7 days   Lab Units 10/03/20  0437 10/02/20  1747 10/02/20  1149   WBC 10*3/mm3 10.55  --   --    HEMOGLOBIN g/dL 7.0* 8.4* 9.1*   HEMATOCRIT % 22.3* 26.6* 28.7*   MCV fL 91.4  --   --    PLATELETS 10*3/mm3 166  --   --        Radiology:  Imaging Results (Last 24 Hours)     Procedure Component Value Units Date/Time    FL C Arm During Surgery [[id number]] Collected: 10/02/20 1117     Updated: 10/02/20 1708    Narrative:      EXAMINATION: FL C ARM DURING SURGERY- 10/02/2020     INDICATION: hip ; M16.7-Other unilateral secondary osteoarthritis of hip        TECHNIQUE: Intraoperative fluoroscopy for improved localization and  treatment planning     COMPARISON: NONE     FINDINGS: Intraoperative fluoroscopy with total fluoroscopic time usage  13 seconds and 3 images saved during right total hip arthroplasty.       Impression:      Intraoperative fluoroscopy was utilized during left total  hip arthroplasty.     D:  10/02/2020  E:  10/02/2020         This report was finalized on 10/2/2020 5:05 PM by Dr. Jules Whatley.       XR Hip With or Without Pelvis 2 - 3 View Right [[id number]] Collected: 10/02/20 1242     Updated: 10/02/20 1332    Narrative:      EXAMINATION: XR HIP W OR WO PELVIS 2-3 VIEW RIGHT- 10/02/2020     INDICATION: post-op right DEMARCO; M16.7-Other unilateral secondary  osteoarthritis of hip     COMPARISON: 2020 AP pelvis and " right lateral hip     FINDINGS: Left hip prosthesis is again seen in anatomic alignment. There  has been revision of the patient's right-sided hardware, with removal of  interlocking nail hardware, and placement of a total right hip  prosthesis. Bony alignment appears anatomic. Myositis ossificans is  again seen medial of the hip joint. No acute bony abnormality is  identified.       Impression:      Right hip prosthesis in anatomic alignment.     D:  10/02/2020  E:  10/02/2020                 PT:  Physical Therapy - Plan of Care Review - Outcome Summary:  Outcome Summary: PT evaluation complete. Patient presents post op R DEMARCO, lateral approach, WBAT. Patient is alert and oriented x4. Session/assessment limited by sx of dizziness as related to low hemoglobin levels this AM. Deficits noted in functional mobility, RLE strength, functional endurance.  Performs transfers ( edge of bed<> BSC)  with FWW with modA x2 with vc for hand and B LE positioning. Significant unsteadiness with transfers this AM. Patient reports significant dizziness on BSC and immediately transferred back to bed. BP taken in supine in bed ( 119/50). Per RN, patient to recieve transfusion this AM. Will attempt to see patient again for PM session. Patient will benefit from skilled IP PT services to address impairments in order to return to PLOF. Recommend IPR at this time at [place], but will monitor progress. (10/03/20 1012)]       Results Review:     I reviewed the patient's new clinical results.    Assessment and Plan     Assessment:   Status post conversion to total hip arthroplasty, right hip    Weightbearing as tolerated right lower extremity with a walker  Acute blood loss anemia-receiving 2 units today  Continue rehabilitation      Status post total replacement of right hip    Hyperlipidemia    Essential hypertension    Deafness    Diabetes type 2, controlled (CMS/[facility])    DJD (degenerative joint disease)      Plan for disposition: Plan for  discharge when stable medically and cleared by physical therapy, most likely to a rehabilitation facility.      Future Appointments   Date Time Provider Department Center   10/26/2020 10:30 AM Gideon Son MD [facility]   2021  1:30 PM Dwight Lassiter MD [facility] IV None           Gideon Son MD  10/03/20  10:01 EDT      Electronically signed by Gideon Son MD at 10/03/20 1002          Physical Therapy Notes (last 24 hours) (Notes from 10/02/20 1353 through 10/03/20 1353)      Wanda Holley, PT at 10/03/20 0842  Version 1 of 1         Patient Name: Bri Iniguez  : 1935    MRN: [id number]                              Today's Date: 10/3/2020       Admit Date: 10/2/2020    Visit Dx:     ICD-10-CM ICD-9-CM   1. Other secondary osteoarthritis of right hip  M16.7 715.25     Patient Active Problem List   Diagnosis   • Macular degeneration   • Hyperlipidemia   • Essential hypertension   • Deafness   • Diabetes type 2, controlled (CMS/HCC)   • Primary osteoarthritis of left hip   • Venous insufficiency of both lower extremities   • Status post total replacement of left hip   • Acute blood loss anemia, mild, asymptomatic   • Leukocytosis, mild, likely reactive   • Hip fracture (CMS/HCC)   • Other persistent atrial fibrillation (CMS/HCC)   • Nonrheumatic aortic valve sclerosis   • DJD (degenerative joint disease)   • Status post total replacement of right hip     Past Medical History:   Diagnosis Date   • A-fib (CMS/[facility])    • Bursitis     trochanteric area   • Colonoscopy refused    • Constipation    • Deaf, left    • Deafness     unspecified   • Diabetes mellitus (CMS/HCC)     PATIENT REPORTS SHE HAS TAKEN METFORMIN IN THE PAST BUT WAS TAKEN OFF OF THIS MEDICATION AROUND 2018.     • Elevated cholesterol    • Essential hypertension    • GERD (gastroesophageal reflux disease)    • Hearing loss, right     PATIENT DOES USE A HEARING AID TO RIGHT SIDE INTERMITTENTLY (REPORTS DEAFNESS  "IN LEFT EAR)   • Hip pain    • History of UTI     PATIENT REPORTS RESOLVED AFTER HORMONE THERAPY WAS ORDERED   • Hyperlipidemia     other   • Impaired functional mobility, balance, gait, and endurance    • Influenza vaccine administered 2014   • Macular degeneration    • Mammogram declined 2015   • Osteoarthritis of left hip    • Primary osteoarthritis of right knee    • Supraventricular premature beats     REPORTED IN PREADMISSION TESTING VISIT \"IRREGULAR HEART BEAT\".  REPORTS UNSURE THE ACTUAL VERBIAGE OF WHAT SHE WAS TOLD OTHER THAN THIS.    • Wears partial dentures     REPORTS UPPER AND LOWER PARTIALS. INSTRUCTED NO ADHESIVES THE DOS.     Past Surgical History:   Procedure Laterality Date   • BUNIONECTOMY Right 1/21/2019    Procedure: Right foot bunionectomy with exostectomy, proximal phalanx osteotomy, right foot second and third digit hammertoe correction with interphalangeal joint fusion, right second metatarsophalangeal joint capsular release/capsulotomy;  Surgeon: Klaus Cason DPM;  Location: [facility] OR;  Service: Podiatry   • CATARACT EXTRACTION Bilateral    • CHOLECYSTECTOMY  2002   • COLONOSCOPY     • HERNIA REPAIR  2005    umbilical   • HIP INTERTROCHANTERIC NAILING Right 12/31/2019    Procedure: HIP RIGHT OPEN REDUCTION INTERNAL FIXATION, INTERMEDULLARY  NAIL;  Surgeon: Carlos Barba Jr., MD;  Location: [facility] OR;  Service: Orthopedics   • HYSTERECTOMY  1974   • TONSILLECTOMY     • TOTAL HIP ARTHROPLASTY Left 1/23/2018    Procedure: LEFT TOTAL HIP ARTHROPLASTY;  Surgeon: Gideon Son MD;  Location: [facility] OR;  Service:      General Information     Row Name 10/03/20 0907          Physical Therapy Time and Intention    Document Type  evaluation  -LO     Mode of Treatment  physical therapy  -LO     Row Name 10/03/20 0907          General Information    Patient Profile Reviewed  yes  -LO     Prior Level of Function  -- PLOF; patient reports was independent with dressing, needed some help with " bathing, ambulated with FWW.  -LO     Existing Precautions/Restrictions  fall lateral hip  -LO     Barriers to Rehab  previous functional deficit  -     Row Name 10/03/20 0907          Living Environment    Lives With  alone  -     Row Name 10/03/20 0907          Home Main Entrance    Number of Stairs, Main Entrance  one  -LO     Stair Railings, Main Entrance  none  -LO     Row Name 10/03/20 0907          Stairs Within Home, Primary    Number of Stairs, Within Home, Primary  none  -     Row Name 10/03/20 0907          Cognition    Orientation Status (Cognition)  oriented x 4  -LO     Row Name 10/03/20 0907          Safety Issues, Functional Mobility    Safety Issues Affecting Function (Mobility)  safety precaution awareness;positioning of assistive device;sequencing abilities  -LO     Impairments Affecting Function (Mobility)  balance;endurance/activity tolerance;pain;strength  -LO       User Key  (r) = Recorded By, (t) = Taken By, (c) = Cosigned By    Initials Name Provider Type    Wanda James PT Physical Therapist        Mobility     Row Name 10/03/20 0909          Bed Mobility    Bed Mobility  sit-supine  -LO     Sit-Supine [place] (Bed Mobility)  modified independence;2 person assist  -LO     Assistive Device (Bed Mobility)  draw sheet;head of bed elevated;bed rails  -     Comment (Bed Mobility)  modA x2 at trunk and legs back into bed  -     Row Name 10/03/20 0909          Transfers    Comment (Transfers)  modAx2 for transfers, patient with small ROCKY and requires significant cuing for B foot placement for safety. Significant unsteadiness with standing at walker.  -     Row Name 10/03/20 0909          Sit-Stand Transfer    Sit-Stand [place] (Transfers)  moderate assist (50% patient effort);2 person assist  -LO     Assistive Device (Sit-Stand Transfers)  walker, front-wheeled  -     Row Name 10/03/20 0909          Gait/Stairs (Locomotion)    Comment (Gait/Stairs)  Due to unsteadiness  during standing and transfers as well as symptomatic low hemoglobin levels this AM, gait not assessed.  -     Row Name 10/03/20 0909          Mobility    Extremity Weight-bearing Status  right lower extremity  -LO     Right Lower Extremity (Weight-bearing Status)  weight-bearing as tolerated (WBAT)  -       User Key  (r) = Recorded By, (t) = Taken By, (c) = Cosigned By    Initials Name Provider Type     Wanda Holley PT Physical Therapist        Obj/Interventions     Row Name 10/03/20 0912          Range of Motion Comprehensive    General Range of Motion  lower extremity range of motion deficits identified  -LO     Comment, General Range of Motion  R hip ROM due to pain  -LO     Row Name 10/03/20 0912          Strength Comprehensive (MMT)    General Manual Muscle Testing (MMT) Assessment  lower extremity strength deficits identified  -     Comment, General Manual Muscle Testing (MMT) Assessment  R hip grossly 2+/5  -LO     Row Name 10/03/20 0912          Motor Skills    Therapeutic Exercise  hip  -     Row Name 10/03/20 0912          Balance    Balance Assessment  sitting static balance;sitting dynamic balance;standing static balance;standing dynamic balance  -     Static Sitting Balance  mild impairment;sitting, edge of bed;supported as related to sx dizziness  -LO     Dynamic Sitting Balance  mild impairment;supported;sitting, edge of bed  -LO     Static Standing Balance  supported;standing;moderate impairment  -LO     Dynamic Standing Balance  severe impairment;supported;standing  -     Comment, Balance  Significant unsteadiness in standing as related to pain with WB as well as symptomatic low hemoglobin this AM.  -     Row Name 10/03/20 0912          Sensory Assessment (Somatosensory)    Sensory Assessment (Somatosensory)  sensation intact  -     Row Name 10/03/20 0912          General Lower Extremity Assessment (Range of Motion)    Comment: Lower Extremity ROM  see above  -     Row Name  10/03/20 0912          Lower Extremity (Manual Muscle Testing)    Comment, MMT: Lower Extremity  see above  -LO       User Key  (r) = Recorded By, (t) = Taken By, (c) = Cosigned By    Initials Name Provider Type    Wanda James, PT Physical Therapist        Goals/Plan     Row Name 10/03/20 0924          Bed Mobility Goal 1 (PT)    Activity/Assistive Device (Bed Mobility Goal 1, PT)  supine to sit;sit to supine;scooting;rolling to right;rolling to left;bridging  -LO     Routt Level/Cues Needed (Bed Mobility Goal 1, PT)  contact guard assist  -LO     Time Frame (Bed Mobility Goal 1, PT)  long term goal (LTG);10 days  -LO     Row Name 10/03/20 0924          Transfer Goal 1 (PT)    Activity/Assistive Device (Transfer Goal 1, PT)  sit-to-stand/stand-to-sit;bed-to-chair/chair-to-bed;toilet;car transfer;walker, rolling  -LO     Routt Level/Cues Needed (Transfer Goal 1, PT)  minimum assist (75% or more patient effort)  -LO     Time Frame (Transfer Goal 1, PT)  long term goal (LTG);10 days  -     Row Name 10/03/20 0924          Gait Training Goal 1 (PT)    Activity/Assistive Device (Gait Training Goal 1, PT)  gait (walking locomotion);assistive device use;decrease fall risk;diminish gait deviation;improve balance and speed;increase endurance/gait distance;maintain weight-bearing status;walker, rolling  -LO     Routt Level (Gait Training Goal 1, PT)  minimum assist (75% or more patient effort)  -LO     Distance (Gait Training Goal 1, PT)  50'  -LO     Time Frame (Gait Training Goal 1, PT)  long term goal (LTG);10 days  -LO     Row Name 10/03/20 0924          Stairs Goal 1 (PT)    Activity/Assistive Device (Stairs Goal 1, PT)  ascending stairs;descending stairs  -LO     Routt Level/Cues Needed (Stairs Goal 1, PT)  minimum assist (75% or more patient effort)  -LO     Number of Stairs (Stairs Goal 1, PT)  1  -LO     Time Frame (Stairs Goal 1, PT)  long term goal (LTG);10 days  -LO     Row Name  10/03/20 0924          Patient Education Goal (PT)    Activity (Patient Education Goal, PT)  Patient will demonstrate safe and effective bed mobility and transfers  -LO     Time Frame (Patient Education Goal, PT)  long term goal (LTG);10 days  -LO       User Key  (r) = Recorded By, (t) = Taken By, (c) = Cosigned By    Initials Name Provider Type    Wanda James, PT Physical Therapist        Clinical Impression     Row Name 10/03/20 0915          Pain    Additional Documentation  Pain Scale: FACES Pre/Post-Treatment (Group)  -LO     Row Name 10/03/20 0915          Pain Scale: FACES Pre/Post-Treatment    Pain: FACES Scale, Pretreatment  2-->hurts little bit  -LO     Posttreatment Pain Rating  4-->hurts little more  -LO     Pain Location - Side  Right  -LO     Pain Location  hip  -LO     Row Name 10/03/20 0915          Plan of Care Review    Plan of Care Reviewed With  patient  -LO     Progress  no change  -LO     Outcome Summary  PT evaluation complete. Patient presents post op R DEMARCO, lateral approach, WBAT. Patient is alert and oriented x4. Session/assessment limited by sx of dizziness as related to low hemoglobin levels this AM. Deficits noted in functional mobility, RLE strength, functional endurance.  Performs transfers ( edge of bed<> BSC)  with FWW with modA x2 with vc for hand and B LE positioning. Significant unsteadiness with transfers this AM. Patient reports significant dizziness on BSC and immediately transferred back to bed. BP taken in supine in bed ( 119/50). Per RN, patient to recieve transfusion this AM. Will attempt to see patient again for PM session. Patient will benefit from skilled IP PT services to address impairments in order to return to PLOF. Recommend IPR at this time at [place], but will monitor progress.  -LO     Row Name 10/03/20 0915          Therapy Assessment/Plan (PT)    Rehab Potential (PT)  fair, will monitor progress closely  -LO     Criteria for Skilled Interventions Met (PT)  yes   -LO     Row Name 10/03/20 0915          Vital Signs    Intra Systolic BP Rehab  119  -LO     Intra Treatment Diastolic BP  50  -LO     O2 Delivery Pre Treatment  supplemental O2  -LO     O2 Delivery Intra Treatment  supplemental O2  -LO     O2 Delivery Post Treatment  supplemental O2  -LO     Pre Patient Position  Sitting  -LO     Intra Patient Position  Standing  -LO     Post Patient Position  Supine  -LO     Row Name 10/03/20 0915          Positioning and Restraints    Pre-Treatment Position  in bed  -LO     Post Treatment Position  bed due to BP readings/sx this AM  -LO     In Bed  notified nsg;supine;call light within reach;encouraged to call for assist;exit alarm on;side rails up x2;SCD pump applied  -LO       User Key  (r) = Recorded By, (t) = Taken By, (c) = Cosigned By    Initials Name Provider Type    Wanda James, PT Physical Therapist        Outcome Measures     Row Name 10/03/20 0926          How much help from another person do you currently need...    Turning from your back to your side while in flat bed without using bedrails?  3  -LO     Moving from lying on back to sitting on the side of a flat bed without bedrails?  2  -LO     Moving to and from a bed to a chair (including a wheelchair)?  2  -LO     Standing up from a chair using your arms (e.g., wheelchair, bedside chair)?  2  -LO     Climbing 3-5 steps with a railing?  1  -LO     To walk in hospital room?  1  -LO     AM-PAC 6 Clicks Score (PT)  11  -LO     Row Name 10/03/20 0926          PADD    Diagnosis  2  -LO     Gender  1  -LO     Age Group  0  -LO     Gait Distance  0  -LO     Assist Level  0  -LO     Home Support  3  -LO     PADD Score  6  -LO     Prediction by PADD Score  extended rehabilitation  -LO     Row Name 10/03/20 0926          Functional Assessment    Outcome Measure Options  AM-PAC 6 Clicks Basic Mobility (PT);PADD  -LO       User Key  (r) = Recorded By, (t) = Taken By, (c) = Cosigned By    Initials Name Provider Type    TYE  Wanda Holley, PT Physical Therapist        Physical Therapy Education                 Title: PT OT SLP Therapies (In Progress)     Topic: Physical Therapy (In Progress)     Point: Mobility training (In Progress)     Learning Progress Summary           Patient Acceptance, E, NR by TYE at 10/3/2020 0842    Comment: Patient education regarding sequencing for transfers.                   Point: Home exercise program (Not Started)     Learner Progress:  Not documented in this visit.          Point: Body mechanics (Not Started)     Learner Progress:  Not documented in this visit.          Point: Precautions (Not Started)     Learner Progress:  Not documented in this visit.                      User Key     Initials Effective Dates Name Provider Type Discipline     03/11/20 -  Wanda Holley, PT Physical Therapist PT              PT Recommendation and Plan  Planned Therapy Interventions (PT): balance training, bed mobility training, gait training, home exercise program, patient/family education, neuromuscular re-education, stair training, strengthening, transfer training  Plan of Care Reviewed With: patient  Progress: no change  Outcome Summary: PT evaluation complete. Patient presents post op R DEMARCO, lateral approach, WBAT. Patient is alert and oriented x4. Session/assessment limited by sx of dizziness as related to low hemoglobin levels this AM. Deficits noted in functional mobility, RLE strength, functional endurance.  Performs transfers ( edge of bed<> BSC)  with FWW with modA x2 with vc for hand and B LE positioning. Significant unsteadiness with transfers this AM. Patient reports significant dizziness on BSC and immediately transferred back to bed. BP taken in supine in bed ( 119/50). Per RN, patient to recieve transfusion this AM. Will attempt to see patient again for PM session. Patient will benefit from skilled IP PT services to address impairments in order to return to PLOF. Recommend [place] at this time at [place], but  will monitor progress.     Time Calculation:   PT Charges     Row Name 10/03/20 0842             Time Calculation    Start Time  0842  -LO      PT Received On  10/03/20  -LO      PT Goal Re-Cert Due Date  10/13/20  -LO         Timed Charges    03222 - PT Therapeutic Activity Minutes  10  -LO        User Key  (r) = Recorded By, (t) = Taken By, (c) = Cosigned By    Initials Name Provider Type    LO Wanda Holley, PT Physical Therapist        Therapy Charges for Today     Code Description Service Date Service Provider Modifiers Qty    22199059571 HC PT THERAPEUTIC ACT EA 15 MIN 10/3/2020 Wanda Holley, PT GP 1    59207275999 HC PT EVAL LOW COMPLEXITY 4 10/3/2020 Wanda Holley, PT GP 1          PT G-Codes  Outcome Measure Options: AM-PAC 6 Clicks Basic Mobility (PT), PADD  AM-PAC 6 Clicks Score (PT): 11    Wanda Holley PT  10/3/2020      Electronically signed by Wanda Holley, PT at 10/03/20 0929     Wanda Holley, PT at 10/03/20 0928  Version 1 of 1         Problem: Adult Inpatient Plan of Care  Goal: Plan of Care Review  Recent Flowsheet Documentation  Taken 10/3/2020 0915 by Wanda Holley, PT  Progress: no change  Plan of Care Reviewed With: patient  Outcome Summary: PT evaluation complete. Patient presents post op R DEMARCO, lateral approach, WBAT. Patient is alert and oriented x4. Session/assessment limited by sx of dizziness as related to low hemoglobin levels this AM. Deficits noted in functional mobility, RLE strength, functional endurance.  Performs transfers ( edge of bed<> BSC)  with FWW with modA x2 with vc for hand and B LE positioning. Significant unsteadiness with transfers this AM. Patient reports significant dizziness on BSC and immediately transferred back to bed. BP taken in supine in bed ( 119/50). Per RN, patient to recieve transfusion this AM. Will attempt to see patient again for PM session. Patient will benefit from skilled IP PT services to address impairments in order to return to PLOF. Recommend IPR at  this time at [place], but will monitor progress.       Electronically signed by Wanda Holley, PT at 10/03/20 0969          Occupational Therapy Notes (last 24 hours) (Notes from 10/02/20 1353 through 10/03/20 1353)      Tory Rivas, OT at 10/03/20 0883          Patient Name: Bri Iniguez  : 1935    MRN: [id number]                              Today's Date: 10/3/2020       Admit Date: 10/2/2020    Visit Dx:     ICD-10-CM ICD-9-CM   1. Other secondary osteoarthritis of right hip  M16.7 715.25     Patient Active Problem List   Diagnosis   • Macular degeneration   • Hyperlipidemia   • Essential hypertension   • Deafness   • Diabetes type 2, controlled (CMS/[facility])   • Primary osteoarthritis of left hip   • Venous insufficiency of both lower extremities   • Status post total replacement of left hip   • Acute blood loss anemia, mild, asymptomatic   • Leukocytosis, mild, likely reactive   • Hip fracture (CMS/[facility])   • Other persistent atrial fibrillation (CMS/[facility])   • Nonrheumatic aortic valve sclerosis   • DJD (degenerative joint disease)   • Status post total replacement of right hip     Past Medical History:   Diagnosis Date   • A-fib (CMS/[facility])    • Bursitis     trochanteric area   • Colonoscopy refused    • Constipation    • Deaf, left    • Deafness     unspecified   • Diabetes mellitus (CMS/[facility])     PATIENT REPORTS SHE HAS TAKEN METFORMIN IN THE PAST BUT WAS TAKEN OFF OF THIS MEDICATION AROUND 2018.     • Elevated cholesterol    • Essential hypertension    • GERD (gastroesophageal reflux disease)    • Hearing loss, right     PATIENT DOES USE A HEARING AID TO RIGHT SIDE INTERMITTENTLY (REPORTS DEAFNESS IN LEFT EAR)   • Hip pain    • History of UTI     PATIENT REPORTS RESOLVED AFTER HORMONE THERAPY WAS ORDERED   • Hyperlipidemia     other   • Impaired functional mobility, balance, gait, and endurance    • Influenza vaccine administered    • Macular degeneration    • Mammogram declined    •  "Osteoarthritis of left hip    • Primary osteoarthritis of right knee    • Supraventricular premature beats     REPORTED IN PREADMISSION TESTING VISIT \"IRREGULAR HEART BEAT\".  REPORTS UNSURE THE ACTUAL VERBIAGE OF WHAT SHE WAS TOLD OTHER THAN THIS.    • Wears partial dentures     REPORTS UPPER AND LOWER PARTIALS. INSTRUCTED NO ADHESIVES THE DOS.     Past Surgical History:   Procedure Laterality Date   • BUNIONECTOMY Right 1/21/2019    Procedure: Right foot bunionectomy with exostectomy, proximal phalanx osteotomy, right foot second and third digit hammertoe correction with interphalangeal joint fusion, right second metatarsophalangeal joint capsular release/capsulotomy;  Surgeon: Klaus Cason DPM;  Location: [facility] OR;  Service: Podiatry   • CATARACT EXTRACTION Bilateral    • CHOLECYSTECTOMY  2002   • COLONOSCOPY     • HERNIA REPAIR  2005    umbilical   • HIP INTERTROCHANTERIC NAILING Right 12/31/2019    Procedure: HIP RIGHT OPEN REDUCTION INTERNAL FIXATION, INTERMEDULLARY  NAIL;  Surgeon: Carlos Barba Jr., MD;  Location: [facility] OR;  Service: Orthopedics   • HYSTERECTOMY  1974   • TONSILLECTOMY     • TOTAL HIP ARTHROPLASTY Left 1/23/2018    Procedure: LEFT TOTAL HIP ARTHROPLASTY;  Surgeon: Gideon Son MD;  Location: [facility] OR;  Service:      General Information     Row Name 10/03/20 0937          OT Time and Intention    Document Type  evaluation  -TB     Mode of Treatment  occupational therapy  -TB     Row Name 10/03/20 0937          General Information    Patient Profile Reviewed  yes  -TB     Prior Level of Function  min assist:;ADL's;all household mobility  -TB     Existing Precautions/Restrictions  fall;hip;right Lateral hip  -TB     Barriers to Rehab  previous functional deficit  -TB     Row Name 10/03/20 0937          Living Environment    Lives With  alone Daughter lives nearby and assists with care at baseline  -TB     Row Name 10/03/20 0937          Home Main Entrance    Number of Stairs, Main " Entrance  one  -TB     Stair Railings, Main Entrance  none  -TB     Row Name 10/03/20 0937          Stairs Within Home, Primary    Number of Stairs, Within Home, Primary  none pt does not access basement. Laundry brought up to main level  -TB     Row Name 10/03/20 0937          Cognition    Orientation Status (Cognition)  oriented x 4  -TB     Row Name 10/03/20 0937          Safety Issues, Functional Mobility    Safety Issues Affecting Function (Mobility)  safety precaution awareness;safety precautions follow-through/compliance;sequencing abilities;positioning of assistive device  -TB     Impairments Affecting Function (Mobility)  balance;endurance/activity tolerance;pain;strength;range of motion (ROM)  -TB     Comment, Safety Issues/Impairments (Mobility)  Pt up with Mod A x2 using RW, presents with posterior lean and narrow ROCKY  -TB       User Key  (r) = Recorded By, (t) = Taken By, (c) = Cosigned By    Initials Name Provider Type    TB Tory Rivas OT Occupational Therapist        Mobility/ADL's     Row Name 10/03/20 0940          Bed Mobility    Bed Mobility  sit-supine;scooting/bridging  -TB     Scooting/Bridging [place] (Bed Mobility)  moderate assist (50% patient effort);2 person assist;verbal cues  -TB     Sit-Supine [place] (Bed Mobility)  moderate assist (50% patient effort);2 person assist;verbal cues  -TB     Bed Mobility, Safety Issues  decreased use of legs for bridging/pushing  -TB     Assistive Device (Bed Mobility)  draw sheet;bed rails  -TB     Row Name 10/03/20 0940          Transfers    Transfers  sit-stand transfer;toilet transfer  -TB     Comment (Transfers)  Education reinforced and cues/assist needed for hand placement, sequencing and safety  -TB     Sit-Stand [place] (Transfers)  moderate assist (50% patient effort);2 person assist;verbal cues  -TB     [place] Level (Toilet Transfer)  moderate assist (50% patient effort);2 person assist;verbal cues  -TB      Assistive Device (Toilet Transfer)  commode, bedside without drop arms;walker, front-wheeled  -TB     Row Name 10/03/20 0940          Sit-Stand Transfer    Assistive Device (Sit-Stand Transfers)  walker, front-wheeled  -TB     Row Name 10/03/20 0940          Toilet Transfer    Type (Toilet Transfer)  sit-stand;stand-sit  -     Row Name 10/03/20 0940          Functional Mobility    Functional Mobility- Ind. Level  moderate assist (50% patient effort);2 person assist required Limited by acute dizziness  -TB     Functional Mobility- Device  rolling walker  -     Functional Mobility-Distance (Feet)  -- EOB to BSC and return; side steps along EOB  -TB     Functional Mobility- Safety Issues  step length decreased;weight-shifting ability decreased;loses balance backward;balance decreased during turns  -     Row Name 10/03/20 0940          Activities of Daily Living    BADL Assessment/Intervention  toileting;lower body dressing  -     Row Name 10/03/20 0940          Mobility    Extremity Weight-bearing Status  right lower extremity  -TB     Right Lower Extremity (Weight-bearing Status)  weight-bearing as tolerated (WBAT)  -     Row Name 10/03/20 0940          Toileting Assessment/Training    [place] Level (Toileting)  adjust/manage clothing;dependent (less than 25% patient effort);perform perineal hygiene  -TB     Assistive Devices (Toileting)  commode, bedside without drop arms  -     Row Name 10/03/20 0940          Lower Body Dressing Assessment/Training    [place] Level (Lower Body Dressing)  lower body dressing skills;moderate assist (50% patient effort) clinical projection  -TB     Comment (Lower Body Dressing)  Pt has h/o L DEMARCO, R ORIF now converted to R DEMARCO. Pt has AE at home and daughter assists at baseline as needed.  -TB       User Key  (r) = Recorded By, (t) = Taken By, (c) = Cosigned By    Initials Name Provider Type    TB Tory Rivas, OT Occupational Therapist         Obj/Interventions     Row Name 10/03/20 0945          Sensory Assessment (Somatosensory)    Sensory Assessment (Somatosensory)  UE sensation intact  -TB     Row Name 10/03/20 0945          Sensory Interventions    Comment, Sensory Intervention  BUE intact  -TB     Row Name 10/03/20 0945          Vision Assessment/Intervention    Visual Impairment/Limitations  WFL;corrective lenses for reading  -TB     Row Name 10/03/20 0945          Range of Motion Comprehensive    General Range of Motion  bilateral upper extremity ROM WFL  -TB     Comment, General Range of Motion  BUE intact for ADL and AD use  -TB     Row Name 10/03/20 0945          Strength Comprehensive (MMT)    General Manual Muscle Testing (MMT) Assessment  upper extremity strength deficits identified  -TB     Comment, General Manual Muscle Testing (MMT) Assessment  Generalized weakness. BUE functionally 4/5  -TB     Row Name 10/03/20 0945          Balance    Balance Assessment  sitting dynamic balance;standing dynamic balance;sitting static balance  -TB     Static Sitting Balance  WFL  -TB     Dynamic Sitting Balance  mild impairment posterior lean  -TB     Dynamic Standing Balance  severe impairment;supported;standing  -TB     Balance Interventions  sitting;standing;sit to stand;occupation based/functional task;UE activity with balance activity  -TB     Comment, Balance  Pt is up with Mod A x2 using RW, presents with posterior lean  -TB       User Key  (r) = Recorded By, (t) = Taken By, (c) = Cosigned By    Initials Name Provider Type    TB Tory Rivas OT Occupational Therapist        Goals/Plan     Row Name 10/03/20 0928          Bed Mobility Goal 1 (OT)    Activity/Assistive Device (Bed Mobility Goal 1, OT)  sit to supine/supine to sit  -TB     [place] Level/Cues Needed (Bed Mobility Goal 1, OT)  moderate assist (50-74% patient effort);verbal cues required  -TB     Time Frame (Bed Mobility Goal 1, OT)  by discharge  -TB      Strategies/Barriers (Bed Mobility Goal 1, OT)  pain, ROM/lateral hip precautions  -TB     Row Name 10/03/20 0954          Transfer Goal 1 (OT)    Activity/Assistive Device (Transfer Goal 1, OT)  sit-to-stand/stand-to-sit;bed-to-chair/chair-to-bed;toilet;walker, rolling  -TB     [place] Level/Cues Needed (Transfer Goal 1, OT)  minimum assist (75% or more patient effort);tactile cues required;verbal cues required 2 person assist  -TB     Time Frame (Transfer Goal 1, OT)  by discharge  -TB     Strategies/Barriers (Transfers Goal 1, OT)  pain, ROM/lateral hip precautions  -TB     Row Name 10/03/20 0954          Dressing Goal 1 (OT)    Activity/Device (Dressing Goal 1, OT)  lower body dressing;long-handled shoe horn;reacher;sock-aid  -TB     [place]/Cues Needed (Dressing Goal 1, OT)  moderate assist (50-74% patient effort);tactile cues required;verbal cues required  -TB     Time Frame (Dressing Goal 1, OT)  by discharge  -TB     Strategies/Barriers (Dressing Goal 1, OT)  Pain, ROM/lateral hip precautions. Pt demonstrates use of AE to support ADL performance  -TB     Row Name 10/03/20 0954          Toileting Goal 1 (OT)    Activity/Device (Toileting Goal 1, OT)  perform perineal hygiene  -TB     [place] Level/Cues Needed (Toileting Goal 1, OT)  set-up required;standby assist  -TB     Time Frame (Toileting Goal 1, OT)  by discharge  -TB       User Key  (r) = Recorded By, (t) = Taken By, (c) = Cosigned By    Initials Name Provider Type    TB Tory Rivas, OT Occupational Therapist        Clinical Impression     Row Name 10/03/20 9580          Pain Assessment    Additional Documentation  Pain Scale: FACES Pre/Post-Treatment (Group)  -TB     Row Name 10/03/20 1927          Pain Scale: FACES Pre/Post-Treatment    Pain: FACES Scale, Pretreatment  2-->hurts little bit  -TB     Posttreatment Pain Rating  4-->hurts little more  -TB     Pain Location - Side  Right  -TB     Pain Location  hip  -TB      Pre/Posttreatment Pain Comment  Increased pain with activity/mobility  -TB     Row Name 10/03/20 0948          Plan of Care Review    Plan of Care Reviewed With  patient  -TB     Outcome Summary  OT IE completed. Pt alert, Ox4 and motivated to work with OT. Pt presents with mild pain, significant balance deficits in standing and generalized weakness limiting mobility and self-care at this time. Pt is Mod A x2 for bed mobility, STS and BSC transfers using RW. OT will follow IP to advance ADL performance and BR transfers as able. Pt has used AE with past hip surgeries and has AE at home. Denies DME needs. Recommend IRF at d/c for best outcome.  -TB     Row Name 10/03/20 0948          Therapy Assessment/Plan (OT)    Patient/Family Therapy Goal Statement (OT)  to regain independence  -TB     Rehab Potential (OT)  fair, will monitor progress closely  -TB     Criteria for Skilled Therapeutic Interventions Met (OT)  yes;skilled treatment is necessary  -TB     Therapy Frequency (OT)  daily  -TB     Row Name 10/03/20 0948          Therapy Plan Review/Discharge Plan (OT)    Equipment Needs Upon Discharge (OT)  -- None  -TB     Anticipated Discharge Disposition (OT)  inpatient rehabilitation facility  -TB     Row Name 10/03/20 0948          Vital Signs    Intra Systolic BP Rehab  119  -TB     Intra Treatment Diastolic BP  50 Presents with acute dizziness with OOB activity  -TB     O2 Delivery Pre Treatment  nasal cannula  -TB     O2 Delivery Intra Treatment  nasal cannula  -TB     O2 Delivery Post Treatment  nasal cannula  -TB     Pre Patient Position  Sitting  -TB     Intra Patient Position  Standing  -TB     Post Patient Position  Supine  -TB     Row Name 10/03/20 0948          Positioning and Restraints    Pre-Treatment Position  in bed  -TB     Post Treatment Position  bed  -TB     In Bed  notified nsg;fowlers;call light within reach;encouraged to call for assist;exit alarm on;SCD pump applied  -TB       User Key  (r) =  Recorded By, (t) = Taken By, (c) = Cosigned By    Initials Name Provider Type    Tory Regalado OT Occupational Therapist        Outcome Measures     Row Name 10/03/20 0956          How much help from another is currently needed...    Putting on and taking off regular lower body clothing?  1  -TB     Bathing (including washing, rinsing, and drying)  2  -TB     Toileting (which includes using toilet bed pan or urinal)  1  -TB     Putting on and taking off regular upper body clothing  3  -TB     Taking care of personal grooming (such as brushing teeth)  3  -TB     Eating meals  4  -TB     AM-PAC 6 Clicks Score (OT)  14  -TB     Row Name 10/03/20 0956          Functional Assessment    Outcome Measure Options  AM-PAC 6 Clicks Daily Activity (OT)  -TB       User Key  (r) = Recorded By, (t) = Taken By, (c) = Cosigned By    Initials Name Provider Type    Tory Regalado OT Occupational Therapist        Occupational Therapy Education                 Title: PT OT SLP Therapies (In Progress)     Topic: Occupational Therapy (Done)     Point: ADL training (Done)     Description:   Instruct learner(s) on proper safety adaptation and remediation techniques during self care or transfers.   Instruct in proper use of assistive devices.              Learning Progress Summary           Patient Acceptance, E, VU,NR by TB at 10/3/2020 0957    Comment: Education initiated for benefits of OOB activity/therapy, role of OT, hip precautions, use of AE, transfer training/safety and recommendation for rehab at d/c.                   Point: Precautions (Done)     Description:   Instruct learner(s) on prescribed precautions during self-care and functional transfers.              Learning Progress Summary           Patient Acceptance, E, VU,NR by TB at 10/3/2020 0957    Comment: Education initiated for benefits of OOB activity/therapy, role of OT, hip precautions, use of AE, transfer training/safety and recommendation for  rehab at d/c.                               User Key     Initials Effective Dates Name Provider Type Discipline     06/08/18 -  Tory Rivas OT Occupational Therapist OT              OT Recommendation and Plan  Retired Outcome Summary/Treatment Plan (OT)  Anticipated Discharge Disposition (OT): inpatient rehabilitation facility  Therapy Frequency (OT): daily  Plan of Care Review  Plan of Care Reviewed With: patient  Outcome Summary: OT IE completed. Pt alert, Ox4 and motivated to work with OT. Pt presents with mild pain, significant balance deficits in standing and generalized weakness limiting mobility and self-care at this time. Pt is Mod A x2 for bed mobility, STS and BSC transfers using RW. OT will follow IP to advance ADL performance and BR transfers as able. Pt has used AE with past hip surgeries and has AE at home. Denies DME needs. Recommend IRF at d/c for best outcome.  Plan of Care Reviewed With: patient  Outcome Summary: OT IE completed. Pt alert, Ox4 and motivated to work with OT. Pt presents with mild pain, significant balance deficits in standing and generalized weakness limiting mobility and self-care at this time. Pt is Mod A x2 for bed mobility, STS and BSC transfers using RW. OT will follow IP to advance ADL performance and BR transfers as able. Pt has used AE with past hip surgeries and has AE at home. Denies DME needs. Recommend IRF at d/c for best outcome.     Time Calculation:   Time Calculation- OT     Row Name 10/03/20 0840             Time Calculation- OT    OT Start Time  0840  -      OT Received On  10/03/20  -      OT Goal Re-Cert Due Date  10/13/20  -        User Key  (r) = Recorded By, (t) = Taken By, (c) = Cosigned By    Initials Name Provider Type    TB Tory Rivas OT Occupational Therapist        Therapy Charges for Today     Code Description Service Date Service Provider Modifiers Qty    [id number] HC OT EVAL MOD COMPLEXITY 4 10/3/2020 Tory Rivas  SHARRI Kate GO 1               Tory Rivas OT  10/3/2020    Electronically signed by Tory Rivas OT at 10/03/20 1000     Tory Rivas OT at 10/03/20 0840          Problem: Adult Inpatient Plan of Care  Goal: Plan of Care Review  Recent Flowsheet Documentation  Taken 10/3/2020 0948 by Tory Rivas OT  Plan of Care Reviewed With: patient  Outcome Summary: OT IE completed. Pt alert, Ox4 and motivated to work with OT. Pt presents with mild pain, significant balance deficits in standing, generalized weakness and dizziness limiting mobility and self-care at this time. Pt is Mod A x2 for bed mobility, STS and BSC transfers using RW. OT will follow IP to advance ADL performance and BR transfers as able. Pt has used AE with past hip surgeries and has AE at home. Denies DME needs. Recommend IRF at d/c for best outcome.       Electronically signed by Tory Rivas OT at 10/03/20 0959

## 2020-10-03 NOTE — THERAPY EVALUATION
"Patient Name: Bri Iniguez  : 1935    MRN: [id number]                              Today's Date: 10/3/2020       Admit Date: 10/2/2020    Visit Dx:     ICD-10-CM ICD-9-CM   1. Other secondary osteoarthritis of right hip  M16.7 715.25     Patient Active Problem List   Diagnosis   • Macular degeneration   • Hyperlipidemia   • Essential hypertension   • Deafness   • Diabetes type 2, controlled (CMS/[facility])   • Primary osteoarthritis of left hip   • Venous insufficiency of both lower extremities   • Status post total replacement of left hip   • Acute blood loss anemia, mild, asymptomatic   • Leukocytosis, mild, likely reactive   • Hip fracture (CMS/[facility])   • Other persistent atrial fibrillation (CMS/[facility])   • Nonrheumatic aortic valve sclerosis   • DJD (degenerative joint disease)   • Status post total replacement of right hip     Past Medical History:   Diagnosis Date   • A-fib (CMS/[facility])    • Bursitis     trochanteric area   • Colonoscopy refused    • Constipation    • Deaf, left    • Deafness     unspecified   • Diabetes mellitus (CMS/[facility])     PATIENT REPORTS SHE HAS TAKEN METFORMIN IN THE PAST BUT WAS TAKEN OFF OF THIS MEDICATION AROUND 2018.     • Elevated cholesterol    • Essential hypertension    • GERD (gastroesophageal reflux disease)    • Hearing loss, right     PATIENT DOES USE A HEARING AID TO RIGHT SIDE INTERMITTENTLY (REPORTS DEAFNESS IN LEFT EAR)   • Hip pain    • History of UTI     PATIENT REPORTS RESOLVED AFTER HORMONE THERAPY WAS ORDERED   • Hyperlipidemia     other   • Impaired functional mobility, balance, gait, and endurance    • Influenza vaccine administered    • Macular degeneration    • Mammogram declined    • Osteoarthritis of left hip    • Primary osteoarthritis of right knee    • Supraventricular premature beats     REPORTED IN PREADMISSION TESTING VISIT \"IRREGULAR HEART BEAT\".  REPORTS UNSURE THE ACTUAL VERBIAGE OF WHAT SHE WAS TOLD OTHER THAN THIS.    • Wears partial " dentures     REPORTS UPPER AND LOWER PARTIALS. INSTRUCTED NO ADHESIVES THE DOS.     Past Surgical History:   Procedure Laterality Date   • BUNIONECTOMY Right 1/21/2019    Procedure: Right foot bunionectomy with exostectomy, proximal phalanx osteotomy, right foot second and third digit hammertoe correction with interphalangeal joint fusion, right second metatarsophalangeal joint capsular release/capsulotomy;  Surgeon: Klaus Cason DPM;  Location: [facility] OR;  Service: Podiatry   • CATARACT EXTRACTION Bilateral    • CHOLECYSTECTOMY  2002   • COLONOSCOPY     • HERNIA REPAIR  2005    umbilical   • HIP INTERTROCHANTERIC NAILING Right 12/31/2019    Procedure: HIP RIGHT OPEN REDUCTION INTERNAL FIXATION, INTERMEDULLARY  NAIL;  Surgeon: Carlos Barba Jr., MD;  Location: [facility] OR;  Service: Orthopedics   • HYSTERECTOMY  1974   • TONSILLECTOMY     • TOTAL HIP ARTHROPLASTY Left 1/23/2018    Procedure: LEFT TOTAL HIP ARTHROPLASTY;  Surgeon: Gideon Son MD;  Location: [facility] OR;  Service:      General Information     Row Name 10/03/20 0907          Physical Therapy Time and Intention    Document Type  evaluation  -LO     Mode of Treatment  physical therapy  -     Row Name 10/03/20 0907          General Information    Patient Profile Reviewed  yes  -LO     Prior Level of Function  -- PLOF; patient reports was independent with dressing, needed some help with bathing, ambulated with FWW.  -LO     Existing Precautions/Restrictions  fall lateral hip  -LO     Barriers to Rehab  previous functional deficit  -LO     Row Name 10/03/20 0907          Living Environment    Lives With  alone  -     Row Name 10/03/20 0907          Home Main Entrance    Number of Stairs, Main Entrance  one  -LO     Stair Railings, Main Entrance  none  -LO     Row Name 10/03/20 0907          Stairs Within Home, Primary    Number of Stairs, Within Home, Primary  none  -LO     Row Name 10/03/20 0907          Cognition    Orientation Status  (Cognition)  oriented x 4  -LO     Row Name 10/03/20 0907          Safety Issues, Functional Mobility    Safety Issues Affecting Function (Mobility)  safety precaution awareness;positioning of assistive device;sequencing abilities  -LO     Impairments Affecting Function (Mobility)  balance;endurance/activity tolerance;pain;strength  -LO       User Key  (r) = Recorded By, (t) = Taken By, (c) = Cosigned By    Initials Name Provider Type    Wanda James PT Physical Therapist        Mobility     Row Name 10/03/20 0909          Bed Mobility    Bed Mobility  sit-supine  -LO     Sit-Supine [place] (Bed Mobility)  modified independence;2 person assist  -LO     Assistive Device (Bed Mobility)  draw sheet;head of bed elevated;bed rails  -LO     Comment (Bed Mobility)  modA x2 at trunk and legs back into bed  -LO     Row Name 10/03/20 0909          Transfers    Comment (Transfers)  modAx2 for transfers, patient with small ROCKY and requires significant cuing for B foot placement for safety. Significant unsteadiness with standing at walker.  -     Row Name 10/03/20 0909          Sit-Stand Transfer    Sit-Stand [place] (Transfers)  moderate assist (50% patient effort);2 person assist  -LO     Assistive Device (Sit-Stand Transfers)  walker, front-wheeled  -     Row Name 10/03/20 0909          Gait/Stairs (Locomotion)    Comment (Gait/Stairs)  Due to unsteadiness during standing and transfers as well as symptomatic low hemoglobin levels this AM, gait not assessed.  -     Row Name 10/03/20 0909          Mobility    Extremity Weight-bearing Status  right lower extremity  -LO     Right Lower Extremity (Weight-bearing Status)  weight-bearing as tolerated (WBAT)  -       User Key  (r) = Recorded By, (t) = Taken By, (c) = Cosigned By    Initials Name Provider Type    Wanda James PT Physical Therapist        Obj/Interventions     Row Name 10/03/20 0912          Range of Motion Comprehensive    General Range of  Motion  lower extremity range of motion deficits identified  -LO     Comment, General Range of Motion  R hip ROM due to pain  -LO     Row Name 10/03/20 0912          Strength Comprehensive (MMT)    General Manual Muscle Testing (MMT) Assessment  lower extremity strength deficits identified  -LO     Comment, General Manual Muscle Testing (MMT) Assessment  R hip grossly 2+/5  -LO     Row Name 10/03/20 0912          Motor Skills    Therapeutic Exercise  hip  -LO     Row Name 10/03/20 0912          Balance    Balance Assessment  sitting static balance;sitting dynamic balance;standing static balance;standing dynamic balance  -LO     Static Sitting Balance  mild impairment;sitting, edge of bed;supported as related to sx dizziness  -LO     Dynamic Sitting Balance  mild impairment;supported;sitting, edge of bed  -LO     Static Standing Balance  supported;standing;moderate impairment  -LO     Dynamic Standing Balance  severe impairment;supported;standing  -LO     Comment, Balance  Significant unsteadiness in standing as related to pain with WB as well as symptomatic low hemoglobin this AM.  -LO     Row Name 10/03/20 0912          Sensory Assessment (Somatosensory)    Sensory Assessment (Somatosensory)  sensation intact  -LO     Row Name 10/03/20 0912          General Lower Extremity Assessment (Range of Motion)    Comment: Lower Extremity ROM  see above  -LO     Row Name 10/03/20 0912          Lower Extremity (Manual Muscle Testing)    Comment, MMT: Lower Extremity  see above  -LO       User Key  (r) = Recorded By, (t) = Taken By, (c) = Cosigned By    Initials Name Provider Type    Wanda James, PT Physical Therapist        Goals/Plan     Row Name 10/03/20 0924          Bed Mobility Goal 1 (PT)    Activity/Assistive Device (Bed Mobility Goal 1, PT)  supine to sit;sit to supine;scooting;rolling to right;rolling to left;bridging  -LO     [place] Level/Cues Needed (Bed Mobility Goal 1, PT)  contact guard assist  -LO      Time Frame (Bed Mobility Goal 1, PT)  long term goal (LTG);10 days  -LO     Row Name 10/03/20 0924          Transfer Goal 1 (PT)    Activity/Assistive Device (Transfer Goal 1, PT)  sit-to-stand/stand-to-sit;bed-to-chair/chair-to-bed;toilet;car transfer;walker, rolling  -LO     [place] Level/Cues Needed (Transfer Goal 1, PT)  minimum assist (75% or more patient effort)  -LO     Time Frame (Transfer Goal 1, PT)  long term goal (LTG);10 days  -LO     Row Name 10/03/20 0924          Gait Training Goal 1 (PT)    Activity/Assistive Device (Gait Training Goal 1, PT)  gait (walking locomotion);assistive device use;decrease fall risk;diminish gait deviation;improve balance and speed;increase endurance/gait distance;maintain weight-bearing status;walker, rolling  -LO     [place] Level (Gait Training Goal 1, PT)  minimum assist (75% or more patient effort)  -LO     Distance (Gait Training Goal 1, PT)  50'  -LO     Time Frame (Gait Training Goal 1, PT)  long term goal (LTG);10 days  -LO     Row Name 10/03/20 0924          Stairs Goal 1 (PT)    Activity/Assistive Device (Stairs Goal 1, PT)  ascending stairs;descending stairs  -LO     [place] Level/Cues Needed (Stairs Goal 1, PT)  minimum assist (75% or more patient effort)  -LO     Number of Stairs (Stairs Goal 1, PT)  1  -LO     Time Frame (Stairs Goal 1, PT)  long term goal (LTG);10 days  -     Row Name 10/03/20 0924          Patient Education Goal (PT)    Activity (Patient Education Goal, PT)  Patient will demonstrate safe and effective bed mobility and transfers  -LO     Time Frame (Patient Education Goal, PT)  long term goal (LTG);10 days  -LO       User Key  (r) = Recorded By, (t) = Taken By, (c) = Cosigned By    Initials Name Provider Type    Wanda James, PT Physical Therapist        Clinical Impression     Row Name 10/03/20 0915          Pain    Additional Documentation  Pain Scale: FACES Pre/Post-Treatment (Group)  -     Row Name 10/03/20 0915           Pain Scale: FACES Pre/Post-Treatment    Pain: FACES Scale, Pretreatment  2-->hurts little bit  -LO     Posttreatment Pain Rating  4-->hurts little more  -LO     Pain Location - Side  Right  -LO     Pain Location  hip  -LO     Row Name 10/03/20 0915          Plan of Care Review    Plan of Care Reviewed With  patient  -LO     Progress  no change  -LO     Outcome Summary  PT evaluation complete. Patient presents post op R DEMARCO, lateral approach, WBAT. Patient is alert and oriented x4. Session/assessment limited by sx of dizziness as related to low hemoglobin levels this AM. Deficits noted in functional mobility, RLE strength, functional endurance.  Performs transfers ( edge of bed<> BSC)  with FWW with modA x2 with vc for hand and B LE positioning. Significant unsteadiness with transfers this AM. Patient reports significant dizziness on BSC and immediately transferred back to bed. BP taken in supine in bed ( 119/50). Per RN, patient to recieve transfusion this AM. Will attempt to see patient again for PM session. Patient will benefit from skilled IP PT services to address impairments in order to return to PLOF. Recommend IPR at this time at [place], but will monitor progress.  -LO     Row Name 10/03/20 0915          Therapy Assessment/Plan (PT)    Rehab Potential (PT)  fair, will monitor progress closely  -LO     Criteria for Skilled Interventions Met (PT)  yes  -LO     Row Name 10/03/20 0915          Vital Signs    Intra Systolic BP Rehab  119  -LO     Intra Treatment Diastolic BP  50  -LO     O2 Delivery Pre Treatment  supplemental O2  -LO     O2 Delivery Intra Treatment  supplemental O2  -LO     O2 Delivery Post Treatment  supplemental O2  -LO     Pre Patient Position  Sitting  -LO     Intra Patient Position  Standing  -LO     Post Patient Position  Supine  -LO     Row Name 10/03/20 0915          Positioning and Restraints    Pre-Treatment Position  in bed  -LO     Post Treatment Position  bed due to BP readings/sx  this AM  -LO     In Bed  notified nsg;supine;call light within reach;encouraged to call for assist;exit alarm on;side rails up x2;SCD pump applied  -LO       User Key  (r) = Recorded By, (t) = Taken By, (c) = Cosigned By    Initials Name Provider Type    [place] James, [place] Physical Therapist        Outcome Measures     Row Name 10/03/20 0926          How much help from another person do you currently need...    Turning from your back to your side while in flat bed without using bedrails?  3  -LO     Moving from lying on back to sitting on the side of a flat bed without bedrails?  2  -LO     Moving to and from a bed to a chair (including a wheelchair)?  2  -LO     Standing up from a chair using your arms (e.g., wheelchair, bedside chair)?  2  -LO     Climbing 3-5 steps with a railing?  1  -LO     To walk in hospital room?  1  -LO     AM-PAC 6 Clicks Score (PT)  11  -LO     Row Name 10/03/20 0926          PADD    Diagnosis  2  -LO     Gender  1  -LO     Age Group  0  -LO     Gait Distance  0  -LO     Assist Level  0  -LO     Home Support  3  -LO     PADD Score  6  -LO     Prediction by PADD Score  extended rehabilitation  -LO     Row Name 10/03/20 0926          Functional Assessment    Outcome Measure Options  AM-PAC 6 Clicks Basic Mobility (PT);PADD  -LO       User Key  (r) = Recorded By, (t) = Taken By, (c) = Cosigned By    Initials Name Provider Type    Wanda James, R[place] Physical Therapist        Physical Therapy Education                 Title: PT OT SLP Therapies (In Progress)     Topic: Physical Therapy (In Progress)     Point: Mobility training (In Progress)     Learning Progress Summary           Patient Acceptance, E, NR by TYE at 10/3/2020 0842    Comment: Patient education regarding sequencing for transfers.                   Point: Home exercise program (Not Started)     Learner Progress:  Not documented in this visit.          Point: Body mechanics (Not Started)     Learner Progress:  Not documented in  this visit.          Point: Precautions (Not Started)     Learner Progress:  Not documented in this visit.                      User Key     Initials Effective Dates Name Provider Type Discipline     03/11/20 -  Wanda Holley PT Physical Therapist PT              PT Recommendation and Plan  Planned Therapy Interventions (PT): balance training, bed mobility training, gait training, home exercise program, patient/family education, neuromuscular re-education, stair training, strengthening, transfer training  Plan of Care Reviewed With: patient  Progress: no change  Outcome Summary: PT evaluation complete. Patient presents post op R DEMARCO, lateral approach, WBAT. Patient is alert and oriented x4. Session/assessment limited by sx of dizziness as related to low hemoglobin levels this AM. Deficits noted in functional mobility, RLE strength, functional endurance.  Performs transfers ( edge of bed<> BSC)  with FWW with modA x2 with vc for hand and B LE positioning. Significant unsteadiness with transfers this AM. Patient reports significant dizziness on BSC and immediately transferred back to bed. BP taken in supine in bed ( 119/50). Per RN, patient to recieve transfusion this AM. Will attempt to see patient again for PM session. Patient will benefit from skilled IP PT services to address impairments in order to return to PLOF. Recommend IPR at this time at [place], but will monitor progress.     Time Calculation:   PT Charges     Row Name 10/03/20 0842             Time Calculation    Start Time  0842  -LO      PT Received On  10/03/20  -LO      PT Goal Re-Cert Due Date  10/13/20  -LO         Timed Charges    99063 - PT Therapeutic Activity Minutes  10  -LO        User Key  (r) = Recorded By, (t) = Taken By, (c) = Cosigned By    Initials Name Provider Type    Wanda James PT Physical Therapist        Therapy Charges for Today     Code Description Service Date Service Provider Modifiers Qty    55929104052  PT THERAPEUTIC ACT  EA 15 MIN 10/3/2020 Wanda Holley, PT GP 1    [id number] HC PT EVAL LOW COMPLEXITY 4 10/3/2020 Wanda Holley, PT GP 1          PT G-Codes  Outcome Measure Options: AM-PAC 6 Clicks Basic Mobility (PT), PADD  AM-PAC 6 Clicks Score (PT): 11    Wanda Holley, PT  10/3/2020

## 2020-10-03 NOTE — PLAN OF CARE
Problem: Adult Inpatient Plan of Care  Goal: Plan of Care Review  Recent Flowsheet Documentation  Taken 10/3/2020 0915 by Wanda Holley, PT  Progress: no change  Plan of Care Reviewed With: patient  Outcome Summary: PT evaluation complete. Patient presents post op R DEMARCO, lateral approach, WBAT. Patient is alert and oriented x4. Session/assessment limited by sx of dizziness as related to low hemoglobin levels this AM. Deficits noted in functional mobility, RLE strength, functional endurance.  Performs transfers ( edge of bed<> BSC)  with FWW with modA x2 with vc for hand and B LE positioning. Significant unsteadiness with transfers this AM. Patient reports significant dizziness on BSC and immediately transferred back to bed. BP taken in supine in bed ( 119/50). Per RN, patient to recieve transfusion this AM. Will attempt to see patient again for PM session. Patient will benefit from skilled IP PT services to address impairments in order to return to PLOF. Recommend IPR at this time at [place], but will monitor progress.

## 2020-10-03 NOTE — PROGRESS NOTES
" progress note      Bri Iniguez  [id number]  1935     LOS: 1 day     Attending: Gideon Son MD    Primary Care Provider: Clifford Nolasco MD      Chief Complaint/Reason for visit:  No chief complaint on file.      Subjective   Doing fairly well with good pain control.  No nausea, vomiting, or shortness of breath.  Receiving first unit of packed red blood cells due to acute blood loss anemia.    Objective        Visit Vitals  /61 (BP Location: Right arm, Patient Position: Lying)   Pulse 102   Temp 98.2 °F (36.8 °C) (Oral)   Resp 16   Ht 167.6 cm (66\")   Wt 77.1 kg (170 lb)   LMP  (LMP Unknown) Comment: HYSTERECTOMY   SpO2 98%   Breastfeeding No   BMI 27.44 kg/m²     Temp (24hrs), Av.9 °F (36.6 °C), Min:97.5 °F (36.4 °C), Max:98.4 °F (36.9 °C)      Intake/Output:    Intake/Output Summary (Last 24 hours) at 10/3/2020 1107  Last data filed at 10/3/2020 0900  Gross per 24 hour   Intake 900 ml   Output 750 ml   Net 150 ml        Physical Therapy: Pending    Physical Exam:     General Appearance:    Alert, cooperative, in no acute distress   Head:    Normocephalic, without obvious abnormality, atraumatic    Lungs:     Normal effort, symmetric chest rise,  clear to      auscultation bilaterally              Heart:    Regular rhythm and normal rate, normal S1 and S2    Abdomen:     Normal bowel sounds, no masses, no organomegaly, soft        non-tender, non-distended, no guarding, no rebound                tenderness   Extremities:  Clean dry and intact dressing over right hip incision.  Intact flexion dorsiflexion bilateral feet.  No clubbing, cyanosis or edema.  No deformities.    Pulses:   Pulses palpable and equal bilaterally   Skin:   No bleeding, bruising or rash          Results Review:     I reviewed the patient's new clinical results.   Results from last 7 days   Lab Units 10/03/20  0437 10/02/20  1747 10/02/20  1149   WBC 10*3/mm3 10.55  --   --    HEMOGLOBIN g/dL 7.0* 8.4* 9.1* "   HEMATOCRIT % 22.3* 26.6* 28.7*   PLATELETS 10*3/mm3 166  --   --      Results from last 7 days   Lab Units 10/03/20  0437 10/02/20  0616   SODIUM mmol/L 133*  --    POTASSIUM mmol/L 3.5 3.7   CHLORIDE mmol/L 99  --    CO2 mmol/L 25.0  --    BUN mg/dL 11  --    CREATININE mg/dL 0.52*  --    CALCIUM mg/dL 7.9*  --    GLUCOSE mg/dL 155*  --      Results for VI VALENCIA (MRN [id number]) as of 10/3/2020 11:08   Ref. Range 10/3/2020 04:37 10/3/2020 07:16   Glucose Latest Ref Range: 70 - 130 mg/dL 155 (H) 174 (H)       I reviewed the patient's new imaging including images and reports.    All medications reviewed.   aspirin, 325 mg, Oral, Daily  atorvastatin, 10 mg, Oral, Nightly  citalopram, 40 mg, Oral, Daily  furosemide, 20 mg, Intravenous, Once  insulin lispro, 0-7 Units, Subcutaneous, 4x Daily With Meals & Nightly  meloxicam, 15 mg, Oral, Daily  metoprolol tartrate, 12.5 mg, Oral, Q12H  sodium chloride, 3 mL, Intravenous, Q12H      acetaminophen, 650 mg, Q4H PRN    Or  acetaminophen, 650 mg, Q4H PRN  dextrose, 25 g, Q15 Min PRN  dextrose, 15 g, Q15 Min PRN  glucagon (human recombinant), 1 mg, Q15 Min PRN  labetalol, 10 mg, Q4H PRN  Morphine, 1 mg, Q2H PRN    And  naloxone, 0.4 mg, Q5 Min PRN  ondansetron, 4 mg, Q6H PRN    Or  ondansetron, 4 mg, Q6H PRN  oxyCODONE, 5 mg, Q4H PRN  sodium chloride, 500 mL, TID PRN  sodium chloride, 1-10 mL, PRN        Assessment/Plan       Status post total replacement of right hip    Hyperlipidemia    Essential hypertension    Deafness    Diabetes type 2, controlled (CMS/[facility])    DJD (degenerative joint disease)    Acute blood loss anemia         Plan    1. PT/OT weightbearing as tolerated right lower extremity with a walker.  2. Pain control-prns   3. IS-encouraged  4. DVT proph-mechanical and aspirin.  5. Bowel regimen  6. Monitor post-op labs  7. DC planning.  Possibly to rehab.    HTN, Hyperlipidemia  - Continue home Lopressor and statin  - Monitor BP   - Holding parameters for  BP meds  - Labetalol PRN for SBP>170     DM  - hgb A1c on 9/25/20 7.0  - Accu-Chek AC and HS with low dose SSI    Acute blood loss anemia  -2 units of packed red blood cells transfusion 10/3/2020    Malachi Cormier MD  10/03/20  11:07 EDT

## 2020-10-03 NOTE — THERAPY EVALUATION
"Patient Name: Bri Iniguez  : 1935    MRN: [id number]                              Today's Date: 10/3/2020       Admit Date: 10/2/2020    Visit Dx:     ICD-10-CM ICD-9-CM   1. Other secondary osteoarthritis of right hip  M16.7 715.25     Patient Active Problem List   Diagnosis   • Macular degeneration   • Hyperlipidemia   • Essential hypertension   • Deafness   • Diabetes type 2, controlled (CMS/[facility])   • Primary osteoarthritis of left hip   • Venous insufficiency of both lower extremities   • Status post total replacement of left hip   • Acute blood loss anemia, mild, asymptomatic   • Leukocytosis, mild, likely reactive   • Hip fracture (CMS/[facility])   • Other persistent atrial fibrillation (CMS/[facility])   • Nonrheumatic aortic valve sclerosis   • DJD (degenerative joint disease)   • Status post total replacement of right hip     Past Medical History:   Diagnosis Date   • A-fib (CMS/[facility])    • Bursitis     trochanteric area   • Colonoscopy refused    • Constipation    • Deaf, left    • Deafness     unspecified   • Diabetes mellitus (CMS/[facility])     PATIENT REPORTS SHE HAS TAKEN METFORMIN IN THE PAST BUT WAS TAKEN OFF OF THIS MEDICATION AROUND 2018.     • Elevated cholesterol    • Essential hypertension    • GERD (gastroesophageal reflux disease)    • Hearing loss, right     PATIENT DOES USE A HEARING AID TO RIGHT SIDE INTERMITTENTLY (REPORTS DEAFNESS IN LEFT EAR)   • Hip pain    • History of UTI     PATIENT REPORTS RESOLVED AFTER HORMONE THERAPY WAS ORDERED   • Hyperlipidemia     other   • Impaired functional mobility, balance, gait, and endurance    • Influenza vaccine administered    • Macular degeneration    • Mammogram declined    • Osteoarthritis of left hip    • Primary osteoarthritis of right knee    • Supraventricular premature beats     REPORTED IN PREADMISSION TESTING VISIT \"IRREGULAR HEART BEAT\".  REPORTS UNSURE THE ACTUAL VERBIAGE OF WHAT SHE WAS TOLD OTHER THAN THIS.    • Wears partial " dentures     REPORTS UPPER AND LOWER PARTIALS. INSTRUCTED NO ADHESIVES THE DOS.     Past Surgical History:   Procedure Laterality Date   • BUNIONECTOMY Right 1/21/2019    Procedure: Right foot bunionectomy with exostectomy, proximal phalanx osteotomy, right foot second and third digit hammertoe correction with interphalangeal joint fusion, right second metatarsophalangeal joint capsular release/capsulotomy;  Surgeon: Klaus Cason DPM;  Location: [facility] OR;  Service: Podiatry   • CATARACT EXTRACTION Bilateral    • CHOLECYSTECTOMY  2002   • COLONOSCOPY     • HERNIA REPAIR  2005    umbilical   • HIP INTERTROCHANTERIC NAILING Right 12/31/2019    Procedure: HIP RIGHT OPEN REDUCTION INTERNAL FIXATION, INTERMEDULLARY  NAIL;  Surgeon: Carlos Barba Jr., MD;  Location: [facility] OR;  Service: Orthopedics   • HYSTERECTOMY  1974   • TONSILLECTOMY     • TOTAL HIP ARTHROPLASTY Left 1/23/2018    Procedure: LEFT TOTAL HIP ARTHROPLASTY;  Surgeon: Gideon Son MD;  Location: [facility] OR;  Service:      General Information     Row Name 10/03/20 0937          OT Time and Intention    Document Type  evaluation  -TB     Mode of Treatment  occupational therapy  -TB     Row Name 10/03/20 0937          General Information    Patient Profile Reviewed  yes  -TB     Prior Level of Function  min assist:;ADL's;all household mobility  -TB     Existing Precautions/Restrictions  fall;hip;right Lateral hip  -TB     Barriers to Rehab  previous functional deficit  -TB     Row Name 10/03/20 0937          Living Environment    Lives With  alone Daughter lives nearby and assists with care at baseline  -TB     Row Name 10/03/20 0937          Home Main Entrance    Number of Stairs, Main Entrance  one  -TB     Stair Railings, Main Entrance  none  -TB     Row Name 10/03/20 0937          Stairs Within Home, Primary    Number of Stairs, Within Home, Primary  none pt does not access basement. Laundry brought up to main level  -TB     Row Name 10/03/20  0937          Cognition    Orientation Status (Cognition)  oriented x 4  -TB     Row Name 10/03/20 0937          Safety Issues, Functional Mobility    Safety Issues Affecting Function (Mobility)  safety precaution awareness;safety precautions follow-through/compliance;sequencing abilities;positioning of assistive device  -TB     Impairments Affecting Function (Mobility)  balance;endurance/activity tolerance;pain;strength;range of motion (ROM)  -TB     Comment, Safety Issues/Impairments (Mobility)  Pt up with Mod A x2 using RW, presents with posterior lean and narrow ROCKY  -TB       User Key  (r) = Recorded By, (t) = Taken By, (c) = Cosigned By    Initials Name Provider Type    TB oTry Rivas, OT Occupational Therapist        Mobility/ADL's     Row Name 10/03/20 0940          Bed Mobility    Bed Mobility  sit-supine;scooting/bridging  -TB     Scooting/Bridging [place] (Bed Mobility)  moderate assist (50% patient effort);2 person assist;verbal cues  -TB     Sit-Supine [place] (Bed Mobility)  moderate assist (50% patient effort);2 person assist;verbal cues  -TB     Bed Mobility, Safety Issues  decreased use of legs for bridging/pushing  -TB     Assistive Device (Bed Mobility)  draw sheet;bed rails  -TB     Row Name 10/03/20 0940          Transfers    Transfers  sit-stand transfer;toilet transfer  -TB     Comment (Transfers)  Education reinforced and cues/assist needed for hand placement, sequencing and safety  -TB     Sit-Stand [place] (Transfers)  moderate assist (50% patient effort);2 person assist;verbal cues  -TB     [place] Level (Toilet Transfer)  moderate assist (50% patient effort);2 person assist;verbal cues  -TB     Assistive Device (Toilet Transfer)  commode, bedside without drop arms;walker, front-wheeled  -TB     Row Name 10/03/20 0940          Sit-Stand Transfer    Assistive Device (Sit-Stand Transfers)  walker, front-wheeled  -TB     Row Name 10/03/20 0940          Toilet  Transfer    Type (Toilet Transfer)  sit-stand;stand-sit  -     Row Name 10/03/20 0940          Functional Mobility    Functional Mobility- Ind. Level  moderate assist (50% patient effort);2 person assist required Limited by acute dizziness  -     Functional Mobility- Device  rolling walker  -     Functional Mobility-Distance (Feet)  -- EOB to BSC and return; side steps along EOB  -TB     Functional Mobility- Safety Issues  step length decreased;weight-shifting ability decreased;loses balance backward;balance decreased during turns  -TB     Row Name 10/03/20 0940          Activities of Daily Living    BADL Assessment/Intervention  toileting;lower body dressing  -     Row Name 10/03/20 0940          Mobility    Extremity Weight-bearing Status  right lower extremity  -TB     Right Lower Extremity (Weight-bearing Status)  weight-bearing as tolerated (WBAT)  -     Row Name 10/03/20 0940          Toileting Assessment/Training    [place] Level (Toileting)  adjust/manage clothing;dependent (less than 25% patient effort);perform perineal hygiene  -     Assistive Devices (Toileting)  commode, bedside without drop arms  -TB     Row Name 10/03/20 0940          Lower Body Dressing Assessment/Training    [place] Level (Lower Body Dressing)  lower body dressing skills;moderate assist (50% patient effort) clinical projection  -TB     Comment (Lower Body Dressing)  Pt has h/o L DEMARCO, R ORIF now converted to R DEMARCO. Pt has AE at home and daughter assists at baseline as needed.  -       User Key  (r) = Recorded By, (t) = Taken By, (c) = Cosigned By    Initials Name Provider Type     Tory Rivas OT Occupational Therapist        Obj/Interventions     Row Name 10/03/20 0945          Sensory Assessment (Somatosensory)    Sensory Assessment (Somatosensory)  UE sensation intact  -TB     Row Name 10/03/20 0945          Sensory Interventions    Comment, Sensory Intervention  BUE intact  -     Row Name  10/03/20 0945          Vision Assessment/Intervention    Visual Impairment/Limitations  WFL;corrective lenses for reading  -TB     Row Name 10/03/20 0945          Range of Motion Comprehensive    General Range of Motion  bilateral upper extremity ROM WFL  -TB     Comment, General Range of Motion  BUE intact for ADL and AD use  -TB     Row Name 10/03/20 0945          Strength Comprehensive (MMT)    General Manual Muscle Testing (MMT) Assessment  upper extremity strength deficits identified  -TB     Comment, General Manual Muscle Testing (MMT) Assessment  Generalized weakness. BUE functionally 4/5  -TB     Row Name 10/03/20 0945          Balance    Balance Assessment  sitting dynamic balance;standing dynamic balance;sitting static balance  -TB     Static Sitting Balance  WFL  -TB     Dynamic Sitting Balance  mild impairment posterior lean  -TB     Dynamic Standing Balance  severe impairment;supported;standing  -TB     Balance Interventions  sitting;standing;sit to stand;occupation based/functional task;UE activity with balance activity  -TB     Comment, Balance  Pt is up with Mod A x2 using RW, presents with posterior lean  -TB       User Key  (r) = Recorded By, (t) = Taken By, (c) = Cosigned By    Initials Name Provider Type    TB Tory Rivas, OT Occupational Therapist        Goals/Plan     Row Name 10/03/20 0954          Bed Mobility Goal 1 (OT)    Activity/Assistive Device (Bed Mobility Goal 1, OT)  sit to supine/supine to sit  -TB     [place] Level/Cues Needed (Bed Mobility Goal 1, OT)  moderate assist (50-74% patient effort);verbal cues required  -TB     Time Frame (Bed Mobility Goal 1, OT)  by discharge  -TB     Strategies/Barriers (Bed Mobility Goal 1, OT)  pain, ROM/lateral hip precautions  -TB     Row Name 10/03/20 0954          Transfer Goal 1 (OT)    Activity/Assistive Device (Transfer Goal 1, OT)  sit-to-stand/stand-to-sit;bed-to-chair/chair-to-bed;toilet;walker, rolling  -TB      [place] Level/Cues Needed (Transfer Goal 1, OT)  minimum assist (75% or more patient effort);tactile cues required;verbal cues required 2 person assist  -TB     Time Frame (Transfer Goal 1, OT)  by discharge  -TB     Strategies/Barriers (Transfers Goal 1, OT)  pain, ROM/lateral hip precautions  -TB     Row Name 10/03/20 0954          Dressing Goal 1 (OT)    Activity/Device (Dressing Goal 1, OT)  lower body dressing;long-handled shoe horn;reacher;sock-aid  -TB     [place]/Cues Needed (Dressing Goal 1, OT)  moderate assist (50-74% patient effort);tactile cues required;verbal cues required  -TB     Time Frame (Dressing Goal 1, OT)  by discharge  -TB     Strategies/Barriers (Dressing Goal 1, OT)  Pain, ROM/lateral hip precautions. Pt demonstrates use of AE to support ADL performance  -TB     Row Name 10/03/20 0954          Toileting Goal 1 (OT)    Activity/Device (Toileting Goal 1, OT)  perform perineal hygiene  -TB     [place] Level/Cues Needed (Toileting Goal 1, OT)  set-up required;standby assist  -TB     Time Frame (Toileting Goal 1, OT)  by discharge  -TB       User Key  (r) = Recorded By, (t) = Taken By, (c) = Cosigned By    Initials Name Provider Type    TB Tory Rivas, OT Occupational Therapist        Clinical Impression     Row Name 10/03/20 0948          Pain Assessment    Additional Documentation  Pain Scale: FACES Pre/Post-Treatment (Group)  -TB     Row Name 10/03/20 0948          Pain Scale: FACES Pre/Post-Treatment    Pain: FACES Scale, Pretreatment  2-->hurts little bit  -TB     Posttreatment Pain Rating  4-->hurts little more  -TB     Pain Location - Side  Right  -TB     Pain Location  hip  -TB     Pre/Posttreatment Pain Comment  Increased pain with activity/mobility  -TB     Row Name 10/03/20 0948          Plan of Care Review    Plan of Care Reviewed With  patient  -TB     Outcome Summary  OT IE completed. Pt alert, Ox4 and motivated to work with OT. Pt presents with mild pain,  significant balance deficits in standing and generalized weakness limiting mobility and self-care at this time. Pt is Mod A x2 for bed mobility, STS and BSC transfers using RW. OT will follow IP to advance ADL performance and BR transfers as able. Pt has used AE with past hip surgeries and has AE at home. Denies DME needs. Recommend IRF at d/c for best outcome.  -TB     Row Name 10/03/20 0948          Therapy Assessment/Plan (OT)    Patient/Family Therapy Goal Statement (OT)  to regain independence  -TB     Rehab Potential (OT)  fair, will monitor progress closely  -TB     Criteria for Skilled Therapeutic Interventions Met (OT)  yes;skilled treatment is necessary  -TB     Therapy Frequency (OT)  daily  -TB     Row Name 10/03/20 0948          Therapy Plan Review/Discharge Plan (OT)    Equipment Needs Upon Discharge (OT)  -- None  -TB     Anticipated Discharge Disposition (OT)  inpatient rehabilitation facility  -TB     Row Name 10/03/20 0948          Vital Signs    Intra Systolic BP Rehab  119  -TB     Intra Treatment Diastolic BP  50 Presents with acute dizziness with OOB activity  -TB     O2 Delivery Pre Treatment  nasal cannula  -TB     O2 Delivery Intra Treatment  nasal cannula  -TB     O2 Delivery Post Treatment  nasal cannula  -TB     Pre Patient Position  Sitting  -TB     Intra Patient Position  Standing  -TB     Post Patient Position  Supine  -TB     Row Name 10/03/20 0948          Positioning and Restraints    Pre-Treatment Position  in bed  -TB     Post Treatment Position  bed  -TB     In Bed  notified nsg;fowlers;call light within reach;encouraged to call for assist;exit alarm on;SCD pump applied  -TB       User Key  (r) = Recorded By, (t) = Taken By, (c) = Cosigned By    Initials Name Provider Type    TB Tory Rivas OT Occupational Therapist        Outcome Measures     Row Name 10/03/20 0909          How much help from another is currently needed...    Putting on and taking off regular lower  body clothing?  1  -TB     Bathing (including washing, rinsing, and drying)  2  -TB     Toileting (which includes using toilet bed pan or urinal)  1  -TB     Putting on and taking off regular upper body clothing  3  -TB     Taking care of personal grooming (such as brushing teeth)  3  -TB     Eating meals  4  -TB     AM-PAC 6 Clicks Score (OT)  14  -TB     Row Name 10/03/20 0956          Functional Assessment    Outcome Measure Options  AM-PAC 6 Clicks Daily Activity (OT)  -TB       User Key  (r) = Recorded By, (t) = Taken By, (c) = Cosigned By    Initials Name Provider Type     Tory Rivas OT Occupational Therapist        Occupational Therapy Education                 Title: PT OT SLP Therapies (In Progress)     Topic: Occupational Therapy (Done)     Point: ADL training (Done)     Description:   Instruct learner(s) on proper safety adaptation and remediation techniques during self care or transfers.   Instruct in proper use of assistive devices.              Learning Progress Summary           Patient Acceptance, E, VU,NR by  at 10/3/2020 0957    Comment: Education initiated for benefits of OOB activity/therapy, role of OT, hip precautions, use of AE, transfer training/safety and recommendation for rehab at d/c.                   Point: Precautions (Done)     Description:   Instruct learner(s) on prescribed precautions during self-care and functional transfers.              Learning Progress Summary           Patient Acceptance, E, VU,NR by  at 10/3/2020 0957    Comment: Education initiated for benefits of OOB activity/therapy, role of OT, hip precautions, use of AE, transfer training/safety and recommendation for rehab at d/c.                               User Key     Initials Effective Dates Name Provider Type Discipline     06/08/18 -  Tory Rivas OT Occupational Therapist OT              OT Recommendation and Plan  Retired Outcome Summary/Treatment Plan (OT)  Anticipated  Discharge Disposition (OT): inpatient rehabilitation facility  Therapy Frequency (OT): daily  Plan of Care Review  Plan of Care Reviewed With: patient  Outcome Summary: OT IE completed. Pt alert, Ox4 and motivated to work with OT. Pt presents with mild pain, significant balance deficits in standing and generalized weakness limiting mobility and self-care at this time. Pt is Mod A x2 for bed mobility, STS and BSC transfers using RW. OT will follow IP to advance ADL performance and BR transfers as able. Pt has used AE with past hip surgeries and has AE at home. Denies DME needs. Recommend IRF at d/c for best outcome.  Plan of Care Reviewed With: patient  Outcome Summary: OT IE completed. Pt alert, Ox4 and motivated to work with OT. Pt presents with mild pain, significant balance deficits in standing and generalized weakness limiting mobility and self-care at this time. Pt is Mod A x2 for bed mobility, STS and BSC transfers using RW. OT will follow IP to advance ADL performance and BR transfers as able. Pt has used AE with past hip surgeries and has AE at home. Denies DME needs. Recommend IRF at d/c for best outcome.     Time Calculation:   Time Calculation- OT     Row Name 10/03/20 0840             Time Calculation- OT    OT Start Time  0840  -TB      OT Received On  10/03/20  -      OT Goal Re-Cert Due Date  10/13/20  -TB        User Key  (r) = Recorded By, (t) = Taken By, (c) = Cosigned By    Initials Name Provider Type    TB Tory Rivas OT Occupational Therapist        Therapy Charges for Today     Code Description Service Date Service Provider Modifiers Qty    [id number]  OT EVAL MOD COMPLEXITY 4 10/3/2020 Tory Rivas OT GO 1               Tory Rivas OT  10/3/2020

## 2020-10-03 NOTE — PLAN OF CARE
Problem: Adult Inpatient Plan of Care  Goal: Plan of Care Review  Recent Flowsheet Documentation  Taken 10/3/2020 2805 by Wanda Holley, PT  Progress: improving  Plan of Care Reviewed With:   patient   friend  Outcome Summary: Patient demonstrates improvements in functional mobility and balance this PM session. Performs bed mobility with Jerry ( assist at BLE), transfers edge of bed>stand>recliner chair with FWW with modA with significant vc for BLE positioning before and during transfer, upright positioning, and slowing to control stepping to complete transfer to recliner chair. BP stable and no sx of dizziness PM session. Cont to recommend IPR at dc.

## 2020-10-03 NOTE — PLAN OF CARE
Goal Outcome Evaluation:  Plan of Care Reviewed With: patient  Progress: improving  Outcome Summary: Pt VSS and AO.  Pt has slept between care.  Pt has not complained of pain.  Pt did have some dizziness when getting up the BSC.  Needed a a little extra help steadying.  Continuing to monitor.

## 2020-10-03 NOTE — PLAN OF CARE
Goal Outcome Evaluation:  Plan of Care Reviewed With: patient  Progress: no change  Outcome Summary: Pt right hip drsng CDI. VSS, blood pressures improved throughout shift, on 2L VS RA. She is A&OX4. She is [place] 1-2 [place] [facility] [place]/o pain, she did let me premedicate her for therapy and said she enjoys being up in the chiair. 2 units PRBCs given this shift, recheck due at 1900. Plan for rehab then home at d/c. Will monitor.

## 2020-10-03 NOTE — THERAPY TREATMENT NOTE
"Patient Name: Bri Iniguez  : 1935    MRN: [id number]                              Today's Date: 10/3/2020       Admit Date: 10/2/2020    Visit Dx:     ICD-10-CM ICD-9-CM   1. Other secondary osteoarthritis of right hip  M16.7 715.25     Patient Active Problem List   Diagnosis   • Macular degeneration   • Hyperlipidemia   • Essential hypertension   • Deafness   • Diabetes type 2, controlled (CMS/[facility])   • Primary osteoarthritis of left hip   • Venous insufficiency of both lower extremities   • Status post total replacement of left hip   • Acute blood loss anemia, mild, asymptomatic   • Leukocytosis, mild, likely reactive   • Hip fracture (CMS/[facility])   • Other persistent atrial fibrillation (CMS/[facility])   • Nonrheumatic aortic valve sclerosis   • DJD (degenerative joint disease)   • Status post total replacement of right hip   • Acute blood loss anemia     Past Medical History:   Diagnosis Date   • A-fib (CMS/[facility])    • Bursitis     trochanteric area   • Colonoscopy refused    • Constipation    • Deaf, left    • Deafness     unspecified   • Diabetes mellitus (CMS/[facility])     PATIENT REPORTS SHE HAS TAKEN METFORMIN IN THE PAST BUT WAS TAKEN OFF OF THIS MEDICATION AROUND 2018.     • Elevated cholesterol    • Essential hypertension    • GERD (gastroesophageal reflux disease)    • Hearing loss, right     PATIENT DOES USE A HEARING AID TO RIGHT SIDE INTERMITTENTLY (REPORTS DEAFNESS IN LEFT EAR)   • Hip pain    • History of UTI     PATIENT REPORTS RESOLVED AFTER HORMONE THERAPY WAS ORDERED   • Hyperlipidemia     other   • Impaired functional mobility, balance, gait, and endurance    • Influenza vaccine administered    • Macular degeneration    • Mammogram declined    • Osteoarthritis of left hip    • Primary osteoarthritis of right knee    • Supraventricular premature beats     REPORTED IN PREADMISSION TESTING VISIT \"IRREGULAR HEART BEAT\".  REPORTS UNSURE THE ACTUAL VERBIAGE OF WHAT SHE WAS TOLD OTHER " THAN THIS.    • Wears partial dentures     REPORTS UPPER AND LOWER PARTIALS. INSTRUCTED NO ADHESIVES THE DOS.     Past Surgical History:   Procedure Laterality Date   • BUNIONECTOMY Right 1/21/2019    Procedure: Right foot bunionectomy with exostectomy, proximal phalanx osteotomy, right foot second and third digit hammertoe correction with interphalangeal joint fusion, right second metatarsophalangeal joint capsular release/capsulotomy;  Surgeon: Klaus Cason DPM;  Location: [facility];  Service: Podiatry   • CATARACT EXTRACTION Bilateral    • CHOLECYSTECTOMY  2002   • COLONOSCOPY     • HERNIA REPAIR  2005    umbilical   • HIP INTERTROCHANTERIC NAILING Right 12/31/2019    Procedure: HIP RIGHT OPEN REDUCTION INTERNAL FIXATION, INTERMEDULLARY  NAIL;  Surgeon: Carlos Barba Jr., MD;  Location: [facility];  Service: Orthopedics   • HYSTERECTOMY  1974   • TONSILLECTOMY     • TOTAL HIP ARTHROPLASTY Left 1/23/2018    Procedure: LEFT TOTAL HIP ARTHROPLASTY;  Surgeon: Gideon Son MD;  Location: [facility] OR;  Service:      General Information     Row Name 10/03/20 1538 10/03/20 0907       Physical Therapy Time and Intention    Document Type  therapy note (daily note)  -LO  evaluation  -LO    Mode of Treatment  physical therapy  -LO  physical therapy  -    Row Name 10/03/20 1538 10/03/20 0907       General Information    Patient Profile Reviewed  yes  -LO  yes  -LO    Prior Level of Function  --  -- PLOF; patient reports was independent with dressing, needed some help with bathing, ambulated with FWW.  -LO    Existing Precautions/Restrictions  fall;hip;right  -LO  fall lateral hip  -LO    Barriers to Rehab  --  previous functional deficit  -LO    Row Name 10/03/20 0907          Living Environment    Lives With  alone  -     Row Name 10/03/20 0907          Home Main Entrance    Number of Stairs, Main Entrance  one  -LO     Stair Railings, Main Entrance  none  -LO     Row Name 10/03/20 0907          Stairs Within  Home, Primary    Number of Stairs, Within Home, Primary  none  -LO     Row Name 10/03/20 1538 10/03/20 0907       Cognition    Orientation Status (Cognition)  oriented x 4  -LO  oriented x 4  -LO    Row Name 10/03/20 1538 10/03/20 0907       Safety Issues, Functional Mobility    Safety Issues Affecting Function (Mobility)  --  safety precaution awareness;positioning of assistive device;sequencing abilities  -LO    Impairments Affecting Function (Mobility)  balance;endurance/activity tolerance;pain;strength;range of motion (ROM)  -LO  balance;endurance/activity tolerance;pain;strength  -LO    Comment, Safety Issues/Impairments (Mobility)  Patient able to transfer with modA this PM session,but does require significant vc for B foot placement for hip width ROCKY, vc for slow controlled stepping, and maintaining upright positioning.  -LO  --      User Key  (r) = Recorded By, (t) = Taken By, (c) = Cosigned By    Initials Name Provider Type    Wanda James, [place] Physical Therapist        Mobility     Row Name 10/03/20 1540 10/03/20 0909       Bed Mobility    Bed Mobility  scooting/bridging;supine-sit  -LO  sit-supine  -LO    Scooting/Bridging Asher (Bed Mobility)  minimum assist (75% patient effort)  -LO  --    Supine-Sit Asher (Bed Mobility)  minimum assist (75% patient effort)  -LO  --    Sit-Supine Asher (Bed Mobility)  --  modified independence;2 person assist  -LO    Assistive Device (Bed Mobility)  bed rails  -LO  draw sheet;head of bed elevated;bed rails  -LO    Comment (Bed Mobility)  Jerry for bed mobility, mostly for assist with BLE out of bed.  -LO  modA x2 at trunk and legs back into bed  -LO    Row Name 10/03/20 1540 10/03/20 0909       Transfers    Comment (Transfers)  Able to transfer with FWW modA x1 this PM with significant cuing for pre-BLE positioning and hand placement, upright standing posture, and slow controlled stepping in order to turn and sit in chair.  -LO  modAx2 for  transfers, patient with small ROCKY and requires significant cuing for B foot placement for safety. Significant unsteadiness with standing at walker.  -LO    Row Name 10/03/20 1540          Bed-Chair Transfer    Bed-Chair [place] (Transfers)  moderate assist (50% patient effort)  -LO     Assistive Device (Bed-Chair Transfers)  walker, front-wheeled  -LO     Row Name 10/03/20 1540 10/03/20 0909       Sit-Stand Transfer    Sit-Stand Braxton (Transfers)  moderate assist (50% patient effort);verbal cues  -LO  moderate assist (50% patient effort);2 person assist  -LO    Assistive Device (Sit-Stand Transfers)  walker, front-wheeled  -LO  walker, front-wheeled  -LO    Row Name 10/03/20 1540 10/03/20 0909       Gait/Stairs (Locomotion)    Comment (Gait/Stairs)  due to level of cuing and assist stil required for transfers, gait not attempting on this date.  -LO  Due to unsteadiness during standing and transfers as well as symptomatic low hemoglobin levels this AM, gait not assessed.  -LO    Row Name 10/03/20 1540 10/03/20 0909       Mobility    Extremity Weight-bearing Status  right lower extremity  -LO  right lower extremity  -LO    Right Lower Extremity (Weight-bearing Status)  weight-bearing as tolerated (WBAT)  -LO  weight-bearing as tolerated (WBAT)  -LO      User Key  (r) = Recorded By, (t) = Taken By, (c) = Cosigned By    Initials Name Provider Type    Wanda James PT Physical Therapist        Obj/Interventions     Row Name 10/03/20 0912          Range of Motion Comprehensive    General Range of Motion  lower extremity range of motion deficits identified  -LO     Comment, General Range of Motion  R hip ROM due to pain  -LO     Row Name 10/03/20 0912          Strength Comprehensive (MMT)    General Manual Muscle Testing (MMT) Assessment  lower extremity strength deficits identified  -LO     Comment, General Manual Muscle Testing (MMT) Assessment  R hip grossly 2+/5  -LO     Row Name 10/03/20 0912           Motor Skills    Therapeutic Exercise  hip  -LO     Row Name 10/03/20 1543 10/03/20 0912       Balance    Balance Assessment  sitting static balance;sitting dynamic balance;standing static balance;standing dynamic balance  -LO  sitting static balance;sitting dynamic balance;standing static balance;standing dynamic balance  -LO    Static Sitting Balance  WNL;supported;sitting, edge of bed  -LO  mild impairment;sitting, edge of bed;supported as related to sx dizziness  -LO    Dynamic Sitting Balance  WNL;supported;sitting, edge of bed  -LO  mild impairment;supported;sitting, edge of bed  -LO    Static Standing Balance  mild impairment;supported;standing  -LO  supported;standing;moderate impairment  -LO    Dynamic Standing Balance  moderate impairment;supported;standing  -LO  severe impairment;supported;standing  -LO    Balance Interventions  standing  -LO  --    Comment, Balance  vc for obtaining balance in standing ( upright positioning, lengthening ROCKY).  -LO  Significant unsteadiness in standing as related to pain with WB as well as symptomatic low hemoglobin this AM.  -LO    Row Name 10/03/20 0912          Sensory Assessment (Somatosensory)    Sensory Assessment (Somatosensory)  sensation intact  -LO     Row Name 10/03/20 0912          General Lower Extremity Assessment (Range of Motion)    Comment: Lower Extremity ROM  see above  -LO     Row Name 10/03/20 0912          Lower Extremity (Manual Muscle Testing)    Comment, MMT: Lower Extremity  see above  -LO       User Key  (r) = Recorded By, (t) = Taken By, (c) = Cosigned By    Initials Name Provider Type    Wanda James, PT Physical Therapist        Goals/Plan     Row Name 10/03/20 0924          Bed Mobility Goal 1 (PT)    Activity/Assistive Device (Bed Mobility Goal 1, PT)  supine to sit;sit to supine;scooting;rolling to right;rolling to left;bridging  -LO     [place] Level/Cues Needed (Bed Mobility Goal 1, PT)  contact guard assist  -LO     Time Frame  (Bed Mobility Goal 1, PT)  long term goal (LTG);10 days  -LO     Row Name 10/03/20 0924          Transfer Goal 1 (PT)    Activity/Assistive Device (Transfer Goal 1, PT)  sit-to-stand/stand-to-sit;bed-to-chair/chair-to-bed;toilet;car transfer;walker, rolling  -LO     [place] Level/Cues Needed (Transfer Goal 1, PT)  minimum assist (75% or more patient effort)  -LO     Time Frame (Transfer Goal 1, PT)  long term goal (LTG);10 days  -LO     Row Name 10/03/20 0924          Gait Training Goal 1 (PT)    Activity/Assistive Device (Gait Training Goal 1, PT)  gait (walking locomotion);assistive device use;decrease fall risk;diminish gait deviation;improve balance and speed;increase endurance/gait distance;maintain weight-bearing status;walker, rolling  -LO     [place] Level (Gait Training Goal 1, PT)  minimum assist (75% or more patient effort)  -LO     Distance (Gait Training Goal 1, PT)  50'  -LO     Time Frame (Gait Training Goal 1, PT)  long term goal (LTG);10 days  -LO     Row Name 10/03/20 0924          Stairs Goal 1 (PT)    Activity/Assistive Device (Stairs Goal 1, PT)  ascending stairs;descending stairs  -LO     [place] Level/Cues Needed (Stairs Goal 1, PT)  minimum assist (75% or more patient effort)  -LO     Number of Stairs (Stairs Goal 1, PT)  1  -LO     Time Frame (Stairs Goal 1, PT)  long term goal (LTG);10 days  -LO     Row Name 10/03/20 0924          Patient Education Goal (PT)    Activity (Patient Education Goal, PT)  Patient will demonstrate safe and effective bed mobility and transfers  -LO     Time Frame (Patient Education Goal, PT)  long term goal (LTG);10 days  -LO       User Key  (r) = Recorded By, (t) = Taken By, (c) = Cosigned By    Initials Name Provider Type    Wanda James, PT Physical Therapist        Clinical Impression     Row Name 10/03/20 1545 10/03/20 0915       Pain    Additional Documentation  Pain Scale: FACES Pre/Post-Treatment (Group)  -LO  Pain Scale: FACES  Pre/Post-Treatment (Group)  -LO    Row Name 10/03/20 1545 10/03/20 0915       Pain Scale: FACES Pre/Post-Treatment    Pain: FACES Scale, Pretreatment  2-->hurts little bit  -LO  2-->hurts little bit  -LO    Posttreatment Pain Rating  4-->hurts little more  -LO  4-->hurts little more  -LO    Pain Location - Side  Right  -LO  Right  -LO    Pain Location  hip  -LO  hip  -LO    Pre/Posttreatment Pain Comment  mild increase in hip pain with mobility.  -LO  --    Row Name 10/03/20 1545 10/03/20 0915       Plan of Care Review    Plan of Care Reviewed With  patient;friend  -LO  patient  -LO    Progress  improving  -LO  no change  -LO    Outcome Summary  Patient demonstrates improvements in functional mobility and balance this PM session. Performs bed mobility with Jerry ( assist at BLE), transfers edge of bed>stand>recliner chair with FWW with modA with significant vc for BLE positioning before and during transfer, upright positioning, and slowing to control stepping to complete transfer to recliner chair. BP stable and no sx of dizziness PM session. Cont to recommend IPR at CT.  -LO  PT evaluation complete. Patient presents post op R DEMARCO, lateral approach, WBAT. Patient is alert and oriented x4. Session/assessment limited by sx of dizziness as related to low hemoglobin levels this AM. Deficits noted in functional mobility, RLE strength, functional endurance.  Performs transfers ( edge of bed<> BSC)  with FWW with modA x2 with vc for hand and B LE positioning. Significant unsteadiness with transfers this AM. Patient reports significant dizziness on BSC and immediately transferred back to bed. BP taken in supine in bed ( 119/50). Per RN, patient to recieve transfusion this AM. Will attempt to see patient again for PM session. Patient will benefit from skilled IP PT services to address impairments in order to return to PLOF. Recommend IPR at this time at CT, but will monitor progress.  -    Row Name 10/03/20 1545 10/03/20  0915       Therapy Assessment/Plan (PT)    Rehab Potential (PT)  good, to achieve stated therapy goals  -LO  fair, will monitor progress closely  -LO    Criteria for Skilled Interventions Met (PT)  yes  -LO  yes  -LO    Row Name 10/03/20 1545 10/03/20 0915       Vital Signs    Intra Systolic BP Rehab  --  119  -LO    Intra Treatment Diastolic BP  --  50  -LO    O2 Delivery Pre Treatment  room air  -LO  supplemental O2  -LO    O2 Delivery Intra Treatment  room air  -LO  supplemental O2  -LO    O2 Delivery Post Treatment  room air  -LO  supplemental O2  -LO    Pre Patient Position  Supine  -LO  Sitting  -LO    Intra Patient Position  Standing  -LO  Standing  -LO    Post Patient Position  Sitting  -LO  Supine  -LO    Row Name 10/03/20 1545 10/03/20 0915       Positioning and Restraints    Pre-Treatment Position  in bed  -LO  in bed  -LO    Post Treatment Position  chair  -LO  bed due to BP readings/sx this AM  -LO    In Bed  --  notified nsg;supine;call light within reach;encouraged to call for assist;exit alarm on;side rails up x2;SCD pump applied  -LO    In Chair  notified nsg;reclined;call light within reach;encouraged to call for assist;with family/caregiver;exit alarm on;heels elevated  -LO  --      User Key  (r) = Recorded By, (t) = Taken By, (c) = Cosigned By    Initials Name Provider Type    LO Wanda Holley, PT Physical Therapist        Outcome Measures     Row Name 10/03/20 1550 10/03/20 0926       How much help from another person do you currently need...    Turning from your back to your side while in flat bed without using bedrails?  3  -LO  3  -LO    Moving from lying on back to sitting on the side of a flat bed without bedrails?  3  -LO  2  -LO    Moving to and from a bed to a chair (including a wheelchair)?  2  -LO  2  -LO    Standing up from a chair using your arms (e.g., wheelchair, bedside chair)?  2  -LO  2  -LO    Climbing 3-5 steps with a railing?  1  -LO  1  -LO    To walk in hospital room?  1   -LO  1  -LO    AM-PAC 6 Clicks Score (PT)  12  -LO  11  -LO    Row Name 10/03/20 1550 10/03/20 0926       PADD    Diagnosis  2  -LO  2  -LO    Gender  1  -LO  1  -LO    Age Group  0  -LO  0  -LO    Gait Distance  0  -LO  0  -LO    Assist Level  0  -LO  0  -LO    Home Support  3  -LO  3  -LO    PADD Score  6  -LO  6  -LO    Prediction by PADD Score  extended rehabilitation  -LO  extended rehabilitation  -LO    Row Name 10/03/20 0926          Functional Assessment    Outcome Measure Options  AM-PAC 6 Clicks Basic Mobility (PT);PADD  -LO       User Key  (r) = Recorded By, (t) = Taken By, (c) = Cosigned By    Initials Name Provider Type    Wanda James PT Physical Therapist        Physical Therapy Education                 Title: PT OT SLP Therapies (In Progress)     Topic: Physical Therapy (In Progress)     Point: Mobility training (In Progress)     Learning Progress Summary           Patient Acceptance, E, NR by  at 10/3/2020 1512    Comment: Patient education on sequencing for bed mobility and transfers.    Acceptance, E, NR by  at 10/3/2020 0842    Comment: Patient education regarding sequencing for transfers.                   Point: Home exercise program (Not Started)     Learner Progress:  Not documented in this visit.          Point: Body mechanics (Not Started)     Learner Progress:  Not documented in this visit.          Point: Precautions (Not Started)     Learner Progress:  Not documented in this visit.                      User Key     Initials Effective Dates Name Provider Type [facility] 03/11/20 -  Wanda Holley PT Physical Therapist PT              PT Recommendation and Plan  Planned Therapy Interventions (PT): balance training, bed mobility training, gait training, home exercise program, patient/family education, neuromuscular re-education, stair training, strengthening, transfer training  Plan of Care Reviewed With: patient, friend  Progress: improving  Outcome Summary: Patient  demonstrates improvements in functional mobility and balance this PM session. Performs bed mobility with Jerry ( assist at BLE), transfers edge of bed>stand>recliner chair with FWW with modA with significant vc for BLE positioning before and during transfer, upright positioning, and slowing to control stepping to complete transfer to recliner chair. BP stable and no sx of dizziness PM session. Cont to recommend IPR at dc.     Time Calculation:   PT Charges     Row Name 10/03/20 1512 10/03/20 0842          Time Calculation    Start Time  1512  -LO  0842  -LO     PT Received On  10/03/20  -LO  10/03/20  -LO     PT Goal Re-Cert Due Date  10/13/20  -LO  10/13/20  -LO        Timed Charges    18053 -  PT Neuromuscular Reeducation Minutes  5  -LO  --     49529 - PT Therapeutic Activity Minutes  22  -LO  10  -LO       User Key  (r) = Recorded By, (t) = Taken By, (c) = Cosigned By    Initials Name Provider Type    LO Wanda Holley, PT Physical Therapist        Therapy Charges for Today     Code Description Service Date Service Provider Modifiers Qty    35505650763 HC PT THERAPEUTIC ACT EA 15 MIN 10/3/2020 Wanda Holley, PT GP 1    75875453619 HC PT EVAL LOW COMPLEXITY 4 10/3/2020 Wanda Holley, PT GP 1    83032182944 HC PT THERAPEUTIC ACT EA 15 MIN 10/3/2020 Wanda Holley, PT GP 2          PT G-Codes  Outcome Measure Options: AM-PAC 6 Clicks Daily Activity (OT)  AM-PAC 6 Clicks Score (PT): 12  AM-PAC 6 Clicks Score (OT): 14    Wanda Holley PT  10/3/2020

## 2020-10-03 NOTE — PROGRESS NOTES
Discharge Planning Assessment  [facility]     Patient Name: Bri Iniguez  MRN: [id number]  Today's Date: 10/3/2020    Admit Date: 10/2/2020    Discharge Needs Assessment     Row Name 10/03/20 1118       Living Environment    Lives With  alone    Current Living Arrangements  home/apartment/condo    Primary Care Provided by  self    Provides Primary Care For  no one    Family Caregiver if Needed  child(hilario), adult    Quality of Family Relationships  involved;helpful       Resource/Environmental Concerns    Resource/Environmental Concerns  none       Transition Planning    Patient/Family Anticipates Transition to  home with help/services;inpatient rehabilitation facility    Transportation Anticipated  family or friend will provide       Discharge Needs Assessment    Readmission Within the Last 30 Days  no previous admission in last 30 days    Equipment Currently Used at Home  cane, quad;walker, rolling;commode    Concerns to be Addressed  discharge planning    Anticipated Changes Related to Illness  none    Equipment Needed After Discharge  none    Outpatient/Agency/Support Group Needs  homecare agency;inpatient rehabilitation facility        Discharge Plan     Row Name 10/03/20 1119       Plan    Plan  home with HH vs inpt rehab    Provided Post Acute Provider List?  Yes    Post Acute Provider List  Inpatient Rehab    Provided Post Acute Provider Quality & Resource List?  Yes    Post Acute Provider Quality and Resource List  Inpatient Rehab    Delivered To  Patient    Patient/Family in Agreement with Plan  yes    Plan Comments  Pt lives alone in [place]. She reports both her daughters live near her and can assist if needed. She was independent with ADLs prior to admit and reports she owns a walker, cane and bedside commode. She is followed by her PCP and her medications are covered by insurance. At this time pt has requested a referral to [facility] for rehab at discharge. She reported understanding if she does not  qualify for inpt rehab she still has the option of home health or outpt PT. A referral has been made.    Final Discharge Disposition Code  06 - home with home health care        Continued Care and Services - Admitted Since 10/2/2020    Coordination has not been started for this encounter.         Demographic Summary     Row Name 10/03/20 1118       General Information    Admission Type  inpatient    Referral Source  physician    Reason for Consult  discharge planning    General Information Comments  PCP Clifford Nolasco       Contact Information    Permission Granted to Share Info With  facility ;family/designee    Contact Information Comments  Sophia [phone]        Functional Status     Row Name 10/03/20 1118       Functional Status, IADL    Medications  independent    Meal Preparation  independent    Housekeeping  independent    Laundry  independent    Shopping  independent       Mental Status    General Appearance WDL  WDL       Mental Status Summary    Recent Changes in Mental Status/Cognitive Functioning  no changes        Psychosocial    No documentation.       Abuse/Neglect    No documentation.       Legal    No documentation.       Substance Abuse    No documentation.       Patient Forms    No documentation.           Mamta Pompa RN

## 2020-10-04 LAB
BH BB BLOOD EXPIRATION DATE: NORMAL
BH BB BLOOD EXPIRATION DATE: NORMAL
BH BB BLOOD TYPE BARCODE: 5100
BH BB BLOOD TYPE BARCODE: 5100
BH BB DISPENSE STATUS: NORMAL
BH BB DISPENSE STATUS: NORMAL
BH BB PRODUCT CODE: NORMAL
BH BB PRODUCT CODE: NORMAL
BH BB UNIT NUMBER: NORMAL
BH BB UNIT NUMBER: NORMAL
CROSSMATCH INTERPRETATION: NORMAL
CROSSMATCH INTERPRETATION: NORMAL
DEPRECATED RDW RBC AUTO: 48 FL (ref 37–54)
ERYTHROCYTE [DISTWIDTH] IN BLOOD BY AUTOMATED COUNT: 14.2 % (ref 12.3–15.4)
GLUCOSE BLDC GLUCOMTR-MCNC: 156 MG/DL (ref 70–130)
GLUCOSE BLDC GLUCOMTR-MCNC: 182 MG/DL (ref 70–130)
GLUCOSE BLDC GLUCOMTR-MCNC: 190 MG/DL (ref 70–130)
GLUCOSE BLDC GLUCOMTR-MCNC: 215 MG/DL (ref 70–130)
HCT VFR BLD AUTO: 26.7 % (ref 34–46.6)
HGB BLD-MCNC: 8.7 G/DL (ref 12–15.9)
MCH RBC QN AUTO: 30.4 PG (ref 26.6–33)
MCHC RBC AUTO-ENTMCNC: 32.6 G/DL (ref 31.5–35.7)
MCV RBC AUTO: 93.4 FL (ref 79–97)
PLATELET # BLD AUTO: 135 10*3/MM3 (ref 140–450)
PMV BLD AUTO: 9.6 FL (ref 6–12)
RBC # BLD AUTO: 2.86 10*6/MM3 (ref 3.77–5.28)
UNIT  ABO: NORMAL
UNIT  ABO: NORMAL
UNIT  RH: NORMAL
UNIT  RH: NORMAL
WBC # BLD AUTO: 10.88 10*3/MM3 (ref 3.4–10.8)

## 2020-10-04 PROCEDURE — 97116 GAIT TRAINING THERAPY: CPT

## 2020-10-04 PROCEDURE — 85027 COMPLETE CBC AUTOMATED: CPT | Performed by: ORTHOPAEDIC SURGERY

## 2020-10-04 PROCEDURE — 82962 GLUCOSE BLOOD TEST: CPT

## 2020-10-04 PROCEDURE — 97530 THERAPEUTIC ACTIVITIES: CPT

## 2020-10-04 PROCEDURE — 99024 POSTOP FOLLOW-UP VISIT: CPT | Performed by: ORTHOPAEDIC SURGERY

## 2020-10-04 PROCEDURE — 63710000001 INSULIN LISPRO (HUMAN) PER 5 UNITS: Performed by: ORTHOPAEDIC SURGERY

## 2020-10-04 RX ADMIN — INSULIN LISPRO 2 UNITS: 100 INJECTION, SOLUTION INTRAVENOUS; SUBCUTANEOUS at 17:28

## 2020-10-04 RX ADMIN — MELOXICAM 15 MG: 7.5 TABLET ORAL at 08:24

## 2020-10-04 RX ADMIN — INSULIN LISPRO 3 UNITS: 100 INJECTION, SOLUTION INTRAVENOUS; SUBCUTANEOUS at 11:56

## 2020-10-04 RX ADMIN — SODIUM CHLORIDE, PRESERVATIVE FREE 3 ML: 5 INJECTION INTRAVENOUS at 20:21

## 2020-10-04 RX ADMIN — OXYCODONE 5 MG: 5 TABLET ORAL at 05:33

## 2020-10-04 RX ADMIN — ATORVASTATIN CALCIUM 10 MG: 10 TABLET, FILM COATED ORAL at 20:19

## 2020-10-04 RX ADMIN — METOPROLOL TARTRATE 12.5 MG: 25 TABLET, FILM COATED ORAL at 05:37

## 2020-10-04 RX ADMIN — INSULIN LISPRO 2 UNITS: 100 INJECTION, SOLUTION INTRAVENOUS; SUBCUTANEOUS at 22:10

## 2020-10-04 RX ADMIN — INSULIN LISPRO 2 UNITS: 100 INJECTION, SOLUTION INTRAVENOUS; SUBCUTANEOUS at 08:24

## 2020-10-04 RX ADMIN — ASPIRIN 325 MG: 325 TABLET, COATED ORAL at 08:24

## 2020-10-04 RX ADMIN — METOPROLOL TARTRATE 12.5 MG: 25 TABLET, FILM COATED ORAL at 20:19

## 2020-10-04 RX ADMIN — CITALOPRAM 40 MG: 40 TABLET, FILM COATED ORAL at 08:24

## 2020-10-04 NOTE — PLAN OF CARE
Patient alert and oriented. Tachycardia at baseline. Able to ambulate in hallway and sit up in chair this afternoon.

## 2020-10-04 NOTE — PROGRESS NOTES
"IM progress note      Bri Iniguez  [id number]  1935     LOS: 2 days     Attending: Gideon Son MD    Primary Care Provider: Clifford Nolasco MD      Chief Complaint/Reason for visit:  No chief complaint on file.      Subjective   Doing fairly well with good pain control.No nausea, vomiting, or shortness of breath.  Tolerated packed red blood cell transfusion on 10/3/2020.  Slow progress with PT observed.    Objective        Visit Vitals  /69 (BP Location: Right arm, Patient Position: Lying)   Pulse 119   Temp 98.2 °F (36.8 °C) (Oral)   Resp 16   Ht 167.6 cm (66\")   Wt 77.1 kg (170 lb)   LMP  (LMP Unknown) Comment: HYSTERECTOMY   SpO2 94%   Breastfeeding No   BMI 27.44 kg/m²     Temp (24hrs), Av.2 °F (36.8 °C), Min:97.9 °F (36.6 °C), Max:98.5 °F (36.9 °C)      Intake/Output:    Intake/Output Summary (Last 24 hours) at 10/4/2020 1112  Last data filed at 10/4/2020 0527  Gross per 24 hour   Intake 2200 ml   Output 1200 ml   Net 1000 ml        Physical Therapy: Pending    Physical Exam:     General Appearance:    Alert, cooperative, in no acute distress   Head:    Normocephalic, without obvious abnormality, atraumatic    Lungs:     Normal effort, symmetric chest rise,  clear to      auscultation bilaterally              Heart:    Regular rhythm and normal rate, normal S1 and S2    Abdomen:     Normal bowel sounds, no masses, no organomegaly, soft        non-tender, non-distended, no guarding, no rebound                tenderness   Extremities:  Clean dry and intact dressing/prineo  over right hip incision.  Intact flexion dorsiflexion bilateral feet.  No clubbing, cyanosis or edema.  No deformities.    Pulses:   Pulses palpable and equal bilaterally   Skin:   No bleeding, bruising or rash          Results Review:     I reviewed the patient's new clinical results.   Results from last 7 days   Lab Units 10/04/20  0650 10/03/20  2001 10/03/20  0437   WBC 10*3/mm3 10.88*  --  10.55   HEMOGLOBIN g/dL " 8.7* 9.0* 7.0*   HEMATOCRIT % 26.7* 27.5* 22.3*   PLATELETS 10*3/mm3 135*  --  166     Results from last 7 days   Lab Units 10/03/20  0437 10/02/20  0616   SODIUM mmol/L 133*  --    POTASSIUM mmol/L 3.5 3.7   CHLORIDE mmol/L 99  --    CO2 mmol/L 25.0  --    BUN mg/dL 11  --    CREATININE mg/dL 0.52*  --    CALCIUM mg/dL 7.9*  --    GLUCOSE mg/dL 155*  --      Results for VI VALENCIA (MRN [id number]) as of 10/3/2020 11:08   Results for VI VALENCIA (MRN [id number]) as of 10/4/2020 11:13   Ref. Range 10/3/2020 16:41 10/3/2020 20:01 10/3/2020 20:14   Glucose Latest Ref Range: 70 - 130 mg/dL 172 (H)  268 (H)     I reviewed the patient's new imaging including images and reports.    All medications reviewed.   aspirin, 325 mg, Oral, Daily  atorvastatin, 10 mg, Oral, Nightly  citalopram, 40 mg, Oral, Daily  insulin lispro, 0-7 Units, Subcutaneous, 4x Daily With Meals & Nightly  meloxicam, 15 mg, Oral, Daily  metoprolol tartrate, 12.5 mg, Oral, Q12H  sodium chloride, 3 mL, Intravenous, Q12H      acetaminophen, 650 mg, Q4H PRN    Or  acetaminophen, 650 mg, Q4H PRN  dextrose, 25 g, Q15 Min PRN  dextrose, 15 g, Q15 Min PRN  glucagon (human recombinant), 1 mg, Q15 Min PRN  labetalol, 10 mg, Q4H PRN  Morphine, 1 mg, Q2H PRN    And  naloxone, 0.4 mg, Q5 Min PRN  ondansetron, 4 mg, Q6H PRN    Or  ondansetron, 4 mg, Q6H PRN  oxyCODONE, 5 mg, Q4H PRN  sodium chloride, 500 mL, TID PRN  sodium chloride, 1-10 mL, PRN        Assessment/Plan       Status post total replacement of right hip    Hyperlipidemia    Essential hypertension    Deafness    Diabetes type 2, controlled (CMS/[facility])    DJD (degenerative joint disease)    Acute blood loss anemia         Plan    1. PT/OT weightbearing as tolerated right lower extremity with a walker. Posterior hip precautions.  2. Pain control-prns   3. IS-encouraged  4. DVT proph-mechanical and aspirin.  5. Bowel regimen  6. Monitor post-op labs  7. DC planning.  Possibly to rehab.    HTN,  Hyperlipidemia  - Continue home Lopressor and statin  - Monitor BP   - Holding parameters for BP meds  - Labetalol PRN for SBP>170     DM  - hgb A1c on 9/25/20 7.0  - Accu-Chek AC and HS with low dose SSI    Acute blood loss anemia  -2 units of packed red blood cells transfusion 10/3/2020  Responded well to transfusion/wy.    Malachi Cormier MD  10/04/20  11:12 EDT

## 2020-10-04 NOTE — PLAN OF CARE
Goal Outcome Evaluation:  Plan of Care Reviewed With: patient  Progress: improving  Outcome Summary: pt is A&O x4 and currently on RA. VSS but tachycardic at times. Metoprolol am dose given early. In/out cathed once this shift. Complaint of pain once this shift managed with PRN pain meds. Difficulty ambulating to bedside commode with assist x2-3. Will continue to monitor.

## 2020-10-04 NOTE — PLAN OF CARE
Problem: Adult Inpatient Plan of Care  Goal: Plan of Care Review  Recent Flowsheet Documentation  Taken 10/4/2020 1509 by Wanda Holley, PT  Progress: improving  Plan of Care Reviewed With: patient  Outcome Summary: Patient continues progressing towards goals this PM session. Transfers with FWW with modA ( able to recall 50% of steps in sequencing for transfers) and ambulates x 60' with FWW with Jerry with chair follow for safety. Requires vc for heel strike on R as well as step through pattern. Ambulation distance limited by fatigue. Requested back in bed to rest after session. Cont to recommend IPR at [place].

## 2020-10-04 NOTE — PLAN OF CARE
Problem: Adult Inpatient Plan of Care  Goal: Plan of Care Review  Recent Flowsheet Documentation  Taken 10/4/2020 1119 by Wanda Holley, PT  Progress: improving  Plan of Care Reviewed With: patient  Outcome Summary: Patient demonstrating progressions in functional mobility this AM session. Performs bed mobility with Jerry ( assist with BLE), transfers modA with FWW ( with focus on sequencing -scooting forward, feet hip width apart, feet tucked posterior to knees, safe hand placement), and ambulates x 4' FWD, 4 ' bwd with Jerry and vc for stepping. PM session to focus on transfers and gait. Cont to recommend IPR at [place].

## 2020-10-04 NOTE — PROGRESS NOTES
"      [facility] Orthopaedic Surgery Progress Note    Subjective      LOS: 2 days   Patient Care Team:  Clifford Nolasco MD as PCP - General  Clifford Nolasco MD as PCP - Claims Attributed    CC: Right hip pain    Interval History:   No new complaints this morning.  Patient resting comfortably in bed.  Eating breakfast.    Objective      Vital Signs  Temp (24hrs), Av.2 °F (36.8 °C), Min:97.9 °F (36.6 °C), Max:98.5 °F (36.9 °C)      /69 (BP Location: Right arm, Patient Position: Lying)   Pulse 119   Temp 98.2 °F (36.8 °C) (Oral)   Resp 16   Ht 167.6 cm (66\")   Wt 77.1 kg (170 lb)   LMP  (LMP Unknown) Comment: HYSTERECTOMY  SpO2 94%   Breastfeeding No   BMI 27.44 kg/m²     Examination:   Examination of the right hip: The wound is clean, dry, and intact.  Ankle dorsiflexion, ankle plantar flexion, and EHL are intact.  Sensation intact in the foot to light touch.   Thigh is soft and nontender.      Labs:  Results from last 7 days   Lab Units 10/04/20  0650 10/03/20  2001 10/03/20  0437 10/02/20  1747 10/02/20  1149   WBC 10*3/mm3 10.88*  --  10.55  --   --    HEMOGLOBIN g/dL 8.7* 9.0* 7.0* 8.4* 9.1*   HEMATOCRIT % 26.7* 27.5* 22.3* 26.6* 28.7*   MCV fL 93.4  --  91.4  --   --    PLATELETS 10*3/mm3 135*  --  166  --   --        Radiology:  Imaging Results (Last 24 Hours)     Procedure Component Value Units Date/Time    XR Hip With or Without Pelvis 2 - 3 View Right [[id number]] Collected: 10/02/20 1242     Updated: 10/03/20 1551    Narrative:      EXAMINATION: XR HIP W OR WO PELVIS 2-3 VIEW RIGHT- 10/02/2020     INDICATION: post-op right DEMARCO; M16.7-Other unilateral secondary  osteoarthritis of hip     COMPARISON: 2020 AP pelvis and right lateral hip     FINDINGS: Left hip prosthesis is again seen in anatomic alignment. There  has been revision of the patient's right-sided hardware, with removal of  interlocking nail hardware, and placement of a total right hip  prosthesis. Bony alignment appears " anatomic. Myositis ossificans is  again seen medial of the hip joint. No acute bony abnormality is  identified.       Impression:      Right hip prosthesis in anatomic alignment.     D:  10/02/2020  E:  10/02/2020         This report was finalized on 10/3/2020 3:47 PM by Dr. Jaime Pickens MD.             PT:  Physical Therapy - Plan of Care Review - Outcome Summary:  Outcome Summary: Patient demonstrates improvements in functional mobility and balance this PM session. Performs bed mobility with Jerry ( assist at BLE), transfers edge of bed>stand>recliner chair with FWW with modA with significant vc for BLE positioning before and during transfer, upright positioning, and slowing to control stepping to complete transfer to recliner chair. BP stable and no sx of dizziness PM session. Cont to recommend IPR at dc. (10/03/20 8519)]       Results Review:     I reviewed the patient's new clinical results.    Assessment and Plan     Assessment:   Status post conversion to total hip arthroplasty, right hip    Weightbearing as tolerated right lower extremity with a walker  Acute blood loss anemia- responded to 2 units on 10/3/2020  Continue rehabilitation      Status post total replacement of right hip    Hyperlipidemia    Essential hypertension    Deafness    Diabetes type 2, controlled (CMS/[facility])    DJD (degenerative joint disease)    Acute blood loss anemia      Plan for disposition: Plan for discharge when stable medically and cleared by physical therapy, most likely to a rehabilitation facility.      Future Appointments   Date Time Provider Department Center   10/26/2020 10:30 AM Gideon Son MD [facility]   1/20/2021  1:30 PM Dwight Lassiter MD [facility] None           Gideon Son MD  10/04/20  08:45 EDT

## 2020-10-04 NOTE — THERAPY TREATMENT NOTE
"Patient Name: Bri Iniguez  : 1935    MRN: [id number]                              Today's Date: 10/4/2020       Admit Date: 10/2/2020    Visit Dx:     ICD-10-CM ICD-9-CM   1. Other secondary osteoarthritis of right hip  M16.7 715.25     Patient Active Problem List   Diagnosis   • Macular degeneration   • Hyperlipidemia   • Essential hypertension   • Deafness   • Diabetes type 2, controlled (CMS/[facility])   • Primary osteoarthritis of left hip   • Venous insufficiency of both lower extremities   • Status post total replacement of left hip   • Acute blood loss anemia, mild, asymptomatic   • Leukocytosis, mild, likely reactive   • Hip fracture (CMS/[facility])   • Other persistent atrial fibrillation (CMS/[facility])   • Nonrheumatic aortic valve sclerosis   • DJD (degenerative joint disease)   • Status post total replacement of right hip   • Acute blood loss anemia     Past Medical History:   Diagnosis Date   • A-fib (CMS/[facility])    • Bursitis     trochanteric area   • Colonoscopy refused    • Constipation    • Deaf, left    • Deafness     unspecified   • Diabetes mellitus (CMS/[facility])     PATIENT REPORTS SHE HAS TAKEN METFORMIN IN THE PAST BUT WAS TAKEN OFF OF THIS MEDICATION AROUND 2018.     • Elevated cholesterol    • Essential hypertension    • GERD (gastroesophageal reflux disease)    • Hearing loss, right     PATIENT DOES USE A HEARING AID TO RIGHT SIDE INTERMITTENTLY (REPORTS DEAFNESS IN LEFT EAR)   • Hip pain    • History of UTI     PATIENT REPORTS RESOLVED AFTER HORMONE THERAPY WAS ORDERED   • Hyperlipidemia     other   • Impaired functional mobility, balance, gait, and endurance    • Influenza vaccine administered    • Macular degeneration    • Mammogram declined    • Osteoarthritis of left hip    • Primary osteoarthritis of right knee    • Supraventricular premature beats     REPORTED IN PREADMISSION TESTING VISIT \"IRREGULAR HEART BEAT\".  REPORTS UNSURE THE ACTUAL VERBIAGE OF WHAT SHE WAS TOLD OTHER " THAN THIS.    • Wears partial dentures     REPORTS UPPER AND LOWER PARTIALS. INSTRUCTED NO ADHESIVES THE DOS.     Past Surgical History:   Procedure Laterality Date   • BUNIONECTOMY Right 1/21/2019    Procedure: Right foot bunionectomy with exostectomy, proximal phalanx osteotomy, right foot second and third digit hammertoe correction with interphalangeal joint fusion, right second metatarsophalangeal joint capsular release/capsulotomy;  Surgeon: Klaus Cason DPM;  Location: [facility];  Service: Podiatry   • CATARACT EXTRACTION Bilateral    • CHOLECYSTECTOMY  2002   • COLONOSCOPY     • HERNIA REPAIR  2005    umbilical   • HIP INTERTROCHANTERIC NAILING Right 12/31/2019    Procedure: HIP RIGHT OPEN REDUCTION INTERNAL FIXATION, INTERMEDULLARY  NAIL;  Surgeon: Carlos Barba Jr., MD;  Location: [facility];  Service: Orthopedics   • HYSTERECTOMY  1974   • TONSILLECTOMY     • TOTAL HIP ARTHROPLASTY Left 1/23/2018    Procedure: LEFT TOTAL HIP ARTHROPLASTY;  Surgeon: Gideon Son MD;  Location: [facility];  Service:      General Information     Row Name 10/04/20 [facility]          Physical Therapy Time and Intention    Document Type  therapy note (daily note)  -     Mode of Treatment  physical therapy  -     Row Name 10/04/20 1113          General Information    Patient Profile Reviewed  yes  -LO     Existing Precautions/Restrictions  fall;hip;right  -     Row Name 10/04/20 1113          Cognition    Orientation Status (Cognition)  oriented x 4  -LO     Row Name 10/04/20 1113          Safety Issues, Functional Mobility    Impairments Affecting Function (Mobility)  balance;endurance/activity tolerance;pain;strength;range of motion (ROM)  -       User Key  (r) = Recorded By, (t) = Taken By, (c) = Cosigned By    Initials Name Provider Type    LO Wanda Holley PT Physical Therapist        Mobility     Row Name 10/04/20 1113          Bed Mobility    Bed Mobility  scooting/bridging;supine-sit  -LO      Scooting/Bridging [place] (Bed Mobility)  minimum assist (75% patient effort)  -LO     Supine-Sit [place] (Bed Mobility)  minimum assist (75% patient effort)  -LO     Assistive Device (Bed Mobility)  bed rails  -LO     Comment (Bed Mobility)  Jerry for bed mobility, physical assist still required for BLE out of bed.  -LO     Row Name 10/04/20 1113          Transfers    Comment (Transfers)  Focus this session on transfer sequencing from edge of bed and then from recliner chair, Able to perform with modA with correct sequencing.  -LO     Row Name 10/04/20 1113          Bed-Chair Transfer    Bed-Chair [place] (Transfers)  moderate assist (50% patient effort)  -LO     Assistive Device (Bed-Chair Transfers)  walker, front-wheeled  -LO     Row Name 10/04/20 1113          Sit-Stand Transfer    Sit-Stand [place] (Transfers)  moderate assist (50% patient effort);verbal cues  -LO     Assistive Device (Sit-Stand Transfers)  walker, front-wheeled  -LO     Row Name 10/04/20 1113          Gait/Stairs (Locomotion)    Webster Level (Gait)  minimum assist (75% patient effort)  -LO     Assistive Device (Gait)  walker, front-wheeled  -LO     Distance in Feet (Gait)  4' fwd, 4'backward  -LO     Deviations/Abnormal Patterns (Gait)  bilateral deviations;steppage;stride length decreased;weight shifting decreased;gait speed decreased;isai decreased  -LO     Bilateral Gait Deviations  forward flexed posture;heel strike decreased  -LO     Comment (Gait/Stairs)  Patient able to initiate gait training this AM session. Ambulates 4' FWD and 4' bwd with FWW and Jerry with vc for stepping.  -LO     Row Name 10/04/20 1113          Mobility    Extremity Weight-bearing Status  right lower extremity  -LO     Right Lower Extremity (Weight-bearing Status)  weight-bearing as tolerated (WBAT)  -LO       User Key  (r) = Recorded By, (t) = Taken By, (c) = Cosigned By    Initials Name Provider Type    Wanda James, PT Physical  Therapist        Obj/Interventions     Row Name 10/04/20 1117          Motor Skills    Therapeutic Exercise  hip;knee;ankle  -     Row Name 10/04/20 1117          Hip (Therapeutic Exercise)    Hip (Therapeutic Exercise)  AROM (active range of motion)  -     Hip AROM (Therapeutic Exercise)  sitting;10 repetitions;bilateral marches  -     Row Name 10/04/20 1117          Knee (Therapeutic Exercise)    Knee (Therapeutic Exercise)  AROM (active range of motion);strengthening exercise;isometric exercises  -     Knee AROM (Therapeutic Exercise)  bilateral;LAQ (long arc quad);heel slides;10 repetitions  -     Knee Isometrics (Therapeutic Exercise)  quad sets;bilateral;10 repetitions  -     Knee Strengthening (Therapeutic Exercise)  marching while seated;10 repetitions  -     Row Name 10/04/20 1117          Ankle (Therapeutic Exercise)    Ankle (Therapeutic Exercise)  AROM (active range of motion)  -     Ankle AROM (Therapeutic Exercise)  bilateral;10 repetitions;plantarflexion;dorsiflexion  -     Row Name 10/04/20 1117          Balance    Balance Assessment  sitting static balance;sitting dynamic balance;standing static balance;standing dynamic balance  -     Static Sitting Balance  WNL;supported;sitting, edge of bed  -     Dynamic Sitting Balance  WNL;supported;sitting, edge of bed  -     Static Standing Balance  mild impairment;supported;standing  -     Dynamic Standing Balance  moderate impairment;supported;standing  -     Balance Interventions  standing  -     Comment, Balance  vc for upright posture and BLE positioning  -       User Key  (r) = Recorded By, (t) = Taken By, (c) = Cosigned By    Initials Name Provider Type     Wanda Holley PT Physical Therapist        Goals/Plan    No documentation.       Clinical Impression     Row Name 10/04/20 1119          Pain    Additional Documentation  Pain Scale: FACES Pre/Post-Treatment (Group)  -     Row Name 10/04/20 1119          Pain  Scale: FACES Pre/Post-Treatment    Pain: FACES Scale, Pretreatment  2-->hurts little bit  -LO     Posttreatment Pain Rating  4-->hurts little more  -LO     Pain Location - Side  Left  -LO     Pain Location  knee  -LO     Pre/Posttreatment Pain Comment  chronic L knee pain, improves with mobility.  -     Row Name 10/04/20 1119          Plan of Care Review    Plan of Care Reviewed With  patient  -LO     Progress  improving  -LO     Outcome Summary  Patient demonstrating progressions in functional mobility this AM session. Performs bed mobility with Jerry ( assist with BLE), transfers modA with FWW ( with focus on sequencing -scooting forward, feet hip width apart, feet tucked posterior to knees, safe hand placement), and ambulates x 4' FWD, 4 ' bwd with Jerry and vc for stepping. PM session to focus on transfers and gait. Cont to recommend IPR at dc.  -     Row Name 10/04/20 1119          Therapy Assessment/Plan (PT)    Rehab Potential (PT)  good, to achieve stated therapy goals  -     Criteria for Skilled Interventions Met (PT)  yes  -     Row Name 10/04/20 1119          Vital Signs    O2 Delivery Pre Treatment  room air  -LO     O2 Delivery Intra Treatment  room air  -LO     O2 Delivery Post Treatment  room air  -LO     Pre Patient Position  Supine  -LO     Intra Patient Position  Standing  -LO     Post Patient Position  Sitting  -LO     Row Name 10/04/20 1119          Positioning and Restraints    Pre-Treatment Position  in bed  -LO     Post Treatment Position  chair  -LO     In Chair  reclined;call light within reach;encouraged to call for assist;exit alarm on;legs elevated  -LO       User Key  (r) = Recorded By, (t) = Taken By, (c) = Cosigned By    Initials Name Provider Type    Wanda James, PT Physical Therapist        Outcome Measures     Row Name 10/04/20 1123          How much help from another person do you currently need...    Turning from your back to your side while in flat bed without using  bedrails?  3  -LO     Moving from lying on back to sitting on the side of a flat bed without bedrails?  3  -LO     Moving to and from a bed to a chair (including a wheelchair)?  2  -LO     Standing up from a chair using your arms (e.g., wheelchair, bedside chair)?  2  -LO     Climbing 3-5 steps with a railing?  1  -LO     To walk in hospital room?  2  -LO     AM-PAC 6 Clicks Score (PT)  13  -LO       User Key  (r) = Recorded By, (t) = Taken By, (c) = Cosigned By    Initials Name Provider Type    Wanda James, PT Physical Therapist        Physical Therapy Education                 Title: PT OT SLP Therapies (In Progress)     Topic: Physical Therapy (In Progress)     Point: Mobility training (In Progress)     Learning Progress Summary           Patient Acceptance, E, NR by  at 10/4/2020 1032    Comment: Patient education regarding sequencing for transfers.    Acceptance, E, NR by  at 10/3/2020 1512    Comment: Patient education on sequencing for bed mobility and transfers.    Acceptance, E, NR by  at 10/3/2020 0842    Comment: Patient education regarding sequencing for transfers.                   Point: Home exercise program (Not Started)     Learner Progress:  Not documented in this visit.          Point: Body mechanics (Not Started)     Learner Progress:  Not documented in this visit.          Point: Precautions (Not Started)     Learner Progress:  Not documented in this visit.                      User Key     Initials Effective Dates Name Provider Type Discipline     03/11/20 -  Wanda Holley, PT Physical Therapist PT              PT Recommendation and Plan  Planned Therapy Interventions (PT): balance training, bed mobility training, gait training, home exercise program, patient/family education, neuromuscular re-education, stair training, strengthening, transfer training  Plan of Care Reviewed With: patient  Progress: improving  Outcome Summary: Patient demonstrating progressions in functional mobility  this AM session. Performs bed mobility with Jerry ( assist with BLE), transfers modA with FWW ( with focus on sequencing -scooting forward, feet hip width apart, feet tucked posterior to knees, safe hand placement), and ambulates x 4' FWD, 4 ' bwd with Jerry and vc for stepping. PM session to focus on transfers and gait. Cont to recommend IPR at dc.     Time Calculation:   PT Charges     Row Name 10/04/20 1032             Time Calculation    Start Time  1032  -LO      PT Received On  10/04/20  -LO      PT Goal Re-Cert Due Date  10/13/20  -LO         Timed Charges    39478 -  PT Neuromuscular Reeducation Minutes  5  -LO      48075 - Gait Training Minutes   6  -LO      66275 - PT Therapeutic Activity Minutes  20  -LO        User Key  (r) = Recorded By, (t) = Taken By, (c) = Cosigned By    Initials Name Provider Type    Wanda James, PT Physical Therapist        Therapy Charges for Today     Code Description Service Date Service Provider Modifiers Qty    25091042900 HC PT THERAPEUTIC ACT EA 15 MIN 10/3/2020 Wanda Holley, PT GP 1    91075258615 HC PT EVAL LOW COMPLEXITY 4 10/3/2020 Wanda Holley, PT GP 1    96497724167 HC PT THERAPEUTIC ACT EA 15 MIN 10/3/2020 Wanda Holley, PT GP 2    62860789780 HC GAIT TRAINING EA 15 MIN 10/4/2020 Wanda Holley, PT GP 1    90319745726 HC PT THERAPEUTIC ACT EA 15 MIN 10/4/2020 Wanda Holley, PT GP 1          PT G-Codes  Outcome Measure Options: AM-PAC 6 Clicks Daily Activity (OT)  AM-PAC 6 Clicks Score (PT): 13  AM-PAC 6 Clicks Score (OT): 14    Wanda Holley PT  10/4/2020

## 2020-10-04 NOTE — THERAPY TREATMENT NOTE
"Patient Name: Bri Iniguez  : 1935    MRN: [id number]                              Today's Date: 10/4/2020       Admit Date: 10/2/2020    Visit Dx:     ICD-10-CM ICD-9-CM   1. Other secondary osteoarthritis of right hip  M16.7 715.25     Patient Active Problem List   Diagnosis   • Macular degeneration   • Hyperlipidemia   • Essential hypertension   • Deafness   • Diabetes type 2, controlled (CMS/[facility])   • Primary osteoarthritis of left hip   • Venous insufficiency of both lower extremities   • Status post total replacement of left hip   • Acute blood loss anemia, mild, asymptomatic   • Leukocytosis, mild, likely reactive   • Hip fracture (CMS/[facility])   • Other persistent atrial fibrillation (CMS/[facility])   • Nonrheumatic aortic valve sclerosis   • DJD (degenerative joint disease)   • Status post total replacement of right hip   • Acute blood loss anemia     Past Medical History:   Diagnosis Date   • A-fib (CMS/[facility])    • Bursitis     trochanteric area   • Colonoscopy refused    • Constipation    • Deaf, left    • Deafness     unspecified   • Diabetes mellitus (CMS/[facility])     PATIENT REPORTS SHE HAS TAKEN METFORMIN IN THE PAST BUT WAS TAKEN OFF OF THIS MEDICATION AROUND 2018.     • Elevated cholesterol    • Essential hypertension    • GERD (gastroesophageal reflux disease)    • Hearing loss, right     PATIENT DOES USE A HEARING AID TO RIGHT SIDE INTERMITTENTLY (REPORTS DEAFNESS IN LEFT EAR)   • Hip pain    • History of UTI     PATIENT REPORTS RESOLVED AFTER HORMONE THERAPY WAS ORDERED   • Hyperlipidemia     other   • Impaired functional mobility, balance, gait, and endurance    • Influenza vaccine administered    • Macular degeneration    • Mammogram declined    • Osteoarthritis of left hip    • Primary osteoarthritis of right knee    • Supraventricular premature beats     REPORTED IN PREADMISSION TESTING VISIT \"IRREGULAR HEART BEAT\".  REPORTS UNSURE THE ACTUAL VERBIAGE OF WHAT SHE WAS TOLD OTHER " THAN THIS.    • Wears partial dentures     REPORTS UPPER AND LOWER PARTIALS. INSTRUCTED NO ADHESIVES THE DOS.     Past Surgical History:   Procedure Laterality Date   • BUNIONECTOMY Right 1/21/2019    Procedure: Right foot bunionectomy with exostectomy, proximal phalanx osteotomy, right foot second and third digit hammertoe correction with interphalangeal joint fusion, right second metatarsophalangeal joint capsular release/capsulotomy;  Surgeon: Klaus Cason DPM;  Location: [facility];  Service: Podiatry   • CATARACT EXTRACTION Bilateral    • CHOLECYSTECTOMY  2002   • COLONOSCOPY     • HERNIA REPAIR  2005    umbilical   • HIP INTERTROCHANTERIC NAILING Right 12/31/2019    Procedure: HIP RIGHT OPEN REDUCTION INTERNAL FIXATION, INTERMEDULLARY  NAIL;  Surgeon: Carlos Barba Jr., MD;  Location: [facility];  Service: Orthopedics   • HYSTERECTOMY  1974   • TONSILLECTOMY     • TOTAL HIP ARTHROPLASTY Left 1/23/2018    Procedure: LEFT TOTAL HIP ARTHROPLASTY;  Surgeon: Gideon Son MD;  Location: [facility];  Service:      General Information     Row Name 10/04/20 1504 10/04/20 1113       Physical Therapy Time and Intention    Document Type  therapy note (daily note)  -LO  therapy note (daily note)  -LO    Mode of Treatment  physical therapy  -LO  physical therapy  -LO    Row Name 10/04/20 1504 10/04/20 1113       General Information    Patient Profile Reviewed  yes  -LO  yes  -LO    Existing Precautions/Restrictions  fall;hip;right  -LO  fall;hip;right  -LO    Row Name 10/04/20 1504 10/04/20 1113       Cognition    Orientation Status (Cognition)  oriented x 4  -LO  oriented x 4  -LO    Row Name 10/04/20 1504 10/04/20 1113       Safety Issues, Functional Mobility    Impairments Affecting Function (Mobility)  balance;endurance/activity tolerance;pain;strength;range of motion (ROM)  -LO  balance;endurance/activity tolerance;pain;strength;range of motion (ROM)  -LO      User Key  (r) = Recorded By, (t) = Taken By, (c) =  Cosigned By    Initials Name Provider Type    Wanda James, PT Physical Therapist        Mobility     Row Name 10/04/20 1505 10/04/20 1113       Bed Mobility    Bed Mobility  scooting/bridging;supine-sit  -LO  scooting/bridging;supine-sit  -LO    Scooting/Bridging [place] (Bed Mobility)  minimum assist (75% patient effort)  -LO  minimum assist (75% patient effort)  -LO    Supine-Sit [place] (Bed Mobility)  minimum assist (75% patient effort)  -LO  minimum assist (75% patient effort)  -LO    Sit-Supine [place] (Bed Mobility)  moderate assist (50% patient effort)  -LO  --    Assistive Device (Bed Mobility)  bed rails  -LO  bed rails  -LO    Comment (Bed Mobility)  Jerry out of bed, modA into bed for BLE  -LO  Jerry for bed mobility, physical assist still required for BLE out of bed.  -LO    Row Name 10/04/20 1505 10/04/20 1113       Transfers    Comment (Transfers)  Able to recall 50% sequencing for transfers, modA for sit<>stand transfers with FWW  -LO  Focus this session on transfer sequencing from edge of bed and then from recliner chair, Able to perform with modA with correct sequencing.  -LO    Row Name 10/04/20 1505 10/04/20 1113       Bed-Chair Transfer    Bed-Chair [place] (Transfers)  moderate assist (50% patient effort)  -LO  moderate assist (50% patient effort)  -LO    Assistive Device (Bed-Chair Transfers)  walker, front-wheeled  -LO  walker, front-wheeled  -LO    Row Name 10/04/20 1505 10/04/20 1113       Sit-Stand Transfer    Sit-Stand [place] (Transfers)  moderate assist (50% patient effort);verbal cues  -LO  moderate assist (50% patient effort);verbal cues  -LO    Assistive Device (Sit-Stand Transfers)  walker, front-wheeled  -LO  walker, front-wheeled  -LO    Row Name 10/04/20 1505 10/04/20 1113       Gait/Stairs (Locomotion)    [place] Level (Gait)  minimum assist (75% patient effort);1 person to manage equipment;1 person assist  -LO  minimum assist (75% patient effort)   -LO    Assistive Device (Gait)  walker, front-wheeled  -LO  walker, front-wheeled  -LO    Distance in Feet (Gait)  60'  -LO  4' fwd, 4'backward  -LO    Deviations/Abnormal Patterns (Gait)  bilateral deviations;steppage;stride length decreased;weight shifting decreased;gait speed decreased;isai decreased  -LO  bilateral deviations;steppage;stride length decreased;weight shifting decreased;gait speed decreased;isai decreased  -LO    Bilateral Gait Deviations  forward flexed posture;heel strike decreased  -LO  forward flexed posture;heel strike decreased  -LO    Comment (Gait/Stairs)  Patient able to increase gait distance this PM session with chair follow. vc for increased stepping on R and heel strike on R.  -LO  Patient able to initiate gait training this AM session. Ambulates 4' FWD and 4' bwd with FWW and Jerry with vc for stepping.  -LO    Row Name 10/04/20 1505 10/04/20 1113       Mobility    Extremity Weight-bearing Status  right lower extremity  -LO  right lower extremity  -LO    Right Lower Extremity (Weight-bearing Status)  weight-bearing as tolerated (WBAT)  -LO  weight-bearing as tolerated (WBAT)  -LO      User Key  (r) = Recorded By, (t) = Taken By, (c) = Cosigned By    Initials Name Provider Type    Wanda James PT Physical Therapist        Obj/Interventions     Row Name 10/04/20 1117          Motor Skills    Therapeutic Exercise  hip;knee;ankle  -     Row Name 10/04/20 1117          Hip (Therapeutic Exercise)    Hip (Therapeutic Exercise)  AROM (active range of motion)  -LO     Hip AROM (Therapeutic Exercise)  sitting;10 repetitions;bilateral marches  -     Row Name 10/04/20 1117          Knee (Therapeutic Exercise)    Knee (Therapeutic Exercise)  AROM (active range of motion);strengthening exercise;isometric exercises  -LO     Knee AROM (Therapeutic Exercise)  bilateral;LAQ (long arc quad);heel slides;10 repetitions  -LO     Knee Isometrics (Therapeutic Exercise)  quad sets;bilateral;10  repetitions  -LO     Knee Strengthening (Therapeutic Exercise)  marching while seated;10 repetitions  -LO     Row Name 10/04/20 1117          Ankle (Therapeutic Exercise)    Ankle (Therapeutic Exercise)  AROM (active range of motion)  -LO     Ankle AROM (Therapeutic Exercise)  bilateral;10 repetitions;plantarflexion;dorsiflexion  -LO     Row Name 10/04/20 1508 10/04/20 1117       Balance    Balance Assessment  sitting static balance;sitting dynamic balance;standing static balance;standing dynamic balance  -LO  sitting static balance;sitting dynamic balance;standing static balance;standing dynamic balance  -LO    Static Sitting Balance  WNL;supported;sitting in chair  -LO  WNL;supported;sitting, edge of bed  -LO    Dynamic Sitting Balance  WNL;supported;sitting in chair  -LO  WNL;supported;sitting, edge of bed  -LO    Static Standing Balance  mild impairment;supported;standing  -LO  mild impairment;supported;standing  -LO    Dynamic Standing Balance  mild impairment;supported;standing  -LO  moderate impairment;supported;standing  -LO    Balance Interventions  --  standing  -LO    Comment, Balance  vc for upright posture and for obtaining balance prior to ambulating  -  vc for upright posture and BLE positioning  -      User Key  (r) = Recorded By, (t) = Taken By, (c) = Cosigned By    Initials Name Provider Type    LO Wanda Holley PT Physical Therapist        Goals/Plan    No documentation.       Clinical Impression     Row Name 10/04/20 1509 10/04/20 1119       Pain    Additional Documentation  Pain Scale: Numbers Pre/Post-Treatment (Group)  -LO  Pain Scale: FACES Pre/Post-Treatment (Group)  -LO    [facility] Name 10/04/20 1509          Pain Scale: Numbers Pre/Post-Treatment    Pretreatment Pain Rating  0/10 - no pain  -LO     Posttreatment Pain Rating  0/10 - no pain  -[facility] Name 10/04/20 1119          Pain Scale: FACES Pre/Post-Treatment    Pain: FACES Scale, Pretreatment  2-->hurts little bit  -LO      Posttreatment Pain Rating  4-->hurts little more  -LO     Pain Location - Side  Left  -LO     Pain Location  knee  -LO     Pre/Posttreatment Pain Comment  chronic L knee pain, improves with mobility.  -LO     Row Name 10/04/20 1509 10/04/20 1119       Plan of Care Review    Plan of Care Reviewed With  patient  -LO  patient  -LO    Progress  improving  -LO  improving  -LO    Outcome Summary  Patient continues progressing towards goals this PM session. Transfers with FWW with modA ( able to recall 50% of steps in sequencing for transfers) and ambulates x 60' with FWW with Jerry with chair follow for safety. Requires vc for heel strike on R as well as step through pattern. Ambulation distance limited by fatigue. Requested back in bed to rest after session. Cont to recommend IPR at dc.  -LO  Patient demonstrating progressions in functional mobility this AM session. Performs bed mobility with Jerry ( assist with BLE), transfers modA with FWW ( with focus on sequencing -scooting forward, feet hip width apart, feet tucked posterior to knees, safe hand placement), and ambulates x 4' FWD, 4 ' bwd with Jerry and vc for stepping. PM session to focus on transfers and gait. Cont to recommend IPR at dc.  -LO    Row Name 10/04/20 1509 10/04/20 1119       Therapy Assessment/Plan (PT)    Rehab Potential (PT)  good, to achieve stated therapy goals  -LO  good, to achieve stated therapy goals  -LO    Criteria for Skilled Interventions Met (PT)  yes  -LO  yes  -LO    Row Name 10/04/20 1509 10/04/20 1119       Vital Signs    O2 Delivery Pre Treatment  room air  -LO  room air  -LO    O2 Delivery Intra Treatment  room air  -LO  room air  -LO    O2 Delivery Post Treatment  room air  -LO  room air  -LO    Pre Patient Position  Sitting  -LO  Supine  -LO    Intra Patient Position  Standing  -LO  Standing  -LO    Post Patient Position  Supine  -LO  Sitting  -LO    Row Name 10/04/20 1509 10/04/20 1119       Positioning and Restraints     Pre-Treatment Position  sitting in chair/recliner  -LO  in bed  -LO    Post Treatment Position  bed per patient request  -LO  chair  -LO    In Bed  notified nsg;encouraged to call for assist;call light within reach;exit alarm on;side rails up x2  -LO  --    In Chair  --  reclined;call light within reach;encouraged to call for assist;exit alarm on;legs elevated  -LO      User Key  (r) = Recorded By, (t) = Taken By, (c) = Cosigned By    Initials Name Provider Type    Wanda James PT Physical Therapist        Outcome Measures     Row Name 10/04/20 1512 10/04/20 1123       How much help from another person do you currently need...    Turning from your back to your side while in flat bed without using bedrails?  3  -LO  3  -LO    Moving from lying on back to sitting on the side of a flat bed without bedrails?  3  -LO  3  -LO    Moving to and from a bed to a chair (including a wheelchair)?  3  -LO  2  -LO    Standing up from a chair using your arms (e.g., wheelchair, bedside chair)?  3  -LO  2  -LO    Climbing 3-5 steps with a railing?  1  -LO  1  -LO    To walk in hospital room?  3  -LO  2  -LO    AM-PAC 6 Clicks Score (PT)  16  -LO  13  -LO    Row Name 10/04/20 1512          PADD    Diagnosis  2  -LO     Gender  1  -LO     Age Group  0  -LO     Gait Distance  0  -LO     Assist Level  0  -LO     Home Support  3  -LO     PADD Score  6  -LO     Prediction by PADD Score  extended rehabilitation  -LO       User Key  (r) = Recorded By, (t) = Taken By, (c) = Cosigned By    Initials Name Provider Type    Wanda James, [place] Physical Therapist        Physical Therapy Education                 Title: PT OT SLP Therapies (In Progress)     Topic: Physical Therapy (In Progress)     Point: Mobility training (In Progress)     Learning Progress Summary           Patient Acceptance, E, NR by TYE at 10/4/2020 1032    Comment: Patient education regarding sequencing for transfers.    Acceptance, E, NR by TYE at 10/4/2020 1032    Comment:  Patient education regarding sequencing for transfers and step pattern for safe and effective gait.    Acceptance, E, NR by  at 10/3/2020 1512    Comment: Patient education on sequencing for bed mobility and transfers.    Acceptance, E, NR by  at 10/3/2020 0842    Comment: Patient education regarding sequencing for transfers.                   Point: Home exercise program (Not Started)     Learner Progress:  Not documented in this visit.          Point: Body mechanics (Not Started)     Learner Progress:  Not documented in this visit.          Point: Precautions (Not Started)     Learner Progress:  Not documented in this visit.                      User Key     Initials Effective Dates Name Provider Type Discipline     03/11/20 -  Wanda Holley, PT Physical Therapist PT              PT Recommendation and Plan  Planned Therapy Interventions (PT): balance training, bed mobility training, gait training, home exercise program, patient/family education, neuromuscular re-education, stair training, strengthening, transfer training  Plan of Care Reviewed With: patient  Progress: improving  Outcome Summary: Patient continues progressing towards goals this PM session. Transfers with FWW with modA ( able to recall 50% of steps in sequencing for transfers) and ambulates x 60' with FWW with Jerry with chair follow for safety. Requires vc for heel strike on R as well as step through pattern. Ambulation distance limited by fatigue. Requested back in bed to rest after session. Cont to recommend IPR at [place].     Time Calculation:   PT Charges     Row Name 10/04/20 1441 10/04/20 1032          Time Calculation    Start Time  1441  -LO  1032  -LO     PT Received On  10/04/20  -LO  10/04/20  -     PT Goal Re-Cert Due Date  10/13/20  -LO  10/13/20  -LO        Timed Charges    14292 -  PT Neuromuscular Reeducation Minutes  --  5  -LO     69612 - Gait Training Minutes   17  -LO  6  -LO     48276 - PT Therapeutic Activity Minutes  8  -LO   20  -LO       User Key  (r) = Recorded By, (t) = Taken By, (c) = Cosigned By    Initials Name Provider Type    Wanda James, PT Physical Therapist        Therapy Charges for Today     Code Description Service Date Service Provider Modifiers Qty    94836503668 HC PT THERAPEUTIC ACT EA 15 MIN 10/3/2020 Wanda Holley, PT GP 1    13021189698 HC PT EVAL LOW COMPLEXITY 4 10/3/2020 Wanda Holley, PT GP 1    00193346687 HC PT THERAPEUTIC ACT EA 15 MIN 10/3/2020 Wanda Holley, PT GP 2    44213488315 HC GAIT TRAINING EA 15 MIN 10/4/2020 Wanda Holley, PT GP 1    39039311835 HC PT THERAPEUTIC ACT EA 15 MIN 10/4/2020 Wanda Holley, PT GP 1    25078549879 HC GAIT TRAINING EA 15 MIN 10/4/2020 Wanda Holley, PT GP 1    61725824205 HC PT THERAPEUTIC ACT EA 15 MIN 10/4/2020 Wanda Holley, PT GP 1    38355112521 HC PT THER SUPP EA 15 MIN 10/4/2020 Wanda Holley, PT GP 1          PT G-Codes  Outcome Measure Options: AM-PAC 6 Clicks Daily Activity (OT)  AM-PAC 6 Clicks Score (PT): 16  AM-PAC 6 Clicks Score (OT): 14    Wanda Holley, [facility]  10/4/2020

## 2020-10-05 VITALS
HEIGHT: 66 IN | BODY MASS INDEX: 27.32 KG/M2 | RESPIRATION RATE: 20 BRPM | OXYGEN SATURATION: 97 % | SYSTOLIC BLOOD PRESSURE: 111 MMHG | HEART RATE: 102 BPM | WEIGHT: 170 LBS | TEMPERATURE: 98.6 F | DIASTOLIC BLOOD PRESSURE: 65 MMHG

## 2020-10-05 PROBLEM — N99.89 POSTOPERATIVE URINARY RETENTION: Status: ACTIVE | Noted: 2020-10-05

## 2020-10-05 PROBLEM — G89.18 ACUTE POSTOPERATIVE PAIN: Status: ACTIVE | Noted: 2020-10-05

## 2020-10-05 PROBLEM — R33.8 POSTOPERATIVE URINARY RETENTION: Status: ACTIVE | Noted: 2020-10-05

## 2020-10-05 LAB
BILIRUB UR QL STRIP: NEGATIVE
CLARITY UR: CLEAR
COLOR UR: YELLOW
DEPRECATED RDW RBC AUTO: 47.4 FL (ref 37–54)
ERYTHROCYTE [DISTWIDTH] IN BLOOD BY AUTOMATED COUNT: 14.2 % (ref 12.3–15.4)
GLUCOSE BLDC GLUCOMTR-MCNC: 145 MG/DL (ref 70–130)
GLUCOSE BLDC GLUCOMTR-MCNC: 214 MG/DL (ref 70–130)
GLUCOSE UR STRIP-MCNC: NEGATIVE MG/DL
HCT VFR BLD AUTO: 28.4 % (ref 34–46.6)
HGB BLD-MCNC: 9.2 G/DL (ref 12–15.9)
HGB UR QL STRIP.AUTO: NEGATIVE
KETONES UR QL STRIP: ABNORMAL
LEUKOCYTE ESTERASE UR QL STRIP.AUTO: NEGATIVE
MCH RBC QN AUTO: 30 PG (ref 26.6–33)
MCHC RBC AUTO-ENTMCNC: 32.4 G/DL (ref 31.5–35.7)
MCV RBC AUTO: 92.5 FL (ref 79–97)
NITRITE UR QL STRIP: NEGATIVE
PH UR STRIP.AUTO: 6.5 [PH] (ref 5–8)
PLATELET # BLD AUTO: 171 10*3/MM3 (ref 140–450)
PMV BLD AUTO: 9.2 FL (ref 6–12)
PROT UR QL STRIP: ABNORMAL
RBC # BLD AUTO: 3.07 10*6/MM3 (ref 3.77–5.28)
SP GR UR STRIP: 1.02 (ref 1–1.03)
UROBILINOGEN UR QL STRIP: ABNORMAL
WBC # BLD AUTO: 9.73 10*3/MM3 (ref 3.4–10.8)

## 2020-10-05 PROCEDURE — 97116 GAIT TRAINING THERAPY: CPT

## 2020-10-05 PROCEDURE — 97110 THERAPEUTIC EXERCISES: CPT

## 2020-10-05 PROCEDURE — 99024 POSTOP FOLLOW-UP VISIT: CPT | Performed by: ORTHOPAEDIC SURGERY

## 2020-10-05 PROCEDURE — 82962 GLUCOSE BLOOD TEST: CPT

## 2020-10-05 PROCEDURE — 81003 URINALYSIS AUTO W/O SCOPE: CPT | Performed by: NURSE PRACTITIONER

## 2020-10-05 PROCEDURE — 85027 COMPLETE CBC AUTOMATED: CPT | Performed by: ORTHOPAEDIC SURGERY

## 2020-10-05 PROCEDURE — 63710000001 INSULIN LISPRO (HUMAN) PER 5 UNITS: Performed by: ORTHOPAEDIC SURGERY

## 2020-10-05 RX ORDER — BISACODYL 5 MG/1
10 TABLET, DELAYED RELEASE ORAL DAILY PRN
Status: DISCONTINUED | OUTPATIENT
Start: 2020-10-05 | End: 2020-10-05 | Stop reason: HOSPADM

## 2020-10-05 RX ORDER — TAMSULOSIN HYDROCHLORIDE 0.4 MG/1
0.4 CAPSULE ORAL DAILY
Status: DISCONTINUED | OUTPATIENT
Start: 2020-10-05 | End: 2020-10-05 | Stop reason: HOSPADM

## 2020-10-05 RX ORDER — TAMSULOSIN HYDROCHLORIDE 0.4 MG/1
0.4 CAPSULE ORAL DAILY
Qty: 30 CAPSULE
Start: 2020-10-06 | End: 2021-01-20

## 2020-10-05 RX ADMIN — METOPROLOL TARTRATE 12.5 MG: 25 TABLET, FILM COATED ORAL at 09:20

## 2020-10-05 RX ADMIN — BISACODYL 10 MG: 5 TABLET, COATED ORAL at 11:28

## 2020-10-05 RX ADMIN — ASPIRIN 325 MG: 325 TABLET, COATED ORAL at 09:20

## 2020-10-05 RX ADMIN — INSULIN LISPRO 3 UNITS: 100 INJECTION, SOLUTION INTRAVENOUS; SUBCUTANEOUS at 11:49

## 2020-10-05 RX ADMIN — CITALOPRAM 40 MG: 40 TABLET, FILM COATED ORAL at 09:20

## 2020-10-05 RX ADMIN — MELOXICAM 15 MG: 7.5 TABLET ORAL at 09:20

## 2020-10-05 RX ADMIN — TAMSULOSIN HYDROCHLORIDE 0.4 MG: 0.4 CAPSULE ORAL at 11:28

## 2020-10-05 NOTE — PLAN OF CARE
Goal Outcome Evaluation:  Plan of Care Reviewed With: patient  Progress: improving  Outcome Summary: pt VSS with the exception of tachycardia. A&O x4. RA. Ambulated to bathroom assist x1, GB, and RW. UOP WNL. No bowel movement this shift. Will continue to monitor.

## 2020-10-05 NOTE — DISCHARGE SUMMARY
Patient Name: Bri Iniguez  MRN: [id number]  : 1935  DOS: 10/5/2020    Attending: Gideon Son MD    Primary Care Provider: Clifford Nolasco MD    Date of Admission:.10/2/2020  5:03 AM    Date of Discharge:  10/5/2020    Discharge Diagnosis:   Status post total replacement of right hip    DJD (degenerative joint disease)    Hyperlipidemia    Essential hypertension    Deafness    Diabetes type 2, controlled (CMS/[facility])    Acute blood loss anemia, 2 units PRBC 10/3    Acute postoperative pain    Postoperative urinary retention      Hospital Course    At admit:    Patient is a pleasant 84 y.o. female presented for scheduled surgery by Dr. Son.  She underwent right total hip arthroplasty with hardware removal under spinal anesthesia.  She tolerated surgery well and was admitted for further medical management.     She is known to us from previous admission in 2018 for left total hip replacement; which she recovered well.  She underwent ORIF for right intratrochanteric hip fracture on 2019 due to a mechanical fall.  Her hip has been painful since.  She uses a cane or walker for ambulation.     She has history of hypertension, hyperlipidemia and atrial fibrillation.  She is followed by cardiology, Dr. Lassiter.     After admit:    Patient was provided pain medications as needed for pain control.  Adjustments were made to pain medications to optimize postop pain management when indicated.   Risks and benefits of opiate medications discussed with patient.  Antonio report in chart was reviewed prior to discharge.    She was seen by PT and has progressed slowly over her stay.    The patient was placed on DVT prophylaxis including aspirin. The patient was encouraged to use IS for atelectasis prophylaxis.   The patient was placed on a bowel regimen to prevent constipation while on pain medication.   The patient's H/H was monitored with a decrease that required transfusion of 2 units PRBCs; which she  recovered well.    She did have postop urinary retention. She was started on Flomax. UA negative. She should continue to be monitored for urinary retention.    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 secondary to a prominent blade     POSTOPERATIVE DIAGNOSIS: Failed trochanteric fixation nail, right hip, with resultant posttraumatic arthritis, possible nonunion proximal femur fracture, with acetabular defect secondary to perforated femoral head secondary to a prominent blade     PROCEDURE PERFORMED: Trochanteric fixation nail removal, with conversion to left total hip arthroplasty with [employer] components     IMPLANTS: #54 press-fit R3 cup with 30 mm and 25 mm screws with neutral polyethylene liner with #17 standard offset press-fit redapt 190 mm length stem with +0 x 36 [employer] ceramic head     SURGEON: Gideon Son MD    Pertinent Test Results:    I reviewed the patient's new clinical results.   Results from last 7 days   Lab Units 10/05/20  0638 10/04/20  0650 10/03/20  2001 10/03/20  0437   WBC 10*3/mm3 9.73 10.88*  --  10.55   HEMOGLOBIN g/dL 9.2* 8.7* 9.0* 7.0*   HEMATOCRIT % 28.4* 26.7* 27.5* 22.3*   PLATELETS 10*3/mm3 171 135*  --  166     Results from last 7 days   Lab Units 10/03/20  0437 10/02/20  0616   SODIUM mmol/L 133*  --    POTASSIUM mmol/L 3.5 3.7   CHLORIDE mmol/L 99  --    CO2 mmol/L 25.0  --    BUN mg/dL 11  --    CREATININE mg/dL 0.52*  --    CALCIUM mg/dL 7.9*  --    GLUCOSE mg/dL 155*  --      Results for VI VALENCIA (MRN [id number]) as of 10/5/2020 12:22   Ref. Range 10/4/2020 16:09 10/4/2020 20:06 10/5/2020 06:38 10/5/2020 07:21 10/5/2020 11:19  "  Glucose Latest Ref Range: 70 - 130 mg/dL 190 (H) 182 (H)  145 (H) 214 (H)     Results for VI VALENCIA (MRN [id number]) as of 10/5/2020 13:27   Ref. Range 10/5/2020 13:14   Color, UA Latest Ref Range: Yellow, Straw  Yellow   Appearance, UA Latest Ref Range: Clear  Clear   Specific Corea, UA Latest Ref Range: 1.001 - 1.030  1.022   pH, UA Latest Ref Range: 5.0 - 8.0  6.5   Glucose Latest Ref Range: Negative  Negative   Ketones, UA Latest Ref Range: Negative  Trace (A)   Blood, UA Latest Ref Range: Negative  Negative   Nitrite, UA Latest Ref Range: Negative  Negative   Leukocytes, UA Latest Ref Range: Negative  Negative   Protein, UA Latest Ref Range: Negative  Trace (A)   Bilirubin, UA Latest Ref Range: Negative  Negative       I reviewed the patient's new imaging including images and reports.      Physical therapy: Pt has no pain pre, during, or post treatment.  Min A for bed mobility.  Min A for STS transfers with RW.  CGA with chair follow and RW 85' of ambulation with VC for sequencing.  Educated pt on hip precautions.  Pt has tachycardia, RN notified.  Recommend DC to IRF when appropriate.    Discharge Assessment:      Visit Vitals  /65 (BP Location: Right arm, Patient Position: Sitting)   Pulse 102   Temp 98.6 °F (37 °C) (Oral)   Resp 20   Ht 167.6 cm (66\")   Wt 77.1 kg (170 lb)   LMP  (LMP Unknown) Comment: HYSTERECTOMY   SpO2 97%   Breastfeeding No   BMI 27.44 kg/m²     Temp (24hrs), Av.2 °F (36.8 °C), Min:97.9 °F (36.6 °C), Max:98.6 °F (37 °C)      General Appearance:    Alert, cooperative, in no acute distress   Lungs:     Clear to auscultation,respirations regular, even and  unlabored    Heart:    Regular rhythm and normal rate, normal S1 and S2   Abdomen:     Normal bowel sounds, no masses, no organomegaly, soft non-tender, non-distended, no guarding, no rebound  tenderness   Extremities:   CDI Prineo dressing right hip. Moves all extremities well, no edema, no cyanosis, no redness "   Pulses:   Pulses palpable and equal bilaterally   Skin:   No bleeding, bruising or rash   Neurologic:   Cranial nerves 2 - 12 grossly intact, sensation intact, Flexion and dorsiflexion intact bilateral feet.       [facility] Dis[facility]: [facility]         Discharge Medications      New Medications      Instructions Start Date   aspirin 325 MG EC tablet   325 mg, Oral, Daily, For 1 month   Start Date: October 6, 2020     tamsulosin 0.4 MG capsule 24 hr capsule  Commonly known as: FLOMAX   0.4 mg, Oral, Daily   Start Date: October 6, 2020        Continue These Medications      Instructions Start Date   atorvastatin 10 MG tablet  Commonly known as: LIPITOR   10 mg, Oral, Nightly      citalopram 40 MG tablet  Commonly known as: CeleXA   40 mg, Oral, Daily      diclofenac 1 % gel gel  Commonly known as: VOLTAREN   4 Times Daily PRN      DOCUSATE SODIUM PO   Oral, Daily      estradiol 0.1 MG/GM vaginal cream  Commonly known as: ESTRACE   1 g, Vaginal, 3 Times Weekly, Apply using finger technique      Intrarosa 6.5 MG insert  Generic drug: Prasterone   1 suppository, Vaginal, Nightly      metoprolol tartrate 25 MG tablet  Commonly known as: LOPRESSOR   0.5 tablets, 2 Times Daily      Ocuvite Adult 50+ capsule   1 tablet, Oral, Daily      traMADol 50 MG tablet  Commonly known as: ULTRAM   50 mg, Oral, Nightly PRN             Discharge Diet: Consistent carb diet diet      Activity at Discharge: WBAT RLE      Follow-up Appointments  Dr. Son per his orders    Discharge took over 30 min     HAWA Casanova  10/05/20  12:25 EDT

## 2020-10-05 NOTE — PLAN OF CARE
Problem: Adult Inpatient Plan of Care  Goal: Plan of Care Review  Recent Flowsheet Documentation  Taken 10/5/2020 0914 by Mallika Bullock PT  Progress: improving  Plan of Care Reviewed With: patient  Outcome Summary: Pt has no pain pre, during, or post treatment.  Min A for bed mobility.  Min A for STS transfers with RW.  CGA with chair follow and RW 85' of ambulation with VC for sequencing.  Educated pt on hip precautions.  Pt has tachycardia, RN notified.  Recommend DC to IRF when appropriate.

## 2020-10-05 NOTE — PROGRESS NOTES
Case Management Discharge Note      Final Note: Per Norma at [facility], they have an acute level rehab bed available for transfer today. Ms Iniguez and daughter in agreement with plan. [facility] wheelchair van will transport at 1430. Call report to [phone]    Provided Post Acute Provider List?: Yes  Post Acute Provider List: Inpatient Rehab  Provided Post Acute Provider Quality & Resource List?: Yes  Post Acute Provider Quality and Resource List: Inpatient Rehab  Delivered To: Patient    Selected Continued Care - Admitted Since 10/2/2020     Destination Coordination complete    Service Provider Selected Services Address Phone Fax    [facility]  Inpatient Rehabilitation [address] [id number] [phone]          Durable Medical Equipment    No services have been selected for the patient.              Dialysis/Infusion    No services have been selected for the patient.              Home Medical Care    No services have been selected for the patient.              Therapy    No services have been selected for the patient.              Community Resources    No services have been selected for the patient.                       Final Discharge Disposition Code: 62 - inpatient rehab facility

## 2020-10-05 NOTE — PLAN OF CARE
Patient alert and oriented and tolerating oral intake well. Ambulated in canales with therapy. Voided 325 12:45 and had BM.

## 2020-10-05 NOTE — THERAPY TREATMENT NOTE
"Patient Name: Bri Iniguez  : 1935    MRN: [id number]                              Today's Date: 10/5/2020       Admit Date: 10/2/2020    Visit Dx:     ICD-10-CM ICD-9-CM   1. Other secondary osteoarthritis of right hip  M16.7 715.25     Patient Active Problem List   Diagnosis   • Macular degeneration   • Hyperlipidemia   • Essential hypertension   • Deafness   • Diabetes type 2, controlled (CMS/[facility])   • Primary osteoarthritis of left hip   • Venous insufficiency of both lower extremities   • Status post total replacement of left hip   • Acute blood loss anemia, mild, asymptomatic   • Leukocytosis, mild, likely reactive   • Hip fracture (CMS/[facility])   • Other persistent atrial fibrillation (CMS/[facility])   • Nonrheumatic aortic valve sclerosis   • DJD (degenerative joint disease)   • Status post total replacement of right hip   • Acute blood loss anemia, 2 units PRBC 10/3   • Acute postoperative pain     Past Medical History:   Diagnosis Date   • A-fib (CMS/[facility])    • Bursitis     trochanteric area   • Colonoscopy refused    • Constipation    • Deaf, left    • Deafness     unspecified   • Diabetes mellitus (CMS/[facility])     PATIENT REPORTS SHE HAS TAKEN METFORMIN IN THE PAST BUT WAS TAKEN OFF OF THIS MEDICATION AROUND 2018.     • Elevated cholesterol    • Essential hypertension    • GERD (gastroesophageal reflux disease)    • Hearing loss, right     PATIENT DOES USE A HEARING AID TO RIGHT SIDE INTERMITTENTLY (REPORTS DEAFNESS IN LEFT EAR)   • Hip pain    • History of UTI     PATIENT REPORTS RESOLVED AFTER HORMONE THERAPY WAS ORDERED   • Hyperlipidemia     other   • Impaired functional mobility, balance, gait, and endurance    • Influenza vaccine administered    • Macular degeneration    • Mammogram declined    • Osteoarthritis of left hip    • Primary osteoarthritis of right knee    • Supraventricular premature beats     REPORTED IN PREADMISSION TESTING VISIT \"IRREGULAR HEART BEAT\".  REPORTS UNSURE " THE ACTUAL VERBIAGE OF WHAT SHE WAS TOLD OTHER THAN THIS.    • Wears partial dentures     REPORTS UPPER AND LOWER PARTIALS. INSTRUCTED NO ADHESIVES THE DOS.     Past Surgical History:   Procedure Laterality Date   • BUNIONECTOMY Right 1/21/2019    Procedure: Right foot bunionectomy with exostectomy, proximal phalanx osteotomy, right foot second and third digit hammertoe correction with interphalangeal joint fusion, right second metatarsophalangeal joint capsular release/capsulotomy;  Surgeon: Klaus Cason DPM;  Location:  [facility];  Service: Podiatry   • CATARACT EXTRACTION Bilateral    • CHOLECYSTECTOMY  2002   • COLONOSCOPY     • HARDWARE REMOVAL Right 10/2/2020    Procedure: HIP HARDWARE REMOVAL RIGHT;  Surgeon: Gideon Son MD;  Location:  [facility];  Service: Orthopedics;  Laterality: Right;   • HERNIA REPAIR  2005    umbilical   • HIP INTERTROCHANTERIC NAILING Right 12/31/2019    Procedure: HIP RIGHT OPEN REDUCTION INTERNAL FIXATION, INTERMEDULLARY  NAIL;  Surgeon: Carlos Barba Jr., MD;  Location:  [facility];  Service: Orthopedics   • HYSTERECTOMY  1974   • TONSILLECTOMY     • TOTAL HIP ARTHROPLASTY Left 1/23/2018    Procedure: LEFT TOTAL HIP ARTHROPLASTY;  Surgeon: Gideon Son MD;  Location:  [facility];  Service:    • TOTAL HIP ARTHROPLASTY Right 10/2/2020    Procedure: TOTAL HIP ARTHROPLASTY RIGHT;  Surgeon: Gideon Son MD;  Location:  [facility];  Service: Orthopedics;  Laterality: Right;     General Information     Row Name 10/05/20 0914          Physical Therapy Time and Intention    Document Type  therapy note (daily note)  -AA     Mode of Treatment  physical therapy  -AA     Row Name 10/05/20 0914          General Information    Patient Profile Reviewed  yes  -AA     Existing Precautions/Restrictions  fall;hip;right;hip, posterior  -AA     Barriers to Rehab  hearing deficit  -AA     Row Name 10/05/20 0914          Cognition    Orientation Status (Cognition)  oriented x 4  -AA     Row  Name 10/05/20 0914          Safety Issues, Functional Mobility    Impairments Affecting Function (Mobility)  balance;endurance/activity tolerance;pain;strength;range of motion (ROM)  -AA       User Key  (r) = Recorded By, (t) = Taken By, (c) = Cosigned By    Initials Name Provider Type    Mallika Hernandez, PT Physical Therapist        Mobility     Row Name 10/05/20 0914          Bed Mobility    Bed Mobility  scooting/bridging;supine-sit  -AA     Scooting/Bridging [place] (Bed Mobility)  minimum assist (75% patient effort)  -AA     Supine-Sit [place] (Bed Mobility)  minimum assist (75% patient effort)  -AA     Assistive Device (Bed Mobility)  bed rails  -AA     Comment (Bed Mobility)  min A with RLE to advance to EOB  -AA     Row Name 10/05/20 0914          Transfers    Comment (Transfers)  VC for posterior hip precautions and hand placement to push off  -AA     Row Name 10/05/20 0914          Sit-Stand Transfer    Sit-Stand [place] (Transfers)  minimum assist (75% patient effort);verbal cues;1 person assist;1 person to manage equipment  -AA     Assistive Device (Sit-Stand Transfers)  walker, front-wheeled  -AA     Row Name 10/05/20 0914          Gait/Stairs (Locomotion)    [place] Level (Gait)  contact guard;verbal cues  -AA     Assistive Device (Gait)  walker, front-wheeled  -AA     Distance in Feet (Gait)  85'  -AA     Deviations/Abnormal Patterns (Gait)  bilateral deviations;steppage;stride length decreased;weight shifting decreased;gait speed decreased;isai decreased  -AA     Bilateral Gait Deviations  forward flexed posture;heel strike decreased  -AA     Comment (Gait/Stairs)  Pt ambulated with CGA, RW,  and chair follow 85' with VC for sequencing, posture, and staying inside walker.  Pt reports no pain during gait.  Ambulated with toe out R.  Tachy during gait limiting distance  -AA     Row Name 10/05/20 0914          Mobility    Extremity Weight-bearing Status  right lower extremity   -AA     Right Lower Extremity (Weight-bearing Status)  weight-bearing as tolerated (WBAT)  -       User Key  (r) = Recorded By, (t) = Taken By, (c) = Cosigned By    Initials Name Provider Type    Mallika Hernandez PT Physical Therapist        Obj/Interventions     Row Name 10/05/20 0914          Motor Skills    Therapeutic Exercise  hip;knee;ankle  -     Row Name 10/05/20 0914          Hip (Therapeutic Exercise)    Hip (Therapeutic Exercise)  AROM (active range of motion);isometric exercises  -     Hip AROM (Therapeutic Exercise)  bilateral;aBduction;aDduction;10 repetitions;sitting  -     Hip Isometrics (Therapeutic Exercise)  bilateral;gluteal sets;10 repetitions;sitting  -     Row Name 10/05/20 0914          Knee (Therapeutic Exercise)    Knee (Therapeutic Exercise)  AROM (active range of motion);isometric exercises  -     Knee AROM (Therapeutic Exercise)  bilateral;LAQ (long arc quad);SLR (straight leg raise);sitting;supine;10 repetitions  -     Knee Isometrics (Therapeutic Exercise)  bilateral;quad sets;10 repetitions;sitting  -     Row Name 10/05/20 0914          Ankle (Therapeutic Exercise)    Ankle (Therapeutic Exercise)  AROM (active range of motion)  -     Ankle AROM (Therapeutic Exercise)  bilateral;dorsiflexion;plantarflexion;10 repetitions  -     Row Name 10/05/20 0914          Balance    Balance Assessment  sitting static balance;standing static balance;standing dynamic balance  -     Static Sitting Balance  WFL;unsupported;sitting, edge of bed  -     Static Standing Balance  mild impairment;supported  -AA     Dynamic Standing Balance  mild impairment;supported  -AA       User Key  (r) = Recorded By, (t) = Taken By, (c) = Cosigned By    Initials Name Provider Type    Mallika Hernandez PT Physical Therapist        Goals/Plan     Row Name 10/05/20 0914          Bed Mobility Goal 1 (PT)    Activity/Assistive Device (Bed Mobility Goal 1, PT)  supine to sit;sit to  supine;scooting;rolling to right;rolling to left;bridging  -AA     [place] Level/Cues Needed (Bed Mobility Goal 1, PT)  contact guard assist  -AA     Time Frame (Bed Mobility Goal 1, PT)  long term goal (LTG);10 days  -AA     Progress/Outcomes (Bed Mobility Goal 1, PT)  good progress toward goal;goal ongoing  -AA     Row Name 10/05/20 0914          Transfer Goal 1 (PT)    Activity/Assistive Device (Transfer Goal 1, PT)  sit-to-stand/stand-to-sit;bed-to-chair/chair-to-bed;walker, rolling  -AA     [place] Level/Cues Needed (Transfer Goal 1, PT)  contact guard assist  -AA     Time Frame (Transfer Goal 1, PT)  long term goal (LTG);10 days  -AA     Progress/Outcome (Transfer Goal 1, PT)  good progress toward goal;goal revised this date;goal ongoing  -AA     Row Name 10/05/20 0914          Gait Training Goal 1 (PT)    Activity/Assistive Device (Gait Training Goal 1, PT)  gait (walking locomotion);assistive device use;decrease fall risk;diminish gait deviation;improve balance and speed;increase endurance/gait distance;maintain weight-bearing status;walker, rolling  -AA     [place] Level (Gait Training Goal 1, PT)  contact guard assist  -AA     Distance (Gait Training Goal 1, PT)  100'  -AA     Time Frame (Gait Training Goal 1, PT)  long term goal (LTG);10 days  -AA     Progress/Outcome (Gait Training Goal 1, PT)  good progress toward goal;goal ongoing;goal revised this date  -AA     Row Name 10/05/20 0914          Stairs Goal 1 (PT)    Activity/Assistive Device (Stairs Goal 1, PT)  ascending stairs;descending stairs  -AA     [place] Level/Cues Needed (Stairs Goal 1, PT)  minimum assist (75% or more patient effort)  -AA     Number of Stairs (Stairs Goal 1, PT)  1  -AA     Time Frame (Stairs Goal 1, PT)  long term goal (LTG);10 days  -AA     Progress/Outcome (Stairs Goal 1, PT)  goal ongoing  -AA     Row Name 10/05/20 0914          Patient Education Goal (PT)    Progress/Outcome (Patient Education Goal,  PT)  goal no longer appropriate  -AA       User Key  (r) = Recorded By, (t) = Taken By, (c) = Cosigned By    Initials Name Provider Type    Mallika Hernandez, PT Physical Therapist        Clinical Impression     Row Name 10/05/20 0914          Pain    Additional Documentation  Pain Scale: Numbers Pre/Post-Treatment (Group)  -     Row Name 10/05/20 0914          Pain Scale: Numbers Pre/Post-Treatment    Pretreatment Pain Rating  0/10 - no pain  -AA     Posttreatment Pain Rating  0/10 - no pain  -AA     Row Name 10/05/20 0914          Plan of Care Review    Plan of Care Reviewed With  patient  -AA     Progress  improving  -AA     Outcome Summary  Pt has no pain pre, during, or post treatment.  Min A for bed mobility.  Min A for STS transfers with RW.  CGA with chair follow and RW 85' of ambulation with VC for sequencing.  Educated pt on hip precautions.  Pt has tachycardia, RN notified.  Recommend DC to IRF when appropriate.  -AA     Row Name 10/05/20 0914          Therapy Assessment/Plan (PT)    Criteria for Skilled Interventions Met (PT)  yes  -AA     Row Name 10/05/20 0914          Vital Signs    Pretreatment Heart Rate (beats/min)  94  -AA     Intratreatment Heart Rate (beats/min)  136  -AA     Posttreatment Heart Rate (beats/min)  117  -AA     Pre Patient Position  Supine  -AA     Intra Patient Position  Standing  -AA     Post Patient Position  Sitting  -AA     Row Name 10/05/20 0914          Positioning and Restraints    Pre-Treatment Position  in bed  -AA     Post Treatment Position  chair  -AA     In Chair  notified nsg;reclined;call light within reach;encouraged to call for assist;exit alarm on;waffle cushion SCDs  -AA       User Key  (r) = Recorded By, (t) = Taken By, (c) = Cosigned By    Initials Name Provider Type    Mallika Hernandez, PT Physical Therapist        Outcome Measures     Row Name 10/05/20 0917          How much help from another person do you currently need...    Turning from your back to  your side while in flat bed without using bedrails?  3  -AA     Moving from lying on back to sitting on the side of a flat bed without bedrails?  3  -AA     Moving to and from a bed to a chair (including a wheelchair)?  3  -AA     Standing up from a chair using your arms (e.g., wheelchair, bedside chair)?  3  -AA     Climbing 3-5 steps with a railing?  1  -AA     To walk in hospital room?  3  -AA     AM-PAC 6 Clicks Score (PT)  16  -AA     Row Name 10/05/20 0917          Functional Assessment    Outcome Measure Options  AM-PAC 6 Clicks Basic Mobility (PT)  -AA       User Key  (r) = Recorded By, (t) = Taken By, (c) = Cosigned By    Initials Name Provider Type    Mallika Hernandez, PT Physical Therapist        Physical Therapy Education                 Title: PT OT SLP Therapies (Done)     Topic: Physical Therapy (Done)     Point: Mobility training (Done)     Learning Progress Summary           Patient Acceptance, E,D, VU,DU,NR by AA at 10/5/2020 0917    Comment: hip precautions  sequencing during functional mobility  see note    Acceptance, E, NR by  at 10/4/2020 1032    Comment: Patient education regarding sequencing for transfers.    Acceptance, E, NR by LO at 10/4/2020 1032    Comment: Patient education regarding sequencing for transfers and step pattern for safe and effective gait.    Acceptance, E, NR by LO at 10/3/2020 1512    Comment: Patient education on sequencing for bed mobility and transfers.    Acceptance, E, NR by  at 10/3/2020 0842    Comment: Patient education regarding sequencing for transfers.                   Point: Home exercise program (Done)     Learning Progress Summary           Patient Acceptance, E,D, VU,DU,NR by AA at 10/5/2020 0917    Comment: hip precautions  sequencing during functional mobility  see note                   Point: Body mechanics (Done)     Learning Progress Summary           Patient Acceptance, E,D, VU,DU,NR by AA at 10/5/2020 0917    Comment: hip  precautions  sequencing during functional mobility  see note                   Point: Precautions (Done)     Learning Progress Summary           Patient Acceptance, E,D, VU,DU,NR by  at 10/5/2020 0917    Comment: hip precautions  sequencing during functional mobility  see note                               User Key     Initials Effective Dates Name Provider Type Discipline     04/02/18 -  Mallika Bullock, PT Physical Therapist PT    LO 03/11/20 -  Wanda Holley, [place] Physical Therapist PT              PT Recommendation and Plan     Plan of Care Reviewed With: patient  Progress: improving  Outcome Summary: Pt has no pain pre, during, or post treatment.  Min A for bed mobility.  Min A for STS transfers with RW.  CGA with chair follow and RW 85' of ambulation with VC for sequencing.  Educated pt on hip precautions.  Pt has tachycardia, RN notified.  Recommend DC to IRF when appropriate.     Time Calculation:   PT Charges     Row Name 10/05/20 0917             Time Calculation    Start Time  0917  -AA      PT Received On  10/05/20  -      PT Goal Re-Cert Due Date  10/13/20  -         Time Calculation- PT    Total Timed Code Minutes- PT  24 minute(s)  -         Timed Charges    90290 - PT Therapeutic Exercise Minutes  10  -AA      80701 - Gait Training Minutes   14  -AA        User Key  (r) = Recorded By, (t) = Taken By, (c) = Cosigned By    Initials Name Provider Type    AA Mallika Bullock, PT Physical Therapist        Therapy Charges for Today     Code Description Service Date Service Provider Modifiers Qty    15894751640 HC PT THER PROC EA 15 MIN 10/5/2020 Mallika Bullock, PT GP 1    75059372237 HC GAIT TRAINING EA 15 MIN 10/5/2020 Mallika Bullock, PT GP 1    98221518045 HC PT THER SUPP EA 15 MIN 10/5/2020 Mallika Bullock, PT GP 2          PT G-Codes  Outcome Measure Options: AM-PAC 6 Clicks Basic Mobility (PT)  AM-PAC 6 Clicks Score (PT): 16  AM-PAC 6 Clicks Score (OT): 14    April STEWART Bullock PT  10/5/2020

## 2020-10-05 NOTE — PROGRESS NOTES
"      [facility] [place]    Subjective      LOS: 3 days   Patient Care Team:  Clifford Nolasco MD as PCP - General  Clifford Nolasco MD as PCP - Claims Attributed    CC: Right hip pain    Interval History:   She is passing gas, but no bowel movement.  No hip pain.    Objective      Vital Signs  Temp (24hrs), Av.2 °F (36.8 °C), Min:97.9 °F (36.6 °C), Max:98.3 °F (36.8 °C)      /86 (BP Location: Right arm, Patient Position: Lying)   Pulse 113   Temp 98.3 °F (36.8 °C) (Oral)   Resp 16   Ht 167.6 cm (66\")   Wt 77.1 kg (170 lb)   LMP  (LMP Unknown) Comment: HYSTERECTOMY  SpO2 97%   Breastfeeding No   BMI 27.44 kg/m²     Examination:   Examination of the right hip: The wound is clean, dry, and intact.  Ankle dorsiflexion, ankle plantar flexion, and EHL are intact.  Sensation intact in the foot to light touch.   Thigh is soft and nontender.      Labs:  Results from last 7 days   Lab Units 10/05/20  0638 10/04/20  0650 10/03/20  2001 10/03/20  0437 10/02/20  1747   WBC 10*3/mm3 9.73 10.88*  --  10.55  --    HEMOGLOBIN g/dL 9.2* 8.7* 9.0* 7.0* 8.4*   HEMATOCRIT % 28.4* 26.7* 27.5* 22.3* 26.6*   MCV fL 92.5 93.4  --  91.4  --    PLATELETS 10*3/mm3 171 135*  --  166  --        Radiology:  Imaging Results (Last 24 Hours)     ** No results found for the last 24 hours. **          PT:  Physical Therapy - Plan of Care Review - Outcome Summary:  Outcome Summary: Patient continues progressing towards goals this PM session. Transfers with FWW with modA ( able to recall 50% of steps in sequencing for transfers) and ambulates x 60' with FWW with Jerry with chair follow for safety. Requires vc for heel strike on R as well as step through pattern. Ambulation distance limited by fatigue. Requested back in bed to rest after session. Cont to recommend IPR at dc. (10/04/20 3104)]       Results Review:     I reviewed the patient's new clinical results.    Assessment and Plan     Assessment:   Status " post conversion to total hip arthroplasty, right hip    Weightbearing as tolerated right lower extremity with a walker  Acute blood loss anemia- responded to 2 units on 10/3/2020  Continue rehabilitation  Constipation      Status post total replacement of right hip    Hyperlipidemia    Essential hypertension    Deafness    Diabetes type 2, controlled (CMS/[facility])    DJD (degenerative joint disease)    Acute blood loss anemia      Plan for disposition: Plan for discharge when stable medically and cleared by physical therapy, most likely to a rehabilitation facility.      Future Appointments   Date Time Provider Department Center   10/26/2020 10:30 AM Gideon Son MD [facility]   1/20/2021  1:30 PM Dwight Lassiter MD [facility] None           Gideon Son MD  10/05/20  08:07 EDT

## 2020-10-08 ENCOUNTER — TELEPHONE (OUTPATIENT)
Dept: CARDIOLOGY | Facility: CLINIC | Age: 85
End: 2020-10-08

## 2020-10-08 NOTE — TELEPHONE ENCOUNTER
Dr. Vasquez at [facility] wanted to know if patient is supposed to be on Eliquis 5 mg. It was dc 'd when patient had surgery.

## 2020-10-09 NOTE — TELEPHONE ENCOUNTER
Yes.  The intention was that it be restarted postoperatively once hemostasis was achieved.  However, the dose should be 2.5 mg orally twice per day.

## 2020-10-26 ENCOUNTER — OFFICE VISIT (OUTPATIENT)
Dept: ORTHOPEDIC SURGERY | Facility: CLINIC | Age: 85
End: 2020-10-26

## 2020-10-26 DIAGNOSIS — Z47.89 ORTHOPEDIC AFTERCARE: ICD-10-CM

## 2020-10-26 DIAGNOSIS — Z96.641 STATUS POST TOTAL HIP REPLACEMENT, RIGHT: Primary | ICD-10-CM

## 2020-10-26 PROCEDURE — 99024 POSTOP FOLLOW-UP VISIT: CPT | Performed by: ORTHOPAEDIC SURGERY

## 2020-10-26 NOTE — PROGRESS NOTES
[facility] Clinic Note    Subjective     Chief Complaint   Patient presents with   • Post-op     3 week status post total hip replacement, right 10/02/2020        HPI    It has been 3  week(s) since Ms. Iniguez's last visit. She returns to clinic today for postoperative follow-up of right hip arthroplasty. She rates her pain a 2/10 on the pain scale. Previous/current treatments: cane/walker and physical therapy. Overall, she is doing better.  100% improvement compared to her preoperative symptoms.    I have reviewed the following portions of the patient's history and agree with: History of Present Illness and Review of Systems    Patient Active Problem List   Diagnosis   • Macular degeneration   • Hyperlipidemia   • Essential hypertension   • Deafness   • Diabetes type 2, controlled (CMS/[facility])   • Primary osteoarthritis of left hip   • Venous insufficiency of both lower extremities   • Status post total replacement of left hip   • Acute blood loss anemia, mild, asymptomatic   • Leukocytosis, mild, likely reactive   • Hip fracture (CMS/[facility])   • Other persistent atrial fibrillation (CMS/[facility])   • Nonrheumatic aortic valve sclerosis   • DJD (degenerative joint disease)   • Status post total replacement of right hip   • Acute blood loss anemia, 2 units PRBC 10/3   • Acute postoperative pain   • Postoperative urinary retention     Past Medical History:   Diagnosis Date   • A-fib (CMS/[facility])    • Bursitis     trochanteric area   • Colonoscopy refused 2015   • Constipation    • Deaf, left    • Deafness     unspecified   • Diabetes mellitus (CMS/[facility])     PATIENT REPORTS SHE HAS TAKEN METFORMIN IN THE PAST BUT WAS TAKEN OFF OF THIS MEDICATION AROUND OCTOBER 2018.     • Elevated cholesterol    • Essential hypertension    • GERD (gastroesophageal reflux disease)    • Hearing loss, right     PATIENT DOES USE A HEARING AID TO RIGHT SIDE INTERMITTENTLY (REPORTS DEAFNESS IN LEFT EAR)   • Hip pain    • History of UTI   "   PATIENT REPORTS RESOLVED AFTER HORMONE THERAPY WAS ORDERED   • Hyperlipidemia     other   • Impaired functional mobility, balance, gait, and endurance    • Influenza vaccine administered 2014   • Macular degeneration    • Mammogram declined 2015   • Osteoarthritis of left hip    • Primary osteoarthritis of right knee    • Supraventricular premature beats     REPORTED IN PREADMISSION TESTING VISIT \"IRREGULAR HEART BEAT\".  REPORTS UNSURE THE ACTUAL VERBIAGE OF WHAT SHE WAS TOLD OTHER THAN THIS.    • Wears partial dentures     REPORTS UPPER AND LOWER PARTIALS. INSTRUCTED NO ADHESIVES THE DOS.      Past Surgical History:   Procedure Laterality Date   • BUNIONECTOMY Right 1/21/2019    Procedure: Right foot bunionectomy with exostectomy, proximal phalanx osteotomy, right foot second and third digit hammertoe correction with interphalangeal joint fusion, right second metatarsophalangeal joint capsular release/capsulotomy;  Surgeon: Klaus Cason DPM;  Location:  [facility];  Service: Podiatry   • CATARACT EXTRACTION Bilateral    • CHOLECYSTECTOMY  2002   • COLONOSCOPY     • HARDWARE REMOVAL Right 10/2/2020    Procedure: HIP HARDWARE REMOVAL RIGHT;  Surgeon: Gideon Son MD;  Location:  [facility];  Service: Orthopedics;  Laterality: Right;   • HERNIA REPAIR  2005    umbilical   • HIP INTERTROCHANTERIC NAILING Right 12/31/2019    Procedure: HIP RIGHT OPEN REDUCTION INTERNAL FIXATION, INTERMEDULLARY  NAIL;  Surgeon: Carlos Barba Jr., MD;  Location:  [facility];  Service: Orthopedics   • HYSTERECTOMY  1974   • TONSILLECTOMY     • TOTAL HIP ARTHROPLASTY Left 1/23/2018    Procedure: LEFT TOTAL HIP ARTHROPLASTY;  Surgeon: Gideon Son MD;  Location:  [facility];  Service:    • TOTAL HIP ARTHROPLASTY Right 10/2/2020    Procedure: TOTAL HIP ARTHROPLASTY RIGHT;  Surgeon: Gideon Son MD;  Location:  [facility];  Service: Orthopedics;  Laterality: Right;      Family History   Problem Relation Age of Onset   • " Hypertension Other    • Diabetes Other         type 2   • Stroke Mother    • Diabetes Mother    • Hypertension Mother    • Cancer Father      Social History     Socioeconomic History   • Marital status:      Spouse name: Not on file   • Number of children: Not on file   • Years of education: Not on file   • Highest education level: Not on file   Tobacco Use   • Smoking status: Never Smoker   • Smokeless tobacco: Never Used   Substance and Sexual Activity   • Alcohol use: No   • Drug use: No   • Sexual activity: Defer      Current Outpatient Medications on File Prior to Visit   Medication Sig Dispense Refill   • aspirin  MG EC tablet Take 1 tablet by mouth Daily. For 1 month     • atorvastatin (LIPITOR) 10 MG tablet Take 10 mg by mouth Every Night.     • citalopram (CeleXA) 40 MG tablet Take 40 mg by mouth Daily.     • diclofenac (VOLTAREN) 1 % gel gel 4 (Four) Times a Day As Needed.     • DOCUSATE SODIUM PO Take  by mouth Daily.     • estradiol (ESTRACE) 0.1 MG/GM vaginal cream Insert 1 g into the vagina 3 (Three) Times a Week. Apply using finger technique  6   • INTRAROSA 6.5 MG insert Insert 1 suppository into the vagina Every Night.  6   • metoprolol tartrate (LOPRESSOR) 25 MG tablet 0.5 tablets 2 (Two) Times a Day.     • Multiple Vitamins-Minerals (OCUVITE ADULT 50+) capsule Take 1 tablet by mouth Daily.     • tamsulosin (FLOMAX) 0.4 MG capsule 24 hr capsule Take 1 capsule by mouth Daily. 30 capsule    • traMADol (ULTRAM) 50 MG tablet Take 50 mg by mouth At Night As Needed.       No current facility-administered medications on file prior to visit.       No Known Allergies     Review of Systems   Constitutional: Negative for activity change, appetite change, chills, diaphoresis, fatigue, fever and unexpected weight change.   HENT: Negative for congestion, dental problem, drooling, ear discharge, ear pain, facial swelling, hearing loss, mouth sores, nosebleeds, postnasal drip, rhinorrhea, sinus  pressure, sneezing, sore throat, tinnitus, trouble swallowing and voice change.    Eyes: Negative for photophobia, pain, discharge, redness, itching and visual disturbance.   Respiratory: Negative for apnea, cough, choking, chest tightness, shortness of breath, wheezing and stridor.    Cardiovascular: Negative for chest pain, palpitations and leg swelling.   Gastrointestinal: Negative for abdominal distention, abdominal pain, anal bleeding, blood in stool, constipation, diarrhea, nausea, rectal pain and vomiting.   Endocrine: Negative for cold intolerance, heat intolerance, polydipsia, polyphagia and polyuria.   Genitourinary: Negative for decreased urine volume, difficulty urinating, dysuria, enuresis, flank pain, frequency, genital sores, hematuria and urgency.   Musculoskeletal: Positive for arthralgias. Negative for back pain, gait problem, joint swelling, myalgias, neck pain and neck stiffness.   Skin: Negative for color change, pallor, rash and wound.   Allergic/Immunologic: Negative for environmental allergies, food allergies and immunocompromised state.   Neurological: Negative for dizziness, tremors, seizures, syncope, facial asymmetry, speech difficulty, weakness, light-headedness, numbness and headaches.   Hematological: Negative for adenopathy. Does not bruise/bleed easily.   Psychiatric/Behavioral: Negative for agitation, behavioral problems, confusion, decreased concentration, dysphoric mood, hallucinations, self-injury, sleep disturbance and suicidal ideas. The patient is not nervous/anxious and is not hyperactive.         Objective      Physical Exam  LMP  (LMP Unknown) Comment: HYSTERECTOMY    There is no height or weight on file to calculate BMI.    General:   Mental Status:  Alert   Appearance: Cooperative, in no acute distress   Build and Nutrition: Well-nourished well-developed female   Orientation: Alert and oriented to person, place and time   Posture: Doing in a  wheelchair    Integument:   Right hip: Wound is well-healed with no signs of infection    Lower Extremity:   Right Hip:    Tenderness:  None    Swelling:  None    Crepitus:  None    Range of motion:  External Rotation: 30°       Internal Rotation: 30°       Flexion:  100°       Extension:  0°    Deformities:  None  Functional testing: Negative Stinchfield    Slightly short on the right compared to left      Imaging/Studies  Imaging Results (Last 24 Hours)     Procedure Component Value Units Date/Time    XR Hip With or Without Pelvis 2 - 3 View Right [[id number]] Resulted: 10/26/20 1049     Updated: 10/26/20 1049    Narrative:      Right Hip Radiographs  Indication: status-post right total hip arthroplasty  Views: low AP pelvis and lateral of the right hip    Comparison: no change compared to prior study, 10/2/2020    Findings:   The components are well aligned, with no signs of loosening or failure.            Assessment and Plan     Diagnoses and all orders for this visit:    1. Status post total hip replacement, right (Primary)  -     XR Hip With or Without Pelvis 2 - 3 View Right    2. Orthopedic aftercare        1. Status post total hip replacement, right    2. Orthopedic aftercare        I reviewed my findings with patient today.  Her right total hip arthroplasty appears to be functioning well, which was a conversion from a failed trochanteric fixation nail in [place], [place].  She will continue with rehabilitation, and I will see her back in 6 weeks, but sooner for any problems.  Of note, her left knee is bothering her, with a known history of arthritis.  Injection may be considered on her next visit if appropriate.    Return in about 6 weeks (around 12/7/2020).    Medical Decision Making  Management Options : physical/occupational therapy  Data/Risk: radiology tests and independent visualization of imaging, lab tests, or EMG/NCV    Gideon Son MD  10/26/20  12:07 EDT    Dragon disclaimer:  Much of  this encounter note is an electronic transcription/translation of spoken language to printed text. The electronic translation of spoken language may permit erroneous, or at times, nonsensical words or phrases to be inadvertently transcribed; Although I have reviewed the note for such errors, some may still exist.

## 2020-12-07 ENCOUNTER — OFFICE VISIT (OUTPATIENT)
Dept: ORTHOPEDIC SURGERY | Facility: CLINIC | Age: 85
End: 2020-12-07

## 2020-12-07 DIAGNOSIS — M17.0 PRIMARY OSTEOARTHRITIS OF BOTH KNEES: ICD-10-CM

## 2020-12-07 DIAGNOSIS — Z96.641 STATUS POST TOTAL HIP REPLACEMENT, RIGHT: Primary | ICD-10-CM

## 2020-12-07 DIAGNOSIS — Z47.89 ORTHOPEDIC AFTERCARE: ICD-10-CM

## 2020-12-07 PROCEDURE — 99024 POSTOP FOLLOW-UP VISIT: CPT | Performed by: ORTHOPAEDIC SURGERY

## 2020-12-07 PROCEDURE — 99214 OFFICE O/P EST MOD 30 MIN: CPT | Performed by: ORTHOPAEDIC SURGERY

## 2020-12-07 PROCEDURE — 20610 DRAIN/INJ JOINT/BURSA W/O US: CPT | Performed by: ORTHOPAEDIC SURGERY

## 2020-12-07 RX ORDER — TRAMADOL HYDROCHLORIDE 50 MG/1
50 TABLET ORAL EVERY 6 HOURS PRN
COMMUNITY
End: 2021-01-20

## 2020-12-07 RX ORDER — ROPIVACAINE HYDROCHLORIDE 5 MG/ML
4 INJECTION, SOLUTION EPIDURAL; INFILTRATION; PERINEURAL
Status: COMPLETED | OUTPATIENT
Start: 2020-12-07 | End: 2020-12-07

## 2020-12-07 RX ORDER — TRIAMCINOLONE ACETONIDE 40 MG/ML
40 INJECTION, SUSPENSION INTRA-ARTICULAR; INTRAMUSCULAR
Status: COMPLETED | OUTPATIENT
Start: 2020-12-07 | End: 2020-12-07

## 2020-12-07 RX ADMIN — TRIAMCINOLONE ACETONIDE 40 MG: 40 INJECTION, SUSPENSION INTRA-ARTICULAR; INTRAMUSCULAR at 11:58

## 2020-12-07 RX ADMIN — ROPIVACAINE HYDROCHLORIDE 4 ML: 5 INJECTION, SOLUTION EPIDURAL; INFILTRATION; PERINEURAL at 11:58

## 2020-12-07 NOTE — PROGRESS NOTES
Procedure   Large Joint Arthrocentesis: R knee  Date/Time: 12/7/2020 11:58 AM  Consent given by: patient  Site marked: site marked  Timeout: Immediately prior to procedure a time out was called to verify the correct patient, procedure, equipment, support staff and site/side marked as required   Supporting Documentation  Indications: pain   Procedure Details  Location: knee - R knee  Preparation: Patient was prepped and draped in the usual sterile fashion  Needle size: 22 G  Approach: anterolateral  Medications administered: 4 mL ropivacaine 0.5 %; 40 mg triamcinolone acetonide 40 MG/ML  Patient tolerance: patient tolerated the procedure well with no immediate complications    Large Joint Arthrocentesis: L knee  Date/Time: 12/7/2020 11:58 AM  Consent given by: patient  Site marked: site marked  Timeout: Immediately prior to procedure a time out was called to verify the correct patient, procedure, equipment, support staff and site/side marked as required   Supporting Documentation  Indications: pain   Procedure Details  Location: knee - L knee  Preparation: Patient was prepped and draped in the usual sterile fashion  Needle size: 22 G  Approach: anterolateral  Medications administered: 4 mL ropivacaine 0.5 %; 40 mg triamcinolone acetonide 40 MG/ML  Patient tolerance: patient tolerated the procedure well with no immediate complications

## 2020-12-07 NOTE — PROGRESS NOTES
[facility] Note    Subjective     Chief Complaint   Patient presents with   • Post-op     6 week follow up/10 weeks  Status post total hip 10-        HPI    It has been 6  week(s) since Ms. Iniguez's last visit. She returns to clinic today for postoperative follow-up of right hip pain. She rates her pain a 0/10 on the pain scale. Previous/current treatments: nothing. Current symptoms: same as prior visit. The pain is worse with none; doesn't use treatments to improve the pain. Overall, she is doing better.  Much improved from her preoperative symptoms.  No pain.    Bri Iniguez is a 85 y.o. female who presents with new problem of: bilateral knee pain.  Onset: atraumatic and gradual in nature. The issue has been ongoing for 3 month(s). Pain is a 4/10 on the pain scale. Pain is described as aching. Associated symptoms include pain, swelling, popping, grinding and stiffness. The pain is worse with walking, standing and sitting; resting, ice, heat and pain medication and/or NSAID improve the pain. Previous treatments have included: viscosupplementation (last injection not sure of the date).  Known history of arthritis, and she would like to have injections today.    I have reviewed the following portions of the patient's history and agree with: History of Present Illness and Review of Systems    Patient Active Problem List   Diagnosis   • Macular degeneration   • Hyperlipidemia   • Essential hypertension   • Deafness   • Diabetes type 2, controlled (CMS/HCC)   • Primary osteoarthritis of left hip   • Venous insufficiency of both lower extremities   • Status post total replacement of left hip   • Acute blood loss anemia, mild, asymptomatic   • Leukocytosis, mild, likely reactive   • Hip fracture (CMS/HCC)   • Other persistent atrial fibrillation (CMS/HCC)   • Nonrheumatic aortic valve sclerosis   • DJD (degenerative joint disease)   • Status post total replacement of right hip   • Acute  "blood loss anemia, 2 units PRBC 10/3   • Acute postoperative pain   • Postoperative urinary retention     Past Medical History:   Diagnosis Date   • A-fib (CMS/[facility])    • Bursitis     trochanteric area   • Colonoscopy refused 2015   • Constipation    • Deaf, left    • Deafness     unspecified   • Diabetes mellitus (CMS/[facility])     PATIENT REPORTS SHE HAS TAKEN METFORMIN IN THE PAST BUT WAS TAKEN OFF OF THIS MEDICATION AROUND OCTOBER 2018.     • Elevated cholesterol    • Essential hypertension    • GERD (gastroesophageal reflux disease)    • Hearing loss, right     PATIENT DOES USE A HEARING AID TO RIGHT SIDE INTERMITTENTLY (REPORTS DEAFNESS IN LEFT EAR)   • Hip pain    • History of UTI     PATIENT REPORTS RESOLVED AFTER HORMONE THERAPY WAS ORDERED   • Hyperlipidemia     other   • Impaired functional mobility, balance, gait, and endurance    • Influenza vaccine administered 2014   • Macular degeneration    • Mammogram declined 2015   • Osteoarthritis of left hip    • Primary osteoarthritis of right knee    • Supraventricular premature beats     REPORTED IN PREADMISSION TESTING VISIT \"IRREGULAR HEART BEAT\".  REPORTS UNSURE THE ACTUAL VERBIAGE OF WHAT SHE WAS TOLD OTHER THAN THIS.    • Wears partial dentures     REPORTS UPPER AND LOWER PARTIALS. INSTRUCTED NO ADHESIVES THE DOS.      Past Surgical History:   Procedure Laterality Date   • BUNIONECTOMY Right 1/21/2019    Procedure: Right foot bunionectomy with exostectomy, proximal phalanx osteotomy, right foot second and third digit hammertoe correction with interphalangeal joint fusion, right second metatarsophalangeal joint capsular release/capsulotomy;  Surgeon: Klaus Cason DPM;  Location: [facility] OR;  Service: Podiatry   • CATARACT EXTRACTION Bilateral    • CHOLECYSTECTOMY  2002   • COLONOSCOPY     • HARDWARE REMOVAL Right 10/2/2020    Procedure: HIP HARDWARE REMOVAL RIGHT;  Surgeon: Gideon Son MD;  Location: [facility] OR;  Service: Orthopedics;  Laterality: " Right;   • HERNIA REPAIR  2005    umbilical   • HIP INTERTROCHANTERIC NAILING Right 12/31/2019    Procedure: HIP RIGHT OPEN REDUCTION INTERNAL FIXATION, INTERMEDULLARY  NAIL;  Surgeon: Carlos Barba Jr., MD;  Location:  [facility] OR;  Service: Orthopedics   • HYSTERECTOMY  1974   • TONSILLECTOMY     • TOTAL HIP ARTHROPLASTY Left 1/23/2018    Procedure: LEFT TOTAL HIP ARTHROPLASTY;  Surgeon: Gideon Son MD;  Location:  [facility] OR;  Service:    • TOTAL HIP ARTHROPLASTY Right 10/2/2020    Procedure: TOTAL HIP ARTHROPLASTY RIGHT;  Surgeon: Gideon Son MD;  Location:  [facility] OR;  Service: Orthopedics;  Laterality: Right;      Family History   Problem Relation Age of Onset   • Hypertension Other    • Diabetes Other         type 2   • Stroke Mother    • Diabetes Mother    • Hypertension Mother    • Cancer Father      Social History     Socioeconomic History   • Marital status:      Spouse name: Not on file   • Number of children: Not on file   • Years of education: Not on file   • Highest education level: Not on file   Tobacco Use   • Smoking status: Never Smoker   • Smokeless tobacco: Never Used   Substance and Sexual Activity   • Alcohol use: No   • Drug use: No   • Sexual activity: Defer      Current Outpatient Medications on File Prior to Visit   Medication Sig Dispense Refill   • atorvastatin (LIPITOR) 10 MG tablet Take 10 mg by mouth Every Night.     • citalopram (CeleXA) 40 MG tablet Take 40 mg by mouth Daily.     • INTRAROSA 6.5 MG insert Insert 1 suppository into the vagina Every Night.  6   • metoprolol tartrate (LOPRESSOR) 25 MG tablet 0.5 tablets 2 (Two) Times a Day.     • Multiple Vitamins-Minerals (OCUVITE ADULT 50+) capsule Take 1 tablet by mouth Daily.     • traMADol (ULTRAM) 50 MG tablet Take 50 mg by mouth Every 6 (Six) Hours As Needed for Moderate Pain .     • aspirin  MG EC tablet Take 1 tablet by mouth Daily. For 1 month     • diclofenac (VOLTAREN) 1 % gel gel 4 (Four) Times a Day As  Needed.     • DOCUSATE SODIUM PO Take  by mouth Daily.     • estradiol (ESTRACE) 0.1 MG/GM vaginal cream Insert 1 g into the vagina 3 (Three) Times a Week. Apply using finger technique  6   • tamsulosin (FLOMAX) 0.4 MG capsule 24 hr capsule Take 1 capsule by mouth Daily. 30 capsule    • [DISCONTINUED] traMADol (ULTRAM) 50 MG tablet Take 50 mg by mouth At Night As Needed.       No current facility-administered medications on file prior to visit.       No Known Allergies     Review of Systems   Constitutional: Negative.    HENT: Negative.    Eyes: Negative.    Respiratory: Negative.    Cardiovascular: Negative.    Gastrointestinal: Negative.    Endocrine: Negative.    Genitourinary: Negative.    Musculoskeletal: Positive for arthralgias.   Skin: Negative.    Allergic/Immunologic: Negative.    Neurological: Negative.    Hematological: Negative.    Psychiatric/Behavioral: Negative.         Objective      Physical Exam  LMP  (LMP Unknown) Comment: HYSTERECTOMY    There is no height or weight on file to calculate BMI.    General:   Mental Status:  Alert   Appearance: Cooperative, in no acute distress   Build and Nutrition: Well-nourished well-developed female   Orientation: Alert and oriented to person, place and time   Posture: Normal   Gait: Sitting in wheelchair    Integument:              Right hip: Wound is well-healed with no signs of infection     Lower Extremity:              Right Hip:                          Tenderness:    None                          Swelling:          None                          Crepitus:          None                          Range of motion:        External Rotation:       30°                                                              Internal Rotation:        30°                                                              Flexion:                       100°                                                              Extension:                   0°                       Deformities:      None  Functional testing: Negative Stinchfield                          Slightly short on the right compared to left    Integument:   Right knee: no skin lesions, no rash, no ecchymosis   Left knee: no skin lesions, no rash, no ecchymosis    Lower Extremities:   Right Knee:    Tenderness:  Medial and lateral joint line tenderness    Effusion:  None    Swelling:  Edema in the lower extremity    Crepitus:  Positive    Atrophy:  None    Range of motion:  Extension: 10°       Flexion: 120°  Instability:  No varus laxity, no valgus laxity, negative anterior drawer  Deformities:  Varus   Left Knee:    Tenderness:  Medial and lateral joint line tenderness    Effusion:  None    Swelling:  Edema in the lower extremity    Crepitus: Positive    Atrophy:  None    Range of motion:  Extension: 10°       Flexion: 120°  Instability:  No varus laxity, no valgus laxity, negative anterior drawer  Deformities:  Valgus      Imaging/Studies  Imaging Results (Last 24 Hours)     Procedure Component Value Units Date/Time    XR Knee 4+ View Bilateral [[id number]] Resulted: 12/07/20 1153     Updated: 12/07/20 1155    Narrative:      Right Knee Radiographs  Indication: right knee pain  Views: Standing AP's and skiers of both knees, with lateral and sunrise   views of the right knee    Comparison: no prior studies available    Findings:   Advanced arthritic changes, with bone-on-bone contact medial compartment,   varus alignment, tricompartmental degeneration, no acute bony   abnormalities.  Diffuse osteopenia.    Left Knee Radiographs  Indication: left knee pain  Views: Standing AP's and skiers of both knees, with lateral and sunrise   views of the left knee    Comparison: no prior studies available    Findings:   Bone-on-bone contact in the lateral compartment, diffuse osteopenia,   patellofemoral degeneration, no acute bony abnormalities.  Posterior   lateral defect proximal tibia.    XR Hip With or Without Pelvis 2 - 3 View Right  [[id number]] Resulted: 12/07/20 1152     Updated: 12/07/20 1153    Narrative:      Right Hip Radiographs  Indication: status-post right total hip arthroplasty  Views: low AP pelvis and lateral of the right hip    Comparison: 10/26/2020    Findings:   The components are well aligned, with no signs of loosening or failure.    Components are stable compared to the previous films.            Assessment and Plan     Diagnoses and all orders for this visit:    1. Status post total hip replacement, right (Primary)  -     XR Hip With or Without Pelvis 2 - 3 View Right    2. Orthopedic aftercare    3. Primary osteoarthritis of both knees  -     XR Knee 4+ View Bilateral  -     Large Joint Arthrocentesis: R knee  -     Large Joint Arthrocentesis: L knee        1. Status post total hip replacement, right    2. Orthopedic aftercare    3. Primary osteoarthritis of both knees        I reviewed my findings with the patient.  Her right hip is doing well following conversion of the failed trochanteric fixation nail to a total hip arthroplasty.  No pain in the hip.  We will follow up with that at the 1 year tony, which will be in about 10 months.    Regarding her knees, she would like to have injections, and these were provided.  She has significant arthritis in both knees.  She is not a good surgical candidate secondary to her chronic lower extremity edema.  Both knees were injected, and I will see her back in 4 months, but sooner for any problems.    Procedure Note:  The potential benefits of performing a therapeutic bilateral knee joint injections, as well as potential risks (including, but not limited to infection, swelling, pain, bleeding, bruising, nerve/blood vessel damage, skin color changes, transient elevation in blood glucose levels, and fat atrophy) were discussed with the patient.  After informed consent, timeout procedure was performed, and the skin on the right and left knees was prepped with chlorhexidine soap and  alcohol, after which ethyl chloride was applied to the skin at the injection site. Via the anterolateral approach, 1ml of Kenalog 40mg/ml mixed with 4ml 0.5% ropivacaine plain was injected into the knee joints.  The patient tolerated the procedures well, experiencing 50% improvement in the right knee and 50% improvement in the left knee a few minutes following the injections.T here were no complications.  Band-Aids were applied to the injection sites. Post-procedural instructions were given to the patient and/or their caregiver.    Return in about 4 months (around 4/7/2021).    Medical Decision Making  Management Options : prescription/IM medicine  Data/Risk: radiology tests and independent visualization of imaging, lab tests, or EMG/NCV    Gideon Son MD  12/07/20  12:46 WILMAR Cantrell disclaimer:  Much of this encounter note is an electronic transcription/translation of spoken language to printed text. The electronic translation of spoken language may permit erroneous, or at times, nonsensical words or phrases to be inadvertently transcribed; Although I have reviewed the note for such errors, some may still exist.

## 2021-01-20 ENCOUNTER — OFFICE VISIT (OUTPATIENT)
Dept: CARDIOLOGY | Facility: CLINIC | Age: 86
End: 2021-01-20

## 2021-01-20 VITALS
OXYGEN SATURATION: 95 % | BODY MASS INDEX: 27.97 KG/M2 | SYSTOLIC BLOOD PRESSURE: 142 MMHG | HEIGHT: 66 IN | DIASTOLIC BLOOD PRESSURE: 78 MMHG | HEART RATE: 60 BPM | WEIGHT: 174 LBS

## 2021-01-20 DIAGNOSIS — I10 ESSENTIAL HYPERTENSION: ICD-10-CM

## 2021-01-20 DIAGNOSIS — I50.32 CHRONIC DIASTOLIC CONGESTIVE HEART FAILURE (HCC): ICD-10-CM

## 2021-01-20 DIAGNOSIS — R60.0 EDEMA LEG: Primary | ICD-10-CM

## 2021-01-20 DIAGNOSIS — I35.0 NONRHEUMATIC AORTIC VALVE STENOSIS: ICD-10-CM

## 2021-01-20 DIAGNOSIS — I87.2 VENOUS INSUFFICIENCY OF BOTH LOWER EXTREMITIES: ICD-10-CM

## 2021-01-20 PROBLEM — I48.19 OTHER PERSISTENT ATRIAL FIBRILLATION (HCC): Status: RESOLVED | Noted: 2020-01-22 | Resolved: 2021-01-20

## 2021-01-20 PROCEDURE — 99214 OFFICE O/P EST MOD 30 MIN: CPT | Performed by: INTERNAL MEDICINE

## 2021-01-20 RX ORDER — FUROSEMIDE 40 MG/1
TABLET ORAL
COMMUNITY
Start: 2020-12-11

## 2021-01-20 NOTE — PROGRESS NOTES
Subjective:     Encounter Date:01/20/2021      Patient ID: Bri Iniguez is a 85 y.o. female.    Chief Complaint: Leg swelling  HPI  This is an 85-year-old female patient who presents to cardiology clinic for routine follow-up.  Since the patient's last visit the patient has undergone hip surgery.  She had an echocardiogram performed in October which shows concentric left ventricular hypertrophy, mild left atrial enlargement and grade 1 diastolic dysfunction with mild secondary pulmonary hypertension all consistent with hypertensive cardiac disease.  The ejection fraction is normal.  She is previously known to have mild valvular aortic stenosis with mild mitral regurgitation.  There is no evidence of pericardial disease.  The patient has had no chest discomfort at rest or with activity.  She has had no shortness of breath.  She has had increasing pedal edema.  She is on minimal dose Lasix therapy.  She is not on a fluid and sodium restriction.  She has had no orthopnea or PND.  There is no dizziness palpitations or syncope.  She is a lifelong non-smoker.  The following portions of the patient's history were reviewed and updated as appropriate: allergies, current medications, past family history, past medical history, past social history, past surgical history and problem  Review of Systems   Constitution: Negative for chills, diaphoresis, fever, malaise/fatigue, weight gain and weight loss.   HENT: Negative for ear discharge, hearing loss, hoarse voice and nosebleeds.    Eyes: Negative for discharge, double vision, pain and photophobia.   Cardiovascular: Positive for leg swelling. Negative for chest pain, claudication, cyanosis, dyspnea on exertion, irregular heartbeat, near-syncope, orthopnea, palpitations, paroxysmal nocturnal dyspnea and syncope.   Respiratory: Negative for cough, hemoptysis, shortness of breath, sputum production and wheezing.    Endocrine: Negative for cold intolerance, heat intolerance,  polydipsia, polyphagia and polyuria.   Hematologic/Lymphatic: Negative for adenopathy and bleeding problem. Does not bruise/bleed easily.   Skin: Negative for color change, flushing, itching and rash.   Musculoskeletal: Negative for muscle cramps, muscle weakness, myalgias and stiffness.   Gastrointestinal: Negative for abdominal pain, diarrhea, hematemesis, hematochezia, nausea and vomiting.   Genitourinary: Negative for dysuria, frequency and nocturia.   Neurological: Negative for focal weakness, loss of balance, numbness, paresthesias and seizures.   Psychiatric/Behavioral: Negative for altered mental status, hallucinations and suicidal ideas.   Allergic/Immunologic: Negative for HIV exposure, hives and persistent infections.           Current Outpatient Medications:   •  atorvastatin (LIPITOR) 10 MG tablet, Take 10 mg by mouth Every Night., Disp: , Rfl:   •  citalopram (CeleXA) 40 MG tablet, Take 40 mg by mouth Daily., Disp: , Rfl:   •  diclofenac (VOLTAREN) 1 % gel gel, 4 (Four) Times a Day As Needed., Disp: , Rfl:   •  DOCUSATE SODIUM PO, Take  by mouth Daily., Disp: , Rfl:   •  estradiol (ESTRACE) 0.1 MG/GM vaginal cream, Insert 1 g into the vagina 3 (Three) Times a Week. Apply using finger technique, Disp: , Rfl: 6  •  furosemide (LASIX) 20 MG tablet, , Disp: , Rfl:   •  INTRAROSA 6.5 MG insert, Insert 1 suppository into the vagina Every Night., Disp: , Rfl: 6  •  metoprolol tartrate (LOPRESSOR) 25 MG tablet, 0.5 tablets 2 (Two) Times a Day., Disp: , Rfl:   •  Multiple Vitamins-Minerals (OCUVITE ADULT 50+) capsule, Take 1 tablet by mouth Daily., Disp: , Rfl:     Objective:   Vitals signs and nursing note reviewed.   Cardiovascular:      PMI at left midclavicular line. Normal rate. Regular rhythm. Normal S1. Normal S2.      Murmurs: There is no murmur.      No gallop. No click. No rub.   Pulses:     Intact distal pulses.   Edema:     Peripheral edema present.     Pretibial: bilateral 2+ pitting edema of the  "pretibial area.     Ankle: bilateral 2+ pitting edema of the ankle.     Feet: bilateral 2+ pitting edema of the feet.      Blood pressure 142/78, pulse 60, height 167.6 cm (66\"), weight 78.9 kg (174 lb), SpO2 95 %, not currently breastfeeding.   Lab Review:     Assessment:       1. Edema leg  Multifactorial.  - Ambulatory Referral to Nutrition Services    2. Essential hypertension  Less than ideal blood pressure control.    3. Venous insufficiency of both lower extremities  The patient demonstrates typical dermal thickening and \"peau d'orange\" dermopathy consistent with chronic venous insufficiency.    4. Nonrheumatic aortic valve stenosis  Mild valvular aortic stenosis.    5. Chronic diastolic congestive heart failure (CMS/HCC)  Heart failure with preserved ejection fraction.  Stage C.  New York Heart Association functional class I symptoms.  Mild peripheral edema with no evidence of central pulmonary venous congestion.  Etiology: Typical hypertensive cardiac disease.    Procedures    Plan:     The patient has been advised regarding the importance of fluid and sodium restriction.  She has been educated that diuretic therapy does not work for treating congestive heart failure, hypertension or peripheral edema without concomitant fluid and sodium restriction.  I have recommended a 1.5 L/day fluid restriction as well as a less than 1500 mg/day sodium restriction.  She has been educated as to foods and beverages that are high in sodium content with instructions to avoid these.  She is instructed to keep a daily diary of her fluid intake and to stop consuming all fluids including fluid used in cooking when she reaches a \"cut-off\" of 1.5 L/day.  She is instructed to weigh herself daily.  We are making arrangements for the patient to meet with an outpatient dietitian to discuss strategies as to how to maintain her fluid and sodium restrictions at home.  The patient has been advised to increase her Lasix to 60 mg orally " "once in the morning for 2 to 3 days until her peripheral edema resolves and she can then decrease the Lasix back to 20 mg orally once per day.  The patient has been educated with adequate fluid and sodium restriction she will likely be able to \"get by\" with as needed diuretic use.  The patient has been advised that if her weight increases more than 5 pounds over 36-48-hour timeframe, to use the extra dose of Lasix as described above.  No further cardiovascular testing is indicated at this time.  The patient has been advised to only sparingly use prescription or over-the-counter nonsteroidal anti-inflammatory medications including Celebrex.  The patient has been advised that while Celebrex is less likely to result in gastritis and subsequent GI blood loss, it does demonstrate adverse effects on hypertension, kidney function and therapy for congestive heart failure.  The patient may be a candidate for the addition of spironolactone to her current diuretic profile.      "

## 2021-04-07 ENCOUNTER — OFFICE VISIT (OUTPATIENT)
Dept: ORTHOPEDIC SURGERY | Facility: CLINIC | Age: 86
End: 2021-04-07

## 2021-04-07 VITALS
DIASTOLIC BLOOD PRESSURE: 75 MMHG | HEIGHT: 66 IN | SYSTOLIC BLOOD PRESSURE: 170 MMHG | WEIGHT: 174 LBS | BODY MASS INDEX: 27.97 KG/M2 | HEART RATE: 58 BPM

## 2021-04-07 DIAGNOSIS — M17.0 PRIMARY OSTEOARTHRITIS OF BOTH KNEES: Primary | ICD-10-CM

## 2021-04-07 PROCEDURE — 20610 DRAIN/INJ JOINT/BURSA W/O US: CPT | Performed by: ORTHOPAEDIC SURGERY

## 2021-04-07 RX ORDER — ROPIVACAINE HYDROCHLORIDE 5 MG/ML
4 INJECTION, SOLUTION EPIDURAL; INFILTRATION; PERINEURAL
Status: COMPLETED | OUTPATIENT
Start: 2021-04-07 | End: 2021-04-07

## 2021-04-07 RX ORDER — TRIAMCINOLONE ACETONIDE 40 MG/ML
40 INJECTION, SUSPENSION INTRA-ARTICULAR; INTRAMUSCULAR
Status: COMPLETED | OUTPATIENT
Start: 2021-04-07 | End: 2021-04-07

## 2021-04-07 RX ADMIN — TRIAMCINOLONE ACETONIDE 40 MG: 40 INJECTION, SUSPENSION INTRA-ARTICULAR; INTRAMUSCULAR at 11:21

## 2021-04-07 RX ADMIN — ROPIVACAINE HYDROCHLORIDE 4 ML: 5 INJECTION, SOLUTION EPIDURAL; INFILTRATION; PERINEURAL at 11:21

## 2021-04-07 NOTE — PROGRESS NOTES
Procedure   Large Joint Arthrocentesis: R knee  Date/Time: 4/7/2021 11:21 AM  Consent given by: patient  Site marked: site marked  Timeout: Immediately prior to procedure a time out was called to verify the correct patient, procedure, equipment, support staff and site/side marked as required   Supporting Documentation  Indications: pain   Procedure Details  Location: knee - R knee  Preparation: Patient was prepped and draped in the usual sterile fashion  Needle size: 22 G  Approach: anterolateral  Medications administered: 40 mg triamcinolone acetonide 40 MG/ML; 4 mL ropivacaine 0.5 %  Patient tolerance: patient tolerated the procedure well with no immediate complications    Large Joint Arthrocentesis: L knee  Date/Time: 4/7/2021 11:21 AM  Consent given by: patient  Site marked: site marked  Timeout: Immediately prior to procedure a time out was called to verify the correct patient, procedure, equipment, support staff and site/side marked as required   Supporting Documentation  Indications: pain   Procedure Details  Location: knee - L knee  Preparation: Patient was prepped and draped in the usual sterile fashion  Needle size: 22 G  Approach: anterolateral  Medications administered: 40 mg triamcinolone acetonide 40 MG/ML; 4 mL ropivacaine 0.5 %  Patient tolerance: patient tolerated the procedure well with no immediate complications

## 2021-04-07 NOTE — PROGRESS NOTES
[facility] [place]    Subjective     Chief Complaint   Patient presents with   • Follow-up     4 month f/u; Primary osteoarthritis of both knees (Cortisone injections 12/7/20)         EVITA Iniguez is a 85 y.o. female who follows up for bilateral knee pain.     The patient has a known history of advanced arthritis. She would like injections in both her knees today. She states the shots have worked pretty well in the past.  She is not a surgical candidate.    I have reviewed the following portions of the patient's history and agree with: History of Present Illness and Review of Systems    Patient Active Problem List   Diagnosis   • Macular degeneration   • Hyperlipidemia   • Essential hypertension   • Deafness   • Diabetes type 2, controlled (CMS/[facility])   • Primary osteoarthritis of left hip   • Venous insufficiency of both lower extremities   • Status post total replacement of left hip   • Acute blood loss anemia, mild, asymptomatic   • Leukocytosis, mild, likely reactive   • Hip fracture (CMS/[facility])   • Nonrheumatic aortic valve sclerosis   • DJD (degenerative joint disease)   • Status post total replacement of right hip   • Acute blood loss anemia, 2 units PRBC 10/3   • Acute postoperative pain   • Postoperative urinary retention   • Edema leg   • Nonrheumatic aortic valve stenosis   • Chronic diastolic congestive heart failure (CMS/[facility])     Past Medical History:   Diagnosis Date   • A-fib (CMS/[facility])    • Bursitis     trochanteric area   • Colonoscopy refused 2015   • Constipation    • Deaf, left    • Deafness     unspecified   • Diabetes mellitus (CMS/[facility])     PATIENT REPORTS SHE HAS TAKEN METFORMIN IN THE PAST BUT WAS TAKEN OFF OF THIS MEDICATION AROUND OCTOBER 2018.     • Elevated cholesterol    • Essential hypertension    • GERD (gastroesophageal reflux disease)    • Hearing loss, right     PATIENT DOES USE A HEARING AID TO RIGHT SIDE INTERMITTENTLY (REPORTS DEAFNESS IN LEFT EAR)   •  "Hip pain    • History of UTI     PATIENT REPORTS RESOLVED AFTER HORMONE THERAPY WAS ORDERED   • Hyperlipidemia     other   • Impaired functional mobility, balance, gait, and endurance    • Influenza vaccine administered 2014   • Macular degeneration    • Mammogram declined 2015   • Osteoarthritis of left hip    • Primary osteoarthritis of right knee    • Supraventricular premature beats     REPORTED IN PREADMISSION TESTING VISIT \"IRREGULAR HEART BEAT\".  REPORTS UNSURE THE ACTUAL VERBIAGE OF WHAT SHE WAS TOLD OTHER THAN THIS.    • Wears partial dentures     REPORTS UPPER AND LOWER PARTIALS. INSTRUCTED NO ADHESIVES THE DOS.      Past Surgical History:   Procedure Laterality Date   • BUNIONECTOMY Right 1/21/2019    Procedure: Right foot bunionectomy with exostectomy, proximal phalanx osteotomy, right foot second and third digit hammertoe correction with interphalangeal joint fusion, right second metatarsophalangeal joint capsular release/capsulotomy;  Surgeon: Klaus Cason DPM;  Location:  [facility];  Service: Podiatry   • CATARACT EXTRACTION Bilateral    • CHOLECYSTECTOMY  2002   • COLONOSCOPY     • HARDWARE REMOVAL Right 10/2/2020    Procedure: HIP HARDWARE REMOVAL RIGHT;  Surgeon: Gideon Son MD;  Location:  [facility];  Service: Orthopedics;  Laterality: Right;   • HERNIA REPAIR  2005    umbilical   • HIP INTERTROCHANTERIC NAILING Right 12/31/2019    Procedure: HIP RIGHT OPEN REDUCTION INTERNAL FIXATION, INTERMEDULLARY  NAIL;  Surgeon: Carlos Barba Jr., MD;  Location:  [facility];  Service: Orthopedics   • HYSTERECTOMY  1974   • TONSILLECTOMY     • TOTAL HIP ARTHROPLASTY Left 1/23/2018    Procedure: LEFT TOTAL HIP ARTHROPLASTY;  Surgeon: Gideon Son MD;  Location:  [facility];  Service:    • TOTAL HIP ARTHROPLASTY Right 10/2/2020    Procedure: TOTAL HIP ARTHROPLASTY RIGHT;  Surgeon: Gideon Son MD;  Location:  [facility];  Service: Orthopedics;  Laterality: Right;      Family History   Problem " Relation Age of Onset   • Hypertension Other    • Diabetes Other         type 2   • Stroke Mother    • Diabetes Mother    • Hypertension Mother    • Cancer Father      Social History     Socioeconomic History   • Marital status:      Spouse name: Not on file   • Number of children: Not on file   • Years of education: Not on file   • Highest education level: Not on file   Tobacco Use   • Smoking status: Never Smoker   • Smokeless tobacco: Never Used   Vaping Use   • Vaping Use: Never used   Substance and Sexual Activity   • Alcohol use: No   • Drug use: No   • Sexual activity: Defer      Current Outpatient Medications on File Prior to Visit   Medication Sig Dispense Refill   • atorvastatin (LIPITOR) 10 MG tablet Take 10 mg by mouth Every Night.     • citalopram (CeleXA) 40 MG tablet Take 40 mg by mouth Daily.     • diclofenac (VOLTAREN) 1 % gel gel 4 (Four) Times a Day As Needed.     • DOCUSATE SODIUM PO Take  by mouth Daily.     • estradiol (ESTRACE) 0.1 MG/GM vaginal cream Insert 1 g into the vagina 3 (Three) Times a Week. Apply using finger technique  6   • furosemide (LASIX) 40 MG tablet      • INTRAROSA 6.5 MG insert Insert 1 suppository into the vagina Every Night.  6   • metoprolol tartrate (LOPRESSOR) 25 MG tablet 0.5 tablets 2 (Two) Times a Day.     • Multiple Vitamins-Minerals (OCUVITE ADULT 50+) capsule Take 1 tablet by mouth Daily.       No current facility-administered medications on file prior to visit.      No Known Allergies     Review of Systems   Constitutional: Negative for activity change, appetite change, chills, diaphoresis, fatigue, fever and unexpected weight change.   HENT: Negative for congestion, dental problem, drooling, ear discharge, ear pain, facial swelling, hearing loss, mouth sores, nosebleeds, postnasal drip, rhinorrhea, sinus pressure, sneezing, sore throat, tinnitus, trouble swallowing and voice change.    Eyes: Negative for photophobia, pain, discharge, redness, itching  "and visual disturbance.   Respiratory: Negative for apnea, cough, choking, chest tightness, shortness of breath, wheezing and stridor.    Cardiovascular: Negative for chest pain, palpitations and leg swelling.   Gastrointestinal: Negative for abdominal distention, abdominal pain, anal bleeding, blood in stool, constipation, diarrhea, nausea, rectal pain and vomiting.   Endocrine: Negative for cold intolerance, heat intolerance, polydipsia, polyphagia and polyuria.   Genitourinary: Negative for decreased urine volume, difficulty urinating, dysuria, enuresis, flank pain, frequency, genital sores, hematuria and urgency.   Musculoskeletal: Positive for arthralgias. Negative for back pain, gait problem, joint swelling, myalgias, neck pain and neck stiffness.   Skin: Negative for color change, pallor, rash and wound.   Allergic/Immunologic: Negative for environmental allergies, food allergies and immunocompromised state.   Neurological: Negative for dizziness, tremors, seizures, syncope, facial asymmetry, speech difficulty, weakness, light-headedness, numbness and headaches.   Hematological: Negative for adenopathy. Does not bruise/bleed easily.   Psychiatric/Behavioral: Negative for agitation, behavioral problems, confusion, decreased concentration, dysphoric mood, hallucinations, self-injury, sleep disturbance and suicidal ideas. The patient is not nervous/anxious and is not hyperactive.         Objective      Physical Exam  /75   Pulse 58   Ht 167.6 cm (65.98\")   Wt 78.9 kg (174 lb)   LMP  (LMP Unknown) Comment: HYSTERECTOMY  BMI 28.10 kg/m²     Body mass index is 28.1 kg/m².    General:   Mental Status:  Alert   Appearance: Cooperative, in no acute distress   Build and Nutrition: Well-nourished well-developed female   Orientation: Alert and oriented to person, place and time   Posture: Sitting in a wheelchair   Gait: The patient is seated in a wheelchair, could not assess.    Integument:        • Right " knee: no skin lesions, no rash, no ecchymosis        • Left knee: no skin lesions, no rash, no ecchymosis     Lower Extremities  • Right Knee        • Tenderness: Medial and lateral joint line tenderness       • Effusion:  Trace       • Swelling: Pitting edema to the proximal tibia       • Crepitus:  None       • Atrophy: None       • Range of motion:             • Extension: 5°              • Flexion: 90°       • Instability: No varus or valgus laxity. Negative anterior drawer        • Deformities: None    • Left Knee:        • Tenderness: Medial and lateral joint line tenderness       • Effusion:  Trace       • Swelling: Pitting edema to the proximal tibia       • Crepitus:  None       • Atrophy: None       • Range of motion:             • Extension: 10°              • Flexion: 110°       • Instability: No varus or valgus laxity. Negative anterior drawer        • Deformities: None      Imaging/Studies  No new imaging today      Assessment and Plan     Diagnoses and all orders for this visit:    1. Primary osteoarthritis of both knees (Primary)  -     Large Joint Arthrocentesis: R knee  -     Large Joint Arthrocentesis: L knee        1. Primary osteoarthritis of both knees        Plan  We injected bilateral knees today. She states cortisone injections are helpful for her.  She is not a surgical candidate.    Procedure Note:   The potential benefits of performing a therapeutic left and right knee joint injections, as well as potential risks (including, but not limited to infection, swelling, pain, bleeding, bruising, nerve/blood vessel damage, skin color changes, transient elevation in blood glucose levels, and fat atrophy) were discussed with the patient.  After informed consent was obtained, a timeout procedure was performed, and the skin on the bilateral knees was prepped with chlorhexidine soap and alcohol, after which ethyl chloride was applied to the skin at the injection site. Via the anterolateral approach,  1 ml of Kenalog 40 mg/ml mixed with 4 ml 0.5% ropivacaine plain was injected into the knee joint.  The patient tolerated the procedure well, experiencing 90% improvement a few minutes following the injection. There were no complications. A Band-Aid was applied to the injection site. Post-procedural instructions were given to the patient and/or their caregiver.    Return in about 4 months (around 8/7/2021).      Scribed for Gideon Son MD by ISAEL CASTRO.  04/07/21   12:08 EDT    I have personally performed the services described in this document as scribed by the above individual, and it is both accurate and complete.  Gideon Son MD  4/7/2021  13:05 EDT

## 2021-08-11 ENCOUNTER — OFFICE VISIT (OUTPATIENT)
Dept: ORTHOPEDIC SURGERY | Facility: CLINIC | Age: 86
End: 2021-08-11

## 2021-08-11 VITALS
SYSTOLIC BLOOD PRESSURE: 171 MMHG | HEART RATE: 79 BPM | WEIGHT: 199 LBS | BODY MASS INDEX: 31.98 KG/M2 | DIASTOLIC BLOOD PRESSURE: 79 MMHG | HEIGHT: 66 IN

## 2021-08-11 DIAGNOSIS — M17.0 PRIMARY OSTEOARTHRITIS OF BOTH KNEES: Primary | ICD-10-CM

## 2021-08-11 PROCEDURE — 20610 DRAIN/INJ JOINT/BURSA W/O US: CPT | Performed by: ORTHOPAEDIC SURGERY

## 2021-08-11 RX ORDER — TRIAMCINOLONE ACETONIDE 40 MG/ML
40 INJECTION, SUSPENSION INTRA-ARTICULAR; INTRAMUSCULAR
Status: COMPLETED | OUTPATIENT
Start: 2021-08-11 | End: 2021-08-11

## 2021-08-11 RX ORDER — ROPIVACAINE HYDROCHLORIDE 5 MG/ML
4 INJECTION, SOLUTION EPIDURAL; INFILTRATION; PERINEURAL
Status: COMPLETED | OUTPATIENT
Start: 2021-08-11 | End: 2021-08-11

## 2021-08-11 RX ADMIN — ROPIVACAINE HYDROCHLORIDE 4 ML: 5 INJECTION, SOLUTION EPIDURAL; INFILTRATION; PERINEURAL at 11:28

## 2021-08-11 RX ADMIN — ROPIVACAINE HYDROCHLORIDE 4 ML: 5 INJECTION, SOLUTION EPIDURAL; INFILTRATION; PERINEURAL at 11:27

## 2021-08-11 RX ADMIN — TRIAMCINOLONE ACETONIDE 40 MG: 40 INJECTION, SUSPENSION INTRA-ARTICULAR; INTRAMUSCULAR at 11:28

## 2021-08-11 RX ADMIN — TRIAMCINOLONE ACETONIDE 40 MG: 40 INJECTION, SUSPENSION INTRA-ARTICULAR; INTRAMUSCULAR at 11:27

## 2021-08-11 NOTE — PROGRESS NOTES
Procedure   Large Joint Arthrocentesis: R knee  Date/Time: 8/11/2021 11:27 AM  Consent given by: patient  Site marked: site marked  Timeout: Immediately prior to procedure a time out was called to verify the correct patient, procedure, equipment, support staff and site/side marked as required   Supporting Documentation  Indications: pain   Procedure Details  Location: knee - R knee  Preparation: Patient was prepped and draped in the usual sterile fashion  Needle size: 23 G  Approach: anterolateral  Medications administered: 4 mL ropivacaine 0.5 %; 40 mg triamcinolone acetonide 40 MG/ML  Patient tolerance: patient tolerated the procedure well with no immediate complications    Large Joint Arthrocentesis: L knee  Date/Time: 8/11/2021 11:28 AM  Consent given by: patient  Site marked: site marked  Timeout: Immediately prior to procedure a time out was called to verify the correct patient, procedure, equipment, support staff and site/side marked as required   Supporting Documentation  Indications: pain   Procedure Details  Location: knee - L knee  Preparation: Patient was prepped and draped in the usual sterile fashion  Needle size: 23 G  Approach: anterolateral  Medications administered: 4 mL ropivacaine 0.5 %; 40 mg triamcinolone acetonide 40 MG/ML  Patient tolerance: patient tolerated the procedure well with no immediate complications

## 2021-08-11 NOTE — PROGRESS NOTES
[facility] [place] [facility] Note    Subjective     Chief Complaint   Patient presents with   • Follow-up     4 month follow up; Primary osteoarthritis of both knees-last cortisone injections given on 4/7/21        HPI     Bri Iniguez is a 85 y.o. female who follows up for bilateral knee arthritis.     She has a known history of advanced arthritis. She has had previous injections. She desires repeat injections today and is not a surgical candidate.    She rates her pain as an 8 out of 10 and it has been ongoing for several years. She confirms that she is not a surgical candidate.    She is accompanied by her daughter, Griselda, today.     I have reviewed the following portions of the patient's history and agree with: History of Present Illness and Review of Systems    Patient Active Problem List   Diagnosis   • Macular degeneration   • Hyperlipidemia   • Essential hypertension   • Deafness   • Diabetes type 2, controlled (CMS/[facility])   • Primary osteoarthritis of left hip   • Venous insufficiency of both lower extremities   • Status post total replacement of left hip   • Acute blood loss anemia, mild, asymptomatic   • Leukocytosis, mild, likely reactive   • Hip fracture (CMS/[facility])   • Nonrheumatic aortic valve sclerosis   • DJD (degenerative joint disease)   • Status post total replacement of right hip   • Acute blood loss anemia, 2 units PRBC 10/3   • Acute postoperative pain   • Postoperative urinary retention   • Edema leg   • Nonrheumatic aortic valve stenosis   • Chronic diastolic congestive heart failure (CMS/[facility])     Past Medical History:   Diagnosis Date   • A-fib (CMS/[facility])    • Bursitis     trochanteric area   • Colonoscopy refused 2015   • Constipation    • Deaf, left    • Deafness     unspecified   • Diabetes mellitus (CMS/[facility])     PATIENT REPORTS SHE HAS TAKEN METFORMIN IN THE PAST BUT WAS TAKEN OFF OF THIS MEDICATION AROUND OCTOBER 2018.     • Elevated cholesterol    • Essential hypertension    • GERD  "(gastroesophageal reflux disease)    • Hearing loss, right     PATIENT DOES USE A HEARING AID TO RIGHT SIDE INTERMITTENTLY (REPORTS DEAFNESS IN LEFT EAR)   • Hip pain    • History of UTI     PATIENT REPORTS RESOLVED AFTER HORMONE THERAPY WAS ORDERED   • Hyperlipidemia     other   • Impaired functional mobility, balance, gait, and endurance    • Influenza vaccine administered 2014   • Macular degeneration    • Mammogram declined 2015   • Osteoarthritis of left hip    • Primary osteoarthritis of right knee    • Supraventricular premature beats     REPORTED IN PREADMISSION TESTING VISIT \"IRREGULAR HEART BEAT\".  REPORTS UNSURE THE ACTUAL VERBIAGE OF WHAT SHE WAS TOLD OTHER THAN THIS.    • Wears partial dentures     REPORTS UPPER AND LOWER PARTIALS. INSTRUCTED NO ADHESIVES THE DOS.      Past Surgical History:   Procedure Laterality Date   • BUNIONECTOMY Right 1/21/2019    Procedure: Right foot bunionectomy with exostectomy, proximal phalanx osteotomy, right foot second and third digit hammertoe correction with interphalangeal joint fusion, right second metatarsophalangeal joint capsular release/capsulotomy;  Surgeon: Klaus Cason DPM;  Location: [facility] OR;  Service: Podiatry   • CATARACT EXTRACTION Bilateral    • CHOLECYSTECTOMY  2002   • COLONOSCOPY     • HARDWARE REMOVAL Right 10/2/2020    Procedure: HIP HARDWARE REMOVAL RIGHT;  Surgeon: Gideon Son MD;  Location:  [facility];  Service: Orthopedics;  Laterality: Right;   • HERNIA REPAIR  2005    umbilical   • HIP INTERTROCHANTERIC NAILING Right 12/31/2019    Procedure: HIP RIGHT OPEN REDUCTION INTERNAL FIXATION, INTERMEDULLARY  NAIL;  Surgeon: Carlos Barba Jr., MD;  Location:  [facility];  Service: Orthopedics   • HYSTERECTOMY  1974   • TONSILLECTOMY     • TOTAL HIP ARTHROPLASTY Left 1/23/2018    Procedure: LEFT TOTAL HIP ARTHROPLASTY;  Surgeon: Gideon Son MD;  Location:  [facility];  Service:    • TOTAL HIP ARTHROPLASTY Right 10/2/2020    Procedure: " TOTAL HIP ARTHROPLASTY RIGHT;  Surgeon: Gideon Son MD;  Location: [facility];  Service: Orthopedics;  Laterality: Right;      Family History   Problem Relation Age of Onset   • Hypertension Other    • Diabetes Other         type 2   • Stroke Mother    • Diabetes Mother    • Hypertension Mother    • Cancer Father      Social History     Socioeconomic History   • Marital status:      Spouse name: Not on file   • Number of children: Not on file   • Years of education: Not on file   • Highest education level: Not on file   Tobacco Use   • Smoking status: Never Smoker   • Smokeless tobacco: Never Used   Vaping Use   • Vaping Use: Never used   Substance and Sexual Activity   • Alcohol use: No   • Drug use: No   • Sexual activity: Defer      Current Outpatient Medications on File Prior to Visit   Medication Sig Dispense Refill   • atorvastatin (LIPITOR) 10 MG tablet Take 10 mg by mouth Every Night.     • citalopram (CeleXA) 40 MG tablet Take 40 mg by mouth Daily.     • diclofenac (VOLTAREN) 1 % gel gel 4 (Four) Times a Day As Needed.     • DOCUSATE SODIUM PO Take  by mouth Daily.     • estradiol (ESTRACE) 0.1 MG/GM vaginal cream Insert 1 g into the vagina 3 (Three) Times a Week. Apply using finger technique  6   • furosemide (LASIX) 40 MG tablet      • INTRAROSA 6.5 MG insert Insert 1 suppository into the vagina Every Night.  6   • metoprolol tartrate (LOPRESSOR) 25 MG tablet 0.5 tablets 2 (Two) Times a Day.     • Multiple Vitamins-Minerals (OCUVITE ADULT 50+) capsule Take 1 tablet by mouth Daily.       No current facility-administered medications on file prior to visit.      No Known Allergies     Review of Systems   Constitutional: Negative for activity change, appetite change, chills, diaphoresis, fatigue, fever and unexpected weight change.   HENT: Negative for congestion, dental problem, drooling, ear discharge, ear pain, facial swelling, hearing loss, mouth sores, nosebleeds, postnasal drip, rhinorrhea,  "sinus pressure, sneezing, sore throat, tinnitus, trouble swallowing and voice change.    Eyes: Negative for photophobia, pain, discharge, redness, itching and visual disturbance.   Respiratory: Negative for apnea, cough, choking, chest tightness, shortness of breath, wheezing and stridor.    Cardiovascular: Negative for chest pain, palpitations and leg swelling.   Gastrointestinal: Negative for abdominal distention, abdominal pain, anal bleeding, blood in stool, constipation, diarrhea, nausea, rectal pain and vomiting.   Endocrine: Negative for cold intolerance, heat intolerance, polydipsia, polyphagia and polyuria.   Genitourinary: Negative for decreased urine volume, difficulty urinating, dysuria, enuresis, flank pain, frequency, genital sores, hematuria and urgency.   Musculoskeletal: Positive for arthralgias. Negative for back pain, gait problem, joint swelling, myalgias, neck pain and neck stiffness.   Skin: Negative for color change, pallor, rash and wound.   Allergic/Immunologic: Negative for environmental allergies, food allergies and immunocompromised state.   Neurological: Negative for dizziness, tremors, seizures, syncope, facial asymmetry, speech difficulty, weakness, light-headedness, numbness and headaches.   Hematological: Negative for adenopathy. Does not bruise/bleed easily.   Psychiatric/Behavioral: Negative for agitation, behavioral problems, confusion, decreased concentration, dysphoric mood, hallucinations, self-injury, sleep disturbance and suicidal ideas. The patient is not nervous/anxious and is not hyperactive.         Objective      Physical Exam  /79   Pulse 79   Ht 167.6 cm (65.98\")   Wt 90.3 kg (199 lb)   LMP  (LMP Unknown) Comment: HYSTERECTOMY  BMI 32.14 kg/m²     Body mass index is 32.14 kg/m².    General:   Mental Status:  Alert   Appearance: Cooperative, in no acute distress   Build and Nutrition: Well-nourished well-developed female   Orientation: Alert and oriented to " person, place and time   Posture: Normal   Gait: Not addressed. Sitting in a wheelchair     Integument  • Right knee: No skin lesions, rash, or ecchymosis.  • Left knee: No skin lesions, rash, or ecchymosis.    Lower Extremities  • Right Knee  • Effusion: 1+  • Crepitus: Positive  • Range of motion:  • Extension: 5°  • Flexion: 95°    • Left Knee:  • Effusion: 1+  • Crepitus: Positive  • Range of motion:  • Extension: 10°  • Flexion: 90°      Imaging/Studies  No new radiographs were obtained.       Assessment and Plan     Diagnoses and all orders for this visit:    1. Primary osteoarthritis of both knees (Primary)  -     Large Joint Arthrocentesis: R knee  -     Large Joint Arthrocentesis: L knee        1. Primary osteoarthritis of both knees        I have discussed my findings with the patient and her daughter today. She is not a surgical candidate. She has elected to proceed with a bilateral knee intra-articular injection today.    Procedure Note:   The potential benefits of performing therapeutic bilateral knee joint injections, as well as potential risks (including, but not limited to infection, swelling, pain, bleeding, bruising, nerve/blood vessel damage, skin color changes, transient elevation in blood glucose levels, and fat atrophy) were discussed with the patient.  After informed consent was obtained, a timeout procedure was performed, and the skin on the right and left knees was prepped with chlorhexidine soap and alcohol, after which ethyl chloride was applied to the skin at the injection site. Via the anterolateral approach, 1 ml of Kenalog 40 mg/ml mixed with 4 ml 0.5% ropivacaine plain was injected into the knee joints. The patient tolerated the procedures well. There were no complications.  Band-Aids were applied to the injection sites. Post-procedural instructions were given to the patient and/or their caregiver.    Return in about 4 months (around 12/11/2021).      Transcribed from ambient  dictation for Gideon Son MD by Juancho Rojas.  08/11/21   11:46 EDT    I have personally performed the services described in this document as transcribed by the above individual, and it is both accurate and complete.  Gideon Son MD  8/11/2021  11:52 EDT

## 2021-10-19 ENCOUNTER — APPOINTMENT (OUTPATIENT)
Dept: GENERAL RADIOLOGY | Facility: HOSPITAL | Age: 86
End: 2021-10-19

## 2021-10-19 ENCOUNTER — HOSPITAL ENCOUNTER (EMERGENCY)
Facility: HOSPITAL | Age: 86
Discharge: HOME | End: 2021-10-19
Attending: EMERGENCY MEDICINE | Admitting: EMERGENCY MEDICINE

## 2021-10-19 VITALS
TEMPERATURE: 97.8 F | RESPIRATION RATE: 18 BRPM | SYSTOLIC BLOOD PRESSURE: 174 MMHG | WEIGHT: 195 LBS | HEART RATE: 110 BPM | HEIGHT: 66 IN | OXYGEN SATURATION: 95 % | BODY MASS INDEX: 31.34 KG/M2 | DIASTOLIC BLOOD PRESSURE: 84 MMHG

## 2021-10-19 DIAGNOSIS — S52.021A CLOSED FRACTURE OF OLECRANON PROCESS OF RIGHT ULNA, INITIAL ENCOUNTER: ICD-10-CM

## 2021-10-19 DIAGNOSIS — S50.01XA CONTUSION OF RIGHT ELBOW, INITIAL ENCOUNTER: ICD-10-CM

## 2021-10-19 DIAGNOSIS — S50.02XA CONTUSION OF LEFT ELBOW, INITIAL ENCOUNTER: Primary | ICD-10-CM

## 2021-10-19 LAB
CRP SERPL-MCNC: 1.07 MG/DL (ref 0–0.5)
ERYTHROCYTE [SEDIMENTATION RATE] IN BLOOD: 43 MM/HR (ref 0–30)
URATE SERPL-MCNC: 4 MG/DL (ref 2.4–5.7)

## 2021-10-19 PROCEDURE — 99283 EMERGENCY DEPT VISIT LOW MDM: CPT

## 2021-10-19 PROCEDURE — 84550 ASSAY OF BLOOD/URIC ACID: CPT | Performed by: EMERGENCY MEDICINE

## 2021-10-19 PROCEDURE — 86140 C-REACTIVE PROTEIN: CPT | Performed by: EMERGENCY MEDICINE

## 2021-10-19 PROCEDURE — 85652 RBC SED RATE AUTOMATED: CPT | Performed by: EMERGENCY MEDICINE

## 2021-10-19 PROCEDURE — 73070 X-RAY EXAM OF ELBOW: CPT

## 2021-10-19 RX ORDER — HYDROCODONE BITARTRATE AND ACETAMINOPHEN 5; 325 MG/1; MG/1
1 TABLET ORAL EVERY 6 HOURS PRN
Qty: 8 TABLET | Refills: 0 | Status: SHIPPED | OUTPATIENT
Start: 2021-10-19 | End: 2021-11-23

## 2021-10-19 RX ORDER — HYDROCODONE BITARTRATE AND ACETAMINOPHEN 5; 325 MG/1; MG/1
1 TABLET ORAL ONCE
Status: COMPLETED | OUTPATIENT
Start: 2021-10-19 | End: 2021-10-19

## 2021-10-19 RX ADMIN — HYDROCODONE BITARTRATE AND ACETAMINOPHEN 1 TABLET: 5; 325 TABLET ORAL at 16:47

## 2021-10-26 ENCOUNTER — OFFICE VISIT (OUTPATIENT)
Dept: ORTHOPEDIC SURGERY | Facility: CLINIC | Age: 86
End: 2021-10-26

## 2021-10-26 VITALS
WEIGHT: 195 LBS | BODY MASS INDEX: 31.34 KG/M2 | HEIGHT: 66 IN | SYSTOLIC BLOOD PRESSURE: 140 MMHG | HEART RATE: 74 BPM | DIASTOLIC BLOOD PRESSURE: 66 MMHG

## 2021-10-26 DIAGNOSIS — S52.021A CLOSED FRACTURE OF OLECRANON PROCESS OF RIGHT ULNA, INITIAL ENCOUNTER: ICD-10-CM

## 2021-10-26 DIAGNOSIS — S52.022A OLECRANON FRACTURE, LEFT, CLOSED, INITIAL ENCOUNTER: Primary | ICD-10-CM

## 2021-10-26 DIAGNOSIS — M17.0 PRIMARY OSTEOARTHRITIS OF BOTH KNEES: ICD-10-CM

## 2021-10-26 DIAGNOSIS — Z96.641 STATUS POST TOTAL HIP REPLACEMENT, RIGHT: ICD-10-CM

## 2021-10-26 PROCEDURE — 99214 OFFICE O/P EST MOD 30 MIN: CPT | Performed by: ORTHOPAEDIC SURGERY

## 2021-10-26 RX ORDER — CLOTRIMAZOLE AND BETAMETHASONE DIPROPIONATE 10; .64 MG/G; MG/G
CREAM TOPICAL
COMMUNITY
Start: 2021-10-13

## 2021-10-26 NOTE — PROGRESS NOTES
"                                                                    [facility] [place] Orthopaedic Surgery Office Visit - Nilo Torres MD    Office Visit       Patient Name: Bri Iniguez    Chief Complaint:   Chief Complaint   Patient presents with   • Right Elbow - Pain   • Left Elbow - Pain       Referring Physician: No ref. provider found    History of Present Illness:   Bri Iniguez is a 85 y.o. female who presents with bilateral body part: elbow Reason: pain.  Onset:Onset: atraumatic and gradual in nature. The issue has been ongoing for 2 week(s). Pain is a 5/10 on the pain scale. Pain is described as Pain Characterization: stabbing and shooting. Associated symptoms include Symptoms: pain, swelling, popping and giving way/buckling. The pain is worse with walking, standing and rising from seated position; resting and pain medication and/or NSAID improve the pain. Previous treatments have included: bracing and NSAIDS. I have reviewed the patient's history of present illness as noted/entered above.    I have reviewed the patient's past medical history, surgical history, social history, family history, medications, and allergies as noted in the electronic medical record and as noted/entered.  I have reviewed the patient's review of systems as noted/enter and updated as noted in the patient's HPI.      Right olecranon avulsion 2 weeks ago  Left olecranon avulsion on 10/18/2021  Left seems to be doing better right still painful right has a larger avulsion fragment triceps tendon is still intact on clinical exam    Bilateral lower extremity issues had to have replacements in the past and revision she does have to use her arms to push off and that is how these \"pop \"occurred.  Counseled on triceps avulsion but incomplete.    Supportive family present    I reviewed ER note from 10/19/2021 Kaden Hernandez MD [facility] ER radiographs reviewed as well      Subjective   Subjective      Review of Systems   Constitutional: " "Negative.  Negative for chills, fatigue and fever.   HENT: Negative.  Negative for congestion and dental problem.    Eyes: Negative.  Negative for blurred vision.   Respiratory: Negative.  Negative for shortness of breath.    Cardiovascular: Negative.  Negative for leg swelling.   Gastrointestinal: Negative.  Negative for abdominal pain.   Endocrine: Negative.  Negative for polyuria.   Genitourinary: Negative.  Negative for difficulty urinating.   Musculoskeletal: Positive for arthralgias.   Skin: Negative.    Allergic/Immunologic: Negative.    Neurological: Negative.    Hematological: Negative.  Negative for adenopathy.   Psychiatric/Behavioral: Negative.  Negative for behavioral problems.        Past Medical History:   Past Medical History:   Diagnosis Date   • A-fib ([facility])    • Bursitis     trochanteric area   • Colonoscopy refused 2015   • Constipation    • Deaf, left    • Deafness     unspecified   • Diabetes mellitus ([facility])     PATIENT REPORTS SHE HAS TAKEN METFORMIN IN THE PAST BUT WAS TAKEN OFF OF THIS MEDICATION AROUND OCTOBER 2018.     • Elevated cholesterol    • Essential hypertension    • GERD (gastroesophageal reflux disease)    • Hearing loss, right     PATIENT DOES USE A HEARING AID TO RIGHT SIDE INTERMITTENTLY (REPORTS DEAFNESS IN LEFT EAR)   • Hip pain    • History of UTI     PATIENT REPORTS RESOLVED AFTER HORMONE THERAPY WAS ORDERED   • Hyperlipidemia     other   • Impaired functional mobility, balance, gait, and endurance    • Influenza vaccine administered 2014   • Macular degeneration    • Mammogram declined 2015   • Osteoarthritis of left hip    • Primary osteoarthritis of right knee    • Supraventricular premature beats     REPORTED IN PREADMISSION TESTING VISIT \"IRREGULAR HEART BEAT\".  REPORTS UNSURE THE ACTUAL VERBIAGE OF WHAT SHE WAS TOLD OTHER THAN THIS.    • Wears partial dentures     REPORTS UPPER AND LOWER PARTIALS. INSTRUCTED NO ADHESIVES THE DOS.       Past Surgical History:   Past " Surgical History:   Procedure Laterality Date   • BUNIONECTOMY Right 1/21/2019    Procedure: Right foot bunionectomy with exostectomy, proximal phalanx osteotomy, right foot second and third digit hammertoe correction with interphalangeal joint fusion, right second metatarsophalangeal joint capsular release/capsulotomy;  Surgeon: Klaus Cason DPM;  Location:  [facility];  Service: Podiatry   • CATARACT EXTRACTION Bilateral    • CHOLECYSTECTOMY  2002   • COLONOSCOPY     • HARDWARE REMOVAL Right 10/2/2020    Procedure: HIP HARDWARE REMOVAL RIGHT;  Surgeon: Gideon Son MD;  Location:  [facility];  Service: Orthopedics;  Laterality: Right;   • HERNIA REPAIR  2005    umbilical   • HIP INTERTROCHANTERIC NAILING Right 12/31/2019    Procedure: HIP RIGHT OPEN REDUCTION INTERNAL FIXATION, INTERMEDULLARY  NAIL;  Surgeon: Carlos Barba Jr., MD;  Location:  [facility];  Service: Orthopedics   • HYSTERECTOMY  1974   • TONSILLECTOMY     • TOTAL HIP ARTHROPLASTY Left 1/23/2018    Procedure: LEFT TOTAL HIP ARTHROPLASTY;  Surgeon: Gideon Son MD;  Location:  [facility];  Service:    • TOTAL HIP ARTHROPLASTY Right 10/2/2020    Procedure: TOTAL HIP ARTHROPLASTY RIGHT;  Surgeon: Gideon Son MD;  Location:  [facility];  Service: Orthopedics;  Laterality: Right;       Family History:   Family History   Problem Relation Age of Onset   • Hypertension Other    • Diabetes Other         type 2   • Stroke Mother    • Diabetes Mother    • Hypertension Mother    • Cancer Father        Social History:   Social History     Socioeconomic History   • Marital status:    Tobacco Use   • Smoking status: Never Smoker   • Smokeless tobacco: Never Used   Vaping Use   • Vaping Use: Never used   Substance and Sexual Activity   • Alcohol use: No   • Drug use: No   • Sexual activity: Defer       Medications:   Current Outpatient Medications:   •  atorvastatin (LIPITOR) 10 MG tablet, Take 10 mg by mouth Every Night., Disp: , Rfl:   •   "citalopram (CeleXA) 40 MG tablet, Take 40 mg by mouth Daily., Disp: , Rfl:   •  clotrimazole-betamethasone (LOTRISONE) 1-0.05 % cream, APPLY TO AFFECTED AREA TWO TIMES A DAY, Disp: , Rfl:   •  diclofenac (VOLTAREN) 1 % gel gel, 4 (Four) Times a Day As Needed., Disp: , Rfl:   •  DOCUSATE SODIUM PO, Take  by mouth Daily., Disp: , Rfl:   •  estradiol (ESTRACE) 0.1 MG/GM vaginal cream, Insert 1 g into the vagina 3 (Three) Times a Week. Apply using finger technique, Disp: , Rfl: 6  •  furosemide (LASIX) 40 MG tablet, , Disp: , Rfl:   •  HYDROcodone-acetaminophen (NORCO) 5-325 MG per tablet, Take 1 tablet by mouth Every 6 (Six) Hours As Needed for Moderate Pain ., Disp: 8 tablet, Rfl: 0  •  INTRAROSA 6.5 MG insert, Insert 1 suppository into the vagina Every Night., Disp: , Rfl: 6  •  metoprolol tartrate (LOPRESSOR) 25 MG tablet, 0.5 tablets 2 (Two) Times a Day., Disp: , Rfl:   •  Multiple Vitamins-Minerals (OCUVITE ADULT 50+) capsule, Take 1 tablet by mouth Daily., Disp: , Rfl:     Allergies: No Known Allergies    The following portions of the patient's history were reviewed and updated as appropriate: allergies, current medications, past family history, past medical history, past social history, past surgical history and problem list.        Objective    Objective      Vital Signs:   Vitals:    10/26/21 1107   BP: 140/66   Pulse: 74   Weight: 88.5 kg (195 lb)   Height: 167.6 cm (65.98\")       Ortho Exam:  Bilateral triceps extension intact with good strength the right more painful than the left does have some baseline swelling consistent with the acute nature of these injuries.  Counseled on triceps avulsion proximal portion olecranon bilaterally right larger than the left    Results Review:   Imaging Results (Last 24 Hours)     ** No results found for the last 24 hours. **        XR Elbow 2 View Bilateral    Result Date: 10/20/2021  Small fracture fragments identified along the posterior aspect of the olecranon. No " significant joint effusion with associated soft tissue swelling seen posteriorly.  D:  10/19/2021 E:  10/20/2021  This report was finalized on 10/20/2021 9:55 AM by Dr. Shayy Dimas MD.        I reviewed and interpreted the images above small avulsion fragments noted on the left, larger fragment on the right    Procedures             Assessment / Plan      Assessment/Plan:   Problem List Items Addressed This Visit        Musculoskeletal and Injuries    DJD (degenerative joint disease)    Relevant Orders    Ambulatory Referral to Home Health (Completed)    Closed fracture of right olecranon process    Relevant Medications    HYDROcodone-acetaminophen (NORCO) 5-325 MG per tablet    Other Relevant Orders    Ambulatory Referral to Home Health (Completed)       Other    Olecranon fracture, left, closed, initial encounter - Primary    Relevant Orders    Ambulatory Referral to Home Health (Completed)      Other Visit Diagnoses     Status post total hip replacement, right        Relevant Orders    Ambulatory Referral to Home Health (Completed)          Unseld on nonoperative treatment for triceps avulsion including avulsions of the proximal olecranon on both sides.  I do not recommend bracing casting orders sling at this point as she is a significant risk for falls she uses her arms to help ambulate and able to demonstrate that today.  She has significant lower extremity issues she is a candidate for home health for lower extremity strengthening and gait training prescription was provided    Follow Up: 4 weeks with right elbow 2 views AP and lateral both elbows next visit        Nilo Torres MD, FAAOS  Orthopedic Surgeon  Fellowship Trained Shoulder and Elbow Surgeon  [facility]  Orthopedics and Sports Medicine  [address], [address]  [place] [address]    10/26/21  11:45 EDT    Please note that portions of this note may have been completed with a voice recognition program. Efforts  were made to edit the dictations, but occasionally words are mistranscribed.

## 2021-11-04 ENCOUNTER — TELEPHONE (OUTPATIENT)
Dept: ORTHOPEDIC SURGERY | Facility: CLINIC | Age: 86
End: 2021-11-04

## 2021-11-04 NOTE — TELEPHONE ENCOUNTER
I called Jackie, she has access to Epic and was able to pull up the last note from Dr. Torres and they are going to see the patient tomorrow.  Sera

## 2021-11-04 NOTE — TELEPHONE ENCOUNTER
Provider: DR. AYERS  Caller: SHANELL WITH [facility]  Relationship to Patient: HOME HEALTH   Phone Number: [phone]  Reason for Call: SHANELL FROM [facility] CALLED TO LET US KNOW THEY DON'T COVER THE [facility] AREA. STATES PATIENT CAN BE SEEN AT [facility]. THEY ADVISED FOR SOMEONE TO REACH OUT TO DEMETRIA HADDAD AT [facility] ([phone]) IN REGARDS TO DISCHARGE DATE. PLEASE ADVISE.

## 2021-11-23 ENCOUNTER — OFFICE VISIT (OUTPATIENT)
Dept: ORTHOPEDIC SURGERY | Facility: CLINIC | Age: 86
End: 2021-11-23

## 2021-11-23 VITALS
WEIGHT: 195.11 LBS | HEIGHT: 66 IN | SYSTOLIC BLOOD PRESSURE: 144 MMHG | BODY MASS INDEX: 31.36 KG/M2 | DIASTOLIC BLOOD PRESSURE: 78 MMHG

## 2021-11-23 DIAGNOSIS — S52.021A CLOSED FRACTURE OF OLECRANON PROCESS OF RIGHT ULNA, INITIAL ENCOUNTER: ICD-10-CM

## 2021-11-23 DIAGNOSIS — Z09 FRACTURE FOLLOW-UP: Primary | ICD-10-CM

## 2021-11-23 DIAGNOSIS — S52.022A OLECRANON FRACTURE, LEFT, CLOSED, INITIAL ENCOUNTER: ICD-10-CM

## 2021-11-23 PROCEDURE — 99213 OFFICE O/P EST LOW 20 MIN: CPT | Performed by: ORTHOPAEDIC SURGERY

## 2021-11-23 RX ORDER — HYDROCODONE BITARTRATE AND ACETAMINOPHEN 5; 325 MG/1; MG/1
1 TABLET ORAL EVERY 8 HOURS PRN
Qty: 21 TABLET | Refills: 0 | Status: SHIPPED | OUTPATIENT
Start: 2021-11-23 | End: 2021-11-30

## 2021-11-23 NOTE — PROGRESS NOTES
[facility] [place] Orthopaedic Surgery Office Follow Up       Office Follow Up Visit       Patient Name: Bri Iniguez    Chief Complaint:   Chief Complaint   Patient presents with   • Follow-up     4 week f/u Closed fracture of olecrnon process of right ulna, Olecranon fracture, left        Referring Physician: No ref. provider found    History of Present Illness:   It has been 4  week(s) since Bri Iniguez's last visit. Bri Iniguez returns to clinic today for F/U: follow-up of bilateralBody Part: elbowReason: fracture. The issue has been ongoing for 1 month(s). Bri Iniguez rates HIS/HER: herpain at 6/10 on the pain scale. Previous/current treatments: cane/walker, NSAIDS and physical therapy. Current symptoms:Symptoms: pain, swelling and stiffness. The pain is worse with walking, standing, sitting, sleeping, lying on affected side and rising from seated position; resting, sitting and pain medication and/or NSAID improves the pain. Overall, he/she: sheis doing better.  I have reviewed the patient's history of present illness as noted/entered above.    I have reviewed the patient's past medical history, surgical history, social history, family history, medications, and allergies as noted in the electronic medical record and as noted/entered.  I have reviewed the patient's review of systems as noted/enter and updated as noted in the patient's HPI.    Bilateral olecranon avulsion fractures     11/23/2021: Bilateral elbows right olecranon and left olecranon avulsions, left is progressed better than right but both are doing better    Ready for Thanksgiving with soup beans the fixins      Prior:  Right olecranon avulsion 2 weeks ago  Left olecranon avulsion on 10/18/2021  Left seems to be doing better right still painful right has a larger avulsion fragment triceps tendon is still intact on clinical exam     Bilateral lower extremity issues had to have replacements in  "the past and revision she does have to use her arms to push off and that is how these \"pop \"occurred.  Counseled on triceps avulsion but incomplete.     Supportive family present     I reviewed ER note from 10/19/2021 Kaden Hernandez MD [facility] ER radiographs reviewed as well    Subjective   Subjective      Review of Systems   Musculoskeletal: Positive for arthralgias.   All other systems reviewed and are negative.       Past Medical History:   Past Medical History:   Diagnosis Date   • A-fib ([facility])    • Bursitis     trochanteric area   • Colonoscopy refused 2015   • Constipation    • Deaf, left    • Deafness     unspecified   • Diabetes mellitus ([facility])     PATIENT REPORTS SHE HAS TAKEN METFORMIN IN THE PAST BUT WAS TAKEN OFF OF THIS MEDICATION AROUND OCTOBER 2018.     • Elevated cholesterol    • Essential hypertension    • GERD (gastroesophageal reflux disease)    • Hearing loss, right     PATIENT DOES USE A HEARING AID TO RIGHT SIDE INTERMITTENTLY (REPORTS DEAFNESS IN LEFT EAR)   • Hip pain    • History of UTI     PATIENT REPORTS RESOLVED AFTER HORMONE THERAPY WAS ORDERED   • Hyperlipidemia     other   • Impaired functional mobility, balance, gait, and endurance    • Influenza vaccine administered 2014   • Macular degeneration    • Mammogram declined 2015   • Osteoarthritis of left hip    • Primary osteoarthritis of right knee    • Supraventricular premature beats     REPORTED IN PREADMISSION TESTING VISIT \"IRREGULAR HEART BEAT\".  REPORTS UNSURE THE ACTUAL VERBIAGE OF WHAT SHE WAS TOLD OTHER THAN THIS.    • Wears partial dentures     REPORTS UPPER AND LOWER PARTIALS. INSTRUCTED NO ADHESIVES THE DOS.       Past Surgical History:   Past Surgical History:   Procedure Laterality Date   • BUNIONECTOMY Right 1/21/2019    Procedure: Right foot bunionectomy with exostectomy, proximal phalanx osteotomy, right foot second and third digit hammertoe correction with interphalangeal joint fusion, right second " metatarsophalangeal joint capsular release/capsulotomy;  Surgeon: Klaus Cason DPM;  Location:  [facility];  Service: Podiatry   • CATARACT EXTRACTION Bilateral    • CHOLECYSTECTOMY  2002   • COLONOSCOPY     • HARDWARE REMOVAL Right 10/2/2020    Procedure: HIP HARDWARE REMOVAL RIGHT;  Surgeon: Gideon Son MD;  Location:  [facility];  Service: Orthopedics;  Laterality: Right;   • HERNIA REPAIR  2005    umbilical   • HIP INTERTROCHANTERIC NAILING Right 12/31/2019    Procedure: HIP RIGHT OPEN REDUCTION INTERNAL FIXATION, INTERMEDULLARY  NAIL;  Surgeon: Carlos Barba Jr., MD;  Location:  [facility];  Service: Orthopedics   • HYSTERECTOMY  1974   • TONSILLECTOMY     • TOTAL HIP ARTHROPLASTY Left 1/23/2018    Procedure: LEFT TOTAL HIP ARTHROPLASTY;  Surgeon: Gideon Son MD;  Location:  [facility];  Service:    • TOTAL HIP ARTHROPLASTY Right 10/2/2020    Procedure: TOTAL HIP ARTHROPLASTY RIGHT;  Surgeon: Gideon Son MD;  Location:  [facility];  Service: Orthopedics;  Laterality: Right;       Family History:   Family History   Problem Relation Age of Onset   • Hypertension Other    • Diabetes Other         type 2   • Stroke Mother    • Diabetes Mother    • Hypertension Mother    • Cancer Father        Social History:   Social History     Socioeconomic History   • Marital status:    Tobacco Use   • Smoking status: Never Smoker   • Smokeless tobacco: Never Used   Vaping Use   • Vaping Use: Never used   Substance and Sexual Activity   • Alcohol use: No   • Drug use: No   • Sexual activity: Defer       Medications:   Current Outpatient Medications:   •  atorvastatin (LIPITOR) 10 MG tablet, Take 10 mg by mouth Every Night., Disp: , Rfl:   •  citalopram (CeleXA) 40 MG tablet, Take 40 mg by mouth Daily., Disp: , Rfl:   •  clotrimazole-betamethasone (LOTRISONE) 1-0.05 % cream, APPLY TO AFFECTED AREA TWO TIMES A DAY, Disp: , Rfl:   •  diclofenac (VOLTAREN) 1 % gel gel, 4 (Four) Times a Day As Needed., Disp: ,  "Rfl:   •  DOCUSATE SODIUM PO, Take  by mouth Daily., Disp: , Rfl:   •  estradiol (ESTRACE) 0.1 MG/GM vaginal cream, Insert 1 g into the vagina 3 (Three) Times a Week. Apply using finger technique, Disp: , Rfl: 6  •  furosemide (LASIX) 40 MG tablet, , Disp: , Rfl:   •  INTRAROSA 6.5 MG insert, Insert 1 suppository into the vagina Every Night., Disp: , Rfl: 6  •  metoprolol tartrate (LOPRESSOR) 25 MG tablet, 0.5 tablets 2 (Two) Times a Day., Disp: , Rfl:   •  Multiple Vitamins-Minerals (OCUVITE ADULT 50+) capsule, Take 1 tablet by mouth Daily., Disp: , Rfl:   •  HYDROcodone-acetaminophen (NORCO) 5-325 MG per tablet, Take 1 tablet by mouth Every 8 (Eight) Hours As Needed for Severe Pain  for up to 7 days., Disp: 21 tablet, Rfl: 0    Allergies: No Known Allergies    The following portions of the patient's history were reviewed and updated as appropriate: allergies, current medications, past family history, past medical history, past social history, past surgical history and problem list.        Objective    Objective      Vital Signs:   Vitals:    11/23/21 1407   BP: 144/78   Weight: 88.5 kg (195 lb 1.7 oz)   Height: 167.6 cm (65.98\")       Ortho Exam:  BIlateral elbow pain much better, excellent triceps strength  Well appearing  Pain improved    Results Review:  Imaging Results (Last 24 Hours)     Procedure Component Value Units Date/Time    XR Elbow 2 View Bilateral [[id number]] Resulted: 11/23/21 1415     Updated: 11/23/21 1416    Narrative:      Imaging: elbow x-rays 2 views - AP and lateral elbow x-ray views-bilateral   elbows    Side: Bilateral elbows    Indication for elbow x-ray 2 views: Bilateral elbow pain    Comparison: Bilateral elbow comparison views available    Findings:   Bilateral elbow proximal olecranon avulsions noted on both sides.  Slight   proximal migration on both sides but small bony avulsions noted.     I personally reviewed the above x-rays and discussed with the patient.      "         Procedures            Assessment / Plan      Assessment/Plan:   Problem List Items Addressed This Visit        Musculoskeletal and Injuries    Closed fracture of right olecranon process    Relevant Medications    HYDROcodone-acetaminophen (NORCO) 5-325 MG per tablet       Other    Olecranon fracture, left, closed, initial encounter    Relevant Medications    HYDROcodone-acetaminophen (NORCO) 5-325 MG per tablet      Other Visit Diagnoses     Fracture follow-up    -  Primary    Relevant Medications    HYDROcodone-acetaminophen (NORCO) 5-325 MG per tablet    Other Relevant Orders    XR Elbow 2 View Bilateral (Completed)          Bilateral elbow fractures - improved  Pain - counseled; Tramadol doesn't help they note; Norco 5 has helped    They will keep me posted;  Gradual progression    Follow Up: ASHWIN Torres MD, FAAOS  Orthopedic Surgeon  Fellowship Trained Shoulder and Elbow Surgeon  [facility]  Orthopedics and Sports Medicine  [address], [address]  [place] [address]    11/23/21  14:40 EST

## 2023-04-23 ENCOUNTER — APPOINTMENT (OUTPATIENT)
Dept: GENERAL RADIOLOGY | Facility: HOSPITAL | Age: 88
End: 2023-04-23
Payer: MEDICARE

## 2023-04-23 ENCOUNTER — APPOINTMENT (OUTPATIENT)
Dept: CT IMAGING | Facility: HOSPITAL | Age: 88
End: 2023-04-23
Payer: MEDICARE

## 2023-04-23 ENCOUNTER — HOSPITAL ENCOUNTER (OUTPATIENT)
Facility: HOSPITAL | Age: 88
Setting detail: OBSERVATION
Discharge: ANOTHER HOSPITAL | End: 2023-04-25
Attending: EMERGENCY MEDICINE | Admitting: INTERNAL MEDICINE
Payer: MEDICARE

## 2023-04-23 DIAGNOSIS — R07.89 ATYPICAL CHEST PAIN: Primary | ICD-10-CM

## 2023-04-23 PROBLEM — R07.9 CHEST PAIN: Status: ACTIVE | Noted: 2023-04-23

## 2023-04-23 LAB
ALBUMIN SERPL-MCNC: 3.8 G/DL (ref 3.4–4.8)
ALBUMIN/GLOB SERPL: 1.2 {RATIO} (ref 0.8–2)
ALP SERPL-CCNC: 80 U/L (ref 25–100)
ALT SERPL-CCNC: 14 U/L (ref 4–36)
ANION GAP SERPL CALCULATED.3IONS-SCNC: 12 MMOL/L (ref 3–16)
AST SERPL-CCNC: 17 U/L (ref 8–33)
BASOPHILS # BLD: 0 K/UL (ref 0–0.1)
BASOPHILS NFR BLD: 0.3 %
BILIRUB SERPL-MCNC: 0.4 MG/DL (ref 0.3–1.2)
BUN SERPL-MCNC: 8 MG/DL (ref 6–20)
CALCIUM SERPL-MCNC: 9 MG/DL (ref 8.5–10.5)
CHLORIDE SERPL-SCNC: 93 MMOL/L (ref 98–107)
CO2 SERPL-SCNC: 25 MMOL/L (ref 20–30)
CREAT SERPL-MCNC: 0.6 MG/DL (ref 0.4–1.2)
D DIMER PPP DDU-MCNC: 2.76 UG/ML FEU (ref 0–0.6)
EOSINOPHIL # BLD: 0.1 K/UL (ref 0–0.4)
EOSINOPHIL NFR BLD: 1.7 %
ERYTHROCYTE [DISTWIDTH] IN BLOOD BY AUTOMATED COUNT: 13.2 % (ref 11–16)
GFR SERPLBLD CREATININE-BSD FMLA CKD-EPI: >60 ML/MIN/{1.73_M2}
GLOBULIN SER CALC-MCNC: 3.3 G/DL
GLUCOSE SERPL-MCNC: 202 MG/DL (ref 74–106)
HCT VFR BLD AUTO: 41 % (ref 37–47)
HGB BLD-MCNC: 13.6 G/DL (ref 11.5–16.5)
IMM GRANULOCYTES # BLD: 0 K/UL
IMM GRANULOCYTES NFR BLD: 0.2 % (ref 0–5)
INR PPP: 0.95 (ref 0.87–1.11)
LYMPHOCYTES # BLD: 1.5 K/UL (ref 1.5–4)
LYMPHOCYTES NFR BLD: 23 %
MAGNESIUM SERPL-MCNC: 1.8 MG/DL (ref 1.7–2.4)
MCH RBC QN AUTO: 29.7 PG (ref 27–32)
MCHC RBC AUTO-ENTMCNC: 33.2 G/DL (ref 31–35)
MCV RBC AUTO: 89.5 FL (ref 80–100)
MONOCYTES # BLD: 0.8 K/UL (ref 0.2–0.8)
MONOCYTES NFR BLD: 12 %
NEUTROPHILS # BLD: 4 K/UL (ref 2–7.5)
NEUTS SEG NFR BLD: 62.8 %
PLATELET # BLD AUTO: 201 K/UL (ref 150–400)
PMV BLD AUTO: 8.9 FL (ref 6–10)
POTASSIUM SERPL-SCNC: 3.6 MMOL/L (ref 3.4–5.1)
PROT SERPL-MCNC: 7.1 G/DL (ref 6.4–8.3)
PROTHROMBIN TIME: 12.7 SEC (ref 12–14.4)
RBC # BLD AUTO: 4.58 M/UL (ref 3.8–5.8)
SARS-COV-2 RDRP RESP QL NAA+PROBE: NOT DETECTED
SODIUM SERPL-SCNC: 130 MMOL/L (ref 136–145)
TROPONIN, HIGH SENSITIVITY: 17 NG/L (ref 0–14)
TROPONIN, HIGH SENSITIVITY: 19 NG/L (ref 0–14)
TROPONIN, HIGH SENSITIVITY: 27 NG/L (ref 0–14)
WBC # BLD AUTO: 6.3 K/UL (ref 4–11)

## 2023-04-23 PROCEDURE — 87635 SARS-COV-2 COVID-19 AMP PRB: CPT

## 2023-04-23 PROCEDURE — 36415 COLL VENOUS BLD VENIPUNCTURE: CPT

## 2023-04-23 PROCEDURE — 71260 CT THORAX DX C+: CPT

## 2023-04-23 PROCEDURE — 85610 PROTHROMBIN TIME: CPT

## 2023-04-23 PROCEDURE — 80053 COMPREHEN METABOLIC PANEL: CPT

## 2023-04-23 PROCEDURE — 83735 ASSAY OF MAGNESIUM: CPT

## 2023-04-23 PROCEDURE — 99285 EMERGENCY DEPT VISIT HI MDM: CPT

## 2023-04-23 PROCEDURE — 6360000004 HC RX CONTRAST MEDICATION: Performed by: EMERGENCY MEDICINE

## 2023-04-23 PROCEDURE — 93005 ELECTROCARDIOGRAM TRACING: CPT

## 2023-04-23 PROCEDURE — 85025 COMPLETE CBC W/AUTO DIFF WBC: CPT

## 2023-04-23 PROCEDURE — 84484 ASSAY OF TROPONIN QUANT: CPT

## 2023-04-23 PROCEDURE — 85379 FIBRIN DEGRADATION QUANT: CPT

## 2023-04-23 PROCEDURE — G0378 HOSPITAL OBSERVATION PER HR: HCPCS

## 2023-04-23 PROCEDURE — 71045 X-RAY EXAM CHEST 1 VIEW: CPT

## 2023-04-23 RX ORDER — M-VIT,TX,IRON,MINS/CALC/FOLIC 27MG-0.4MG
1 TABLET ORAL DAILY
Status: DISCONTINUED | OUTPATIENT
Start: 2023-04-24 | End: 2023-04-25 | Stop reason: HOSPADM

## 2023-04-23 RX ORDER — ONDANSETRON 2 MG/ML
4 INJECTION INTRAMUSCULAR; INTRAVENOUS EVERY 6 HOURS PRN
Status: DISCONTINUED | OUTPATIENT
Start: 2023-04-23 | End: 2023-04-25 | Stop reason: HOSPADM

## 2023-04-23 RX ORDER — ENOXAPARIN SODIUM 100 MG/ML
1 INJECTION SUBCUTANEOUS ONCE
Status: COMPLETED | OUTPATIENT
Start: 2023-04-24 | End: 2023-04-24

## 2023-04-23 RX ORDER — PRASTERONE 6.5 MG/1
1 INSERT VAGINAL DAILY
COMMUNITY

## 2023-04-23 RX ORDER — ACETAMINOPHEN 325 MG/1
650 TABLET ORAL EVERY 6 HOURS PRN
Status: DISCONTINUED | OUTPATIENT
Start: 2023-04-23 | End: 2023-04-25 | Stop reason: HOSPADM

## 2023-04-23 RX ORDER — CITALOPRAM 40 MG/1
40 TABLET ORAL DAILY
COMMUNITY

## 2023-04-23 RX ORDER — ATORVASTATIN CALCIUM 10 MG/1
10 TABLET, FILM COATED ORAL DAILY
Status: DISCONTINUED | OUTPATIENT
Start: 2023-04-24 | End: 2023-04-25 | Stop reason: HOSPADM

## 2023-04-23 RX ORDER — ONDANSETRON 4 MG/1
4 TABLET, ORALLY DISINTEGRATING ORAL EVERY 8 HOURS PRN
Status: DISCONTINUED | OUTPATIENT
Start: 2023-04-23 | End: 2023-04-25 | Stop reason: HOSPADM

## 2023-04-23 RX ORDER — ASPIRIN 325 MG
325 TABLET ORAL DAILY
Status: DISCONTINUED | OUTPATIENT
Start: 2023-04-24 | End: 2023-04-25 | Stop reason: HOSPADM

## 2023-04-23 RX ORDER — ACETAMINOPHEN 650 MG/1
650 SUPPOSITORY RECTAL EVERY 6 HOURS PRN
Status: DISCONTINUED | OUTPATIENT
Start: 2023-04-23 | End: 2023-04-25 | Stop reason: HOSPADM

## 2023-04-23 RX ORDER — POLYETHYLENE GLYCOL 3350 17 G/17G
17 POWDER, FOR SOLUTION ORAL DAILY PRN
Status: DISCONTINUED | OUTPATIENT
Start: 2023-04-23 | End: 2023-04-25 | Stop reason: HOSPADM

## 2023-04-23 RX ORDER — FUROSEMIDE 40 MG/1
40 TABLET ORAL DAILY
Status: DISCONTINUED | OUTPATIENT
Start: 2023-04-24 | End: 2023-04-25 | Stop reason: HOSPADM

## 2023-04-23 RX ORDER — ASPIRIN 325 MG
325 TABLET ORAL ONCE
COMMUNITY

## 2023-04-23 RX ORDER — ATORVASTATIN CALCIUM 10 MG/1
10 TABLET, FILM COATED ORAL DAILY
COMMUNITY

## 2023-04-23 RX ORDER — FUROSEMIDE 40 MG/1
40 TABLET ORAL DAILY
COMMUNITY

## 2023-04-23 RX ORDER — CITALOPRAM 20 MG/1
20 TABLET ORAL DAILY
Status: DISCONTINUED | OUTPATIENT
Start: 2023-04-24 | End: 2023-04-25 | Stop reason: HOSPADM

## 2023-04-23 RX ADMIN — IOPAMIDOL 100 ML: 755 INJECTION, SOLUTION INTRAVENOUS at 18:43

## 2023-04-24 ENCOUNTER — OUTSIDE FACILITY SERVICE (OUTPATIENT)
Dept: CARDIOLOGY | Facility: CLINIC | Age: 88
End: 2023-04-24
Payer: MEDICARE

## 2023-04-24 PROBLEM — I48.91 ATRIAL FIBRILLATION (HCC): Status: ACTIVE | Noted: 2023-04-24

## 2023-04-24 PROBLEM — R91.1 LUNG NODULE: Status: ACTIVE | Noted: 2023-04-24

## 2023-04-24 LAB
ALBUMIN SERPL-MCNC: 3.8 G/DL (ref 3.4–4.8)
ALBUMIN/GLOB SERPL: 1.1 {RATIO} (ref 0.8–2)
ALP SERPL-CCNC: 78 U/L (ref 25–100)
ALT SERPL-CCNC: 15 U/L (ref 4–36)
ANION GAP SERPL CALCULATED.3IONS-SCNC: 12 MMOL/L (ref 3–16)
AST SERPL-CCNC: 22 U/L (ref 8–33)
BASOPHILS # BLD: 0 K/UL (ref 0–0.1)
BASOPHILS NFR BLD: 0.4 %
BILIRUB SERPL-MCNC: 0.4 MG/DL (ref 0.3–1.2)
BUN SERPL-MCNC: 5 MG/DL (ref 6–20)
CALCIUM SERPL-MCNC: 9 MG/DL (ref 8.5–10.5)
CHLORIDE SERPL-SCNC: 95 MMOL/L (ref 98–107)
CHOLEST SERPL-MCNC: 168 MG/DL (ref 0–200)
CO2 SERPL-SCNC: 26 MMOL/L (ref 20–30)
CREAT SERPL-MCNC: 0.6 MG/DL (ref 0.4–1.2)
EOSINOPHIL # BLD: 0.1 K/UL (ref 0–0.4)
EOSINOPHIL NFR BLD: 1.2 %
ERYTHROCYTE [DISTWIDTH] IN BLOOD BY AUTOMATED COUNT: 13.2 % (ref 11–16)
GFR SERPLBLD CREATININE-BSD FMLA CKD-EPI: >60 ML/MIN/{1.73_M2}
GLOBULIN SER CALC-MCNC: 3.5 G/DL
GLUCOSE BLD-MCNC: 169 MG/DL (ref 74–106)
GLUCOSE BLD-MCNC: 233 MG/DL (ref 74–106)
GLUCOSE SERPL-MCNC: 166 MG/DL (ref 74–106)
HCT VFR BLD AUTO: 41.5 % (ref 37–47)
HDLC SERPL-MCNC: 59 MG/DL (ref 40–60)
HGB BLD-MCNC: 13.9 G/DL (ref 11.5–16.5)
IMM GRANULOCYTES # BLD: 0 K/UL
IMM GRANULOCYTES NFR BLD: 0.4 % (ref 0–5)
LDLC SERPL CALC-MCNC: 89 MG/DL
LV EF: 53 %
LVEF MODALITY: NORMAL
LYMPHOCYTES # BLD: 1 K/UL (ref 1.5–4)
LYMPHOCYTES NFR BLD: 19.6 %
MAGNESIUM SERPL-MCNC: 1.9 MG/DL (ref 1.7–2.4)
MCH RBC QN AUTO: 30 PG (ref 27–32)
MCHC RBC AUTO-ENTMCNC: 33.5 G/DL (ref 31–35)
MCV RBC AUTO: 89.4 FL (ref 80–100)
MONOCYTES # BLD: 0.6 K/UL (ref 0.2–0.8)
MONOCYTES NFR BLD: 12.3 %
NEUTROPHILS # BLD: 3.5 K/UL (ref 2–7.5)
NEUTS SEG NFR BLD: 66.1 %
NT-PROBNP SERPL-MCNC: 1431 PG/ML (ref 0–1800)
PERFORMED ON: ABNORMAL
PERFORMED ON: ABNORMAL
PLATELET # BLD AUTO: 169 K/UL (ref 150–400)
PMV BLD AUTO: 8.8 FL (ref 6–10)
POTASSIUM SERPL-SCNC: 3.6 MMOL/L (ref 3.4–5.1)
PROT SERPL-MCNC: 7.3 G/DL (ref 6.4–8.3)
RBC # BLD AUTO: 4.64 M/UL (ref 3.8–5.8)
SODIUM SERPL-SCNC: 133 MMOL/L (ref 136–145)
TRIGL SERPL-MCNC: 99 MG/DL (ref 0–249)
TROPONIN, HIGH SENSITIVITY: 24 NG/L (ref 0–14)
VLDLC SERPL CALC-MCNC: 20 MG/DL
WBC # BLD AUTO: 5.2 K/UL (ref 4–11)

## 2023-04-24 PROCEDURE — 80053 COMPREHEN METABOLIC PANEL: CPT

## 2023-04-24 PROCEDURE — 36415 COLL VENOUS BLD VENIPUNCTURE: CPT

## 2023-04-24 PROCEDURE — G0378 HOSPITAL OBSERVATION PER HR: HCPCS

## 2023-04-24 PROCEDURE — 93306 TTE W/DOPPLER COMPLETE: CPT

## 2023-04-24 PROCEDURE — 85025 COMPLETE CBC W/AUTO DIFF WBC: CPT

## 2023-04-24 PROCEDURE — 96374 THER/PROPH/DIAG INJ IV PUSH: CPT

## 2023-04-24 PROCEDURE — 83735 ASSAY OF MAGNESIUM: CPT

## 2023-04-24 PROCEDURE — 6370000000 HC RX 637 (ALT 250 FOR IP): Performed by: PHYSICIAN ASSISTANT

## 2023-04-24 PROCEDURE — 80061 LIPID PANEL: CPT

## 2023-04-24 PROCEDURE — 6370000000 HC RX 637 (ALT 250 FOR IP): Performed by: INTERNAL MEDICINE

## 2023-04-24 PROCEDURE — 97165 OT EVAL LOW COMPLEX 30 MIN: CPT

## 2023-04-24 PROCEDURE — 83880 ASSAY OF NATRIURETIC PEPTIDE: CPT

## 2023-04-24 PROCEDURE — 96372 THER/PROPH/DIAG INJ SC/IM: CPT

## 2023-04-24 PROCEDURE — 6360000002 HC RX W HCPCS: Performed by: INTERNAL MEDICINE

## 2023-04-24 PROCEDURE — 97161 PT EVAL LOW COMPLEX 20 MIN: CPT

## 2023-04-24 PROCEDURE — 84484 ASSAY OF TROPONIN QUANT: CPT

## 2023-04-24 PROCEDURE — 93005 ELECTROCARDIOGRAM TRACING: CPT

## 2023-04-24 RX ORDER — HYDRALAZINE HYDROCHLORIDE 25 MG/1
25 TABLET, FILM COATED ORAL 3 TIMES DAILY PRN
Status: DISCONTINUED | OUTPATIENT
Start: 2023-04-24 | End: 2023-04-25 | Stop reason: HOSPADM

## 2023-04-24 RX ORDER — HYDROCODONE BITARTRATE AND ACETAMINOPHEN 5; 325 MG/1; MG/1
1 TABLET ORAL EVERY 6 HOURS PRN
Status: DISCONTINUED | OUTPATIENT
Start: 2023-04-24 | End: 2023-04-25 | Stop reason: HOSPADM

## 2023-04-24 RX ORDER — NITROGLYCERIN 0.4 MG/1
TABLET SUBLINGUAL
Status: DISPENSED
Start: 2023-04-24 | End: 2023-04-24

## 2023-04-24 RX ORDER — NITROGLYCERIN 0.4 MG/1
0.4 TABLET SUBLINGUAL EVERY 5 MIN PRN
Status: DISCONTINUED | OUTPATIENT
Start: 2023-04-24 | End: 2023-04-25 | Stop reason: HOSPADM

## 2023-04-24 RX ORDER — PANTOPRAZOLE SODIUM 40 MG/1
40 TABLET, DELAYED RELEASE ORAL
Status: DISCONTINUED | OUTPATIENT
Start: 2023-04-25 | End: 2023-04-24

## 2023-04-24 RX ORDER — PANTOPRAZOLE SODIUM 40 MG/1
40 TABLET, DELAYED RELEASE ORAL
Status: DISCONTINUED | OUTPATIENT
Start: 2023-04-24 | End: 2023-04-25 | Stop reason: HOSPADM

## 2023-04-24 RX ADMIN — ASPIRIN 325 MG: 325 TABLET ORAL at 09:42

## 2023-04-24 RX ADMIN — CITALOPRAM HYDROBROMIDE 20 MG: 20 TABLET ORAL at 09:39

## 2023-04-24 RX ADMIN — ENOXAPARIN SODIUM 100 MG: 100 INJECTION SUBCUTANEOUS at 00:19

## 2023-04-24 RX ADMIN — ACETAMINOPHEN 650 MG: 325 TABLET, FILM COATED ORAL at 20:59

## 2023-04-24 RX ADMIN — FUROSEMIDE 40 MG: 40 TABLET ORAL at 09:39

## 2023-04-24 RX ADMIN — ACETAMINOPHEN 650 MG: 325 TABLET, FILM COATED ORAL at 04:30

## 2023-04-24 RX ADMIN — METOPROLOL TARTRATE 25 MG: 25 TABLET, FILM COATED ORAL at 21:00

## 2023-04-24 RX ADMIN — ATORVASTATIN CALCIUM 10 MG: 10 TABLET, FILM COATED ORAL at 09:38

## 2023-04-24 RX ADMIN — HYDROCODONE BITARTRATE AND ACETAMINOPHEN 1 TABLET: 5; 325 TABLET ORAL at 16:29

## 2023-04-24 RX ADMIN — MULTIPLE VITAMINS W/ MINERALS TAB 1 TABLET: TAB at 09:39

## 2023-04-24 RX ADMIN — ONDANSETRON 4 MG: 2 INJECTION INTRAMUSCULAR; INTRAVENOUS at 10:47

## 2023-04-24 RX ADMIN — ACETAMINOPHEN 650 MG: 325 TABLET, FILM COATED ORAL at 13:22

## 2023-04-24 RX ADMIN — METOPROLOL TARTRATE 12.5 MG: 25 TABLET, FILM COATED ORAL at 00:21

## 2023-04-24 RX ADMIN — PANTOPRAZOLE SODIUM 40 MG: 40 TABLET, DELAYED RELEASE ORAL at 16:29

## 2023-04-24 RX ADMIN — Medication 0.4 MG: at 00:40

## 2023-04-24 RX ADMIN — METOPROLOL TARTRATE 12.5 MG: 25 TABLET, FILM COATED ORAL at 09:39

## 2023-04-24 ASSESSMENT — PAIN SCALES - GENERAL
PAINLEVEL_OUTOF10: 8
PAINLEVEL_OUTOF10: 6
PAINLEVEL_OUTOF10: 4
PAINLEVEL_OUTOF10: 7

## 2023-04-24 ASSESSMENT — PAIN DESCRIPTION - LOCATION: LOCATION: LEG;HIP

## 2023-04-24 ASSESSMENT — PAIN DESCRIPTION - DESCRIPTORS
DESCRIPTORS: ACHING;THROBBING;CRAMPING
DESCRIPTORS: ACHING

## 2023-04-24 NOTE — PROGRESS NOTES
Ask patient if  we needed transfer her to another facility who her cardiologist was. She stated [facility] was the last one she saw about three years ago and only once. She never went to any follow up appointment. Stated her daughters wanted her to go to a different cardiologist but she did not remember who. Stated she would like to stay here until here daughters get here in the morning. Mariely aware.

## 2023-04-24 NOTE — PROGRESS NOTES
Medication Reconciliation completed with fill history from [facility] and patient interview. Patient takes one whole tablet by mouth twice daily of Metoprolol Tart 25 mg. I changed the sig in home medications.

## 2023-04-24 NOTE — PROGRESS NOTES
Pt being transferred to [address] [place] [address] at [facility] in the morning. EMS to transport. Pt needs to be ready by 6:30 AM. EMS will be here at 6:45 AM. Nurse to call report at [phone].

## 2023-04-24 NOTE — H&P
Short Stay Summary      Patient ID: Song Riggins       Patient's PCP: Shi Garcia    Admit Date: 4/23/2023     Discharge Date:   4/25/2023    Admitting Physician: Coni Camarena MD    Discharge Physician: BIN Chisholm     Reason for this admission:   Chest pain     Discharge Diagnoses: Active Hospital Problems    Diagnosis Date Noted    Atrial fibrillation [facility]) [I48.91] 04/24/2023    Chest pain [R07.9] 04/23/2023    Type 2 diabetes mellitus without complication, without long-term current use of insulin ([facility] [address] [E11.9] 01/04/2020    Essential hypertension [I10] 01/04/2020       Procedures:  CT CHEST PULMONARY EMBOLISM W CONTRAST   Final Result      No pulmonary embolism seen. No acute parenchymal consolidation or effusion. 8 mm left lower lobe nodule. Following the Fleischner Society 2017 guidelines for single solid nodules >8 mm, if patient is low risk then consider CT at 3 months, PET/CT, or tissue sampling. If patient is high risk, then consider CT at 3 months, PET/CT,    or tissue sampling.  qzxv      Cardiomegaly with coronary calcification. XR CHEST PORTABLE   Final Result      No acute disease               Consults:   None  PT/OT    HISTORY OF PRESENT ILLNESS:   The patient is a 80 y.o. female with PMH of atrial fibrillation, diastolic HF, HTN who presents due to chest pain. Patient is sitting up on the edge of the bed doing a sponge bath at time of exam. She is smiling, pleasant, and having appropriate converesatoin. Reports she has had intermittent chest pain for the past 1 week. It usually starts in her middle chest or between her shoulder blades. It does radiate to her neck and sometimes down her left arm. When she takes her aspirin it seems to help some. Also states nitroglycerin helped the pain. She has no known history of CAD. No DM or smoking. Today states that pain has resolved and has not occurred so far.  She lives alone and is fairly independent although reports

## 2023-04-24 NOTE — PROGRESS NOTES
Occupational Therapy  Facility/Department: [facility] MED [facility]  Occupational Therapy Initial Assessment    Name: Carrington Dykes  : 1935  MRN: [id number]  Date of Service: 2023    Discharge Recommendations:   Home with assist PRN    Patient Diagnosis(es): The encounter diagnosis was Atypical chest pain. Past Medical History:  has a past medical history of Deaf, left, Hyperlipidemia, Hypertension, and Macular degeneration. Past Surgical History:  has a past surgical history that includes hip surgery (Left). Assessment   Performance deficits / Impairments: Decreased endurance;Decreased balance  Assessment: This 80year old pt was referred to OT services upon admission following onset of chest pain. Pt is pleasant and cooperative, room air, on room air. Pt has multiple family members present. Pt transferred to sitting at EOB with MOD I. Pt sat unsupported at EOB. Pt uses AE for dressing. Pt come to stand at RW at [facility] with CGA. Pt with poor standing tolerance. however, this is baseline for pt as she is non ambulator for about ~3 years. Pt and family report pt is performing at baseline functional status. Pt is awaiting transfer per family. No skilled OT services at this time. Prognosis: Good  Decision Making: Low Complexity  No Skilled OT: At baseline function  REQUIRES OT FOLLOW-UP: No        Restrictions  Restrictions/Precautions  Restrictions/Precautions: Fall Risk, General Precautions  Required Braces or Orthoses?: No    Subjective   General  Chart Reviewed: Yes  Patient assessed for rehabilitation services?: Yes  Family / Caregiver Present: Yes  Referring Practitioner: Anna Marie Mcfarland PA-C  Diagnosis: Chest pain  Subjective  Subjective: Pt agreeable to OT services. Pt states she is having chest pain, BLE hip and knee pain. Pt states the chest pain is more like indigestion.      Social/Functional History  Social/Functional History  Lives With: Alone (Family lives close and checks on her daily)  Type of Home:

## 2023-04-24 NOTE — DISCHARGE SUMMARY
Prasterone (INTRAROSA) 6.5 MG INST Place 1 application vaginally daily Vaginal suppository daily      metoprolol tartrate (LOPRESSOR) 25 MG tablet Take 0.5 tablets by mouth 2 times daily  Qty: 60 tablet, Refills: 3      Multiple Vitamins-Minerals (OCUVITE EXTRA PO) Take by mouth               Patient was seen and examined with Dr. Franklin Clemons. Assessment and plan formulated by Dr. Franklin Clemons. Signed:  Electronically signed by BIN Leone on 4/24/2023 at 6:09 PM       Thank you May Samuel for the opportunity to be involved in this patient's care. If you have any questions or concerns please feel free to contact me at [phone].

## 2023-04-24 NOTE — PROGRESS NOTES
Physical Therapy  Facility/Department: [facility]  Physical Therapy Initial Assessment/Discharge Summary    Name: Wandra Cooks  : 1935  MRN: [id number]  Date of Service: 2023    Discharge Recommendations:  Home with assist PRN          Patient Diagnosis(es): The encounter diagnosis was Atypical chest pain. Past Medical History:  has a past medical history of Deaf, left, Hyperlipidemia, Hypertension, and Macular degeneration. Past Surgical History:  has a past surgical history that includes hip surgery (Left). Assessment   Assessment: PT eval completed on this patient admitted to hospital with primary diagnosis of chest pain. She is received supine in bed on RA. Multiple family members present at bedside. NAD. Pleasant and cooperative. Patient is able to perform sup to sit with mod I. Sit to stand and transfer from bed with RW and SBA. Patient is non-ambulatory at baseline and declines attempt to ambulate. Patient left sitting EOB per her request. Patient appears to be at her baseline functional level and does not require further skilled PT intervention at this time. Therapy Prognosis: Good  Decision Making: Low Complexity  Requires PT Follow-Up: No  Activity Tolerance  Activity Tolerance: Patient tolerated evaluation without incident;Patient tolerated treatment well     Plan   Physcial Therapy Plan  General Plan: Discharge with evaluation only  Safety Devices  Type of Devices: Call light within reach, Left in bed     Restrictions  Restrictions/Precautions  Restrictions/Precautions: Fall Risk, General Precautions  Required Braces or Orthoses?: No     Subjective   Pain: No c/o pain.   General  Chart Reviewed: Yes  Patient assessed for rehabilitation services?: Yes  Family / Caregiver Present: Yes  Referring Practitioner: Bharat Dennison MD  Follows Commands: Within Functional Limits         Social/Functional History  Social/Functional History  Lives With: Alone (Family lives close and checks on

## 2023-04-24 NOTE — PLAN OF CARE
Problem: Discharge Planning  Goal: Discharge to home or other facility with appropriate resources  Outcome: Progressing  Flowsheets (Taken 4/23/2023 8287)  Discharge to home or other facility with appropriate resources:   Identify barriers to discharge with patient and caregiver   Identify discharge learning needs (meds, wound care, etc)   Refer to discharge planning if patient needs post-hospital services based on physician order or complex needs related to functional status, cognitive ability or social support system   Arrange for needed discharge resources and transportation as appropriate   Arrange for interpreters to assist at discharge as needed     Problem: Safety - Adult  Goal: Free from fall injury  Outcome: Progressing

## 2023-04-24 NOTE — ED NOTES
Report complete at this time to [facility] on medical unit, pt to go to 12.      Samuel Zacarias RN  04/23/23 2027

## 2023-04-24 NOTE — CARE COORDINATION
Lives With: Alone (Family lives close and checks on her daily)  Type of Home: House  Home Layout: One level  Home Access: Ramped entrance  Bathroom Shower/Tub: Walk-in shower  Bathroom Toilet: Handicap height  [address]: Shower chair, Grab bars in shower, 3-in-1 commode, Grab bars around toilet  Bathroom Accessibility: Wheelchair accessible  Home Equipment: Carlton Maynard, [address], [place], [address] [address]  Has the patient had two or more falls in the past year or any fall with injury in the past year?: No  Receives Help From: Family  ADL Assistance: [address]: Independent (Family assists with housekeeping tasks as needed.)  Homemaking Responsibilities: Yes  Ambulation Assistance: Non-ambulatory  Transfer Assistance: Independent  Active : No     Lives alone - family involved. Has SC, grab bars, cane, RW, WC.  Non-ambulatory at baseline. I with tx and ADLs. No DC needs noted at this time. Will reassess PRN.

## 2023-04-25 ENCOUNTER — HOSPITAL ENCOUNTER (OUTPATIENT)
Facility: HOSPITAL | Age: 88
Discharge: HOME | End: 2023-04-25
Attending: INTERNAL MEDICINE | Admitting: INTERNAL MEDICINE
Payer: MEDICARE

## 2023-04-25 ENCOUNTER — DOCUMENTATION (OUTPATIENT)
Dept: CARDIAC REHAB | Facility: HOSPITAL | Age: 88
End: 2023-04-25
Payer: MEDICARE

## 2023-04-25 ENCOUNTER — APPOINTMENT (OUTPATIENT)
Dept: CARDIOLOGY | Facility: HOSPITAL | Age: 88
End: 2023-04-25
Payer: MEDICARE

## 2023-04-25 VITALS
HEART RATE: 88 BPM | WEIGHT: 210 LBS | OXYGEN SATURATION: 94 % | BODY MASS INDEX: 33.89 KG/M2 | TEMPERATURE: 97.9 F | RESPIRATION RATE: 16 BRPM | DIASTOLIC BLOOD PRESSURE: 86 MMHG | SYSTOLIC BLOOD PRESSURE: 153 MMHG

## 2023-04-25 VITALS
RESPIRATION RATE: 18 BRPM | TEMPERATURE: 97.3 F | BODY MASS INDEX: 30.76 KG/M2 | SYSTOLIC BLOOD PRESSURE: 142 MMHG | OXYGEN SATURATION: 93 % | DIASTOLIC BLOOD PRESSURE: 76 MMHG | WEIGHT: 196 LBS | HEART RATE: 74 BPM | HEIGHT: 67 IN

## 2023-04-25 DIAGNOSIS — R60.0 EDEMA LEG: ICD-10-CM

## 2023-04-25 DIAGNOSIS — I21.4 NSTEMI, INITIAL EPISODE OF CARE: Primary | ICD-10-CM

## 2023-04-25 LAB
ASCENDING AORTA: 3.2 CM
BH CV ECHO MEAS - AO MAX PG: 7.9 MMHG
BH CV ECHO MEAS - AO MEAN PG: 3.7 MMHG
BH CV ECHO MEAS - AO ROOT AREA (BSA CORRECTED): 1.5 CM2
BH CV ECHO MEAS - AO ROOT DIAM: 3 CM
BH CV ECHO MEAS - AO V2 MAX: 140 CM/SEC
BH CV ECHO MEAS - AO V2 VTI: 25.9 CM
BH CV ECHO MEAS - AVA(I,D): 2.46 CM2
BH CV ECHO MEAS - EDV(CUBED): 59.3 ML
BH CV ECHO MEAS - EDV(MOD-SP2): 82.1 ML
BH CV ECHO MEAS - EDV(MOD-SP4): 127 ML
BH CV ECHO MEAS - EF(MOD-BP): 52.2 %
BH CV ECHO MEAS - EF(MOD-SP2): 57.2 %
BH CV ECHO MEAS - EF(MOD-SP4): 47.7 %
BH CV ECHO MEAS - ESV(CUBED): 17.6 ML
BH CV ECHO MEAS - ESV(MOD-SP2): 35.1 ML
BH CV ECHO MEAS - ESV(MOD-SP4): 66.4 ML
BH CV ECHO MEAS - FS: 33.3 %
BH CV ECHO MEAS - IVS/LVPW: 0.9 CM
BH CV ECHO MEAS - IVSD: 0.9 CM
BH CV ECHO MEAS - LA DIMENSION: 4 CM
BH CV ECHO MEAS - LAT PEAK E' VEL: 7 CM/SEC
BH CV ECHO MEAS - LV DIASTOLIC VOL/BSA (35-75): 64 CM2
BH CV ECHO MEAS - LV MASS(C)D: 113.6 GRAMS
BH CV ECHO MEAS - LV MAX PG: 4.8 MMHG
BH CV ECHO MEAS - LV MEAN PG: 2 MMHG
BH CV ECHO MEAS - LV SYSTOLIC VOL/BSA (12-30): 33.5 CM2
BH CV ECHO MEAS - LV V1 MAX: 110 CM/SEC
BH CV ECHO MEAS - LV V1 VTI: 22.5 CM
BH CV ECHO MEAS - LVIDD: 3.9 CM
BH CV ECHO MEAS - LVIDS: 2.6 CM
BH CV ECHO MEAS - LVOT AREA: 2.8 CM2
BH CV ECHO MEAS - LVOT DIAM: 1.9 CM
BH CV ECHO MEAS - LVPWD: 1 CM
BH CV ECHO MEAS - MED PEAK E' VEL: 6.9 CM/SEC
BH CV ECHO MEAS - MV A MAX VEL: 93.6 CM/SEC
BH CV ECHO MEAS - MV DEC SLOPE: 369.5 CM/SEC2
BH CV ECHO MEAS - MV DEC TIME: 0.24 MSEC
BH CV ECHO MEAS - MV E MAX VEL: 85.9 CM/SEC
BH CV ECHO MEAS - MV E/A: 0.92
BH CV ECHO MEAS - MV MAX PG: 2.33 MMHG
BH CV ECHO MEAS - MV MEAN PG: 2 MMHG
BH CV ECHO MEAS - MV P1/2T: 67.4 MSEC
BH CV ECHO MEAS - MV V2 VTI: 21.4 CM
BH CV ECHO MEAS - MVA(P1/2T): 3.3 CM2
BH CV ECHO MEAS - MVA(VTI): 3 CM2
BH CV ECHO MEAS - PA ACC TIME: 0.09 SEC
BH CV ECHO MEAS - PA PR(ACCEL): 40.3 MMHG
BH CV ECHO MEAS - PA V2 MAX: 113 CM/SEC
BH CV ECHO MEAS - PI END-D VEL: 87.3 CM/SEC
BH CV ECHO MEAS - RAP SYSTOLE: 3 MMHG
BH CV ECHO MEAS - RVSP: 34 MMHG
BH CV ECHO MEAS - SI(MOD-SP2): 23.7 ML/M2
BH CV ECHO MEAS - SI(MOD-SP4): 30.6 ML/M2
BH CV ECHO MEAS - SV(LVOT): 63.8 ML
BH CV ECHO MEAS - SV(MOD-SP2): 47 ML
BH CV ECHO MEAS - SV(MOD-SP4): 60.6 ML
BH CV ECHO MEAS - TAPSE (>1.6): 2.02 CM
BH CV ECHO MEAS - TR MAX PG: 31.2 MMHG
BH CV ECHO MEAS - TR MAX VEL: 279 CM/SEC
BH CV ECHO MEASUREMENTS AVERAGE E/E' RATIO: 12.36
BH CV VAS BP LEFT ARM: NORMAL MMHG
BH CV XLRA - RV BASE: 3.5 CM
BH CV XLRA - RV LENGTH: 5.4 CM
BH CV XLRA - RV MID: 2.7 CM
BH CV XLRA - TDI S': 16.5 CM/SEC
BUN BLDA-MCNC: 5 MG/DL (ref 8–26)
CA-I BLDA-SCNC: 1.11 MMOL/L (ref 1.2–1.32)
CATH EF ESTIMATED: 60 %
CHLORIDE BLDA-SCNC: 95 MMOL/L (ref 98–109)
CO2 BLDA-SCNC: 31 MMOL/L (ref 24–29)
CREAT BLDA-MCNC: 0.6 MG/DL (ref 0.6–1.3)
EGFRCR SERPLBLD CKD-EPI 2021: 87 ML/MIN/1.73
GLUCOSE BLD-MCNC: 166 MG/DL (ref 74–106)
GLUCOSE BLDC GLUCOMTR-MCNC: 153 MG/DL (ref 70–130)
HCT VFR BLDA CALC: 39 % (ref 38–51)
HGB BLDA-MCNC: 13.3 G/DL (ref 12–17)
IVRT: 93 MSEC
LEFT ATRIUM VOLUME INDEX: 32.4 ML/M2
LV EF 2D ECHO EST: 60 %
MAXIMAL PREDICTED HEART RATE: 133 BPM
PERFORMED ON: ABNORMAL
POTASSIUM BLDA-SCNC: 3.6 MMOL/L (ref 3.5–4.9)
SODIUM BLD-SCNC: 136 MMOL/L (ref 138–146)
STRESS TARGET HR: 113 BPM

## 2023-04-25 PROCEDURE — 85014 HEMATOCRIT: CPT

## 2023-04-25 PROCEDURE — 93306 TTE W/DOPPLER COMPLETE: CPT

## 2023-04-25 PROCEDURE — 25010000002 HEPARIN (PORCINE) PER 1000 UNITS: Performed by: INTERNAL MEDICINE

## 2023-04-25 PROCEDURE — 25510000001 IOPAMIDOL PER 1 ML: Performed by: INTERNAL MEDICINE

## 2023-04-25 PROCEDURE — G0378 HOSPITAL OBSERVATION PER HR: HCPCS

## 2023-04-25 PROCEDURE — 6370000000 HC RX 637 (ALT 250 FOR IP): Performed by: PHYSICIAN ASSISTANT

## 2023-04-25 PROCEDURE — 25010000002 MIDAZOLAM PER 1 MG: Performed by: INTERNAL MEDICINE

## 2023-04-25 PROCEDURE — A9270 NON-COVERED ITEM OR SERVICE: HCPCS | Performed by: PHYSICIAN ASSISTANT

## 2023-04-25 PROCEDURE — C1769 GUIDE WIRE: HCPCS | Performed by: INTERNAL MEDICINE

## 2023-04-25 PROCEDURE — C1894 INTRO/SHEATH, NON-LASER: HCPCS | Performed by: INTERNAL MEDICINE

## 2023-04-25 PROCEDURE — 63710000001 ASPIRIN 81 MG CHEWABLE TABLET: Performed by: PHYSICIAN ASSISTANT

## 2023-04-25 PROCEDURE — 80047 BASIC METABLC PNL IONIZED CA: CPT

## 2023-04-25 PROCEDURE — 25010000002 FENTANYL CITRATE (PF) 50 MCG/ML SOLUTION: Performed by: INTERNAL MEDICINE

## 2023-04-25 PROCEDURE — 93458 L HRT ARTERY/VENTRICLE ANGIO: CPT | Performed by: INTERNAL MEDICINE

## 2023-04-25 PROCEDURE — 6370000000 HC RX 637 (ALT 250 FOR IP): Performed by: INTERNAL MEDICINE

## 2023-04-25 PROCEDURE — 99222 1ST HOSP IP/OBS MODERATE 55: CPT | Performed by: INTERNAL MEDICINE

## 2023-04-25 RX ORDER — SODIUM CHLORIDE 9 MG/ML
3 INJECTION, SOLUTION INTRAVENOUS CONTINUOUS
Status: CANCELLED | OUTPATIENT
Start: 2023-04-25 | End: 2023-04-25

## 2023-04-25 RX ORDER — ASPIRIN 81 MG/1
324 TABLET, CHEWABLE ORAL ONCE
Status: COMPLETED | OUTPATIENT
Start: 2023-04-25 | End: 2023-04-25

## 2023-04-25 RX ORDER — ACETAMINOPHEN 325 MG/1
650 TABLET ORAL EVERY 4 HOURS PRN
Status: DISCONTINUED | OUTPATIENT
Start: 2023-04-25 | End: 2023-04-25 | Stop reason: HOSPADM

## 2023-04-25 RX ORDER — HEPARIN SODIUM 1000 [USP'U]/ML
INJECTION, SOLUTION INTRAVENOUS; SUBCUTANEOUS
Status: DISCONTINUED | OUTPATIENT
Start: 2023-04-25 | End: 2023-04-25 | Stop reason: HOSPADM

## 2023-04-25 RX ORDER — ASPIRIN 325 MG
325 TABLET ORAL DAILY
COMMUNITY
End: 2023-04-25 | Stop reason: HOSPADM

## 2023-04-25 RX ORDER — LIDOCAINE HYDROCHLORIDE 10 MG/ML
INJECTION, SOLUTION EPIDURAL; INFILTRATION; INTRACAUDAL; PERINEURAL
Status: DISCONTINUED | OUTPATIENT
Start: 2023-04-25 | End: 2023-04-25 | Stop reason: HOSPADM

## 2023-04-25 RX ORDER — ASPIRIN 81 MG/1
81 TABLET, CHEWABLE ORAL DAILY
Refills: 11
Start: 2023-04-25

## 2023-04-25 RX ORDER — MIDAZOLAM HYDROCHLORIDE 1 MG/ML
INJECTION INTRAMUSCULAR; INTRAVENOUS
Status: DISCONTINUED | OUTPATIENT
Start: 2023-04-25 | End: 2023-04-25 | Stop reason: HOSPADM

## 2023-04-25 RX ORDER — FENTANYL CITRATE 50 UG/ML
INJECTION, SOLUTION INTRAMUSCULAR; INTRAVENOUS
Status: DISCONTINUED | OUTPATIENT
Start: 2023-04-25 | End: 2023-04-25 | Stop reason: HOSPADM

## 2023-04-25 RX ORDER — NICARDIPINE HCL-0.9% SOD CHLOR 1 MG/10 ML
SYRINGE (ML) INTRAVENOUS
Status: DISCONTINUED | OUTPATIENT
Start: 2023-04-25 | End: 2023-04-25 | Stop reason: HOSPADM

## 2023-04-25 RX ORDER — SENNOSIDES 8.6 MG
650 CAPSULE ORAL EVERY 6 HOURS PRN
COMMUNITY
End: 2023-04-25 | Stop reason: HOSPADM

## 2023-04-25 RX ADMIN — ASPIRIN 81 MG 324 MG: 81 TABLET ORAL at 09:31

## 2023-04-25 RX ADMIN — HYDROCODONE BITARTRATE AND ACETAMINOPHEN 1 TABLET: 5; 325 TABLET ORAL at 06:41

## 2023-04-25 RX ADMIN — PANTOPRAZOLE SODIUM 40 MG: 40 TABLET, DELAYED RELEASE ORAL at 06:42

## 2023-04-25 ASSESSMENT — PAIN DESCRIPTION - DESCRIPTORS: DESCRIPTORS: ACHING

## 2023-04-25 ASSESSMENT — PAIN SCALES - GENERAL: PAINLEVEL_OUTOF10: 7

## 2023-04-25 NOTE — PROGRESS NOTES
Cardiac Rehab staff mailed referral letter to patient regarding Phase II Cardiac Rehab program. Instruction for patient to contact [facility] Cardiac Rehab Department for additional program information and to forward referral to closest Cardiac Rehab program.

## 2023-04-25 NOTE — PROGRESS NOTES
615 Ana Maria from [facility] called for report on patient. [address] arrive to [facility] patient  [phone] Patient transferred to [facility]. Patient A&O. All belongs were sent home with her children yesterday. Telemetry box removed. Patient left facility with an 18 gauge IV in the [address] [place] [address]. EMS transported patient from facility via stretcher.

## 2023-04-25 NOTE — NURSING NOTE
ACC REVIEW REPORT: [facility]        PATIENT NAME: Bri Iniguez    PATIENT ID: [id number]        COVID-19 ACC SCREENING       DOES THE PATIENT HAVE A FEVER GREATER THAN OR EQUAL .4: N    IS THE PATIENT EXPERIENCING SHORTNESS OF BREATH: N    DOES THE PATIENT HAVE A COUGH: N  DOES THE PATIENT HAVE ANY OF THE FOLLOWING RISK FACTORS:    EXPOSURE TO SUSPECTED OR KNOWN COVID-19: N    RECENT TRAVEL HISTORY TO ENDEMIC AREA (DOMESTIC/LOCAL): N    IS THE PATIENT A HEALTHCARE WORKER: N    HAS THE PATIENT EXPERIENCED A LOSS OF SENSE OF TASTE OR SMELL: N    HAS THE PATIENT BEEN TESTED FOR COVID-19: Y    DATE TESTED: 4/23/24    LAB TESTING SENT TO: NEGATIVE          BED: 9801    BED TYPE: CV    BED GIVEN TO: ARLENE LAMBERT    TIME BED GIVEN: 0605    TODAY'S DATE: 4/25/2023    TRANSFER DATE: 4/25/23    ETA: 0745    TRANSFERRING FACILITY: [facility]    TRANSFERRING FACILITY PHONE # : [phone]    TRANSFERRING MD: ROBINSON    DATE/TIME REQUEST RECEIVED: 4/24/23 AT 1445    [facility] RN: FRANCESCA OSORIO RN    REPORT FROM: ARLENE LAMBERT    TIME REPORT TAKEN: 0600    DIAGNOSIS: NSTEMI    REASON FOR TRANSFER TO [facility]: CATH LAB    TRANSPORTATION: GROUND    CLINICAL REASON FOR TRANSFER TO [facility]: HIGHER LEVEL OF CARE      CLINICAL INFORMATION    HEIGHT: UNKNOWN    WEIGHT: 210 LBS    ALLERGIES: NKDA    INFECTIOUS DISEASE: N    ISOLATION: N    VITAL SIGNS:   TIME: 0559  TEMP: 97.9  PULSE: 88  B/P: 153/86  RESP:         LAB INFORMATION: ELEVATED DDIMER, ELEVATED TROPONIN    CULTURE INFORMATION:     MEDS/IV FLUIDS: 18G LAC IS SL      CARDIAC SYSTEM:    CHEST PAIN: NONE AT THIS TIME    RATE:     SCALE:     RHYTHM: AFIB    Is patient taking or has patient been given any drugs that could increase bleeding? Y    DRUG: ASA 325MG GIVEN 09004/24     DOSE/FREQUENCY: LOVENOX 100MG SG GIVEN 2200 4/23/23    TROPONIN:    DATE: 4/23  TIME: 1538  TROP: 19    DATE: 4/23  TIME: 2215  TROP: 27      HEART CATH:     HEART CATH DATE:     HEART CATH RESULTS:      LAD:   RCA:   CX:   LMAIN:   EF:     SWAN:     SITE:   SIZE:    DATE INSERTED:     ARTLINE:     SITE:   SIZE:   DATE INSERTED:     SHEATH:    SITE:   SIZE:   DATE INSERTED:       VASOSEAL:    SITE:   DATE INSERTED:       EXTERNAL PACEMAKER:     RATE:   EXT PACER ON:       MODE:    DATE INSERTED:   OUTPUT SETTING:   SENSITIVITY SETTING:   SENSITIVITY TYPE:       IABP:    SITE:   AUG PRESSURE:   DATE INSERTED:     CARDIAC NOTES: QUESTIONABLE HISTORY OF AFIB. IT WAS STATED THAT PATIENT WAS TOLD AT ONE TIME THAT SHE HAD IT BUT SHE DID NOT DO ANY FOLLOW UP. HAS NOT BEEN TAKING DAILY ASA FOR QUITE SOME TIME SHE STATED TO THE STAF AT [facility]      RESPIRATORY SYSTEM:    LUNG SOUNDS: CL    ABG DATE:         ABG TIME:     ABG RESULTS:    PH:   PCO2:   PO2:   HCO3:   O2 SAT:       ETT SIZE:     ORAL:     NASAL:     SECURED AT MEASUREMENT (CM):     ON VENTILATOR:    TV:   FI02:   RATE:   PEEP:     OXYGEN: ROOM AIR    O2 SAT: 94%    IMAGING FINDINGS: CXR NO ACUTE FINDINGS, CT LUNG 8MM NODULE, CALCIFICATION OF CORONORY ARTERIES    PNEUMO CHEST TUBE SEAL TYPE:     RESPIRATORY STATUS: GOOD      CNS/MUSCULOSKELETAL    ALERT AND ORIENTED:    PERSON: Y  PLACE: Y  TIME: Y    INJURY:  WHERE: STRATCH TO R FOREARM, BLE REDNESS AND EDEMA    YOKO COMA SCALE:    E:   M:   V:     STROKE SCALE:     SIZE OF HEMORRHAGE:     SYMPTOMS: (CHOOSE APPROPRIATE)    ASPHASIA:   ATAXIA:   DYSARTHRIA:   DYSPHASIA:   HEADACHE:   PARALYSIS:   SEIZURE:   SYNCOPE:   VERTIGO:   VISION CHANGE:        EXTREMITY WEAKNESS:    LEFT ARM:   RIGHT ARM:   LEFT LEG:   RIGHT LEG:     CAT SCAN RESULTS:     MRI RESULTS:     CNS/MUSCULOSKELETAL NOTES: USES WHEELCHAIR AT HOME DUE TO IMMOBILITY FROM KNEE AND HIP SURGERIES. LIVES ALONE AND DAUGHTERS HELP HER      GI//GY      ABDOMINAL PAIN:     VOMITING:     DIARRHEA:     NAUSEA:     BOWEL SOUNDS:     OCCULT STOOL:     HEMATURIA:     NG TUBE:    SIZE:     DATE INSERTED:       ULTRASOUND RESULTS:     ACUTE ABDOMEN RESULTS:      CT SCAN RESULTS:       GI//GY NOTES:     DEMETRICE:     PAST MEDICAL HISTORY: ?AFIB  ?DM  DEAF IN L EAR, USES HEARING AID IN R EAR      OTHER SYMPTOM NOTES:     ADDITIONAL NOTES: PATIENT WENT TO THE ED WITH C/O CHEST PAIN X 1 WEEK. INITIALLY DID NOT WANT TO TRANSFER BECAUSE CHEST PAIN WENT AWAY WITH NTG ON 4/24PM. DAUGHTERS WANTED HER TO COME SEE DR. BOWERS AND GET CHECKED OUT WITH A HEART CATH          Donna Oconnor  4/25/2023  06:07 EDT

## 2023-04-25 NOTE — Clinical Note
Reason For Visit:   Chief Complaint   Patient presents with     Surgical Followup     DOS 2/3/18 Examination Under Anesthesia Right Knee, Right Knee Arthroscopy, Distal Femoral Osteotomy      Date of surgery: 2/3/18  Type of surgery:     PROCEDURES:   1.  Examination under anesthesia, right knee.   2.  Right knee arthroscopy.   3.  Chondroplasty, lateral femoral condyle and lateral tibial plateau.   4.  Open hardware removal, deep, right lateral femur ACL button.   5.  Distal femoral osteotomy, right leg with a planned 7 degree correction.     Smoker: No  Request smoking cessation information: No    Pain Assessment  Patient Currently in Pain: Yes  0-10 Pain Scale: 2  Primary Pain Location: Knee  Pain Orientation: Right  Other Pain Locations:  (Surface pain--feels like a bruise)                                                  removed.

## 2023-04-25 NOTE — PLAN OF CARE
Problem: Discharge Planning  Goal: Discharge to home or other facility with appropriate resources  4/25/2023 0708 by Darryl Blue RN  Outcome: Adequate for Discharge  4/25/2023 0246 by Darryl Blue RN  Outcome: Progressing     Problem: Safety - Adult  Goal: Free from fall injury  Outcome: Adequate for Discharge     Problem: ABCDS Injury Assessment  Goal: Absence of physical injury  4/25/2023 0708 by Darryl Blue RN  Outcome: Adequate for Discharge  4/25/2023 0246 by Darryl Blue RN  Outcome: Progressing     Problem: Skin/Tissue Integrity  Goal: Absence of new skin breakdown  Description: 1. Monitor for areas of redness and/or skin breakdown  2. Assess vascular access sites hourly  3. Every 4-6 hours minimum:  Change oxygen saturation probe site  4. Every 4-6 hours:  If on nasal continuous positive airway pressure, respiratory therapy assess nares and determine need for appliance change or resting period.   Outcome: Adequate for Discharge     Problem: Chronic Conditions and Co-morbidities  Goal: Patient's chronic conditions and co-morbidity symptoms are monitored and maintained or improved  Outcome: Adequate for Discharge

## 2023-04-25 NOTE — H&P
Bri Iniguez  [id number]  1935   LOS: 0 days   Patient Care Team:  Shayy Lyons MD as PCP - General (Internal Medicine)    Ms Iniguez is an 87 year old  white female from [place], [place].    Chief Complaint:  NSTEMI    Problem List:  1. NSTEMI  a. Transfer from [facility] to [facility] 4/25/2023 for chest pain, elevated with NTG SL, troponin 17, 24, 27, 19  2. Elevated d dimer-2.76, CT chest negative for PE  3. Pulmonary nodule 8mm  4. Hyperlipidemia  5. Type 2 diabetes  6. LBBB  7. Coronary calcification  8. GERD  9. atrial fibrillation 9/25/2020 - not on anticoagulation  10. Left ear deafness  11. Surgical history:  a. Left hip replacement  b. Bunionectomy  c. Bilateral cataracts  d. Cholecystectomy  e. Umbilical hernia repair  f. Hysterectomy      No Known Allergies  Medications Prior to Admission   Medication Sig Dispense Refill Last Dose   • acetaminophen (TYLENOL) 650 MG 8 hr tablet Take 1 tablet by mouth Every 6 (Six) Hours As Needed for Mild Pain.   4/25/2023 at 0630   • aspirin 325 MG tablet Take 1 tablet by mouth Daily.   4/24/2023 at 0930   • atorvastatin (LIPITOR) 10 MG tablet Take 1 tablet by mouth Every Night.   4/24/2023 at 0930   • citalopram (CeleXA) 40 MG tablet Take 1 tablet by mouth Daily.   4/24/2023 at 0930   • furosemide (LASIX) 40 MG tablet    4/24/2023 at 0930   • INTRAROSA 6.5 MG insert Insert 1 suppository into the vagina Every Night.  6 Past Week   • metoprolol tartrate (LOPRESSOR) 25 MG tablet 1 tablet 2 (Two) Times a Day.   4/24/2023 at 2100   • Multiple Vitamins-Minerals (OCUVITE ADULT 50+) capsule Take 1 tablet by mouth Daily.   4/24/2023 at 0930     Scheduled Meds:  Continuous Infusions:No current facility-administered medications for this encounter.    PRN Meds:.       History of Present Illness:      Ms Iniguez is an 87-year-old female that is a transfer from [facility].  She comes in as an NSTEMI.  She started having chest pain intermittently for 1 week  "[facility]  She states she started having left-sided pain that radiated up into her jaw.  She took aspirin and it helped and then presented to the emergency room.  She had a full evaluation and was found to have an NSTEMI and was transferred to us for heart cath.  D-dimer was elevated but CT scan was negative for PE did show cardiac calcification.  No further chest pain since admission.    Cardiac risk factors: advanced age (older than 55 for men, 65 for women), diabetes mellitus, dyslipidemia and sedentary lifestyle.    Social History     Socioeconomic History   • Marital status:    Tobacco Use   • Smoking status: Never   • Smokeless tobacco: Never   Vaping Use   • Vaping Use: Never used   Substance and Sexual Activity   • Alcohol use: No   • Drug use: No   • Sexual activity: Defer     Family History   Problem Relation Age of Onset   • Hypertension Other    • Diabetes Other         type 2   • Stroke Mother    • Diabetes Mother    • Hypertension Mother    • Cancer Father        Review of Systems  10 point review of systems was completed, positives outlined in the HPI, and otherwise all other systems are negative.      Objective:       Physical Exam  /99 (BP Location: Left arm, Patient Position: Lying) Comment: 180/93 right  Pulse 86   Temp 97.3 °F (36.3 °C) (Temporal)   Resp 16   Ht 167.6 cm (66\")   Wt 89.1 kg (196 lb 6.4 oz)   LMP  (LMP Unknown) Comment: HYSTERECTOMY  SpO2 93%   BMI 31.70 kg/m²       04/25/23  0815   Weight: 89.1 kg (196 lb 6.4 oz)     Body mass index is 31.7 kg/m².  No intake or output data in the 24 hours ending 04/25/23 0908    General Appearance:  Alert, cooperative, no distress, appears stated age   Head:  Normocephalic, without obvious abnormality, atraumatic   Neck: Supple, symmetrical, trachea midline, no adenopathy, thyroid: not enlarged, symmetric, no tenderness/mass/nodules, no carotid bruit or JVD   Lungs:  "  Clear to auscultation bilaterally, respirations unlabored   Heart:  Regular rate and rhythm, S1, S2 normal, no murmur, rub or gallop   Abdomen:   Soft, nontender, no masses, no organomegaly, bowel sounds audible x4   Extremities: No edema, normal range of motion   Pulses: 2+ and symmetric   Skin: Skin color, texture, turgor normal, no rashes or lesions   Neurologic: Normal     I have examined the patient and agree with the above      Lab Review       Results from last 7 days   Lab Units 04/24/23  1105 04/23/23  1538   WBC K/uL 5.2 6.3   HEMOGLOBIN g/dL 13.9 13.6   HEMATOCRIT % 41.5 41.0   PLATELETS K/uL 169 201     Results from last 7 days   Lab Units 04/24/23  1105   TRIGLYCERIDES mg/dL 99   HDL CHOL mg/dL 59   LDL CHOL mg/dL 89                 Assessment:      1.   NSTEMI  2. Hyperlipidemia  3. paroxsymal a fib with LBBB - only on ASA      Plan:     Right radial approach for left heart cath.  Revascularization is possible.  (Patient not surgical candidate given age and poor mobility).  Procedure was discussed with pt including risk and benefits.  Pt verbalized understanding.   Schedule ECHO  Continue dual antiplatelet therapy, statin and beta-blocker    I have seen and examined the patient, I have reviewed the note, discussed the case with the advance practice clinician, made necessary changes and I agree with the final note.    Pavan Bronson MD  04/25/23  11:04 EDT

## 2023-04-27 LAB
EKG ATRIAL RATE: 89 BPM
EKG DIAGNOSIS: NORMAL
EKG Q-T INTERVAL: 436 MS
EKG QRS DURATION: 146 MS
EKG QTC CALCULATION (BAZETT): 497 MS
EKG R AXIS: -73 DEGREES
EKG T AXIS: 97 DEGREES
EKG VENTRICULAR RATE: 78 BPM

## 2023-06-12 ENCOUNTER — OFFICE VISIT (OUTPATIENT)
Dept: CARDIOLOGY | Facility: CLINIC | Age: 88
End: 2023-06-12
Payer: MEDICARE

## 2023-06-12 VITALS
BODY MASS INDEX: 30.76 KG/M2 | OXYGEN SATURATION: 96 % | WEIGHT: 196 LBS | SYSTOLIC BLOOD PRESSURE: 130 MMHG | HEIGHT: 67 IN | DIASTOLIC BLOOD PRESSURE: 82 MMHG | HEART RATE: 67 BPM

## 2023-06-12 DIAGNOSIS — I48.0 PAROXYSMAL ATRIAL FIBRILLATION: Primary | ICD-10-CM

## 2023-06-12 DIAGNOSIS — I25.2 HISTORY OF NON-ST ELEVATION MYOCARDIAL INFARCTION (NSTEMI): ICD-10-CM

## 2023-06-12 DIAGNOSIS — E78.2 MIXED HYPERLIPIDEMIA: ICD-10-CM

## 2023-06-12 RX ORDER — EMPAGLIFLOZIN 10 MG/1
10 TABLET, FILM COATED ORAL DAILY
COMMUNITY
Start: 2023-05-10 | End: 2023-06-12

## 2023-06-12 NOTE — PROGRESS NOTES
[facility] Cardiology  Subjective:     Encounter Date: 06/12/2023      Patient ID: Bri Iniguez is a 87 y.o. female.    Chief Complaint: [facility]-up      PROBLEM LIST:  NSTEMI  Transfer from [facility] to [facility] 04/25/2023 for chest pain, elevated with NTG SL, troponin 17, 24, 27, 19  LHC, 04/25/2023: EF 60%. Angiographically near normal coronary arteries. AF.  Echo, 04/25/2023: EF 60%. Mild MR with a centrally-directed jet noted.   Shingles, 04/26/2023.   Atrial fibrillation 09/25/2020  Elevated d dimer-2.76, CT chest negative for PE  Pulmonary nodule 8mm  Hyperlipidemia  Type 2 diabetes  LBBB  Coronary calcification  GERD  Left ear deafness  Surgical history:  Left hip replacement  Bunionectomy  Bilateral cataracts  Cholecystectomy  Umbilical hernia repair  Hysterectomy      History of Present Illness  Bri Iniguez returns today for a follow up with a history of NSTEMI, PAF, and cardiac risk factors. Since last visit, patient has been doing well overall from a cardiovascular standpoint. Patient was taken to the hospital with chest pain and had a normal cardiac workup. The day after she came home from the hospital, she broke out into shingles. She presumed this is what caused her chest pain. Patient is reluctant to start Jardiance. Her HA1c is 7.9. Patient denies chest pain, shortness of breath, orthopnea, palpitations, edema, dizziness, and syncope.     No Known Allergies      Current Outpatient Medications:     apixaban (ELIQUIS) 5 MG tablet tablet, Take 1 tablet by mouth 2 (Two) Times a Day., Disp: 60 tablet, Rfl: 6    aspirin 81 MG chewable tablet, Chew 1 tablet Daily., Disp: , Rfl: 11    atorvastatin (LIPITOR) 10 MG tablet, Take 1 tablet by mouth Every Night., Disp: , Rfl:     citalopram (CeleXA) 40 MG tablet, Take 1 tablet by mouth Daily., Disp: , Rfl:     furosemide (LASIX) 40 MG tablet, Take 1 tablet by mouth Daily., Disp: , Rfl:     INTRAROSA 6.5 MG insert,  "Insert 1 suppository into the vagina Every Night., Disp: , Rfl: 6    metoprolol tartrate (LOPRESSOR) 25 MG tablet, 1 tablet 2 (Two) Times a Day., Disp: , Rfl:     Multiple Vitamins-Minerals (OCUVITE ADULT 50+) capsule, Take 1 tablet by mouth Daily., Disp: , Rfl:     The following portions of the patient's history were reviewed and updated as appropriate: allergies, current medications, past family history, past medical history, past social history, past surgical history and problem list.    Review of Systems   Constitutional: Negative.   Cardiovascular:  Negative for chest pain, dyspnea on exertion, leg swelling, palpitations and syncope.   Respiratory: Negative.  Negative for shortness of breath.    Hematologic/Lymphatic: Negative for bleeding problem. Does not bruise/bleed easily.   Skin:  Negative for rash.   Musculoskeletal:  Negative for muscle weakness and myalgias.   Gastrointestinal:  Negative for heartburn, nausea and vomiting.   Neurological:  Negative for dizziness, light-headedness, loss of balance and numbness.        Objective:     Vitals:    06/12/23 1512   BP: 130/82   BP Location: Right arm   Patient Position: Sitting   Cuff Size: Adult   Pulse: 67   SpO2: 96%   Weight: 88.9 kg (196 lb)   Height: 170.2 cm (67\")         Constitutional:       Appearance: Well-developed.   Neck:      Thyroid: No thyromegaly.      Vascular: No carotid bruit or JVD.   Pulmonary:      Breath sounds: Normal breath sounds.   Cardiovascular:      Regular rhythm.      No gallop. No S3 and S4 gallop.   Pulses:     Intact distal pulses.      Carotid: 2+ bilaterally.     Radial: 2+ bilaterally.  Edema:     Peripheral edema absent.   Abdominal:      General: Bowel sounds are normal.      Palpations: Abdomen is soft. There is no abdominal mass.      Tenderness: There is no abdominal tenderness.   Skin:     General: Skin is warm and dry.      Findings: No rash (\"Healing\" old rash in dermatomal distribution left anterior and " posterior chest consistent with shingles).   Neurological:      Mental Status: Alert and oriented to person, place, and time.       Lab Review:  Lab Results   Component Value Date    CHLPL 168 04/24/2023    TRIG 99 04/24/2023    HDL 59 04/24/2023    LDL 89 04/24/2023      Lab Results   Component Value Date    WBC 5.2 04/24/2023    RBC 4.64 04/24/2023    HGB 13.3 04/25/2023    HCT 39 04/25/2023    MCV 89.4 04/24/2023     04/24/2023         ECG 12 Lead    Date/Time: 6/12/2023 4:17 PM  Performed by: Pavan Bronson MD  Authorized by: Pavan Bronson MD   Comparison: compared with previous ECG from 9/25/2020  Comparison to previous ECG: Now NSR  Rhythm: sinus rhythm  BPM: 69  Conduction: left bundle branch block    Clinical impression: abnormal EKG  Comments: Left axis deviation            Assessment:   Diagnoses and all orders for this visit:    1. Paroxysmal atrial fibrillation (Primary)    2. History of non-ST elevation myocardial infarction (NSTEMI)    3. Mixed hyperlipidemia    Other orders  -     ECG 12 Lead        Impression:  1. Paroxysmal atrial fibrillation. Stable and asymptomatic. Continue on Eliquis for anticoagulation and metoprolol for rate control.   2.  Coronary artery disease, nonflow limiting. Stable without recurrent events. Continue on aspirin for antiplatelet therapy.   3. Mixed hyperlipidemia. Well controlled. LDL 89 on 04/24/2023. Continue on atorvastatin.  4.  Chest pain which in retrospect was clearly due to varicella/shingles    Plan:  Stable cardiac status.   Continue current medications.  Revisit as needed.    Scribed for Pavan Bronson MD by Chinyere Garay. 6/12/2023 16:15 EDT    Pavan Bronson MD      Please note that portions of this note may have been completed with a voice recognition program. Efforts were made to edit the dictations, but occasionally words are mistranscribed.

## 2023-11-13 RX ORDER — APIXABAN 5 MG/1
TABLET, FILM COATED ORAL
Qty: 60 TABLET | Refills: 11 | Status: SHIPPED | OUTPATIENT
Start: 2023-11-13

## 2024-04-01 ENCOUNTER — HOSPITAL ENCOUNTER (INPATIENT)
Facility: HOSPITAL | Age: 89
LOS: 2 days | Discharge: HOME | End: 2024-04-04
Attending: EMERGENCY MEDICINE | Admitting: FAMILY MEDICINE
Payer: MEDICARE

## 2024-04-01 ENCOUNTER — APPOINTMENT (OUTPATIENT)
Dept: GENERAL RADIOLOGY | Facility: HOSPITAL | Age: 89
End: 2024-04-01
Payer: MEDICARE

## 2024-04-01 DIAGNOSIS — I48.91 ATRIAL FIBRILLATION WITH RAPID VENTRICULAR RESPONSE: Primary | ICD-10-CM

## 2024-04-01 DIAGNOSIS — E87.1 HYPONATREMIA: ICD-10-CM

## 2024-04-01 DIAGNOSIS — I95.9 HYPOTENSION, UNSPECIFIED HYPOTENSION TYPE: ICD-10-CM

## 2024-04-01 PROBLEM — N99.89 POSTOPERATIVE URINARY RETENTION: Status: RESOLVED | Noted: 2020-10-05 | Resolved: 2024-04-01

## 2024-04-01 PROBLEM — Z96.641 STATUS POST TOTAL REPLACEMENT OF RIGHT HIP: Status: RESOLVED | Noted: 2020-10-02 | Resolved: 2024-04-01

## 2024-04-01 PROBLEM — R79.89 ELEVATED SERUM CREATININE: Status: ACTIVE | Noted: 2024-04-01

## 2024-04-01 PROBLEM — S72.009A HIP FRACTURE: Status: RESOLVED | Noted: 2019-12-30 | Resolved: 2024-04-01

## 2024-04-01 PROBLEM — G89.18 ACUTE POSTOPERATIVE PAIN: Status: RESOLVED | Noted: 2020-10-05 | Resolved: 2024-04-01

## 2024-04-01 PROBLEM — S52.022A OLECRANON FRACTURE, LEFT, CLOSED, INITIAL ENCOUNTER: Status: RESOLVED | Noted: 2021-10-26 | Resolved: 2024-04-01

## 2024-04-01 PROBLEM — R60.0 EDEMA LEG: Status: RESOLVED | Noted: 2021-01-20 | Resolved: 2024-04-01

## 2024-04-01 PROBLEM — E11.9 T2DM (TYPE 2 DIABETES MELLITUS): Status: ACTIVE | Noted: 2024-04-01

## 2024-04-01 PROBLEM — D72.829 LEUKOCYTOSIS: Status: ACTIVE | Noted: 2024-04-01

## 2024-04-01 PROBLEM — D72.829 LEUKOCYTOSIS: Status: RESOLVED | Noted: 2018-01-24 | Resolved: 2024-04-01

## 2024-04-01 PROBLEM — D62 ACUTE BLOOD LOSS ANEMIA: Status: RESOLVED | Noted: 2018-01-24 | Resolved: 2024-04-01

## 2024-04-01 PROBLEM — K21.9 GERD WITHOUT ESOPHAGITIS: Status: ACTIVE | Noted: 2024-04-01

## 2024-04-01 PROBLEM — F41.8 ANXIETY ASSOCIATED WITH DEPRESSION: Status: ACTIVE | Noted: 2024-04-01

## 2024-04-01 PROBLEM — S52.021A CLOSED FRACTURE OF RIGHT OLECRANON PROCESS: Status: RESOLVED | Noted: 2021-10-26 | Resolved: 2024-04-01

## 2024-04-01 PROBLEM — M16.12 PRIMARY OSTEOARTHRITIS OF LEFT HIP: Status: RESOLVED | Noted: 2017-02-15 | Resolved: 2024-04-01

## 2024-04-01 PROBLEM — Z96.642 STATUS POST TOTAL REPLACEMENT OF LEFT HIP: Status: RESOLVED | Noted: 2018-01-23 | Resolved: 2024-04-01

## 2024-04-01 PROBLEM — I50.33 ACUTE ON CHRONIC DIASTOLIC CONGESTIVE HEART FAILURE: Status: ACTIVE | Noted: 2024-04-01

## 2024-04-01 PROBLEM — D62 ACUTE BLOOD LOSS ANEMIA: Status: RESOLVED | Noted: 2020-10-03 | Resolved: 2024-04-01

## 2024-04-01 PROBLEM — J90 PLEURAL EFFUSION, BILATERAL: Status: ACTIVE | Noted: 2024-04-01

## 2024-04-01 PROBLEM — R33.8 POSTOPERATIVE URINARY RETENTION: Status: RESOLVED | Noted: 2020-10-05 | Resolved: 2024-04-01

## 2024-04-01 PROBLEM — I21.4 NSTEMI, INITIAL EPISODE OF CARE: Status: RESOLVED | Noted: 2023-04-25 | Resolved: 2024-04-01

## 2024-04-01 LAB
ALBUMIN SERPL-MCNC: 3.5 G/DL (ref 3.5–5.2)
ALBUMIN/GLOB SERPL: 0.9 G/DL
ALP SERPL-CCNC: 69 U/L (ref 39–117)
ALT SERPL W P-5'-P-CCNC: 20 U/L (ref 1–33)
ANION GAP SERPL CALCULATED.3IONS-SCNC: 14 MMOL/L (ref 5–15)
AST SERPL-CCNC: 24 U/L (ref 1–32)
BASOPHILS # BLD AUTO: 0.03 10*3/MM3 (ref 0–0.2)
BASOPHILS NFR BLD AUTO: 0.2 % (ref 0–1.5)
BILIRUB SERPL-MCNC: 0.8 MG/DL (ref 0–1.2)
BUN SERPL-MCNC: 14 MG/DL (ref 8–23)
BUN/CREAT SERPL: 13.2 (ref 7–25)
CALCIUM SPEC-SCNC: 8.7 MG/DL (ref 8.6–10.5)
CHLORIDE SERPL-SCNC: 85 MMOL/L (ref 98–107)
CO2 SERPL-SCNC: 24 MMOL/L (ref 22–29)
CREAT SERPL-MCNC: 1.06 MG/DL (ref 0.57–1)
DEPRECATED RDW RBC AUTO: 45.9 FL (ref 37–54)
EGFRCR SERPLBLD CKD-EPI 2021: 50.6 ML/MIN/1.73
EOSINOPHIL # BLD AUTO: 0.03 10*3/MM3 (ref 0–0.4)
EOSINOPHIL NFR BLD AUTO: 0.2 % (ref 0.3–6.2)
ERYTHROCYTE [DISTWIDTH] IN BLOOD BY AUTOMATED COUNT: 14.3 % (ref 12.3–15.4)
FLUAV SUBTYP SPEC NAA+PROBE: NOT DETECTED
FLUBV RNA ISLT QL NAA+PROBE: NOT DETECTED
GLOBULIN UR ELPH-MCNC: 3.9 GM/DL
GLUCOSE SERPL-MCNC: 189 MG/DL (ref 65–99)
HCT VFR BLD AUTO: 34.3 % (ref 34–46.6)
HGB BLD-MCNC: 10.8 G/DL (ref 12–15.9)
HOLD SPECIMEN: NORMAL
IMM GRANULOCYTES # BLD AUTO: 0.1 10*3/MM3 (ref 0–0.05)
IMM GRANULOCYTES NFR BLD AUTO: 0.8 % (ref 0–0.5)
LYMPHOCYTES # BLD AUTO: 2.68 10*3/MM3 (ref 0.7–3.1)
LYMPHOCYTES NFR BLD AUTO: 20.6 % (ref 19.6–45.3)
MAGNESIUM SERPL-MCNC: 1.9 MG/DL (ref 1.6–2.4)
MCH RBC QN AUTO: 27.9 PG (ref 26.6–33)
MCHC RBC AUTO-ENTMCNC: 31.5 G/DL (ref 31.5–35.7)
MCV RBC AUTO: 88.6 FL (ref 79–97)
MONOCYTES # BLD AUTO: 1.1 10*3/MM3 (ref 0.1–0.9)
MONOCYTES NFR BLD AUTO: 8.5 % (ref 5–12)
NEUTROPHILS NFR BLD AUTO: 69.7 % (ref 42.7–76)
NEUTROPHILS NFR BLD AUTO: 9.04 10*3/MM3 (ref 1.7–7)
NRBC BLD AUTO-RTO: 0 /100 WBC (ref 0–0.2)
NT-PROBNP SERPL-MCNC: 8091 PG/ML (ref 0–1800)
PLATELET # BLD AUTO: 254 10*3/MM3 (ref 140–450)
PMV BLD AUTO: 10.1 FL (ref 6–12)
POTASSIUM SERPL-SCNC: 3.7 MMOL/L (ref 3.5–5.2)
PROT SERPL-MCNC: 7.4 G/DL (ref 6–8.5)
RBC # BLD AUTO: 3.87 10*6/MM3 (ref 3.77–5.28)
SARS-COV-2 RNA RESP QL NAA+PROBE: NOT DETECTED
SODIUM SERPL-SCNC: 123 MMOL/L (ref 136–145)
T4 FREE SERPL-MCNC: 1.39 NG/DL (ref 0.92–1.68)
TROPONIN T SERPL HS-MCNC: 46 NG/L
TSH SERPL DL<=0.05 MIU/L-ACNC: 5.04 UIU/ML (ref 0.27–4.2)
WBC NRBC COR # BLD AUTO: 12.98 10*3/MM3 (ref 3.4–10.8)
WHOLE BLOOD HOLD COAG: NORMAL
WHOLE BLOOD HOLD SPECIMEN: NORMAL

## 2024-04-01 PROCEDURE — 87636 SARSCOV2 & INF A&B AMP PRB: CPT | Performed by: EMERGENCY MEDICINE

## 2024-04-01 PROCEDURE — 84439 ASSAY OF FREE THYROXINE: CPT | Performed by: EMERGENCY MEDICINE

## 2024-04-01 PROCEDURE — 85025 COMPLETE CBC W/AUTO DIFF WBC: CPT | Performed by: EMERGENCY MEDICINE

## 2024-04-01 PROCEDURE — 84443 ASSAY THYROID STIM HORMONE: CPT | Performed by: EMERGENCY MEDICINE

## 2024-04-01 PROCEDURE — 93005 ELECTROCARDIOGRAM TRACING: CPT | Performed by: EMERGENCY MEDICINE

## 2024-04-01 PROCEDURE — 71045 X-RAY EXAM CHEST 1 VIEW: CPT

## 2024-04-01 PROCEDURE — 83735 ASSAY OF MAGNESIUM: CPT | Performed by: EMERGENCY MEDICINE

## 2024-04-01 PROCEDURE — 99291 CRITICAL CARE FIRST HOUR: CPT

## 2024-04-01 PROCEDURE — 25010000002 AMIODARONE IN DEXTROSE 5% 360-4.14 MG/200ML-% SOLUTION: Performed by: EMERGENCY MEDICINE

## 2024-04-01 PROCEDURE — 92960 CARDIOVERSION ELECTRIC EXT: CPT

## 2024-04-01 PROCEDURE — 84484 ASSAY OF TROPONIN QUANT: CPT | Performed by: EMERGENCY MEDICINE

## 2024-04-01 PROCEDURE — G0378 HOSPITAL OBSERVATION PER HR: HCPCS

## 2024-04-01 PROCEDURE — 36415 COLL VENOUS BLD VENIPUNCTURE: CPT

## 2024-04-01 PROCEDURE — 83880 ASSAY OF NATRIURETIC PEPTIDE: CPT | Performed by: EMERGENCY MEDICINE

## 2024-04-01 PROCEDURE — 80053 COMPREHEN METABOLIC PANEL: CPT | Performed by: EMERGENCY MEDICINE

## 2024-04-01 RX ORDER — SODIUM CHLORIDE 0.9 % (FLUSH) 0.9 %
10 SYRINGE (ML) INJECTION AS NEEDED
Status: DISCONTINUED | OUTPATIENT
Start: 2024-04-01 | End: 2024-04-04 | Stop reason: HOSPADM

## 2024-04-01 RX ORDER — PROPOFOL 10 MG/ML
200 INJECTION, EMULSION INTRAVENOUS ONCE
Status: DISCONTINUED | OUTPATIENT
Start: 2024-04-01 | End: 2024-04-03

## 2024-04-01 RX ADMIN — AMIODARONE HYDROCHLORIDE 0.5 MG/MIN: 1.8 INJECTION, SOLUTION INTRAVENOUS at 22:10

## 2024-04-01 NOTE — Clinical Note
Level of Care: Telemetry [5]   Diagnosis: A-fib [325692]   Admitting Physician: AMIRAH OCHOA [[id number]]

## 2024-04-02 ENCOUNTER — APPOINTMENT (OUTPATIENT)
Dept: CT IMAGING | Facility: HOSPITAL | Age: 89
End: 2024-04-02
Payer: MEDICARE

## 2024-04-02 ENCOUNTER — APPOINTMENT (OUTPATIENT)
Dept: CARDIOLOGY | Facility: HOSPITAL | Age: 89
End: 2024-04-02
Payer: MEDICARE

## 2024-04-02 PROBLEM — I35.8 NONRHEUMATIC AORTIC VALVE SCLEROSIS: Status: RESOLVED | Noted: 2020-01-22 | Resolved: 2024-04-02

## 2024-04-02 PROBLEM — I48.91 A-FIB: Status: ACTIVE | Noted: 2024-04-02

## 2024-04-02 PROBLEM — K92.2 GI BLEED: Status: ACTIVE | Noted: 2024-04-02

## 2024-04-02 PROBLEM — I25.10 CORONARY ARTERY DISEASE INVOLVING NATIVE CORONARY ARTERY OF NATIVE HEART: Status: ACTIVE | Noted: 2024-04-02

## 2024-04-02 PROBLEM — I35.0 NONRHEUMATIC AORTIC VALVE STENOSIS: Status: RESOLVED | Noted: 2021-01-20 | Resolved: 2024-04-02

## 2024-04-02 PROBLEM — I48.0 PAROXYSMAL ATRIAL FIBRILLATION: Status: ACTIVE | Noted: 2024-04-01

## 2024-04-02 LAB
ANION GAP SERPL CALCULATED.3IONS-SCNC: 13 MMOL/L (ref 5–15)
BASOPHILS # BLD AUTO: 0.04 10*3/MM3 (ref 0–0.2)
BASOPHILS NFR BLD AUTO: 0.4 % (ref 0–1.5)
BILIRUB UR QL STRIP: NEGATIVE
BUN SERPL-MCNC: 17 MG/DL (ref 8–23)
BUN/CREAT SERPL: 20 (ref 7–25)
CALCIUM SPEC-SCNC: 7.9 MG/DL (ref 8.6–10.5)
CHLORIDE SERPL-SCNC: 93 MMOL/L (ref 98–107)
CLARITY UR: CLEAR
CO2 SERPL-SCNC: 25 MMOL/L (ref 22–29)
COLOR UR: YELLOW
CREAT SERPL-MCNC: 0.85 MG/DL (ref 0.57–1)
D-LACTATE SERPL-SCNC: 1.5 MMOL/L (ref 0.5–2)
DEPRECATED RDW RBC AUTO: 45.1 FL (ref 37–54)
EGFRCR SERPLBLD CKD-EPI 2021: 66 ML/MIN/1.73
EOSINOPHIL # BLD AUTO: 0.12 10*3/MM3 (ref 0–0.4)
EOSINOPHIL NFR BLD AUTO: 1.1 % (ref 0.3–6.2)
ERYTHROCYTE [DISTWIDTH] IN BLOOD BY AUTOMATED COUNT: 14.5 % (ref 12.3–15.4)
GEN 5 2HR TROPONIN T REFLEX: 45 NG/L
GLUCOSE BLDC GLUCOMTR-MCNC: 168 MG/DL (ref 70–130)
GLUCOSE BLDC GLUCOMTR-MCNC: 168 MG/DL (ref 70–130)
GLUCOSE BLDC GLUCOMTR-MCNC: 218 MG/DL (ref 70–130)
GLUCOSE BLDC GLUCOMTR-MCNC: 230 MG/DL (ref 70–130)
GLUCOSE SERPL-MCNC: 160 MG/DL (ref 65–99)
GLUCOSE UR STRIP-MCNC: NEGATIVE MG/DL
HBA1C MFR BLD: 7.5 % (ref 4.8–5.6)
HCT VFR BLD AUTO: 29.6 % (ref 34–46.6)
HCT VFR BLD AUTO: 29.8 % (ref 34–46.6)
HGB BLD-MCNC: 9.8 G/DL (ref 12–15.9)
HGB BLD-MCNC: 9.8 G/DL (ref 12–15.9)
HGB UR QL STRIP.AUTO: NEGATIVE
IMM GRANULOCYTES # BLD AUTO: 0.19 10*3/MM3 (ref 0–0.05)
IMM GRANULOCYTES NFR BLD AUTO: 1.7 % (ref 0–0.5)
INR PPP: 2.26 (ref 0.89–1.12)
KETONES UR QL STRIP: NEGATIVE
LEUKOCYTE ESTERASE UR QL STRIP.AUTO: NEGATIVE
LYMPHOCYTES # BLD AUTO: 2.76 10*3/MM3 (ref 0.7–3.1)
LYMPHOCYTES NFR BLD AUTO: 25.1 % (ref 19.6–45.3)
MAGNESIUM SERPL-MCNC: 2.9 MG/DL (ref 1.6–2.4)
MCH RBC QN AUTO: 28.5 PG (ref 26.6–33)
MCHC RBC AUTO-ENTMCNC: 32.9 G/DL (ref 31.5–35.7)
MCV RBC AUTO: 86.6 FL (ref 79–97)
MONOCYTES # BLD AUTO: 1.11 10*3/MM3 (ref 0.1–0.9)
MONOCYTES NFR BLD AUTO: 10.1 % (ref 5–12)
NEUTROPHILS NFR BLD AUTO: 6.78 10*3/MM3 (ref 1.7–7)
NEUTROPHILS NFR BLD AUTO: 61.6 % (ref 42.7–76)
NITRITE UR QL STRIP: NEGATIVE
NRBC BLD AUTO-RTO: 0 /100 WBC (ref 0–0.2)
OSMOLALITY UR: 219 MOSM/KG (ref 300–1100)
PH UR STRIP.AUTO: 6 [PH] (ref 5–8)
PLATELET # BLD AUTO: 228 10*3/MM3 (ref 140–450)
PMV BLD AUTO: 10.5 FL (ref 6–12)
POTASSIUM SERPL-SCNC: 3.2 MMOL/L (ref 3.5–5.2)
POTASSIUM SERPL-SCNC: 4 MMOL/L (ref 3.5–5.2)
PROCALCITONIN SERPL-MCNC: 0.1 NG/ML (ref 0–0.25)
PROT UR QL STRIP: NEGATIVE
PROTHROMBIN TIME: 25.2 SECONDS (ref 12.2–14.5)
QT INTERVAL: 338 MS
QT INTERVAL: 410 MS
QT INTERVAL: 458 MS
QTC INTERVAL: 490 MS
QTC INTERVAL: 508 MS
QTC INTERVAL: 515 MS
RBC # BLD AUTO: 3.44 10*6/MM3 (ref 3.77–5.28)
SODIUM SERPL-SCNC: 125 MMOL/L (ref 136–145)
SODIUM SERPL-SCNC: 127 MMOL/L (ref 136–145)
SODIUM SERPL-SCNC: 131 MMOL/L (ref 136–145)
SODIUM SERPL-SCNC: 131 MMOL/L (ref 136–145)
SODIUM SERPL-SCNC: 132 MMOL/L (ref 136–145)
SODIUM UR-SCNC: 43 MMOL/L
SP GR UR STRIP: 1.01 (ref 1–1.03)
TROPONIN T DELTA: 2 NG/L
TROPONIN T SERPL HS-MCNC: 43 NG/L
UROBILINOGEN UR QL STRIP: NORMAL
WBC NRBC COR # BLD AUTO: 11 10*3/MM3 (ref 3.4–10.8)

## 2024-04-02 PROCEDURE — 25010000002 AMIODARONE IN DEXTROSE 5% 360-4.14 MG/200ML-% SOLUTION: Performed by: EMERGENCY MEDICINE

## 2024-04-02 PROCEDURE — 85025 COMPLETE CBC W/AUTO DIFF WBC: CPT | Performed by: NURSE PRACTITIONER

## 2024-04-02 PROCEDURE — 85018 HEMOGLOBIN: CPT | Performed by: INTERNAL MEDICINE

## 2024-04-02 PROCEDURE — 63710000001 INSULIN LISPRO (HUMAN) PER 5 UNITS: Performed by: NURSE PRACTITIONER

## 2024-04-02 PROCEDURE — 83935 ASSAY OF URINE OSMOLALITY: CPT | Performed by: NURSE PRACTITIONER

## 2024-04-02 PROCEDURE — 83605 ASSAY OF LACTIC ACID: CPT | Performed by: NURSE PRACTITIONER

## 2024-04-02 PROCEDURE — 83735 ASSAY OF MAGNESIUM: CPT | Performed by: NURSE PRACTITIONER

## 2024-04-02 PROCEDURE — 84132 ASSAY OF SERUM POTASSIUM: CPT | Performed by: FAMILY MEDICINE

## 2024-04-02 PROCEDURE — 84295 ASSAY OF SERUM SODIUM: CPT | Performed by: INTERNAL MEDICINE

## 2024-04-02 PROCEDURE — 99222 1ST HOSP IP/OBS MODERATE 55: CPT | Performed by: INTERNAL MEDICINE

## 2024-04-02 PROCEDURE — 84484 ASSAY OF TROPONIN QUANT: CPT | Performed by: NURSE PRACTITIONER

## 2024-04-02 PROCEDURE — 82570 ASSAY OF URINE CREATININE: CPT | Performed by: NURSE PRACTITIONER

## 2024-04-02 PROCEDURE — 0 DEXTROSE 5 % SOLUTION: Performed by: INTERNAL MEDICINE

## 2024-04-02 PROCEDURE — 84300 ASSAY OF URINE SODIUM: CPT | Performed by: NURSE PRACTITIONER

## 2024-04-02 PROCEDURE — 99222 1ST HOSP IP/OBS MODERATE 55: CPT | Performed by: PHYSICIAN ASSISTANT

## 2024-04-02 PROCEDURE — 93010 ELECTROCARDIOGRAM REPORT: CPT | Performed by: INTERNAL MEDICINE

## 2024-04-02 PROCEDURE — 25010000002 FUROSEMIDE PER 20 MG: Performed by: INTERNAL MEDICINE

## 2024-04-02 PROCEDURE — 25010000002 ONDANSETRON PER 1 MG: Performed by: INTERNAL MEDICINE

## 2024-04-02 PROCEDURE — 71250 CT THORAX DX C-: CPT

## 2024-04-02 PROCEDURE — 84145 PROCALCITONIN (PCT): CPT | Performed by: NURSE PRACTITIONER

## 2024-04-02 PROCEDURE — 93306 TTE W/DOPPLER COMPLETE: CPT | Performed by: INTERNAL MEDICINE

## 2024-04-02 PROCEDURE — 85014 HEMATOCRIT: CPT | Performed by: INTERNAL MEDICINE

## 2024-04-02 PROCEDURE — 85610 PROTHROMBIN TIME: CPT | Performed by: INTERNAL MEDICINE

## 2024-04-02 PROCEDURE — 25010000002 FUROSEMIDE PER 20 MG: Performed by: NURSE PRACTITIONER

## 2024-04-02 PROCEDURE — 83036 HEMOGLOBIN GLYCOSYLATED A1C: CPT | Performed by: NURSE PRACTITIONER

## 2024-04-02 PROCEDURE — 99232 SBSQ HOSP IP/OBS MODERATE 35: CPT | Performed by: INTERNAL MEDICINE

## 2024-04-02 PROCEDURE — 97166 OT EVAL MOD COMPLEX 45 MIN: CPT

## 2024-04-02 PROCEDURE — 97162 PT EVAL MOD COMPLEX 30 MIN: CPT

## 2024-04-02 PROCEDURE — 5A2204Z RESTORATION OF CARDIAC RHYTHM, SINGLE: ICD-10-PCS | Performed by: EMERGENCY MEDICINE

## 2024-04-02 PROCEDURE — 82948 REAGENT STRIP/BLOOD GLUCOSE: CPT

## 2024-04-02 PROCEDURE — 81003 URINALYSIS AUTO W/O SCOPE: CPT | Performed by: NURSE PRACTITIONER

## 2024-04-02 PROCEDURE — 80048 BASIC METABOLIC PNL TOTAL CA: CPT | Performed by: NURSE PRACTITIONER

## 2024-04-02 PROCEDURE — 93306 TTE W/DOPPLER COMPLETE: CPT

## 2024-04-02 PROCEDURE — 93005 ELECTROCARDIOGRAM TRACING: CPT | Performed by: NURSE PRACTITIONER

## 2024-04-02 RX ORDER — ATORVASTATIN CALCIUM 10 MG/1
10 TABLET, FILM COATED ORAL NIGHTLY
Status: DISCONTINUED | OUTPATIENT
Start: 2024-04-02 | End: 2024-04-04 | Stop reason: HOSPADM

## 2024-04-02 RX ORDER — DEXTROSE MONOHYDRATE 50 MG/ML
50 INJECTION, SOLUTION INTRAVENOUS CONTINUOUS
Status: DISCONTINUED | OUTPATIENT
Start: 2024-04-02 | End: 2024-04-02

## 2024-04-02 RX ORDER — IBUPROFEN 600 MG/1
1 TABLET ORAL
Status: DISCONTINUED | OUTPATIENT
Start: 2024-04-02 | End: 2024-04-04 | Stop reason: HOSPADM

## 2024-04-02 RX ORDER — CITALOPRAM 20 MG/1
40 TABLET ORAL DAILY
Status: DISCONTINUED | OUTPATIENT
Start: 2024-04-02 | End: 2024-04-04 | Stop reason: HOSPADM

## 2024-04-02 RX ORDER — NICOTINE POLACRILEX 4 MG
15 LOZENGE BUCCAL
Status: DISCONTINUED | OUTPATIENT
Start: 2024-04-02 | End: 2024-04-04 | Stop reason: HOSPADM

## 2024-04-02 RX ORDER — ONDANSETRON 2 MG/ML
4 INJECTION INTRAMUSCULAR; INTRAVENOUS EVERY 6 HOURS PRN
Status: DISCONTINUED | OUTPATIENT
Start: 2024-04-02 | End: 2024-04-04 | Stop reason: HOSPADM

## 2024-04-02 RX ORDER — ASPIRIN 81 MG/1
81 TABLET, CHEWABLE ORAL DAILY
Status: DISCONTINUED | OUTPATIENT
Start: 2024-04-02 | End: 2024-04-02

## 2024-04-02 RX ORDER — FUROSEMIDE 10 MG/ML
40 INJECTION INTRAMUSCULAR; INTRAVENOUS ONCE
Status: COMPLETED | OUTPATIENT
Start: 2024-04-02 | End: 2024-04-02

## 2024-04-02 RX ORDER — INSULIN LISPRO 100 [IU]/ML
2-7 INJECTION, SOLUTION INTRAVENOUS; SUBCUTANEOUS
Status: DISCONTINUED | OUTPATIENT
Start: 2024-04-02 | End: 2024-04-04 | Stop reason: HOSPADM

## 2024-04-02 RX ORDER — FUROSEMIDE 40 MG/1
40 TABLET ORAL DAILY
Status: DISCONTINUED | OUTPATIENT
Start: 2024-04-02 | End: 2024-04-02

## 2024-04-02 RX ORDER — SODIUM CHLORIDE 0.9 % (FLUSH) 0.9 %
10 SYRINGE (ML) INJECTION AS NEEDED
Status: DISCONTINUED | OUTPATIENT
Start: 2024-04-02 | End: 2024-04-04 | Stop reason: HOSPADM

## 2024-04-02 RX ORDER — CHOLECALCIFEROL (VITAMIN D3) 125 MCG
5 CAPSULE ORAL NIGHTLY PRN
Status: DISCONTINUED | OUTPATIENT
Start: 2024-04-02 | End: 2024-04-04 | Stop reason: HOSPADM

## 2024-04-02 RX ORDER — BISACODYL 10 MG
10 SUPPOSITORY, RECTAL RECTAL DAILY PRN
Status: DISCONTINUED | OUTPATIENT
Start: 2024-04-02 | End: 2024-04-04 | Stop reason: HOSPADM

## 2024-04-02 RX ORDER — BISACODYL 5 MG/1
5 TABLET, DELAYED RELEASE ORAL DAILY PRN
Status: DISCONTINUED | OUTPATIENT
Start: 2024-04-02 | End: 2024-04-04 | Stop reason: HOSPADM

## 2024-04-02 RX ORDER — PANTOPRAZOLE SODIUM 40 MG/10ML
40 INJECTION, POWDER, LYOPHILIZED, FOR SOLUTION INTRAVENOUS EVERY 12 HOURS SCHEDULED
Status: DISCONTINUED | OUTPATIENT
Start: 2024-04-02 | End: 2024-04-04 | Stop reason: HOSPADM

## 2024-04-02 RX ORDER — SODIUM CHLORIDE 9 MG/ML
40 INJECTION, SOLUTION INTRAVENOUS AS NEEDED
Status: DISCONTINUED | OUTPATIENT
Start: 2024-04-02 | End: 2024-04-04 | Stop reason: HOSPADM

## 2024-04-02 RX ORDER — DEXTROSE MONOHYDRATE 25 G/50ML
25 INJECTION, SOLUTION INTRAVENOUS
Status: DISCONTINUED | OUTPATIENT
Start: 2024-04-02 | End: 2024-04-04 | Stop reason: HOSPADM

## 2024-04-02 RX ORDER — ACETAMINOPHEN 325 MG/1
650 TABLET ORAL EVERY 6 HOURS PRN
Status: DISCONTINUED | OUTPATIENT
Start: 2024-04-02 | End: 2024-04-04 | Stop reason: HOSPADM

## 2024-04-02 RX ORDER — ONDANSETRON 4 MG/1
4 TABLET, ORALLY DISINTEGRATING ORAL EVERY 6 HOURS PRN
Status: DISCONTINUED | OUTPATIENT
Start: 2024-04-02 | End: 2024-04-04 | Stop reason: HOSPADM

## 2024-04-02 RX ORDER — POTASSIUM CHLORIDE 20 MEQ/1
40 TABLET, EXTENDED RELEASE ORAL EVERY 4 HOURS
Status: COMPLETED | OUTPATIENT
Start: 2024-04-02 | End: 2024-04-02

## 2024-04-02 RX ORDER — SODIUM CHLORIDE 0.9 % (FLUSH) 0.9 %
10 SYRINGE (ML) INJECTION EVERY 12 HOURS SCHEDULED
Status: DISCONTINUED | OUTPATIENT
Start: 2024-04-02 | End: 2024-04-04 | Stop reason: HOSPADM

## 2024-04-02 RX ORDER — AMOXICILLIN 250 MG
2 CAPSULE ORAL 2 TIMES DAILY PRN
Status: DISCONTINUED | OUTPATIENT
Start: 2024-04-02 | End: 2024-04-04 | Stop reason: HOSPADM

## 2024-04-02 RX ORDER — POLYETHYLENE GLYCOL 3350 17 G/17G
17 POWDER, FOR SOLUTION ORAL DAILY PRN
Status: DISCONTINUED | OUTPATIENT
Start: 2024-04-02 | End: 2024-04-04 | Stop reason: HOSPADM

## 2024-04-02 RX ADMIN — INSULIN LISPRO 2 UNITS: 100 INJECTION, SOLUTION INTRAVENOUS; SUBCUTANEOUS at 20:28

## 2024-04-02 RX ADMIN — AMIODARONE HYDROCHLORIDE 0.5 MG/MIN: 1.8 INJECTION, SOLUTION INTRAVENOUS at 04:11

## 2024-04-02 RX ADMIN — ASPIRIN 81 MG: 81 TABLET, CHEWABLE ORAL at 08:12

## 2024-04-02 RX ADMIN — ACETAMINOPHEN 650 MG: 325 TABLET ORAL at 20:28

## 2024-04-02 RX ADMIN — FUROSEMIDE 40 MG: 10 INJECTION, SOLUTION INTRAMUSCULAR; INTRAVENOUS at 17:54

## 2024-04-02 RX ADMIN — DEXTROSE MONOHYDRATE 50 ML/HR: 50 INJECTION, SOLUTION INTRAVENOUS at 07:11

## 2024-04-02 RX ADMIN — PANTOPRAZOLE SODIUM 40 MG: 40 INJECTION, POWDER, FOR SOLUTION INTRAVENOUS at 20:28

## 2024-04-02 RX ADMIN — ACETAMINOPHEN 650 MG: 325 TABLET ORAL at 13:54

## 2024-04-02 RX ADMIN — INSULIN LISPRO 3 UNITS: 100 INJECTION, SOLUTION INTRAVENOUS; SUBCUTANEOUS at 17:54

## 2024-04-02 RX ADMIN — METOPROLOL TARTRATE 25 MG: 25 TABLET, FILM COATED ORAL at 11:24

## 2024-04-02 RX ADMIN — APIXABAN 5 MG: 5 TABLET, FILM COATED ORAL at 11:24

## 2024-04-02 RX ADMIN — CITALOPRAM HYDROBROMIDE 40 MG: 20 TABLET ORAL at 17:54

## 2024-04-02 RX ADMIN — ONDANSETRON 4 MG: 2 INJECTION INTRAMUSCULAR; INTRAVENOUS at 06:23

## 2024-04-02 RX ADMIN — INSULIN LISPRO 2 UNITS: 100 INJECTION, SOLUTION INTRAVENOUS; SUBCUTANEOUS at 08:12

## 2024-04-02 RX ADMIN — METOPROLOL TARTRATE 25 MG: 25 TABLET, FILM COATED ORAL at 20:29

## 2024-04-02 RX ADMIN — Medication 10 ML: at 20:31

## 2024-04-02 RX ADMIN — APIXABAN 5 MG: 5 TABLET, FILM COATED ORAL at 01:45

## 2024-04-02 RX ADMIN — FUROSEMIDE 40 MG: 40 TABLET ORAL at 08:11

## 2024-04-02 RX ADMIN — Medication 10 ML: at 01:45

## 2024-04-02 RX ADMIN — POTASSIUM CHLORIDE 40 MEQ: 1500 TABLET, EXTENDED RELEASE ORAL at 08:11

## 2024-04-02 RX ADMIN — ATORVASTATIN CALCIUM 10 MG: 10 TABLET, FILM COATED ORAL at 20:29

## 2024-04-02 RX ADMIN — Medication 5 MG: at 01:46

## 2024-04-02 RX ADMIN — ONDANSETRON 4 MG: 2 INJECTION INTRAMUSCULAR; INTRAVENOUS at 13:54

## 2024-04-02 RX ADMIN — FUROSEMIDE 40 MG: 10 INJECTION, SOLUTION INTRAMUSCULAR; INTRAVENOUS at 11:24

## 2024-04-02 RX ADMIN — PANTOPRAZOLE SODIUM 40 MG: 40 INJECTION, POWDER, FOR SOLUTION INTRAVENOUS at 08:11

## 2024-04-02 RX ADMIN — POTASSIUM CHLORIDE 40 MEQ: 1500 TABLET, EXTENDED RELEASE ORAL at 11:24

## 2024-04-02 RX ADMIN — Medication 10 ML: at 08:12

## 2024-04-02 RX ADMIN — INSULIN LISPRO 3 UNITS: 100 INJECTION, SOLUTION INTRAVENOUS; SUBCUTANEOUS at 13:25

## 2024-04-02 NOTE — PLAN OF CARE
Goal Outcome Evaluation:  Plan of Care Reviewed With: patient, daughter        Progress: no change  Outcome Evaluation: PT eval completed. Pt completed a SPT to chair with FWx and Max A x2 with cues for PLB d/t increased HR response (into 180s bpm RN present and aware) and anxiety with mobility. Pt demonstrated mobility below baseline function with decreased activity tolerance, strength deficits, and balance deficits warranting IPPT POC. d/c rec SNF.      Anticipated Discharge Disposition (PT): skilled nursing facility

## 2024-04-02 NOTE — CASE MANAGEMENT/SOCIAL WORK
Discharge Planning Assessment  [facility]     Patient Name: Bri Iniguez  MRN: [id number]  Today's Date: 4/2/2024    Admit Date: 4/1/2024    Plan: Home   Discharge Needs Assessment       Row Name 04/02/24 0923       Living Environment    People in Home alone    Current Living Arrangements home    Primary Care Provided by self    Provides Primary Care For no one    Family Caregiver if Needed child(hilario), adult    Family Caregiver Names Griselda Iniguez, Ronna Iniguez (Daughters)    Quality of Family Relationships unable to assess    Able to Return to Prior Arrangements yes       Transition Planning    Patient/Family Anticipates Transition to home;home with family    Patient/Family Anticipated Services at Transition     Transportation Anticipated family or friend will provide       Discharge Needs Assessment    Readmission Within the Last 30 Days no previous admission in last 30 days    Equipment Currently Used at Home grab bar;shower chair;wheelchair;walker, standard                   Discharge Plan       Row Name 04/02/24 0924       Plan    Plan Home    Patient/Family in Agreement with Plan yes    Plan Comments Spoke with patient at bedside to initiate discharge planning. Patient lives alone in [place], but states that her daughters live close by and are there if she needs assistance. Prior to admission, was not current with any home health agencies. Patient is wheelchair bound at baseline. Patient states her home is equipped with a handicap bathroom, and uses a standard walker to assist with transfer to and from chairs/bed/toilet. Called and spoke with patient's daughter, Griselda, who confirmed she lives close by and assist with all the shopping and errands. Confirmed that PCP is Juan Berg. Verified insurance is Medicare. Patient has prescription coverage, and has medications filled at [facility]. Will await therapy notes and recommendations to determine proper discharge placement. Patient  states her plan is to return home at discharge and family will transport. CM will continue to follow.    Final Discharge Disposition Code 01 - home or self-care                  Continued Care and Services - Admitted Since 4/1/2024    No active coordination exists for this encounter.          Demographic Summary       Row Name 04/02/24 0910       General Information    Arrived From emergency department    Reason for Consult discharge planning    Preferred Language English       Contact Information    Permission Granted to Share Info With                    Functional Status       Row Name 04/02/24 0910       Functional Status    Usual Activity Tolerance fair    Current Activity Tolerance fair       Functional Status, IADL    Medications independent    Meal Preparation independent    Housekeeping independent    Laundry independent    Shopping assistive equipment and person    IADL Comments Daughters assist with the shopping and errands       Employment/    Employment Status retired                   Psychosocial    No documentation.                  Abuse/Neglect    No documentation.                  Legal    No documentation.                  Substance Abuse    No documentation.                  Patient Forms    No documentation.                     Won Iniguez RN

## 2024-04-02 NOTE — ED NOTES
".. Bri Iniguez    Nursing Report ED to Floor:  Mental status: a&o x4  Ambulatory status: x1  Oxygen Therapy:  ra  Cardiac Rhythm: afib  Admitted from: home  Safety Concerns:  na  Social Issues: na  ED Room #:  18    ED Nurse Phone Extension - [phone] or may call [phone].      HPI:   Chief Complaint   Patient presents with    Shortness of Breath       Past Medical History:  Past Medical History:   Diagnosis Date    A-fib     Bursitis     trochanteric area    Colonoscopy refused 2015    Constipation     Deaf, left     Deafness     unspecified    Diabetes mellitus     PATIENT REPORTS SHE HAS TAKEN METFORMIN IN THE PAST BUT WAS TAKEN OFF OF THIS MEDICATION AROUND OCTOBER 2018.      Elevated cholesterol     Essential hypertension     GERD (gastroesophageal reflux disease)     Hearing loss, right     PATIENT DOES USE A HEARING AID TO RIGHT SIDE INTERMITTENTLY (REPORTS DEAFNESS IN LEFT EAR)    Hip pain     History of UTI     PATIENT REPORTS RESOLVED AFTER HORMONE THERAPY WAS ORDERED    Hyperlipidemia     other    Impaired functional mobility, balance, gait, and endurance     Influenza vaccine administered 2014    Macular degeneration     Mammogram declined 2015    Osteoarthritis of left hip     Primary osteoarthritis of right knee     Supraventricular premature beats     REPORTED IN PREADMISSION TESTING VISIT \"IRREGULAR HEART BEAT\".  REPORTS UNSURE THE ACTUAL VERBIAGE OF WHAT SHE WAS TOLD OTHER THAN THIS.     Wears partial dentures     REPORTS UPPER AND LOWER PARTIALS. INSTRUCTED NO ADHESIVES THE DOS.        Past Surgical History:  Past Surgical History:   Procedure Laterality Date    BUNIONECTOMY Right 01/21/2019    Procedure: Right foot bunionectomy with exostectomy, proximal phalanx osteotomy, right foot second and third digit hammertoe correction with interphalangeal joint fusion, right second metatarsophalangeal joint capsular release/capsulotomy;  Surgeon: Klaus Cason DPM;  Location: [facility];  Service: Podiatry "    CARDIAC CATHETERIZATION N/A 04/25/2023    Procedure: Left Heart Cath;  Surgeon: Pavan Bronson MD;  Location:  [facility];  Service: Cardiovascular;  Laterality: N/A;    CATARACT EXTRACTION Bilateral     CHOLECYSTECTOMY  2002    COLONOSCOPY      HARDWARE REMOVAL Right 10/02/2020    Procedure: HIP HARDWARE REMOVAL RIGHT;  Surgeon: Gideon Son MD;  Location:  [facility];  Service: Orthopedics;  Laterality: Right;    HERNIA REPAIR  2005    umbilical    HIP INTERTROCHANTERIC NAILING Right 12/31/2019    Procedure: HIP RIGHT OPEN REDUCTION INTERNAL FIXATION, INTERMEDULLARY  NAIL;  Surgeon: Carlos Barba Jr., MD;  Location:  [facility];  Service: Orthopedics    HYSTERECTOMY  1974    TONSILLECTOMY      TOTAL HIP ARTHROPLASTY Left 01/23/2018    Procedure: LEFT TOTAL HIP ARTHROPLASTY;  Surgeon: Gideon Son MD;  Location:  [facility];  Service:     TOTAL HIP ARTHROPLASTY Right 10/02/2020    Procedure: TOTAL HIP ARTHROPLASTY RIGHT;  Surgeon: Gideon Son MD;  Location:  [facility];  Service: Orthopedics;  Laterality: Right;        Admitting Doctor:   Irais León DO    Consulting Provider(s):  Consults       No orders found from 3/3/2024 to 4/2/2024.             Admitting Diagnosis:   There were no encounter diagnoses.    Most Recent Vitals:   Vitals:    04/01/24 1928 04/01/24 1933 04/01/24 1940 04/01/24 1942   BP: 112/75 126/89 111/62    BP Location:       Patient Position:       Pulse: 107 110  101   Resp:       Temp:       TempSrc:       SpO2: 97% 95%  95%   Weight:       Height:           Active LDAs/IV Access:   Lines, Drains & Airways       Active LDAs       Name Placement date Placement time Site Days    Peripheral IV 04/01/24 1736 Anterior;Right Forearm 04/01/24  1736  Forearm  less than 1                    Labs (abnormal labs have a star):   Labs Reviewed   COMPREHENSIVE METABOLIC PANEL - Abnormal; Notable for the following components:       Result Value    Glucose 189 (*)      Creatinine 1.06 (*)     Sodium 123 (*)     Chloride 85 (*)     eGFR 50.6 (*)     All other components within normal limits    Narrative:     GFR Normal >60  Chronic Kidney Disease <60  Kidney Failure <15    The GFR formula is only valid for adults with stable renal function between ages 18 and 70.   BNP (IN-HOUSE) - Abnormal; Notable for the following components:    proBNP 8,091.0 (*)     All other components within normal limits    Narrative:     This assay is used as an aid in the diagnosis of individuals suspected of having heart failure. It can be used as an aid in the diagnosis of acute decompensated heart failure (ADHF) in patients presenting with signs and symptoms of ADHF to the emergency department (ED). In addition, NT-proBNP of <300 pg/mL indicates ADHF is not likely.    Age Range Result Interpretation  NT-proBNP Concentration (pg/mL:      <50             Positive            >450                   Gray                 300-450                    Negative             <300    50-75           Positive            >900                  Gray                300-900                  Negative            <300      >75             Positive            >1800                  Gray                300-1800                  Negative            <300   SINGLE HS TROPONIN T - Abnormal; Notable for the following components:    HS Troponin T 46 (*)     All other components within normal limits    Narrative:     High Sensitive Troponin T Reference Range:  <14.0 ng/L- Negative Female for AMI  <22.0 ng/L- Negative Male for AMI  >=14 - Abnormal Female indicating possible myocardial injury.  >=22 - Abnormal Male indicating possible myocardial injury.   Clinicians would have to utilize clinical acumen, EKG, Troponin, and serial changes to determine if it is an Acute Myocardial Infarction or myocardial injury due to an underlying chronic condition.        CBC WITH AUTO DIFFERENTIAL - Abnormal; Notable for the following components:     WBC 12.98 (*)     Hemoglobin 10.8 (*)     Eosinophil % 0.2 (*)     Immature Grans % 0.8 (*)     Neutrophils, Absolute 9.04 (*)     Monocytes, Absolute 1.10 (*)     Immature Grans, Absolute 0.10 (*)     All other components within normal limits   TSH - Abnormal; Notable for the following components:    TSH 5.040 (*)     All other components within normal limits    Narrative:     Due to abnormal TSH results, suggest ordering Free T4.   COVID-19 AND FLU A/B, NP SWAB IN TRANSPORT MEDIA 1 HR TAT - Normal    Narrative:     Fact sheet for providers: https://www.fda.gov/media/891463/download    Fact sheet for patients: https://www.fda.gov/media/437671/download    Test performed by PCR.   T4, FREE - Normal   MAGNESIUM - Normal   RAINBOW DRAW    Narrative:     The following orders were created for panel order [facility] Draw.  Procedure                               Abnormality         Status                     ---------                               -----------         ------                     Green Top (Gel)[[id number]]                                  Final result               Lavender Top[[id number]]                                     Final result               Gold Top - SST[[id number]]                                   Final result               Gray Top[[id number]]                                         In process                 Light Blue Top[[id number]]                                   Final result                 Please view results for these tests on the individual orders.   URINALYSIS W/ CULTURE IF INDICATED   CBC AND DIFFERENTIAL    Narrative:     The following orders were created for panel order CBC & Differential.  Procedure                               Abnormality         Status                     ---------                               -----------         ------                     CBC Auto Differential[[id number]]        Abnormal            Final result                 Please view results for these tests  on the individual orders.   GREEN TOP   LAVENDER TOP   GOLD TOP - SST   LIGHT BLUE TOP   GRAY TOP       Meds Given in ED:   Medications   sodium chloride 0.9 % flush 10 mL (has no administration in time range)   amiodarone 360 mg in 200 mL D5W infusion (has no administration in time range)   Propofol (DIPRIVAN) injection 200 mg ( Intravenous Rate/Dose Change 4/1/24 1832)   sodium chloride 0.9 % bolus 250 mL ( Intravenous Rate/Dose Change 4/1/24 1805)     amiodarone, 0.5 mg/min         Last NIH score:                                                          Dysphagia screening results:        Garcia Coma Scale:  No data recorded     CIWA:        Restraint Type:            Isolation Status:  No active isolations

## 2024-04-02 NOTE — PLAN OF CARE
Problem: Adult Inpatient Plan of Care  Goal: Plan of Care Review  4/2/2024 0832 by Audelia Cason, RN  Outcome: Ongoing, Progressing  Flowsheets (Taken 4/2/2024 0600)  Progress: no change  Plan of Care Reviewed With: patient  4/2/2024 0831 by Audelia Cason, RN     Goal Outcome Evaluation: Full skin assessment completed with another RN. Vitals WNL. 02 >90% 2L NC. Having moderate amount of coffee ground emesis this AM. Called provider and gave Zofran for nausea. D5W going at 50 ml/hr. Amiodarone going in 0.5 mg/min. Ice pack for back which pt states is helping discomfort. Q2 rounding completed. Safety measures in place. Call light within reach.

## 2024-04-02 NOTE — CONSULTS
Inpatient Cardiology Consult  Consult performed by: Sharon Hayes APRN  Consult ordered by: Wanda Serrano APRN          [facility] at [facility]  Cardiovascular Consultation Note    Reason for consult: CHF/atrial fibrillation     Patient is an 88-year-old female with a history of paroxysmal atrial fibrillation, mild nonobstructive/near normal coronary arteries, type 2 diabetes mellitus, hypertension and hyperlipidemia who we are being asked to consult for atrial fibrillation and CHF.  The patient presented to [facility] last evening reporting a 2-week history of worsening shortness of breath, fatigue, and palpitations.  She also reported worsening swelling in her legs.  On arrival she was found to be in atrial fibrillation with RVR with rates of 160s.  The patient is chronically anticoagulated with Eliquis without missed doses so a cardioversion was attempted in the emergency room but was unsuccessful.  She was started on IV amiodarone  but remains in atrial fibrillation with rates controlled.  Her proBNP was elevated over 8000 and CT of the chest suggested mild pulmonary edema.  The patient's Eliquis was was held this morning as there was reportedly a couple episodes of coffee-ground emesis and H&H decreased from 10.8 and 34.3 to 9.8 and 29.6.  She was evaluated by GI who recommended EGD but patient declined in favor of conservative management due to her advanced age and cardiac issues.  Of note her sodium was also low on arrival at 123 but has improved with administration of IV fluids and is currently 132.  The patient is currently sitting up in the bed on O2 at 2 L by nasal cannula.  She reports her breathing is better than when she was admitted last night.  She denies any episodes of chest pain.  Patient notes several weeks of dyspnea on exertion with palpitations over the last several days.    Cardiac risk factors: Advanced age, hypertension, hyperlipidemia, type 2  diabetes mellitus, obesity    Past medical and surgical history, social and family history reviewed in EMR.    REVIEW OF SYSTEMS:   H&P ROS reviewed and pertinent CV ROS as noted in HPI.         Vital Sign Min/Max for last 24 hours  Temp  Min: 96 °F (35.6 °C)  Max: 97.7 °F (36.5 °C)   BP  Min: 85/48  Max: 133/83   Pulse  Min: 73  Max: 161   Resp  Min: 16  Max: 20   SpO2  Min: 91 %  Max: 100 %   No data recorded      Intake/Output Summary (Last 24 hours) at 2024 1141  Last data filed at 2024 0622  Gross per 24 hour   Intake 135.86 ml   Output --   Net 135.86 ml           Constitutional:       General: Awake.      Appearance: Healthy appearance.   Neck:      Vascular: No JVD.   Pulmonary:      Effort: Pulmonary effort is normal.      Breath sounds: Normal breath sounds.   Cardiovascular:      Normal rate. Regularly irregular rhythm.      Murmurs: There is no murmur.   Pulses:     Intact distal pulses.   Edema:     Thigh: bilateral trace edema of the thigh.     Pretibial: bilateral trace edema of the pretibial area.     Ankle: bilateral trace edema of the ankle.     Feet: bilateral trace edema of the feet.  Skin:     General: Skin is warm and dry.   Neurological:      Mental Status: Alert and oriented to person, place and time.   Psychiatric:         Behavior: Behavior is cooperative.      I have examined the patient and agree with the above      [id number] atrial fibrillation at 76 bpm, PVCs, left bundle branch block    Lab Review:   Labs reviewed in the electronic medical record.  Pertinent findings include:  Lab Results   Component Value Date    GLUCOSE 160 (H) 2024    BUN 17 2024    CREATININE 0.85 2024    EGFR 66.0 2024    BCR 20.0 2024    K 3.2 (L) 2024    CO2 25.0 2024    CALCIUM 7.9 (L) 2024    PROTENTOTREF 7.1 2016    ALBUMIN 3.5 2024    BILITOT 0.8 2024    AST 24 2024    ALT 20 2024     Lab Results   Component Value Date     WBC 11.00 (H) 04/02/2024    HGB 9.8 (L) 04/02/2024    HCT 29.6 (L) 04/02/2024    MCV 86.6 04/02/2024     04/02/2024     Lab Results   Component Value Date    CHLPL 168 04/24/2023    TRIG 99 04/24/2023    HDL 59 04/24/2023    LDL 89 04/24/2023     Results from last 7 days   Lab Units 04/02/24  0741   HEMOGLOBIN A1C % 7.50*         Active Hospital Problems    Diagnosis     **Acute on chronic diastolic congestive heart failure      Echo (1/2/2020): LVEF 76%.  Grade 1 diastolic dysfunction  Echo (4/25/2023): LVEF 60%.  Mild MR.      Nonobstructive CAD      Cardiac cath (4/25/2023): Mild CAD near normal coronary arteries.  LVEF 60%  Echo (4/25/2023): LVEF 60%.  Mild MR.      Possible GI bleed     Paroxysmal atrial fibrillation      Diagnosed 9/2020  Echo (1/2/2020): LVEF 76%.  Grade 1 diastolic dysfunction.  Left ventricular systolic function is hyperdynamic (EF > 70).  Echo (4/25/2023): LVEF 60%.  Mild MR.  [id number]      T2DM (type 2 diabetes mellitus)     Elevated troponin level not due myocardial infarction     Hyponatremia     Leukocytosis     Essential hypertension     GERD without esophagitis     Anxiety associated with depression     Hyperlipidemia     A-fib              Acute on chronic diastolic heart failure likely tachycardic induced from RVR  Echo 4/2023 LVEF 60% with mild MR  Give 40 mg IV Lasix x 1.    Monitor creatinine closely.    Will reassess need for more diuretic in the a.m.    Atrial fibrillation with RVR/paroxysmal atrial fibrillation  Start home dose metoprolol tartrate 25 mg twice daily  Likely discontinue amiodarone and choose rate control strategy given advanced age  Will restart Eliquis once H&H stable and no further hematemesis  GI deferring endoscopy for now but plans for EGD if H&H continues to trend down    Elevated troponin not due to MI   Troponins elevated and flat not consistent with ACS but More consistent with acute CHF and A-fib with RVR  Defer aspirin as cath in 2023  showed nonobstructive CAD/near normal coronary arteries      Hematemesis/possible GI bleed/anemia  GI consulted and deferring endoscopy for now per patient preference but if H&H trends down we will plan on EGD  Will restart Eliquis if H&H stable and no signs or symptoms of active or overt bleeding      HAWA Morrow    Discussed with patient and daughter.  At this time we will continue IV diuresis.  She does not wish further aggressive interventions done such as EGD or cardioversion we will honor this.  Will manage with rate control.  Resume anticoagulation prior to discharge.      I have seen and examined the patient, I have reviewed the note, discussed the case with the advance practice clinician, made necessary changes and I agree with the final note.    Pavan Bronson MD  04/02/24  12:28 EDT

## 2024-04-02 NOTE — THERAPY EVALUATION
Patient Name: Bri Iniguez  : 1935    MRN: [id number]                              Today's Date: 2024       Admit Date: 2024    Visit Dx:     ICD-10-CM ICD-9-CM   1. Atrial fibrillation with rapid ventricular response  I48.91 427.31   2. Hypotension, unspecified hypotension type  I95.9 458.9   3. Hyponatremia  E87.1 276.1     Patient Active Problem List   Diagnosis    Macular degeneration    Hyperlipidemia    Essential hypertension    Deafness    Venous insufficiency of both lower extremities    DJD (degenerative joint disease)    Paroxysmal atrial fibrillation    T2DM (type 2 diabetes mellitus)    Hyponatremia    Leukocytosis    GERD without esophagitis    Acute on chronic diastolic congestive heart failure    Elevated troponin level not due myocardial infarction    Anxiety associated with depression    Nonobstructive CAD    Possible GI bleed     Past Medical History:   Diagnosis Date    A-fib     Acute blood loss anemia, mild, asymptomatic 2018    Bursitis     trochanteric area    Closed fracture of right olecranon process 10/26/2021    Colonoscopy refused     Constipation     Deaf, left     Deafness     unspecified    Diabetes mellitus     PATIENT REPORTS SHE HAS TAKEN METFORMIN IN THE PAST BUT WAS TAKEN OFF OF THIS MEDICATION AROUND 2018.      Diabetes type 2, controlled 2016    Edema leg 2021    Elevated cholesterol     Essential hypertension     GERD (gastroesophageal reflux disease)     Hearing loss, right     PATIENT DOES USE A HEARING AID TO RIGHT SIDE INTERMITTENTLY (REPORTS DEAFNESS IN LEFT EAR)    Hip fracture 2019    Hip pain     History of UTI     PATIENT REPORTS RESOLVED AFTER HORMONE THERAPY WAS ORDERED    Hyperlipidemia     other    Impaired functional mobility, balance, gait, and endurance     Influenza vaccine administered     Macular degeneration     Mammogram declined 2015    Nonobstructive CAD 2024    NSTEMI, initial episode of care  "04/25/2023    Added automatically from request for surgery [id number]      Olecranon fracture, left, closed, initial encounter 10/26/2021    Osteoarthritis of left hip     Postoperative urinary retention 10/05/2020    Primary osteoarthritis of left hip 02/15/2017    Primary osteoarthritis of right knee     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  REPORTS UNSURE THE ACTUAL VERBIAGE OF WHAT SHE WAS TOLD OTHER THAN THIS.     Wears partial dentures     REPORTS UPPER AND LOWER PARTIALS. INSTRUCTED NO ADHESIVES THE DOS.     Past Surgical History:   Procedure Laterality Date    BUNIONECTOMY Right 01/21/2019    Procedure: Right foot bunionectomy with exostectomy, proximal phalanx osteotomy, right foot second and third digit hammertoe correction with interphalangeal joint fusion, right second metatarsophalangeal joint capsular release/capsulotomy;  Surgeon: Klaus Cason DPM;  Location:  [facility];  Service: Podiatry    CARDIAC CATHETERIZATION N/A 04/25/2023    Procedure: Left Heart Cath;  Surgeon: Pavan Bronson MD;  Location:  [facility];  Service: Cardiovascular;  Laterality: N/A;    CATARACT EXTRACTION Bilateral     CHOLECYSTECTOMY  2002    COLONOSCOPY      HARDWARE REMOVAL Right 10/02/2020    Procedure: HIP HARDWARE REMOVAL RIGHT;  Surgeon: Gideon Son MD;  Location:  [facility];  Service: Orthopedics;  Laterality: Right;    HERNIA REPAIR  2005    umbilical    HIP INTERTROCHANTERIC NAILING Right 12/31/2019    Procedure: HIP RIGHT OPEN REDUCTION INTERNAL FIXATION, INTERMEDULLARY  NAIL;  Surgeon: Carlos Barba Jr., MD;  Location:  [facility];  Service: Orthopedics    HYSTERECTOMY  1974    TONSILLECTOMY      TOTAL HIP ARTHROPLASTY Left 01/23/2018    Procedure: LEFT TOTAL HIP ARTHROPLASTY;  Surgeon: Gideon Son MD;  Location: "  [facility];  Service:     TOTAL HIP ARTHROPLASTY Right 10/02/2020    Procedure: TOTAL HIP ARTHROPLASTY RIGHT;  Surgeon: Gideon Son MD;  Location:  [facility];  Service: Orthopedics;  Laterality: Right;      General Information       Row Name 04/02/24 1333          Physical Therapy Time and Intention    Document Type evaluation  -     Mode of Treatment physical therapy  -       Row Name 04/02/24 1333          General Information    Patient Profile Reviewed yes  -     Prior Level of Function independent:;w/c or scooter;transfer;bed mobility  independently transfers utilizing FWx to w/c, self-propels w/c, live alone  -     Existing Precautions/Restrictions fall;oxygen therapy device and L/min;other (see comments)  extremely anxious during mobility; monitor vitals; deaf in L ear  -     Barriers to Rehab medically complex;ineffective coping  -       Row Name 04/02/24 1333          Living Environment    People in Home alone  -       Row Name 04/02/24 1333          Home Main Entrance    Number of Stairs, Main Entrance other (see comments)  ramp  -       Row Name 04/02/24 1333          Stairs Within Home, Primary    Number of Stairs, Within Home, Primary none  -       Row Name 04/02/24 1333          Cognition    Orientation Status (Cognition) oriented x 3  -       Row Name 04/02/24 1333          Safety Issues, Functional Mobility    Safety Issues Affecting Function (Mobility) awareness of need for assistance;insight into deficits/self-awareness;judgment;positioning of assistive device;problem-solving;safety precaution awareness;safety precautions follow-through/compliance;sequencing abilities  -     Impairments Affecting Function (Mobility) balance;coordination;endurance/activity tolerance;motor planning;postural/trunk control;shortness of breath;strength  -     Comment, Safety Issues/Impairments (Mobility) cues for improved safety awareness  -               User Key  (r) = Recorded By, (t) =  Taken By, (c) = Cosigned By      Initials Name Provider Type     Brooklynn Mancuso, PT Physical Therapist                   Mobility       Row Name 04/02/24 1334          Bed Mobility    Bed Mobility supine-sit  -HM     Supine-Sit Worden (Bed Mobility) contact guard  -HM     Assistive Device (Bed Mobility) bed rails;head of bed elevated  -HM     Comment, (Bed Mobility) no symptom onset reported sitting EOB  -       Row Name 04/02/24 1334          Bed-Chair Transfer    Bed-Chair Worden (Transfers) maximum assist (25% patient effort);2 person assist;verbal cues;nonverbal cues (demo/gesture)  -HM     Assistive Device (Bed-Chair Transfers) walker, front-wheeled  -HM     Comment, (Bed-Chair Transfer) cues for HP and foot placement as pt placed LLE far behind ROCKY, manual assist to navigate walker, increased anxiety noted with transfer. once sitting in chair HR noted to be as high as 186, RN present and aware. cues for PLB for controlled breathing. HR returned into 120s with time.  -       Row Name 04/02/24 1334          Sit-Stand Transfer    Sit-Stand Worden (Transfers) moderate assist (50% patient effort);verbal cues;nonverbal cues (demo/gesture)  -HM     Assistive Device (Sit-Stand Transfers) walker, front-wheeled  -HM     Comment, (Sit-Stand Transfer) cues for HP, pt utilized significant momentum to attempt to stand, pt educated on increased safety with transfer  -       Row Name 04/02/24 1334          Gait/Stairs (Locomotion)    Comment, (Gait/Stairs) non-ambulatory at baseline  -               User Key  (r) = Recorded By, (t) = Taken By, (c) = Cosigned By      Initials Name Provider Type     Brooklynn Mancuso PT Physical Therapist                   Obj/Interventions       Row Name 04/02/24 1435          Range of Motion Comprehensive    General Range of Motion bilateral lower extremity ROM WFL  -HM       Row Name 04/02/24 1435          Strength Comprehensive (MMT)    General Manual Muscle  Testing (MMT) Assessment lower extremity strength deficits identified  -     Comment, General Manual Muscle Testing (MMT) Assessment BLE grossly 4-/5  -HM       Row Name 04/02/24 1435          Balance    Balance Assessment sitting static balance;sitting dynamic balance;sit to stand dynamic balance;standing dynamic balance;standing static balance  -     Static Sitting Balance supervision;verbal cues  -     Dynamic Sitting Balance contact guard;verbal cues  -     Position, Sitting Balance unsupported;sitting edge of bed  -     Sit to Stand Dynamic Balance moderate assist;2-person assist  -HM     Static Standing Balance minimal assist;2-person assist  -HM     Dynamic Standing Balance maximum assist;2-person assist  -HM     Position/Device Used, Standing Balance supported;walker, front-wheeled  -     Balance Interventions standing;dynamic;occupation based/functional task  -     Comment, Balance manual assist at walker for improved stability  -       Row Name 04/02/24 1435          Sensory Assessment (Somatosensory)    Sensory Assessment (Somatosensory) LE sensation intact  reported numbness however able to identify light touch when asked  -               User Key  (r) = Recorded By, (t) = Taken By, (c) = Cosigned By      Initials Name Provider Type     Brooklynn Mancuso, PT Physical Therapist                   Goals/Plan       Row Name 04/02/24 1451          Bed Mobility Goal 1 (PT)    Activity/Assistive Device (Bed Mobility Goal 1, PT) bed mobility activities, all  -HM     [place] Level/Cues Needed (Bed Mobility Goal 1, PT) standby assist  -     Time Frame (Bed Mobility Goal 1, PT) long term goal (LTG);10 days  -     Progress/Outcomes (Bed Mobility Goal 1, PT) new goal  -       Row Name 04/02/24 1451          Transfer Goal 1 (PT)    Activity/Assistive Device (Transfer Goal 1, PT) sit-to-stand/stand-to-sit;bed-to-chair/chair-to-bed  -HM     [place] Level/Cues Needed (Transfer Goal 1,  PT) contact guard required  -     Time Frame (Transfer Goal 1, PT) long term goal (LTG);10 days  -     Progress/Outcome (Transfer Goal 1, PT) new goal  -       Row Name 04/02/24 4471          Problem Specific Goal 1 (PT)    Problem Specific Goal 1 (PT) Pt will be able to self propel wheelchair 150 feet independently and complete 2 180 degree turns with no cues for safety.  -     Time Frame (Problem Specific Goal 1, PT) long-term goal (LTG);2 weeks  -     Progress/Outcome (Problem Specific Goal 1, PT) new goal  -       Row Name 04/02/24 1453          Therapy Assessment/Plan (PT)    Planned Therapy Interventions (PT) balance training;bed mobility training;home exercise program;neuromuscular re-education;postural re-education;patient/family education;strengthening;transfer training;ROM (range of motion);wheelchair management/propulsion training  -               User Key  (r) = Recorded By, (t) = Taken By, (c) = Cosigned By      Initials Name Provider Type     Brooklynn Mancuso, PT Physical Therapist                   Clinical Impression       Row Name 04/02/24 0168          Pain    Pretreatment Pain Rating 0/10 - no pain  -     Posttreatment Pain Rating 0/10 - no pain  -     Pain Intervention(s) Ambulation/increased activity;Repositioned  -       Row Name 04/02/24 9550          Plan of Care Review    Plan of Care Reviewed With patient;daughter  -     Progress no change  -     Outcome Evaluation PT eval completed. Pt completed a SPT to chair with FWx and Max A x2 with cues for PLB d/t increased HR response (into 180s bpm RN present and aware) and anxiety with mobility. Pt demonstrated mobility below baseline function with decreased activity tolerance, strength deficits, and balance deficits warranting IPPT POC. d/c rec SNF.  -       Row Name 04/02/24 7343          Therapy Assessment/Plan (PT)    Rehab Potential (PT) good, to achieve stated therapy goals  -     Criteria for Skilled  Interventions Met (PT) yes;meets criteria;skilled treatment is necessary  -     Therapy Frequency (PT) daily  -       Row Name 04/02/24 1447          Vital Signs    Pre Systolic BP Rehab 138  -HM     Pre Treatment Diastolic BP 88  -HM     Pretreatment Heart Rate (beats/min) 126  -HM     Intratreatment Heart Rate (beats/min) 186   -HM     Posttreatment Heart Rate (beats/min) 116  -HM     O2 Delivery Pre Treatment nasal cannula  -HM     O2 Delivery Intra Treatment nasal cannula  -HM     Post SpO2 (%) 91  -HM     O2 Delivery Post Treatment nasal cannula  -HM     Pre Patient Position Supine  -HM     Intra Patient Position Standing  -HM     Post Patient Position Sitting  -HM       Row Name 04/02/24 1447          Positioning and Restraints    Pre-Treatment Position in bed  -HM     Post Treatment Position chair  -HM     In Chair notified nsg;reclined;sitting;call light within reach;encouraged to call for assist;exit alarm on;waffle cushion;heels elevated;legs elevated;with nsg;with OT  -HM               User Key  (r) = Recorded By, (t) = Taken By, (c) = Cosigned By      Initials Name Provider Type     Brooklynn Mancuso, PT Physical Therapist                   Outcome Measures       Row Name 04/02/24 1452          How much help from another person do you currently need...    Turning from your back to your side while in flat bed without using bedrails? 3  -HM     Moving from lying on back to sitting on the side of a flat bed without bedrails? 3  -HM     Moving to and from a bed to a chair (including a wheelchair)? 2  -HM     Standing up from a chair using your arms (e.g., wheelchair, bedside chair)? 2  -HM     Climbing 3-5 steps with a railing? 1  -HM     To walk in hospital room? 1  -HM     AM-PAC 6 Clicks Score (PT) 12  -HM     Highest Level of Mobility Goal 4 --> Transfer to chair/commode  -       Row Name 04/02/24 1452 04/02/24 1322       Functional Assessment    Outcome Measure Options AM-PAC 6 Clicks Basic  Mobility (PT)  - AM-PAC 6 Clicks Daily Activity (OT)  -GISSEL              User Key  (r) = Recorded By, (t) = Taken By, (c) = Cosigned By      Initials Name Provider Type    Mandy Morrissey, OT Occupational Therapist     Brooklynn Mancuso, PT Physical Therapist                                 Physical Therapy Education       Title: PT OT SLP Therapies (In Progress)       Topic: Physical Therapy (In Progress)       Point: Mobility training (Done)       Learning Progress Summary             Patient Acceptance, TB,E, VU,NR by  at 4/2/2024 1453                         Point: Home exercise program (Not Started)       Learner Progress:  Not documented in this visit.              Point: Body mechanics (Done)       Learning Progress Summary             Patient Acceptance, TB,E, VU,NR by  at 4/2/2024 1453                         Point: Precautions (Done)       Learning Progress Summary             Patient Acceptance, TB,E, VU,NR by  at 4/2/2024 1453                                         User Key       Initials Effective Dates Name Provider Type Discipline     09/22/22 -  Brooklynn Mancuso, [place] Physical Therapist PT                  PT Recommendation and Plan  Planned Therapy Interventions (PT): balance training, bed mobility training, home exercise program, neuromuscular re-education, postural re-education, patient/family education, strengthening, transfer training, ROM (range of motion), wheelchair management/propulsion training  Plan of Care Reviewed With: patient, daughter  Progress: no change  Outcome Evaluation: PT eval completed. Pt completed a SPT to chair with FWx and Max A x2 with cues for PLB d/t increased HR response (into 180s bpm RN present and aware) and anxiety with mobility. Pt demonstrated mobility below baseline function with decreased activity tolerance, strength deficits, and balance deficits warranting IPPT POC. d/c rec SNF.     Time Calculation:   PT Evaluation Complexity  History, PT Evaluation  Complexity: 3 or more personal factors and/or comorbidities  Examination of Body Systems (PT Eval Complexity): total of 3 or more elements  Clinical Presentation (PT Evaluation Complexity): evolving  Clinical Decision Making (PT Evaluation Complexity): moderate complexity  Overall Complexity (PT Evaluation Complexity): moderate complexity     PT Charges       Row Name 04/02/24 1453             Time Calculation    Start Time 1110  -HM      PT Received On 04/02/24  -HM      PT Goal Re-Cert Due Date 04/12/24  -HM         Untimed Charges    PT Eval/Re-eval Minutes 50  -HM         Total Minutes    Untimed Charges Total Minutes 50  -HM       Total Minutes 50  -HM                User Key  (r) = Recorded By, (t) = Taken By, (c) = Cosigned By      Initials Name Provider Type     Brooklynn Mancuso, [place] Physical Therapist                  Therapy Charges for Today       Code Description Service Date Service Provider Modifiers Qty    [id number] HC PT EVAL MOD COMPLEXITY 4 4/2/2024 Brooklynn Mancuso, PT GP 1            PT G-Codes  Outcome Measure Options: AM-PAC 6 Clicks Basic Mobility (PT)  AM-PAC 6 Clicks Score (PT): 12  AM-PAC 6 Clicks Score (OT): 14  PT Discharge Summary  Anticipated Discharge Disposition (PT): skilled nursing facility    Brooklynn Mancuso PT  4/2/2024

## 2024-04-02 NOTE — PLAN OF CARE
Goal Outcome Evaluation:  Plan of Care Reviewed With: patient, daughter           Outcome Evaluation: OT eval completed. Pt presents with generalized weakness, dyspnea, and impaired balance impacting ADL's. Pt CGA for supine to sit EOB, ModAx2 for STS using FWW, MaxAx2 for SPT to chair. Pt required extra time, focused deep breathing to recover from increased heart rate brought on by transfer. IP OT services warranted. Recommend SNF at discharge to support goal of return to living independently.      Anticipated Discharge Disposition (OT): skilled nursing facility

## 2024-04-02 NOTE — CONSULTS
[facility] Consult    Referring Provider: No ref. provider found    PCP: Juan Berg DO    Reason for Consultation: Hematemesis    History of present illness:    Bri Iniguez is a 88 y.o. female, PMH includes HTN, HL, PAF, T2DM, CHF, anxiety, depression, is admitted via ED yesterday for evaluation of AFib with RVR. Pt had failed cardioversion in ED, has responded well to amiodarone and is now rate controlled.    Pt notes few week h/o SOA, palpitations and fatigue. She had a few episodes of coffee ground emesis this morning, denies GI complaints at this time. She denies h/o GI bleed, NSAID use at home. She takes 81mg ASA daily. Patient denies associated fever, chills, abdominal pain, indigestion, diarrhea, constipation, dysphagia, hematochezia, melena, bloating, weight loss or gain, dysuria, jaundice or bruising.    Labs this morning reveal H/H 9.8/29.6 (baseline 13 one year ago), BUN 17, Cr 0.85.     Patient denies personal or FHx of PUD, H Pylori, gastritis, pancreatitis, colitis, Celiac disease, UC, Crohn's disease, IBS, colon or gastric cancers. Pt denies EtOH, tobacco, illicit substance use.    No previous EGD. CSY in remote past, unremarkable.     Allergies:  Patient has no known allergies.    Scheduled Meds:  aspirin, 81 mg, Oral, Daily  atorvastatin, 10 mg, Oral, Nightly  furosemide, 40 mg, Oral, Daily  insulin lispro, 2-7 Units, Subcutaneous, 4x Daily AC & at Bedtime  pantoprazole, 40 mg, Intravenous, Q12H  pharmacy consult - [facility], , Does not apply, Daily  potassium chloride ER, 40 mEq, Oral, Q4H  Propofol, 200 mg, Intravenous, Once  sodium chloride, 250 mL, Intravenous, Once  sodium chloride, 10 mL, Intravenous, Q12H         Infusions:  amiodarone, 0.5 mg/min, Last Rate: 0.5 mg/min (04/02/24 0622)  dextrose, 50 mL/hr, Last Rate: 50 mL/hr (04/02/24 0711)        PRN Meds:    senna-docusate sodium **AND** polyethylene glycol **AND** bisacodyl **AND** bisacodyl    dextrose    dextrose    glucagon  (human recombinant)    melatonin    ondansetron ODT **OR** ondansetron    Potassium Replacement - Follow Nurse / BPA Driven Protocol    sodium chloride    sodium chloride    sodium chloride    Home Meds:  Medications Prior to Admission   Medication Sig Dispense Refill Last Dose    apixaban (Eliquis) 5 MG tablet tablet TAKE ONE (1) TABLET BY MOUTH TWO TIMES DAILY 60 tablet 11 4/2/2024 at 0900    aspirin 81 MG chewable tablet Chew 1 tablet Daily.  11 4/2/2024    atorvastatin (LIPITOR) 10 MG tablet Take 1 tablet by mouth Every Night.   4/2/2024    citalopram (CeleXA) 40 MG tablet Take 1 tablet by mouth Daily.   4/1/2024 at 2100    metoprolol tartrate (LOPRESSOR) 25 MG tablet 1 tablet 2 (Two) Times a Day.   4/2/2024    furosemide (LASIX) 40 MG tablet Take 1 tablet by mouth Daily.       INTRAROSA 6.5 MG insert Insert 1 suppository into the vagina Every Night.  6     Multiple Vitamins-Minerals (OCUVITE ADULT 50+) capsule Take 1 tablet by mouth Daily.          ROS: Review of Systems   Constitutional:  Positive for fatigue. Negative for chills, diaphoresis and unexpected weight change.   HENT:  Negative for drooling, facial swelling, mouth sores, nosebleeds, rhinorrhea, sore throat, tinnitus, trouble swallowing and voice change.    Respiratory:  Positive for shortness of breath. Negative for cough and chest tightness.    Cardiovascular:  Positive for palpitations. Negative for chest pain and leg swelling.   Gastrointestinal:  Positive for vomiting (hematemesis x 2 this AM). Negative for abdominal distention, abdominal pain, blood in stool, constipation, diarrhea and nausea.   Genitourinary:  Negative for dysuria, flank pain and hematuria.   Musculoskeletal:  Negative for arthralgias, joint swelling and myalgias.   Skin:  Negative for color change, pallor and rash.   Neurological:  Negative for tremors, syncope and light-headedness.   Psychiatric/Behavioral:  Negative for confusion and hallucinations.    All other systems  "reviewed and are negative.      PAST MED HX:  Past Medical History:   Diagnosis Date    A-fib     Acute blood loss anemia, mild, asymptomatic 01/24/2018    Bursitis     trochanteric area    Closed fracture of right olecranon process 10/26/2021    Colonoscopy refused 2015    Constipation     Deaf, left     Deafness     unspecified    Diabetes mellitus     PATIENT REPORTS SHE HAS TAKEN METFORMIN IN THE PAST BUT WAS TAKEN OFF OF THIS MEDICATION AROUND OCTOBER 2018.      Diabetes type 2, controlled 08/18/2016    Edema leg 01/20/2021    Elevated cholesterol     Essential hypertension     GERD (gastroesophageal reflux disease)     Hearing loss, right     PATIENT DOES USE A HEARING AID TO RIGHT SIDE INTERMITTENTLY (REPORTS DEAFNESS IN LEFT EAR)    Hip fracture 12/30/2019    Hip pain     History of UTI     PATIENT REPORTS RESOLVED AFTER HORMONE THERAPY WAS ORDERED    Hyperlipidemia     other    Impaired functional mobility, balance, gait, and endurance     Influenza vaccine administered 2014    Macular degeneration     Mammogram declined 2015    Nonobstructive CAD 4/2/2024    NSTEMI, initial episode of care 04/25/2023    Added automatically from request for surgery [id number]      Olecranon fracture, left, closed, initial encounter 10/26/2021    Osteoarthritis of left hip     Postoperative urinary retention 10/05/2020    Primary osteoarthritis of left hip 02/15/2017    Primary osteoarthritis of right knee     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  REPORTS UNSURE THE ACTUAL VERBIAGE OF WHAT SHE WAS TOLD OTHER THAN THIS.     Wears partial dentures     REPORTS UPPER AND LOWER PARTIALS. INSTRUCTED NO ADHESIVES THE DOS.       PAST SURG HX:  Past Surgical History:   Procedure Laterality Date    BUNIONECTOMY Right 01/21/2019    Procedure: Right foot bunionectomy " with exostectomy, proximal phalanx osteotomy, right foot second and third digit hammertoe correction with interphalangeal joint fusion, right second metatarsophalangeal joint capsular release/capsulotomy;  Surgeon: Klaus Cason DPM;  Location:  [facility];  Service: Podiatry    CARDIAC CATHETERIZATION N/A 04/25/2023    Procedure: Left Heart Cath;  Surgeon: Pavan Bronson MD;  Location:  [facility];  Service: Cardiovascular;  Laterality: N/A;    CATARACT EXTRACTION Bilateral     CHOLECYSTECTOMY  2002    COLONOSCOPY      HARDWARE REMOVAL Right 10/02/2020    Procedure: HIP HARDWARE REMOVAL RIGHT;  Surgeon: Gideon Son MD;  Location:  [facility];  Service: Orthopedics;  Laterality: Right;    HERNIA REPAIR  2005    umbilical    HIP INTERTROCHANTERIC NAILING Right 12/31/2019    Procedure: HIP RIGHT OPEN REDUCTION INTERNAL FIXATION, INTERMEDULLARY  NAIL;  Surgeon: Carlos Barba Jr., MD;  Location:  [facility];  Service: Orthopedics    HYSTERECTOMY  1974    TONSILLECTOMY      TOTAL HIP ARTHROPLASTY Left 01/23/2018    Procedure: LEFT TOTAL HIP ARTHROPLASTY;  Surgeon: Gideon Son MD;  Location:  [facility];  Service:     TOTAL HIP ARTHROPLASTY Right 10/02/2020    Procedure: TOTAL HIP ARTHROPLASTY RIGHT;  Surgeon: Gideon Son MD;  Location:  [facility];  Service: Orthopedics;  Laterality: Right;       FAM HX:  Family History   Problem Relation Age of Onset    Hypertension Other     Diabetes Other         type 2    Stroke Mother     Diabetes Mother     Hypertension Mother     Cancer Father        SOC HX:  Social History     Socioeconomic History    Marital status:    Tobacco Use    Smoking status: Never    Smokeless tobacco: Never   Vaping Use    Vaping status: Never Used   Substance and Sexual Activity    Alcohol use: No    Drug use: No    Sexual activity: Not Currently     Partners: Male       PHYSICAL EXAM  /88 (BP Location: Left arm, Patient Position: Lying)   Pulse 86   Temp  "96 °F (35.6 °C) (Oral)   Resp 16   Ht 167.6 cm (66\")   Wt 90.7 kg (200 lb)   LMP  (LMP Unknown) Comment: HYSTERECTOMY  SpO2 95%   BMI 32.28 kg/m²   Wt Readings from Last 3 Encounters:   04/01/24 90.7 kg (200 lb)   06/12/23 88.9 kg (196 lb)   04/25/23 88.9 kg (196 lb)   ,body mass index is 32.28 kg/m².  Physical Exam  Vitals and nursing note reviewed.   Constitutional:       Appearance: Normal appearance. She is normal weight. She is not ill-appearing or diaphoretic.      Interventions: Nasal cannula in place.      Comments: Pleasantly conversant   HENT:      Head: Normocephalic and atraumatic.      Right Ear: External ear normal.      Left Ear: External ear normal.      Nose: Nose normal.      Mouth/Throat:      Mouth: Mucous membranes are moist.      Pharynx: Oropharynx is clear.   Eyes:      Conjunctiva/sclera: Conjunctivae normal.      Pupils: Pupils are equal, round, and reactive to light.   Neck:      Thyroid: No thyromegaly.   Cardiovascular:      Rate and Rhythm: Normal rate. Rhythm irregular.      Pulses: Normal pulses.      Heart sounds: Normal heart sounds.   Pulmonary:      Effort: Pulmonary effort is normal.      Breath sounds: Normal breath sounds.   Abdominal:      General: Abdomen is flat. Bowel sounds are normal. There is no distension.      Tenderness: There is no abdominal tenderness.   Musculoskeletal:         General: Normal range of motion.      Cervical back: Normal range of motion and neck supple.      Right lower leg: No edema.      Left lower leg: No edema.   Skin:     General: Skin is warm and dry.   Neurological:      General: No focal deficit present.      Mental Status: She is oriented to person, place, and time.   Psychiatric:         Mood and Affect: Mood normal.         Results Review:   I reviewed the patient's new clinical results.  I reviewed the patient's new imaging results and agree with the interpretation.  I reviewed the patient's other test results and agree with the " interpretation    Lab Results   Component Value Date    WBC 11.00 (H) 04/02/2024    HGB 9.8 (L) 04/02/2024    HGB 9.8 (L) 04/02/2024    HGB 10.8 (L) 04/01/2024    HCT 29.6 (L) 04/02/2024    MCV 86.6 04/02/2024     04/02/2024       Lab Results   Component Value Date    INR 2.26 (H) 04/02/2024    INR 0.95 04/23/2023    INR 1.04 09/25/2020       Lab Results   Component Value Date    GLUCOSE 160 (H) 04/02/2024    BUN 17 04/02/2024    CREATININE 0.85 04/02/2024    EGFRIFNONA 112 10/03/2020    EGFRIFAFRI 101 08/18/2016    BCR 20.0 04/02/2024     (L) 04/02/2024    K 3.2 (L) 04/02/2024    CO2 25.0 04/02/2024    CALCIUM 7.9 (L) 04/02/2024    PROTENTOTREF 7.1 08/18/2016    ALBUMIN 3.5 04/01/2024    ALKPHOS 69 04/01/2024    BILITOT 0.8 04/01/2024    ALT 20 04/01/2024    AST 24 04/01/2024       ASSESSMENTS/PLANS    Hematemesis  Anemia, secondary to GI blood loss  AFib with RVR  A/C CHF   - continue trending H/H and transfuse per hospitalist protocol   - BID PPI   - discussed role of endoscopy with patient, who declines in favor of conservative management due to advanced age and ongoing cardiac issues   - would reconsider EGD if further decline in H/H, ongoing GI complaints, etc    I discussed the patient's findings and my recommendations with patient. Thank you very kindly for this consultation. Please call with questions, or if pt has further decline in H/H, decides to proceed with endoscopy, etc.       Judith Khan PA-C  04/02/24  10:05 EDT

## 2024-04-02 NOTE — THERAPY EVALUATION
Patient Name: Bri Iniguez  : 1935    MRN: [id number]                              Today's Date: 2024       Admit Date: 2024    Visit Dx:     ICD-10-CM ICD-9-CM   1. Atrial fibrillation with rapid ventricular response  I48.91 427.31   2. Hypotension, unspecified hypotension type  I95.9 458.9   3. Hyponatremia  E87.1 276.1     Patient Active Problem List   Diagnosis    Macular degeneration    Hyperlipidemia    Essential hypertension    Deafness    Venous insufficiency of both lower extremities    DJD (degenerative joint disease)    Paroxysmal atrial fibrillation    T2DM (type 2 diabetes mellitus)    Hyponatremia    Leukocytosis    GERD without esophagitis    Acute on chronic diastolic congestive heart failure    Elevated troponin level not due myocardial infarction    Anxiety associated with depression    Nonobstructive CAD    Possible GI bleed     Past Medical History:   Diagnosis Date    A-fib     Acute blood loss anemia, mild, asymptomatic 2018    Bursitis     trochanteric area    Closed fracture of right olecranon process 10/26/2021    Colonoscopy refused     Constipation     Deaf, left     Deafness     unspecified    Diabetes mellitus     PATIENT REPORTS SHE HAS TAKEN METFORMIN IN THE PAST BUT WAS TAKEN OFF OF THIS MEDICATION AROUND 2018.      Diabetes type 2, controlled 2016    Edema leg 2021    Elevated cholesterol     Essential hypertension     GERD (gastroesophageal reflux disease)     Hearing loss, right     PATIENT DOES USE A HEARING AID TO RIGHT SIDE INTERMITTENTLY (REPORTS DEAFNESS IN LEFT EAR)    Hip fracture 2019    Hip pain     History of UTI     PATIENT REPORTS RESOLVED AFTER HORMONE THERAPY WAS ORDERED    Hyperlipidemia     other    Impaired functional mobility, balance, gait, and endurance     Influenza vaccine administered     Macular degeneration     Mammogram declined 2015    Nonobstructive CAD 2024    NSTEMI, initial episode of care  "04/25/2023    Added automatically from request for surgery [id number]      Olecranon fracture, left, closed, initial encounter 10/26/2021    Osteoarthritis of left hip     Postoperative urinary retention 10/05/2020    Primary osteoarthritis of left hip 02/15/2017    Primary osteoarthritis of right knee     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  REPORTS UNSURE THE ACTUAL VERBIAGE OF WHAT SHE WAS TOLD OTHER THAN THIS.     Wears partial dentures     REPORTS UPPER AND LOWER PARTIALS. INSTRUCTED NO ADHESIVES THE DOS.     Past Surgical History:   Procedure Laterality Date    BUNIONECTOMY Right 01/21/2019    Procedure: Right foot bunionectomy with exostectomy, proximal phalanx osteotomy, right foot second and third digit hammertoe correction with interphalangeal joint fusion, right second metatarsophalangeal joint capsular release/capsulotomy;  Surgeon: Klaus Cason DPM;  Location:  [facility];  Service: Podiatry    CARDIAC CATHETERIZATION N/A 04/25/2023    Procedure: Left Heart Cath;  Surgeon: Pavan Bronson MD;  Location:  [facility];  Service: Cardiovascular;  Laterality: N/A;    CATARACT EXTRACTION Bilateral     CHOLECYSTECTOMY  2002    COLONOSCOPY      HARDWARE REMOVAL Right 10/02/2020    Procedure: HIP HARDWARE REMOVAL RIGHT;  Surgeon: Gideon Son MD;  Location:  [facility];  Service: Orthopedics;  Laterality: Right;    HERNIA REPAIR  2005    umbilical    HIP INTERTROCHANTERIC NAILING Right 12/31/2019    Procedure: HIP RIGHT OPEN REDUCTION INTERNAL FIXATION, INTERMEDULLARY  NAIL;  Surgeon: Carlos Barba Jr., MD;  Location:  [facility];  Service: Orthopedics    HYSTERECTOMY  1974    TONSILLECTOMY      TOTAL HIP ARTHROPLASTY Left 01/23/2018    Procedure: LEFT TOTAL HIP ARTHROPLASTY;  Surgeon: Gideon Son MD;  Location: "  [facility];  Service:     TOTAL HIP ARTHROPLASTY Right 10/02/2020    Procedure: TOTAL HIP ARTHROPLASTY RIGHT;  Surgeon: Gideon Son MD;  Location:  [facility];  Service: Orthopedics;  Laterality: Right;      General Information       Row Name 04/02/24 1302          OT Time and Intention    Document Type evaluation  -GISSEL     Mode of Treatment occupational therapy  -GISSEL       Row Name 04/02/24 1302          General Information    Patient Profile Reviewed yes  -GISSEL     Prior Level of Function independent:;ADL's;bed mobility;transfer;w/c or scooter  Non-ambulatory at baseline; IND with transfers to w/c, commode and shower chair using FWW  -GISSEL     Existing Precautions/Restrictions fall;oxygen therapy device and L/min;other (see comments)  extremely anxious during mobility; monitor vitals; deaf in L ear  -GISSEL     Barriers to Rehab medically complex;ineffective coping  -GISSEL       Row Name 04/02/24 1302          Occupational Profile    Environmental Supports and Barriers (Occupational Profile) Lives alone in single level home, daughter lives next door; non-ambulatory at baseline, uses FWW to transfer to w/c, commode, and shower chair; on RA at baseline  -GISSEL       Row Name 04/02/24 1302          Living Environment    People in Home alone  -GISSEL       Row Name 04/02/24 1302          Home Main Entrance    Number of Stairs, Main Entrance none;other (see comments)  Ramp  -GISSEL       Row Name 04/02/24 1302          Stairs Within Home, Primary    Number of Stairs, Within Home, Primary none  -GISSEL       Row Name 04/02/24 1302          Cognition    Orientation Status (Cognition) oriented x 3  -GISSEL       Row Name 04/02/24 1302          Safety Issues, Functional Mobility    Safety Issues Affecting Function (Mobility) awareness of need for assistance;insight into deficits/self-awareness;judgment;positioning of assistive device;problem-solving;safety precaution awareness;safety precautions follow-through/compliance;sequencing abilities  -GISSEL      Impairments Affecting Function (Mobility) balance;coordination;endurance/activity tolerance;motor planning;postural/trunk control;shortness of breath;strength  -GISSEL     Comment, Safety Issues/Impairments (Mobility) cues for problem solving and sequencing during transfers d/t increased anxiety during mobility  -GISSEL               User Key  (r) = Recorded By, (t) = Taken By, (c) = Cosigned By      Initials Name Provider Type    Mandy Morrissey OT Occupational Therapist                     Mobility/ADL's       Row Name 04/02/24 1307          Bed Mobility    Bed Mobility supine-sit  -GISSEL     Supine-Sit [place] (Bed Mobility) contact guard  -GISSEL     Assistive Device (Bed Mobility) bed rails;head of bed elevated  -GISSEL     Comment, (Bed Mobility) Increased time and effort needed  -GISSEL       Row Name 04/02/24 1307          Transfers    Transfers sit-stand transfer;bed-chair transfer  -GISSEL       Row Name 04/02/24 1307          Bed-Chair Transfer    Bed-Chair [place] (Transfers) maximum assist (25% patient effort);2 person assist;verbal cues;nonverbal cues (demo/gesture)  -GISSEL     Assistive Device (Bed-Chair Transfers) walker, front-wheeled  -GISSEL     Comment, (Bed-Chair Transfer) Mod to MaxAx2 for safety; cues for HP and sequencing  -GISSEL       Row Name 04/02/24 1307          Sit-Stand Transfer    Sit-Stand [place] (Transfers) moderate assist (50% patient effort);verbal cues;nonverbal cues (demo/gesture)  -GISSEL     Assistive Device (Sit-Stand Transfers) walker, front-wheeled  -GISSEL     Comment, (Sit-Stand Transfer) several attempts made to stand from EOB to walker to simulate home experience, pt became increasingly anxious with increased heart rate; cues to slow down and breathe  -GISSEL       Row Name 04/02/24 1307          Activities of Daily Living    BADL Assessment/Intervention lower body dressing;grooming  -GISSEL       Row Name 04/02/24 1307          Lower Body Dressing Assessment/Training    [place] Level (Lower Body  Dressing) don;socks;dependent (less than 25% patient effort)  -GISSEL     Position (Lower Body Dressing) edge of bed sitting  -       Row Name 04/02/24 1307          Grooming Assessment/Training    [place] Level (Grooming) hair care, combing/brushing;oral care regimen;wash face, hands;set up  -GISSEL     Position (Grooming) supported sitting  -               User Key  (r) = Recorded By, (t) = Taken By, (c) = Cosigned By      Initials Name Provider Type    Mandy Morrissey OT Occupational Therapist                   Obj/Interventions       Row Name 04/02/24 1311          Sensory Assessment (Somatosensory)    Sensory Assessment (Somatosensory) UE sensation intact  -       Row Name 04/02/24 1311          Vision Assessment/Intervention    Visual Impairment/Limitations unable/difficult to assess  hx macular degeneration  -     Visual Field Deficit central vision impaired  -       Row Name 04/02/24 1311          Range of Motion Comprehensive    General Range of Motion bilateral upper extremity ROM WFL  -       Row Name 04/02/24 1311          Strength Comprehensive (MMT)    Comment, General Manual Muscle Testing (MMT) Assessment BUE's grossly 4-/5  -       Row Name 04/02/24 1311          Balance    Balance Assessment sitting static balance;sitting dynamic balance;standing static balance;standing dynamic balance  -     Static Sitting Balance supervision;verbal cues  -     Dynamic Sitting Balance contact guard;verbal cues  -GISSEL     Position, Sitting Balance unsupported;sitting edge of bed;other (see comments)  cues to correct posterior lean  -     Static Standing Balance minimal assist;2-person assist;verbal cues;non-verbal cues (demo/gesture)  -GISSEL     Dynamic Standing Balance maximum assist;2-person assist;verbal cues;non-verbal cues (demo/gesture)  -GISSEL     Position/Device Used, Standing Balance supported;walker, front-wheeled  -               User Key  (r) = Recorded By, (t) = Taken By, (c) = Cosigned By       Initials Name Provider Type    Mandy Morrissey OT Occupational Therapist                   Goals/Plan       Row Name 04/02/24 1321          Transfer Goal 1 (OT)    Activity/Assistive Device (Transfer Goal 1, OT) sit-to-stand/stand-to-sit;bed-to-chair/chair-to-bed;toilet;walker, rolling  -GISSEL     [place] Level/Cues Needed (Transfer Goal 1, OT) moderate assist (50-74% patient effort)  -GISSEL     Time Frame (Transfer Goal 1, OT) long term goal (LTG);10 days  -GISSEL     Progress/Outcome (Transfer Goal 1, OT) new goal  -GISSEL       Row Name 04/02/24 1321          Dressing Goal 1 (OT)    Activity/Device (Dressing Goal 1, OT) lower body dressing  -GISSEL     [place]/Cues Needed (Dressing Goal 1, OT) minimum assist (75% or more patient effort)  -GISSEL     Time Frame (Dressing Goal 1, OT) long term goal (LTG);10 days  -GISSEL     Progress/Outcome (Dressing Goal 1, OT) new goal  -GISSEL       Row Name 04/02/24 1321          Toileting Goal 1 (OT)    Activity/Device (Toileting Goal 1, OT) adjust/manage clothing;perform perineal hygiene;commode, bedside without drop arms  -GISSEL     [place] Level/Cues Needed (Toileting Goal 1, OT) moderate assist (50-74% patient effort)  -GISSEL     Time Frame (Toileting Goal 1, OT) long term goal (LTG);10 days  -GISSEL     Progress/Outcome (Toileting Goal 1, OT) new goal  -GISSEL       Row Name 04/02/24 1321          Therapy Assessment/Plan (OT)    Planned Therapy Interventions (OT) activity tolerance training;BADL retraining;functional balance retraining;occupation/activity based interventions;patient/caregiver education/training;ROM/therapeutic exercise;strengthening exercise;transfer/mobility retraining  -GISSEL               User Key  (r) = Recorded By, (t) = Taken By, (c) = Cosigned By      Initials Name Provider Type    Mandy Morrissey OT Occupational Therapist                   Clinical Impression       Row Name 04/02/24 1313          Pain Assessment    Pretreatment Pain Rating 0/10 - no pain  -GISSEL      Posttreatment Pain Rating 0/10 - no pain  -       Row Name 04/02/24 1313          Plan of Care Review    Plan of Care Reviewed With patient;daughter  -     Outcome Evaluation OT eval completed. Pt presents with generalized weakness, dyspnea, and impaired balance impacting ADL's. Pt CGA for supine to sit EOB, ModAx2 for STS using FWW, MaxAx2 for SPT to chair. Pt required extra time, focused deep breathing to recover from increased heart rate brought on by transfer. IP OT services warranted. Recommend SNF at discharge to support goal of return to living independently.  -       Row Name 04/02/24 1313          Therapy Assessment/Plan (OT)    Rehab Potential (OT) good, to achieve stated therapy goals  -     Criteria for Skilled Therapeutic Interventions Met (OT) yes;meets criteria;skilled treatment is necessary  -     Therapy Frequency (OT) daily  -       Row Name 04/02/24 1313          Therapy Plan Review/Discharge Plan (OT)    Anticipated Discharge Disposition (OT) skilled nursing facility  -       Row Name 04/02/24 1313          Vital Signs    O2 Delivery Pre Treatment nasal cannula  -GISSEL     O2 Delivery Intra Treatment nasal cannula  -GISSEL     O2 Delivery Post Treatment nasal cannula  -GISSEL     Pre Patient Position Supine  -GISSEL     Intra Patient Position Standing  -GISSEL     Post Patient Position Sitting  -       Row Name 04/02/24 1313          Positioning and Restraints    Pre-Treatment Position in bed  -GISSEL     Post Treatment Position chair  -GISSEL     In Chair notified nsg;reclined;call light within reach;encouraged to call for assist;exit alarm on;with family/caregiver;waffle cushion;on mechanical lift sling;legs elevated;heels elevated  -               User Key  (r) = Recorded By, (t) = Taken By, (c) = Cosigned By      Initials Name Provider Type    Mandy Morrissey, OT Occupational Therapist                   Outcome Measures       Row Name 04/02/24 1322          How much help from another is currently  needed...    Putting on and taking off regular lower body clothing? 1  -GISSEL     Bathing (including washing, rinsing, and drying) 2  -GISSEL     Toileting (which includes using toilet bed pan or urinal) 1  -GISSEL     Putting on and taking off regular upper body clothing 3  -GISSEL     Taking care of personal grooming (such as brushing teeth) 3  -GISSEL     Eating meals 4  -GISSEL     AM-PAC 6 Clicks Score (OT) 14  -GISSEL       Row Name 04/02/24 1322          Functional Assessment    Outcome Measure Options AM-PAC 6 Clicks Daily Activity (OT)  -GISSEL               User Key  (r) = Recorded By, (t) = Taken By, (c) = Cosigned By      Initials Name Provider Type    Mandy Morrissey OT Occupational Therapist                    Occupational Therapy Education       Title: PT OT SLP Therapies (In Progress)       Topic: Occupational Therapy (In Progress)       Point: ADL training (In Progress)       Description:   Instruct learner(s) on proper safety adaptation and remediation techniques during self care or transfers.   Instruct in proper use of assistive devices.                  Learning Progress Summary             Patient Acceptance, E, NL by GISSEL at 4/2/2024 1323    Comment: OT POC; deep breathing for relaxation; transfer training   Family Acceptance, E, NL by GISSEL at 4/2/2024 1323    Comment: OT POC; deep breathing for relaxation; transfer training                         Point: Home exercise program (Not Started)       Description:   Instruct learner(s) on appropriate technique for monitoring, assisting and/or progressing therapeutic exercises/activities.                  Learner Progress:  Not documented in this visit.              Point: Precautions (In Progress)       Description:   Instruct learner(s) on prescribed precautions during self-care and functional transfers.                  Learning Progress Summary             Patient Acceptance, E, NL by IGSSEL at 4/2/2024 1323    Comment: OT POC; deep breathing for relaxation; transfer training    Family Acceptance, E, NL by GISSEL at 4/2/2024 1323    Comment: OT POC; deep breathing for relaxation; transfer training                         Point: Body mechanics (In Progress)       Description:   Instruct learner(s) on proper positioning and spine alignment during self-care, functional mobility activities and/or exercises.                  Learning Progress Summary             Patient Acceptance, E, NL by GISSEL at 4/2/2024 1323    Comment: OT POC; deep breathing for relaxation; transfer training   Family Acceptance, E, NL by GISSEL at 4/2/2024 1323    Comment: OT POC; deep breathing for relaxation; transfer training                                         User Key       Initials Effective Dates Name Provider Type Discipline    GISSEL 06/16/21 -  Mandy Sanders, OT Occupational Therapist OT                  OT Recommendation and Plan  Planned Therapy Interventions (OT): activity tolerance training, BADL retraining, functional balance retraining, occupation/activity based interventions, patient/caregiver education/training, ROM/therapeutic exercise, strengthening exercise, transfer/mobility retraining  Therapy Frequency (OT): daily  Plan of Care Review  Plan of Care Reviewed With: patient, daughter  Outcome Evaluation: OT eval completed. Pt presents with generalized weakness, dyspnea, and impaired balance impacting ADL's. Pt CGA for supine to sit EOB, ModAx2 for STS using FWW, MaxAx2 for SPT to chair. Pt required extra time, focused deep breathing to recover from increased heart rate brought on by transfer. IP OT services warranted. Recommend SNF at discharge to support goal of return to living independently.     Time Calculation:   Evaluation Complexity (OT)  Review Occupational Profile/Medical/Therapy History Complexity: expanded/moderate complexity  Assessment, Occupational Performance/Identification of Deficit Complexity: 3-5 performance deficits  Clinical Decision Making Complexity (OT): detailed assessment/moderate  complexity  Overall Complexity of Evaluation (OT): moderate complexity     Time Calculation- OT       Row Name 04/02/24 1100             Time Calculation- OT    OT Start Time 1100  -GISSEL      OT Received On 04/02/24  -GISSEL      OT Goal Re-Cert Due Date 04/12/24  -GISSEL         Untimed Charges    OT Eval/Re-eval Minutes 52  -GISSEL         Total Minutes    Untimed Charges Total Minutes 52  -GISSEL       Total Minutes 52  -GISSEL                User Key  (r) = Recorded By, (t) = Taken By, (c) = Cosigned By      Initials Name Provider Type    Mandy Morrissey OT Occupational Therapist                  Therapy Charges for Today       Code Description Service Date Service Provider Modifiers Qty    [id number] HC OT EVAL MOD COMPLEXITY 4 4/2/2024 Mandy Sanders OT GO 1                 Mandy Sanders OT  4/2/2024

## 2024-04-02 NOTE — ED PROVIDER NOTES
Subjective   History of Present Illness  88-year-old female presents for evaluation of generalized weakness and shortness of breath.  Of note, the patient has a long past medical history and multiple comorbidities.  She lives at home with her daughter.  She is wheelchair-bound at baseline and is nonambulatory.  Over the past 10 days or so the patient has been experiencing increased generalized weakness when compared to baseline and seems to be getting short of breath even with talking.  She notes that she is getting winded with minimal exertion.  She tells me that she has had poor oral intake given her ongoing symptoms over the past 10 days.  She denies any productive cough.  No fevers.  No known exposures to anyone with COVID-19.  She has a history of atrial fibrillation and is followed by Dr. Bronson of cardiology.  She notes a sensation of rapid heart rate intermittently over the past few days as well.  She endorses compliance with all of her medications, most notably her DOAC.      Review of Systems   Constitutional:  Positive for appetite change and fatigue.   Respiratory:  Positive for shortness of breath.    Neurological:  Positive for weakness.   All other systems reviewed and are negative.      Past Medical History:   Diagnosis Date   • A-fib    • Acute blood loss anemia, mild, asymptomatic 01/24/2018   • Bursitis     trochanteric area   • Closed fracture of right olecranon process 10/26/2021   • Colonoscopy refused 2015   • Constipation    • Deaf, left    • Deafness     unspecified   • Diabetes mellitus     PATIENT REPORTS SHE HAS TAKEN METFORMIN IN THE PAST BUT WAS TAKEN OFF OF THIS MEDICATION AROUND OCTOBER 2018.     • Diabetes type 2, controlled 08/18/2016   • Edema leg 01/20/2021   • Elevated cholesterol    • Essential hypertension    • GERD (gastroesophageal reflux disease)    • Hearing loss, right     PATIENT DOES USE A HEARING AID TO RIGHT SIDE INTERMITTENTLY (REPORTS DEAFNESS IN LEFT EAR)   • Hip  "fracture 12/30/2019   • Hip pain    • History of UTI     PATIENT REPORTS RESOLVED AFTER HORMONE THERAPY WAS ORDERED   • Hyperlipidemia     other   • Impaired functional mobility, balance, gait, and endurance    • Influenza vaccine administered 2014   • Macular degeneration    • Mammogram declined 2015   • NSTEMI, initial episode of care 04/25/2023    Added automatically from request for surgery [id number]     • Olecranon fracture, left, closed, initial encounter 10/26/2021   • Osteoarthritis of left hip    • Postoperative urinary retention 10/05/2020   • Primary osteoarthritis of left hip 02/15/2017   • Primary osteoarthritis of right knee    • 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  REPORTS UNSURE THE ACTUAL VERBIAGE OF WHAT SHE WAS TOLD OTHER THAN THIS.    • Wears partial dentures     REPORTS UPPER AND LOWER PARTIALS. INSTRUCTED NO ADHESIVES THE DOS.       No Known Allergies    Past Surgical History:   Procedure Laterality Date   • BUNIONECTOMY Right 01/21/2019    Procedure: Right foot bunionectomy with exostectomy, proximal phalanx osteotomy, right foot second and third digit hammertoe correction with interphalangeal joint fusion, right second metatarsophalangeal joint capsular release/capsulotomy;  Surgeon: Klaus Cason DPM;  Location:  [facility];  Service: Podiatry   • CARDIAC CATHETERIZATION N/A 04/25/2023    Procedure: Left Heart Cath;  Surgeon: Pavan Bronson MD;  Location:  [facility];  Service: Cardiovascular;  Laterality: N/A;   • CATARACT EXTRACTION Bilateral    • CHOLECYSTECTOMY  2002   • COLONOSCOPY     • HARDWARE REMOVAL Right 10/02/2020    Procedure: HIP HARDWARE REMOVAL RIGHT;  Surgeon: Gideon Son MD;  Location:  [facility];  Service: Orthopedics;  Laterality: Right;   • HERNIA REPAIR  2005    " umbilical   • HIP INTERTROCHANTERIC NAILING Right 12/31/2019    Procedure: HIP RIGHT OPEN REDUCTION INTERNAL FIXATION, INTERMEDULLARY  NAIL;  Surgeon: Carlos Barba Jr., MD;  Location:  [facility];  Service: Orthopedics   • HYSTERECTOMY  1974   • TONSILLECTOMY     • TOTAL HIP ARTHROPLASTY Left 01/23/2018    Procedure: LEFT TOTAL HIP ARTHROPLASTY;  Surgeon: Gideon Son MD;  Location:  [facility];  Service:    • TOTAL HIP ARTHROPLASTY Right 10/02/2020    Procedure: TOTAL HIP ARTHROPLASTY RIGHT;  Surgeon: Gideon Son MD;  Location:  [facility];  Service: Orthopedics;  Laterality: Right;       Family History   Problem Relation Age of Onset   • Hypertension Other    • Diabetes Other         type 2   • Stroke Mother    • Diabetes Mother    • Hypertension Mother    • Cancer Father        Social History     Socioeconomic History   • Marital status:    Tobacco Use   • Smoking status: Never   • Smokeless tobacco: Never   Vaping Use   • Vaping status: Never Used   Substance and Sexual Activity   • Alcohol use: No   • Drug use: No   • Sexual activity: Not Currently     Partners: Male           Objective   Physical Exam  Vitals and nursing note reviewed.   Constitutional:       General: She is not in acute distress.     Appearance: She is well-developed. She is not diaphoretic.      Comments: Chronically ill-appearing elderly female   HENT:      Head: Normocephalic and atraumatic.   Eyes:      Pupils: Pupils are equal, round, and reactive to light.   Cardiovascular:      Rate and Rhythm: Tachycardia present. Rhythm irregular.      Heart sounds: Normal heart sounds. No murmur heard.     No friction rub. No gallop.   Pulmonary:      Breath sounds: No wheezing or rales.      Comments: Breathing appears mildly labored, tachypneic, no audible wheezes noted, no accessory muscle use or retractions noted  Abdominal:      General: Bowel sounds are normal. There is no distension.      Palpations: Abdomen is soft. There is no  mass.      Tenderness: There is no abdominal tenderness. There is no guarding or rebound.   Musculoskeletal:         General: Normal range of motion.      Cervical back: Neck supple.      Right lower leg: Edema present.      Left lower leg: Edema present.      Comments: Symmetrical, 1+ lower extremity edema noted to both lower legs   Skin:     General: Skin is warm and dry.      Findings: No erythema or rash.   Neurological:      Mental Status: She is alert and oriented to person, place, and time.   Psychiatric:         Mood and Affect: Mood normal.         Thought Content: Thought content normal.         Judgment: Judgment normal.         Critical Care    Performed by: Carlos Gonzalez MD  Authorized by: Carlos Gonzalez MD    Critical care provider statement:     Critical care time (minutes):  40    Critical care was necessary to treat or prevent imminent or life-threatening deterioration of the following conditions:  Cardiac failure    Critical care was time spent personally by me on the following activities:  Development of treatment plan with patient or surrogate, discussions with consultants, evaluation of patient's response to treatment, examination of patient, obtaining history from patient or surrogate, ordering and performing treatments and interventions, ordering and review of laboratory studies, ordering and review of radiographic studies, pulse oximetry, re-evaluation of patient's condition and review of old charts  Electrical Cardioversion    Date/Time: 4/2/2024 1:28 AM    Performed by: Carlos Gonzalez MD  Authorized by: Carlos Gonzalez MD    Consent:     Consent obtained:  Verbal and written    Consent given by:  Patient    Risks, benefits, and alternatives were discussed: yes      Risks discussed:  Cutaneous burn, death, induced arrhythmia and pain    Alternatives discussed:  Rate-control medication  Universal protocol:     Procedure explained and questions answered to patient or  proxy's satisfaction: yes      Relevant documents present and verified: yes      Test results available: yes      Imaging studies available: yes      Required blood products, implants, devices, and special equipment available: yes      Site/side marked: yes      Immediately prior to procedure, a time out was called: yes      Patient identity confirmed:  Verbally with patient and arm band  Pre-procedure details:     Cardioversion basis:  Emergent    Rhythm:  Atrial fibrillation    Electrode placement:  Anterior-posterior  Attempt one:     Cardioversion mode:  Synchronous    Waveform:  Biphasic    Shock (Joules):  200    Shock outcome:  No change in rhythm  Attempt two:     Cardioversion mode:  Synchronous    Waveform:  Biphasic    Shock (Joules):  200    Shock outcome:  No change in rhythm  Attempt three:     Cardioversion mode:  Synchronous    Waveform:  Biphasic    Shock (Joules):  200    Shock outcome:  No change in rhythm  Post-procedure details:     Patient status:  Awake    Procedure completion:  Tolerated well, no immediate complications  Procedural Sedation    Date/Time: 4/2/2024 1:28 AM    Performed by: Carlos Gonzalez MD  Authorized by: Carlos Gonzalez MD    Consent:     Consent obtained:  Verbal and written    Consent given by:  Patient    Risks, benefits, and alternatives were discussed: yes      Risks discussed:  Dysrhythmia, allergic reaction, inadequate sedation, nausea, vomiting, respiratory compromise necessitating ventilatory assistance and intubation and prolonged hypoxia resulting in organ damage  Universal protocol:     Procedure explained and questions answered to patient or proxy's satisfaction: yes      Relevant documents present and verified: yes      Test results available: yes      Imaging studies available: yes      Required blood products, implants, devices, and special equipment available: yes      Site/side marked: yes      Immediately prior to procedure, a time out was  called: yes      Patient identity confirmed:  Verbally with patient and arm band  Indications:     Procedure performed:  Cardioversion    Procedure necessitating sedation performed by:  Physician performing sedation    Intended level of sedation:  Moderate  Pre-sedation assessment:     ASA classification: class 2 - patient with mild systemic disease      Mallampati score:  II - soft palate, uvula, fauces visible    Neck mobility: normal      Pre-sedation assessments completed and reviewed: airway patency, anesthesia/sedation history, cardiovascular function, hydration status, mental status, nausea/vomiting, pain level, respiratory function and temperature      Pre-sedation assessment completed:  4/1/2024 6:26 PM  Immediate pre-procedure details:     Reassessment: Patient reassessed immediately prior to procedure      Reviewed: vital signs, relevant labs/tests and NPO status      Verified: bag valve mask available, emergency equipment available, intubation equipment available, IV patency confirmed and oxygen available    Procedure details (see MAR for exact dosages):     Sedation start time:  4/1/2024 6:32 PM    Preoxygenation:  Nonrebreather mask    Sedation:  Propofol    Intra-procedure monitoring:  Blood pressure monitoring, cardiac monitor, continuous pulse oximetry, continuous capnometry, frequent LOC assessments and frequent vital sign checks    Intra-procedure events: none      Sedation end time:  4/1/2024 6:50 PM    Total sedation time (minutes):  18  Post-procedure details:     Post-sedation assessment completed:  4/1/2024 7:05 PM    Attendance: Constant attendance by certified staff until patient recovered      Recovery: Patient returned to pre-procedure baseline      Estimated blood loss (see I/O flowsheets): no      Complications:  N/a    Post-sedation assessments completed and reviewed: airway patency, cardiovascular function, hydration status, mental status, nausea/vomiting, pain level and respiratory  function      Specimens recovered:  None    Patient is stable for discharge or admission: yes      Procedure completion:  Tolerated well, no immediate complications             ED Course  ED Course as of 04/02/24 0132   Mon Apr 01, 2024   1631 88-year-old female presents for evaluation of generalized weakness and shortness of breath.  Of note, the patient has a long past medical history and multiple comorbidities.  She lives at home with her daughter.  Over the past 10 days or so the patient has been experiencing increased generalized weakness when compared to baseline and seems to be getting short of breath even with talking.  She is wheelchair-bound and is nonambulatory at baseline.  Her symptoms seem to be progressively worsening so her daughter finally convinced her to come to the ED for evaluation today.  The patient endorses a sensation of rapid heart rate as well.  On arrival to the ED, the patient is nontoxic-appearing.  She is tachypneic and does get winded with speaking. [DD]   1645 Her initial EKG interpreted independently by me revealed atrial fibrillation with rapid ventricular response and a heart rate of 136.  After reviewing her EKG, I had a long discussion with her daughter.  Her daughter is adamant that the patient is compliant with all of her medications, most notably her DOAC.  She is followed by Dr. Bronson of cardiology.  Differential diagnosis is quite broad.  We will obtain labs and imaging, and we will reassess following initial interventions. [DD]   1706 I personally and independently viewed the patient's x-ray images myself, and I am in agreement with the radiologist's reading for final interpretation, particularly regarding mild CHF findings. [DD]   1941 Labs remarkable for sodium of 123. [DD]   1941 I was notified by the patient's nurse that the patient's blood pressure had dropped significantly.  I walked into the room and her blood pressure was 85/60.  Her heart rate was nearly 150.  I  did a thorough review the patient's records.  She has an ejection fraction of 60% on her last echocardiogram 1 year ago.  She is an excellent historian and has been compliant with her DOAC.  Given the unstable nature of her vital signs and overall clinical picture, I felt that electrical cardioversion was both safe and warranted. [DD]   1942 After obtaining informed consent, using propofol for procedural sedation, the patient was electrically cardioverted at 200 J x 3 without success.  She remained in atrial fibrillation with heart rates ranging from . [DD]   1942 Her blood pressure improved with gentle IV fluids. [DD]   1943 After reviewing the patient's labs and imaging, I discussed the patient's case with our on-call cardiologist, Dr. Peña, and the patient will be started on an amiodarone drip per his recommendations.  We will hold off on a bolus of amiodarone per his recommendations.  I am in agreement. [DD]   1943 The patient is currently normotensive with a blood pressure of 126/89. [DD]   Tue Apr 02, 2024   0130 Once I felt that the patient was appropriate for the floor, I discussed her case with our hospitalist, Dr. Rivas, and the patient will be admitted under her care for further evaluation and treatment.  The patient is hemodynamically stable at this time and family is aware/agreeable with the plan. [DD]      ED Course User Index  [DD] Carlos Gonzalez MD                                        Recent Results (from the past 24 hour(s))   ECG 12 Lead ED Triage Standing Order; SOA    Collection Time: 04/01/24  4:22 PM   Result Value Ref Range    QT Interval 338 ms    QTC Interval 508 ms   COVID-19 and FLU A/B PCR, 1 HR TAT - Swab, Nasopharynx    Collection Time: 04/01/24  4:26 PM    Specimen: Nasopharynx; Swab   Result Value Ref Range    COVID19 Not Detected Not Detected - Ref. Range    Influenza A PCR Not Detected Not Detected    Influenza B PCR Not Detected Not Detected   Comprehensive  Metabolic Panel    Collection Time: 04/01/24  4:29 PM    Specimen: Blood   Result Value Ref Range    Glucose 189 (H) 65 - 99 mg/dL    BUN 14 8 - 23 mg/dL    Creatinine 1.06 (H) 0.57 - 1.00 mg/dL    Sodium 123 (L) 136 - 145 mmol/L    Potassium 3.7 3.5 - 5.2 mmol/L    Chloride 85 (L) 98 - 107 mmol/L    CO2 24.0 22.0 - 29.0 mmol/L    Calcium 8.7 8.6 - 10.5 mg/dL    Total Protein 7.4 6.0 - 8.5 g/dL    Albumin 3.5 3.5 - 5.2 g/dL    ALT (SGPT) 20 1 - 33 U/L    AST (SGOT) 24 1 - 32 U/L    Alkaline Phosphatase 69 39 - 117 U/L    Total Bilirubin 0.8 0.0 - 1.2 mg/dL    Globulin 3.9 gm/dL    A/G Ratio 0.9 g/dL    BUN/Creatinine Ratio 13.2 7.0 - 25.0    Anion Gap 14.0 5.0 - 15.0 mmol/L    eGFR 50.6 (L) >60.0 mL/min/1.73   BNP    Collection Time: 04/01/24  4:29 PM    Specimen: Blood   Result Value Ref Range    proBNP 8,091.0 (H) 0.0 - 1,800.0 pg/mL   Single High Sensitivity Troponin T    Collection Time: 04/01/24  4:29 PM    Specimen: Blood   Result Value Ref Range    HS Troponin T 46 (H) <14 ng/L   Green Top (Gel)    Collection Time: 04/01/24  4:29 PM   Result Value Ref Range    Extra Tube Hold for add-ons.    Lavender Top    Collection Time: 04/01/24  4:29 PM   Result Value Ref Range    Extra Tube hold for add-on    Gold Top - SST    Collection Time: 04/01/24  4:29 PM   Result Value Ref Range    Extra Tube Hold for add-ons.    Gray Top    Collection Time: 04/01/24  4:29 PM   Result Value Ref Range    Extra Tube Hold for add-ons.    Light Blue Top    Collection Time: 04/01/24  4:29 PM   Result Value Ref Range    Extra Tube Hold for add-ons.    CBC Auto Differential    Collection Time: 04/01/24  4:29 PM    Specimen: Blood   Result Value Ref Range    WBC 12.98 (H) 3.40 - 10.80 10*3/mm3    RBC 3.87 3.77 - 5.28 10*6/mm3    Hemoglobin 10.8 (L) 12.0 - 15.9 g/dL    Hematocrit 34.3 34.0 - 46.6 %    MCV 88.6 79.0 - 97.0 fL    MCH 27.9 26.6 - 33.0 pg    MCHC 31.5 31.5 - 35.7 g/dL    RDW 14.3 12.3 - 15.4 %    RDW-SD 45.9 37.0 - 54.0 fl     MPV 10.1 6.0 - 12.0 fL    Platelets 254 140 - 450 10*3/mm3    Neutrophil % 69.7 42.7 - 76.0 %    Lymphocyte % 20.6 19.6 - 45.3 %    Monocyte % 8.5 5.0 - 12.0 %    Eosinophil % 0.2 (L) 0.3 - 6.2 %    Basophil % 0.2 0.0 - 1.5 %    Immature Grans % 0.8 (H) 0.0 - 0.5 %    Neutrophils, Absolute 9.04 (H) 1.70 - 7.00 10*3/mm3    Lymphocytes, Absolute 2.68 0.70 - 3.10 10*3/mm3    Monocytes, Absolute 1.10 (H) 0.10 - 0.90 10*3/mm3    Eosinophils, Absolute 0.03 0.00 - 0.40 10*3/mm3    Basophils, Absolute 0.03 0.00 - 0.20 10*3/mm3    Immature Grans, Absolute 0.10 (H) 0.00 - 0.05 10*3/mm3    nRBC 0.0 0.0 - 0.2 /100 WBC   TSH    Collection Time: 04/01/24  4:29 PM    Specimen: Blood   Result Value Ref Range    TSH 5.040 (H) 0.270 - 4.200 uIU/mL   T4, Free    Collection Time: 04/01/24  4:29 PM    Specimen: Blood   Result Value Ref Range    Free T4 1.39 0.92 - 1.68 ng/dL   Magnesium    Collection Time: 04/01/24  4:29 PM    Specimen: Blood   Result Value Ref Range    Magnesium 1.9 1.6 - 2.4 mg/dL   ECG 12 Lead Other; After Cardioversion    Collection Time: 04/01/24  7:35 PM   Result Value Ref Range    QT Interval 410 ms    QTC Interval 490 ms   Lactic Acid, Plasma    Collection Time: 04/02/24 12:29 AM    Specimen: Blood   Result Value Ref Range    Lactate 1.5 0.5 - 2.0 mmol/L   Procalcitonin    Collection Time: 04/02/24 12:29 AM    Specimen: Blood   Result Value Ref Range    Procalcitonin 0.10 0.00 - 0.25 ng/mL   High Sensitivity Troponin T    Collection Time: 04/02/24 12:29 AM    Specimen: Blood   Result Value Ref Range    HS Troponin T 43 (H) <14 ng/L     Note: In addition to lab results from this visit, the labs listed above may include labs taken at another facility or during a different encounter within the last 24 hours. Please correlate lab times with ED admission and discharge times for further clarification of the services performed during this visit.    XR Chest 1 View   Final Result   Impression:   Findings of suspected  CHF exacerbation including cardiomegaly, trace to small bilateral pleural effusions and pulmonary vascular congestion. Associated bibasilar opacities, presumed atelectasis however correlate for clinical signs of infection.         Electronically Signed: Renaldo Silveira MD     4/1/2024 4:45 PM EDT     Workstation ID: [id number]      CT Chest Without Contrast Diagnostic    (Results Pending)     Vitals:    04/01/24 2300 04/01/24 2320 04/01/24 2321 04/02/24 0008   BP: 126/86 115/77  133/83   BP Location:       Patient Position:       Pulse: 103  100 99   Resp:    18   Temp:    97.7 °F (36.5 °C)   TempSrc:    Oral   SpO2: 91%  95% 96%   Weight:       Height:         Medications   sodium chloride 0.9 % flush 10 mL (has no administration in time range)   amiodarone 360 mg in 200 mL D5W infusion (0.5 mg/min Intravenous Not Given 4/1/24 2219)   Propofol (DIPRIVAN) injection 200 mg ( Intravenous Rate/Dose Change 4/1/24 1832)   sodium chloride 0.9 % bolus 250 mL ( Intravenous Rate/Dose Change 4/1/24 1805)   apixaban (ELIQUIS) tablet 5 mg (has no administration in time range)   aspirin chewable tablet 81 mg (has no administration in time range)   atorvastatin (LIPITOR) tablet 10 mg (has no administration in time range)   furosemide (LASIX) tablet 40 mg (has no administration in time range)   sodium chloride 0.9 % flush 10 mL (has no administration in time range)   sodium chloride 0.9 % flush 10 mL (has no administration in time range)   sodium chloride 0.9 % infusion 40 mL (has no administration in time range)   dextrose (GLUTOSE) oral gel 15 g (has no administration in time range)   dextrose (D50W) (25 g/50 mL) IV injection 25 g (has no administration in time range)   glucagon (GLUCAGEN) injection 1 mg (has no administration in time range)   Insulin Lispro (humaLOG) injection 2-7 Units (has no administration in time range)   sennosides-docusate (PERICOLACE) 8.6-50 MG per tablet 2 tablet (has no administration in time range)      And   polyethylene glycol (MIRALAX) packet 17 g (has no administration in time range)     And   bisacodyl (DULCOLAX) EC tablet 5 mg (has no administration in time range)     And   bisacodyl (DULCOLAX) suppository 10 mg (has no administration in time range)   melatonin tablet 5 mg (has no administration in time range)     ECG/EMG Results (last 24 hours)       Procedure Component Value Units Date/Time    ECG 12 Lead ED Triage Standing Order; SOA [[id number]] Collected: 04/01/24 1622     Updated: 04/01/24 1623     QT Interval 338 ms      QTC Interval 508 ms     Narrative:      Test Reason : ED Triage Standing Order~  Blood Pressure :   */*   mmHG  Vent. Rate : 136 BPM     Atrial Rate :   * BPM     P-R Int :   * ms          QRS Dur : 124 ms      QT Int : 338 ms       P-R-T Axes :   * -36 140 degrees     QTc Int : 508 ms    ** Critical Test Result: Long QTc  Atrial fibrillation with rapid ventricular response with premature ventricular or aberrantly conducted complexes  Left axis deviation  Anterolateral infarct , age undetermined  Abnormal ECG  When compared with ECG of 25-SEP-2020 13:31,  Significant changes have occurred    Referred By:            Confirmed By:     ECG 12 Lead Other; After Cardioversion [[id number]] Collected: 04/1935     Updated: 04/1935     QT Interval 410 ms      QTC Interval 490 ms     Narrative:      Test Reason : Other~  Blood Pressure :   */*   mmHG  Vent. Rate :  86 BPM     Atrial Rate : 101 BPM     P-R Int :   * ms          QRS Dur : 132 ms      QT Int : 410 ms       P-R-T Axes :   * -44 128 degrees     QTc Int : 490 ms    ** Poor data quality, interpretation may be adversely affected  Atrial fibrillation with premature ventricular or aberrantly conducted complexes  Left axis deviation  Left bundle branch block  Abnormal ECG  When compared with ECG of 01-APR-2024 16:22, (Unconfirmed)  Vent. rate has decreased BY  50 BPM  Left bundle branch block is now present  Criteria for Anterior  infarct are no longer present  Criteria for Anterolateral infarct are no longer present    Referred By: EDMD           Confirmed By:           ECG 12 Lead Other; After Cardioversion   Preliminary Result   Test Reason : Other~   Blood Pressure :   */*   mmHG   Vent. Rate :  86 BPM     Atrial Rate : 101 BPM      P-R Int :   * ms          QRS Dur : 132 ms       QT Int : 410 ms       P-R-T Axes :   * -44 128 degrees      QTc Int : 490 ms      ** Poor data quality, interpretation may be adversely affected   Atrial fibrillation with premature ventricular or aberrantly conducted    complexes   Left axis deviation   Left bundle branch block   Abnormal ECG   When compared with ECG of 01-APR-2024 16:22, (Unconfirmed)   Vent. rate has decreased BY  50 BPM   Left bundle branch block is now present   Criteria for Anterior infarct are no longer present   Criteria for Anterolateral infarct are no longer present      Referred By: EDMD           Confirmed By:       ECG 12 Lead ED Triage Standing Order; SOA   Preliminary Result   Test Reason : ED Triage Standing Order~   Blood Pressure :   */*   mmHG   Vent. Rate : 136 BPM     Atrial Rate :   * BPM      P-R Int :   * ms          QRS Dur : 124 ms       QT Int : 338 ms       P-R-T Axes :   * -36 140 degrees      QTc Int : 508 ms      ** Critical Test Result: Long QTc   Atrial fibrillation with rapid ventricular response with premature    ventricular or aberrantly conducted complexes   Left axis deviation   Anterolateral infarct , age undetermined   Abnormal ECG   When compared with ECG of 25-SEP-2020 13:31,   Significant changes have occurred      Referred By:            Confirmed By:       ECG 12 Lead Tachycardia    (Results Pending)              Medical Decision Making  Amount and/or Complexity of Data Reviewed  Labs: ordered.  Radiology: ordered.  ECG/medicine tests: ordered.    Risk  Prescription drug management.  Decision regarding hospitalization.        Final diagnoses:   Atrial  fibrillation with rapid ventricular response   Hypotension, unspecified hypotension type   Hyponatremia       ED Disposition  ED Disposition       ED Disposition   Decision to Admit    Condition   --    Comment   Level of Care: Telemetry [5]   Diagnosis: A-fib [[id number]]   Admitting Physician: AMIRAH OCHOA [[id number]]                 No follow-up provider specified.       Medication List      No changes were made to your prescriptions during this visit.            Carlos Gonzalez MD  04/02/24 0132

## 2024-04-02 NOTE — PROGRESS NOTES
[facility] Medicine Services  PROGRESS NOTE    Patient Name: Bri Iniguez  : 1935  MRN: [id number]    Date of Admission: 2024  Primary Care Physician: Juan Berg DO    Subjective   Subjective     CC:  Afib RVR    HPI:  No chest pain or palpitations.  Denies cough.  No fever.      Objective   Objective     Vital Signs:   Temp:  [96 °F (35.6 °C)-97.7 °F (36.5 °C)] 97.1 °F (36.2 °C)  Heart Rate:  [] 86  Resp:  [16-18] 16  BP: ()/() 103/67  Flow (L/min):  [2] 2     Physical Exam:  NAD, in bed  MM moist  Irregularly irregular  Diminished breath sounds bilaterally, faint crackles at bases  Abd soft, NT  Trace edema  Awake, speech clear  Normal affect    Results Reviewed:  LAB RESULTS:      Lab 24  0741 24  0326 24  0029 24  1629   WBC  --  11.00*  --  12.98*   HEMOGLOBIN 9.8* 9.8*  --  10.8*   HEMATOCRIT 29.6* 29.8*  --  34.3   PLATELETS  --  228  --  254   NEUTROS ABS  --  6.78  --  9.04*   IMMATURE GRANS (ABS)  --  0.19*  --  0.10*   LYMPHS ABS  --  2.76  --  2.68   MONOS ABS  --  1.11*  --  1.10*   EOS ABS  --  0.12  --  0.03   MCV  --  86.6  --  88.6   PROCALCITONIN  --   --  0.10  --    LACTATE  --   --  1.5  --    PROTIME 25.2*  --   --   --          Lab 24  1129 24  0741 24  0326 24  1629   SODIUM 127* 132* 131* 123*   POTASSIUM  --   --  3.2* 3.7   CHLORIDE  --   --  93* 85*   CO2  --   --  25.0 24.0   ANION GAP  --   --  13.0 14.0   BUN  --   --  17 14   CREATININE  --   --  0.85 1.06*   EGFR  --   --  66.0 50.6*   GLUCOSE  --   --  160* 189*   CALCIUM  --   --  7.9* 8.7   MAGNESIUM  --   --  2.9* 1.9   HEMOGLOBIN A1C  --  7.50*  --   --    TSH  --   --   --  5.040*         Lab 24  1629   TOTAL PROTEIN 7.4   ALBUMIN 3.5   GLOBULIN 3.9   ALT (SGPT) 20   AST (SGOT) 24   BILIRUBIN 0.8   ALK PHOS 69         Lab 24  0741 24  0326 24  0029 24  1629   PROBNP  --   --   --  8,091.0*    HSTROP T  --  45* 43* 46*   PROTIME 25.2*  --   --   --    INR 2.26*  --   --   --                  Brief Urine Lab Results  (Last result in the past 365 days)        Color   Clarity   Blood   Leuk Est   Nitrite   Protein   CREAT   Urine HCG        04/02/24 1602 Yellow   Clear   Negative   Negative   Negative   Negative                   Microbiology Results Abnormal       Procedure Component Value - Date/Time    COVID-19 and FLU A/B PCR, 1 HR TAT - Swab, Nasopharynx [[id number]]  (Normal) Collected: 04/01/24 1626    Lab Status: Final result Specimen: Swab from Nasopharynx Updated: 04/01/24 1700     COVID19 Not Detected     Influenza A PCR Not Detected     Influenza B PCR Not Detected    Narrative:      Fact sheet for providers: https://www.fda.gov/media/581212/download    Fact sheet for patients: https://www.fda.gov/media/158382/download    Test performed by PCR.            CT Chest Without Contrast Diagnostic    Result Date: 4/2/2024  CT CHEST WO CONTRAST DIAGNOSTIC Date of Exam: 4/2/2024 3:35 AM EDT Indication: CXR c/w CHF, r/o penumonia, dyspnea/leukocytosis. Comparison: 4/1/2024. Technique: Axial CT images were obtained of the chest without contrast administration.  Reconstructed coronal and sagittal images were also obtained. Automated exposure control and iterative construction methods were used. Findings: Hilum and Mediastinum: No pathologically enlarged lymph nodes. The heart appears enlarged. Trace amount of pericardial fluid present. Atherosclerotic calcifications are present including within the coronary arteries. Moderate size hiatal hernia..   Unremarkable thoracic aorta and pulmonary arteries. Lung Parenchyma and Pleura: Patchy airspace disease is seen within the bilateral lower lobes. Air bronchograms are present. Small bilateral pleural effusions are present. Mild intralobular septal thickening is present.. Upper Abdomen: No acute process. Soft tissues: Unremarkable. Osseous structures: No  aggressive focal lytic or sclerotic osseous lesions.     Impression: Impression: 1. Mild pulmonary edema pattern with small bilateral pleural effusions with bibasilar atelectasis and/or pneumonia. 2. Cardiomegaly with trace pericardial effusion. 3. Ancillary findings as described above. Electronically Signed: Tootie Valdez MD  4/2/2024 3:46 AM EDT  Workstation ID: [id number]    XR Chest 1 View    Result Date: 4/1/2024  XR CHEST 1 VW Date of Exam: 4/1/2024 4:27 PM EDT Indication: SOA triage protocol Comparison: Chest radiograph 1/17/2019 Findings: Mild cardiomegaly. Prominent and slightly indistinct central vasculature worsening from prior exam. There is a small left and suspected trace right pleural effusion with associated bibasilar opacities. No overt interstitial edema. No pneumothorax. No convincing infectious consolidation. Degenerative related osseous changes.     Impression: Impression: Findings of suspected CHF exacerbation including cardiomegaly, trace to small bilateral pleural effusions and pulmonary vascular congestion. Associated bibasilar opacities, presumed atelectasis however correlate for clinical signs of infection. Electronically Signed: Renaldo Silveira MD  4/1/2024 4:45 PM EDT  Workstation ID: [id number]     Results for orders placed during the hospital encounter of 04/25/23    Adult Transthoracic Echo Complete W/ Cont if Necessary Per Protocol    Interpretation Summary    Estimated left ventricular EF = 60%    Mild mitral valve regurgitation is present with a centrally-directed jet noted.      Current medications:  Scheduled Meds:atorvastatin, 10 mg, Oral, Nightly  citalopram, 40 mg, Oral, Daily  furosemide, 40 mg, Intravenous, Once  insulin lispro, 2-7 Units, Subcutaneous, 4x Daily AC & at Bedtime  metoprolol tartrate, 25 mg, Oral, Q12H  pantoprazole, 40 mg, Intravenous, Q12H  pharmacy consult - MTM, , Does not apply, Daily  Propofol, 200 mg, Intravenous, Once  sodium chloride, 250 mL, Intravenous,  "Once  sodium chloride, 10 mL, Intravenous, Q12H      Continuous Infusions:dextrose, 50 mL/hr, Last Rate: 50 mL/hr (04/02/24 0711)      PRN Meds:.  acetaminophen    senna-docusate sodium **AND** polyethylene glycol **AND** bisacodyl **AND** bisacodyl    dextrose    dextrose    glucagon (human recombinant)    melatonin    ondansetron ODT **OR** ondansetron    Potassium Replacement - Follow Nurse / BPA Driven Protocol    sodium chloride    sodium chloride    sodium chloride    Assessment & Plan   Assessment & Plan     Active Hospital Problems    Diagnosis  POA    **Acute on chronic diastolic congestive heart failure [I50.33]  Yes    Nonobstructive CAD [I25.10]  Yes    Possible GI bleed [K92.2]  Yes    Paroxysmal atrial fibrillation [I48.0]  Yes    T2DM (type 2 diabetes mellitus) [E11.9]  Yes    Hyponatremia [E87.1]  Yes    Leukocytosis [D72.829]  Yes    GERD without esophagitis [K21.9]  Yes    Anxiety associated with depression [F41.8]  Yes    Elevated troponin level not due myocardial infarction [R79.89]  Yes    Hyperlipidemia [E78.5]  Yes    Essential hypertension [I10]  Yes      Resolved Hospital Problems   No resolved problems to display.        Brief Hospital Course to date:  Bri Iniguez is a 88 y.o. female with h/o CHF,HTN, HLD, atrial fib (eliquis), DMII, GERD who presented with shortness of breath and palpitations as well as two weeks of leg edema    Atrial fibrillation with RVR  - cardioversion attempted in ED but unsuccessful  - home metooprolol resumed  - eliquis held for coffee ground emesis, likely restart in am if hemoglobin stable and no further signs of bleeding.    Coffee ground emesis  - GI evaluated, patient deferred EGD, prefers medical management  - PPI BID    Hyponatremia  - improved  - sodium 123 at admit currently 131    Chronic diastolic heart failure  - lasix IV x 1  - echo pending    Leukocytosis  - CT chest shows basilar \"atelectasis vs pneumonia\"  - procalcitonin low  - patient " afebrile  - will defer antibiotics at present, monitor clinically    DMII  - SSI  - A1c 7.5    Anxiety  - resume home medications    HTN    HLD            Expected Discharge Location and Transportation:   Expected Discharge   Expected Discharge Date: 4/4/2024; Expected Discharge Time:      DVT prophylaxis:  No DVT prophylaxis order currently exists.         AM-PAC 6 Clicks Score (PT): 12 (04/02/24 1452)    CODE STATUS:   Code Status and Medical Interventions:   Ordered at: 04/01/24 2044     Medical Intervention Limits:    NO intubation (DNI)     Level Of Support Discussed With:    Patient     Code Status (Patient has no pulse and is not breathing):    No CPR (Do Not Attempt to Resuscitate)     Medical Interventions (Patient has pulse or is breathing):    Limited Support     Comments:    DNR/DNI       Ishmael Rodriguez MD  04/02/24

## 2024-04-02 NOTE — H&P
"    [facility] Medicine Services  HISTORY AND PHYSICAL    Patient Name: Bri Iniguez  : 1935  MRN: [id number]  Primary Care Physician: Juan Berg DO  Date of admission: 2024    Subjective   Subjective     Chief Complaint:  Shortness of breath, palpitations     HPI:  Bri Iniguez is a 88 y.o. female w/ a hx of CHF (EF 60%), HTN, HLD, atrial fibrillation (Eliquis), T2DM, GERD who presented to the ED w/ c/o shortness of breath and palpitations.   Pt c/o shortness of breath, palpitations and fatigue x ~1.5 weeks. Today the shortness of breath became significantly worse prompting her ED visit. Pt reports decreased urine output and decreased oral intake 2/2 no appetite. Pt reports BLE edema that is \"nor worse than usual\". Pt also reports a cough w/ scant sputum production.  Pt evaluated in the ED. CXR concerning for CHF w/ trace to small bilateral pleural effusions. EKG c/w A.Fib RVR (rates 160's). Cardioversion attempted but unsuccessful. Pt admitted to the hospital medicine service for further evaluation.     Review of Systems   Constitutional:  Positive for appetite change and fatigue. Negative for chills, diaphoresis, fever and unexpected weight change.   HENT: Negative.  Negative for congestion, postnasal drip, rhinorrhea, sinus pressure, sinus pain, sneezing, sore throat and trouble swallowing.    Eyes: Negative.  Negative for visual disturbance.   Respiratory:  Positive for cough and shortness of breath. Negative for chest tightness and wheezing.    Cardiovascular:  Positive for palpitations. Negative for chest pain.   Gastrointestinal: Negative.  Negative for abdominal distention, abdominal pain, blood in stool, constipation, diarrhea, nausea and vomiting.   Endocrine: Negative.    Genitourinary:  Positive for decreased urine volume. Negative for difficulty urinating, dysuria, flank pain, frequency, hematuria, pelvic pain and urgency.   Musculoskeletal:  Negative for " arthralgias, back pain, myalgias, neck pain and neck stiffness.        Generalized weakness.    Skin: Negative.  Negative for wound.   Allergic/Immunologic: Negative.  Negative for immunocompromised state.   Neurological: Negative.  Negative for dizziness, tremors, seizures, syncope, facial asymmetry, speech difficulty, weakness, light-headedness, numbness and headaches.   Hematological: Negative.  Does not bruise/bleed easily.   Psychiatric/Behavioral: Negative.  Negative for confusion.    All other systems reviewed and are negative.     Personal History     Past Medical History:   Diagnosis Date    A-fib     Acute blood loss anemia, mild, asymptomatic 01/24/2018    Bursitis     trochanteric area    Closed fracture of right olecranon process 10/26/2021    Colonoscopy refused 2015    Constipation     Deaf, left     Deafness     unspecified    Diabetes mellitus     PATIENT REPORTS SHE HAS TAKEN METFORMIN IN THE PAST BUT WAS TAKEN OFF OF THIS MEDICATION AROUND OCTOBER 2018.      Diabetes type 2, controlled 08/18/2016    Edema leg 01/20/2021    Elevated cholesterol     Essential hypertension     GERD (gastroesophageal reflux disease)     Hearing loss, right     PATIENT DOES USE A HEARING AID TO RIGHT SIDE INTERMITTENTLY (REPORTS DEAFNESS IN LEFT EAR)    Hip fracture 12/30/2019    Hip pain     History of UTI     PATIENT REPORTS RESOLVED AFTER HORMONE THERAPY WAS ORDERED    Hyperlipidemia     other    Impaired functional mobility, balance, gait, and endurance     Influenza vaccine administered 2014    Macular degeneration     Mammogram declined 2015    NSTEMI, initial episode of care 04/25/2023    Added automatically from request for surgery [id number]      Olecranon fracture, left, closed, initial encounter 10/26/2021    Osteoarthritis of left hip     Postoperative urinary retention 10/05/2020    Primary osteoarthritis of left hip 02/15/2017    Primary osteoarthritis of right knee     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  REPORTS UNSURE THE ACTUAL VERBIAGE OF WHAT SHE WAS TOLD OTHER THAN THIS.     Wears partial dentures     REPORTS UPPER AND LOWER PARTIALS. INSTRUCTED NO ADHESIVES THE DOS.     Past Surgical History:   Procedure Laterality Date    BUNIONECTOMY Right 01/21/2019    Procedure: Right foot bunionectomy with exostectomy, proximal phalanx osteotomy, right foot second and third digit hammertoe correction with interphalangeal joint fusion, right second metatarsophalangeal joint capsular release/capsulotomy;  Surgeon: Klaus Cason DPM;  Location:  [facility];  Service: Podiatry    CARDIAC CATHETERIZATION N/A 04/25/2023    Procedure: Left Heart Cath;  Surgeon: Pavan Bronson MD;  Location:  [facility];  Service: Cardiovascular;  Laterality: N/A;    CATARACT EXTRACTION Bilateral     CHOLECYSTECTOMY  2002    COLONOSCOPY      HARDWARE REMOVAL Right 10/02/2020    Procedure: HIP HARDWARE REMOVAL RIGHT;  Surgeon: Gideon Son MD;  Location:  [facility];  Service: Orthopedics;  Laterality: Right;    HERNIA REPAIR  2005    umbilical    HIP INTERTROCHANTERIC NAILING Right 12/31/2019    Procedure: HIP RIGHT OPEN REDUCTION INTERNAL FIXATION, INTERMEDULLARY  NAIL;  Surgeon: Carlos Barba Jr., MD;  Location:  [facility];  Service: Orthopedics    HYSTERECTOMY  1974    TONSILLECTOMY      TOTAL HIP ARTHROPLASTY Left 01/23/2018    Procedure: LEFT TOTAL HIP ARTHROPLASTY;  Surgeon: Gideon Son MD;  Location:  [facility];  Service:     TOTAL HIP ARTHROPLASTY Right 10/02/2020    Procedure: TOTAL HIP ARTHROPLASTY RIGHT;  Surgeon: Gideon Son MD;  Location:  [facility];  Service: Orthopedics;  Laterality: Right;     Family History: family history includes Cancer in her father; Diabetes in her mother and another family member; Hypertension in her " mother and another family member; Stroke in her mother.     Social History:  reports that she has never smoked. She has never used smokeless tobacco. She reports that she does not drink alcohol and does not use drugs.  Social History     Social History Narrative    Not on file     Medications:  Ocuvite Adult 50+, Prasterone, apixaban, aspirin, atorvastatin, citalopram, furosemide, and metoprolol tartrate    No Known Allergies    Objective   Objective     Vital Signs:   Temp:  [97.5 °F (36.4 °C)] 97.5 °F (36.4 °C)  Heart Rate:  [] 101  Resp:  [20] 20  BP: ()/() 111/62    Physical Exam     Constitutional: Awake, alert; non-toxic appearing   Eyes: PERRLA, sclerae anicteric, no conjunctival injection  HENT: NCAT, mucous membranes moist  Neck: Supple, no thyromegaly, no lymphadenopathy, trachea midline  Respiratory: Diminished lower lobes bilaterally w/ faint rhonchi, no wheeze; increase in respiratory effort w/ conversation   Cardiovascular: Irregular rate and rhythm, no murmurs, rubs, or gallops, BLE edema (R>L)  Gastrointestinal: Positive bowel sounds, soft, nontender, nondistended  Musculoskeletal: Normal ROM bilaterally   Psychiatric: Appropriate affect, cooperative  Neurologic: Oriented x 3, strength symmetric in all extremities, Cranial Nerves grossly intact to confrontation, speech clear  Skin: No rashes, lesions or wounds     Result Review:  I have personally reviewed the results from the time of this admission to 4/1/2024 20:56 EDT and agree with these findings:  [x]  Laboratory list / accordion  []  Microbiology  [x]  Radiology  [x]  EKG/Telemetry   []  Cardiology/Vascular   []  Pathology  [x]  Old records    LAB RESULTS:      Lab 04/01/24  1629   WBC 12.98*   HEMOGLOBIN 10.8*   HEMATOCRIT 34.3   PLATELETS 254   NEUTROS ABS 9.04*   IMMATURE GRANS (ABS) 0.10*   LYMPHS ABS 2.68   MONOS ABS 1.10*   EOS ABS 0.03   MCV 88.6         Lab 04/01/24  1629   SODIUM 123*   POTASSIUM 3.7   CHLORIDE 85*    CO2 24.0   ANION GAP 14.0   BUN 14   CREATININE 1.06*   EGFR 50.6*   GLUCOSE 189*   CALCIUM 8.7   MAGNESIUM 1.9   TSH 5.040*         Lab 04/01/24  1629   TOTAL PROTEIN 7.4   ALBUMIN 3.5   GLOBULIN 3.9   ALT (SGPT) 20   AST (SGOT) 24   BILIRUBIN 0.8   ALK PHOS 69         Lab 04/01/24  1629   PROBNP 8,091.0*   HSTROP T 46*                 Brief Urine Lab Results       None          Microbiology Results (last 10 days)       Procedure Component Value - Date/Time    COVID-19 and FLU A/B PCR, 1 HR TAT - Swab, Nasopharynx [[id number]]  (Normal) Collected: 04/01/24 1626    Lab Status: Final result Specimen: Swab from Nasopharynx Updated: 04/01/24 1700     COVID19 Not Detected     Influenza A PCR Not Detected     Influenza B PCR Not Detected    Narrative:      Fact sheet for providers: https://www.fda.gov/media/113593/download    Fact sheet for patients: https://www.fda.gov/media/265708/download    Test performed by PCR.          XR Chest 1 View    Result Date: 4/1/2024  XR CHEST 1 VW Date of Exam: 4/1/2024 4:27 PM EDT Indication: SOA triage protocol Comparison: Chest radiograph 1/17/2019 Findings: Mild cardiomegaly. Prominent and slightly indistinct central vasculature worsening from prior exam. There is a small left and suspected trace right pleural effusion with associated bibasilar opacities. No overt interstitial edema. No pneumothorax. No convincing infectious consolidation. Degenerative related osseous changes.     Impression: Impression: Findings of suspected CHF exacerbation including cardiomegaly, trace to small bilateral pleural effusions and pulmonary vascular congestion. Associated bibasilar opacities, presumed atelectasis however correlate for clinical signs of infection. Electronically Signed: Renaldo Silveira MD  4/1/2024 4:45 PM EDT  Workstation ID: [id number]     Results for orders placed during the hospital encounter of 04/25/23    Adult Transthoracic Echo Complete W/ Cont if Necessary Per  "Protocol    Interpretation Summary    Estimated left ventricular EF = 60%    Mild mitral valve regurgitation is present with a centrally-directed jet noted.    Assessment & Plan   Assessment & Plan       A-fib    Hyperlipidemia    Essential hypertension    T2DM (type 2 diabetes mellitus)    Hyponatremia    Leukocytosis    GERD without esophagitis    Acute on chronic diastolic congestive heart failure    Elevated serum creatinine    Pleural effusion, bilateral    Anxiety associated with depression    Bri Iniguez is a 88 y.o. female w/ a hx of CHF (EF 60%), HTN, HLD, atrial fibrillation (Eliquis), T2DM, GERD who presented to the ED w/ c/o shortness of breath and palpitations.     **Atrial fibrillation w/ RVR   **Acute/chronic diastolic CHF   **Small, bilateral pleural effusions   **Hx of HTN, HLD   -last ECHO 4/2023 w/ EF 60%, mild mitral valve regurg   -initial rates in 160's; cardioversion attempted in ED but unsuccessful   -repeat EKG in am   -proBNP 8,091  -troponin 46; repeat pending   -CXR c/w small effusions, cardiomegaly, CHF  -CT Chest wo pending   -ED provider spoke w/ On-Call Cardiologist who recommended amiodarone infusion   -amiodarone infusion started; continue per protocol  -continue Eliquis, ASA   -continue statin   -continue Lasix (40mg daily) w/ hold parameters; would benefit from IV diuresis when BP more stable   -routine Lopressor held for now 2/2 borderline hypotension (SBP 80's-116)  -s/p 250ml NS bolus given in ED w/ improvement in BP; holding further IVF for now   -daily weights; strict I/Os   -Cardiology consult in am; previously seen by Dr. Bronson     **Hyponatremia   -Na 123 (previously 136 in 4/2023)   -urine sodium pending   -s/p 250ml NS bolus given in ED for hypotension; holding further IVF  -likely 2/2 volume overload   -repeat BMP in am     **Leukocytosis   -WBC 12.98  -lactic acid and procal pending   -CXR c/w CHF and \"Associated bibasilar opacities, presumed atelectasis however " "correlate for clinical signs of infection.\"  -pt currently on 2 liters (room air at baseline)   -afebrile   -CT Chest wo pending   -UA pending   -repeat CBC in am     **Elevated creatinine; mild   -creatinine 1.06 w/ eGFR 50.6  -previously 0.60 in 4/2023  -UA pending   -urine studies pending   -repeat BMP in am     **T2DM  -diet controlled   -hem A1c pending   -FSBG q ac/hs w/ LDSS     **Anxiety   -Celexa dose needs clarification; plan to continue     DVT prophylaxis:  Eliquis     CODE STATUS:    Medical Intervention Limits: NO intubation (DNI)  Level Of Support Discussed With: Patient  Code Status (Patient has no pulse and is not breathing): No CPR (Do Not Attempt to Resuscitate)  Medical Interventions (Patient has pulse or is breathing): Limited Support  Comments: DNR/DNI    Expected Discharge  Expected discharge date/ time has not been documented.    This note has been completed as part of a split-shared workflow.     Signature: Electronically signed by HAWA Hernandez, 04/01/24, 8:56 PM EDT.    Time spent: 55 minutes     Attending Rajeev     I supervised care of the patient on day of service with direct care provided by the advanced care provider (APC).    Irais León,   04/01/24              "

## 2024-04-03 LAB
ANION GAP SERPL CALCULATED.3IONS-SCNC: 11 MMOL/L (ref 5–15)
ASCENDING AORTA: 3 CM
BH CV ECHO MEAS - AO MAX PG: 4.9 MMHG
BH CV ECHO MEAS - AO MEAN PG: 2.9 MMHG
BH CV ECHO MEAS - AO ROOT DIAM: 2.6 CM
BH CV ECHO MEAS - AO V2 MAX: 110 CM/SEC
BH CV ECHO MEAS - AO V2 VTI: 20.1 CM
BH CV ECHO MEAS - AVA(I,D): 1.57 CM2
BH CV ECHO MEAS - EDV(CUBED): 97.6 ML
BH CV ECHO MEAS - EDV(MOD-SP2): 44.4 ML
BH CV ECHO MEAS - EDV(MOD-SP4): 50.7 ML
BH CV ECHO MEAS - EF(MOD-BP): 52 %
BH CV ECHO MEAS - EF(MOD-SP2): 57 %
BH CV ECHO MEAS - EF(MOD-SP4): 56.5 %
BH CV ECHO MEAS - ESV(CUBED): 29.9 ML
BH CV ECHO MEAS - ESV(MOD-SP2): 19.1 ML
BH CV ECHO MEAS - ESV(MOD-SP4): 22.1 ML
BH CV ECHO MEAS - FS: 32.6 %
BH CV ECHO MEAS - IVS/LVPW: 0.89 CM
BH CV ECHO MEAS - IVSD: 0.91 CM
BH CV ECHO MEAS - LAT PEAK E' VEL: 9 CM/SEC
BH CV ECHO MEAS - LV DIASTOLIC VOL/BSA (35-75): 25.5 CM2
BH CV ECHO MEAS - LV MASS(C)D: 150.3 GRAMS
BH CV ECHO MEAS - LV MAX PG: 1.46 MMHG
BH CV ECHO MEAS - LV MEAN PG: 0.92 MMHG
BH CV ECHO MEAS - LV SYSTOLIC VOL/BSA (12-30): 11.1 CM2
BH CV ECHO MEAS - LV V1 MAX: 60.2 CM/SEC
BH CV ECHO MEAS - LV V1 VTI: 10.5 CM
BH CV ECHO MEAS - LVIDD: 4.6 CM
BH CV ECHO MEAS - LVIDS: 3.1 CM
BH CV ECHO MEAS - LVOT AREA: 3 CM2
BH CV ECHO MEAS - LVOT DIAM: 1.96 CM
BH CV ECHO MEAS - LVPWD: 1.01 CM
BH CV ECHO MEAS - MED PEAK E' VEL: 7 CM/SEC
BH CV ECHO MEAS - MR MAX VEL: 477 CM/SEC
BH CV ECHO MEAS - MR VTI: 163 CM
BH CV ECHO MEAS - MV A MAX VEL: 42.1 CM/SEC
BH CV ECHO MEAS - MV DEC SLOPE: 780.7 CM/SEC2
BH CV ECHO MEAS - MV DEC TIME: 0.16 SEC
BH CV ECHO MEAS - MV E MAX VEL: 124.6 CM/SEC
BH CV ECHO MEAS - MV E/A: 3
BH CV ECHO MEAS - MV MAX PG: 6.9 MMHG
BH CV ECHO MEAS - MV MEAN PG: 2.9 MMHG
BH CV ECHO MEAS - MV V2 VTI: 31.2 CM
BH CV ECHO MEAS - MVA(VTI): 1.02 CM2
BH CV ECHO MEAS - PA ACC TIME: 0.07 SEC
BH CV ECHO MEAS - PA V2 MAX: 77 CM/SEC
BH CV ECHO MEAS - PI END-D VEL: 131.9 CM/SEC
BH CV ECHO MEAS - RAP SYSTOLE: 10 MMHG
BH CV ECHO MEAS - RVSP: 44 MMHG
BH CV ECHO MEAS - SI(MOD-SP2): 12.7 ML/M2
BH CV ECHO MEAS - SI(MOD-SP4): 14.4 ML/M2
BH CV ECHO MEAS - SV(LVOT): 31.7 ML
BH CV ECHO MEAS - SV(MOD-SP2): 25.3 ML
BH CV ECHO MEAS - SV(MOD-SP4): 28.6 ML
BH CV ECHO MEAS - TAPSE (>1.6): 1.3 CM
BH CV ECHO MEAS - TR MAX PG: 33.6 MMHG
BH CV ECHO MEAS - TR MAX VEL: 289.7 CM/SEC
BH CV ECHO MEASUREMENTS AVERAGE E/E' RATIO: 15.58
BH CV VAS BP RIGHT ARM: NORMAL MMHG
BH CV XLRA - RV BASE: 3.3 CM
BH CV XLRA - RV LENGTH: 6.4 CM
BH CV XLRA - RV MID: 1.8 CM
BH CV XLRA - TDI S': 8 CM/SEC
BUN SERPL-MCNC: 13 MG/DL (ref 8–23)
BUN/CREAT SERPL: 21.7 (ref 7–25)
CALCIUM SPEC-SCNC: 8.1 MG/DL (ref 8.6–10.5)
CHLORIDE SERPL-SCNC: 93 MMOL/L (ref 98–107)
CO2 SERPL-SCNC: 26 MMOL/L (ref 22–29)
CREAT SERPL-MCNC: 0.6 MG/DL (ref 0.57–1)
CREAT UR-MCNC: 30.7 MG/DL
DEPRECATED RDW RBC AUTO: 45.7 FL (ref 37–54)
EGFRCR SERPLBLD CKD-EPI 2021: 86.5 ML/MIN/1.73
ERYTHROCYTE [DISTWIDTH] IN BLOOD BY AUTOMATED COUNT: 14.4 % (ref 12.3–15.4)
GLUCOSE BLDC GLUCOMTR-MCNC: 144 MG/DL (ref 70–130)
GLUCOSE BLDC GLUCOMTR-MCNC: 188 MG/DL (ref 70–130)
GLUCOSE BLDC GLUCOMTR-MCNC: 202 MG/DL (ref 70–130)
GLUCOSE BLDC GLUCOMTR-MCNC: 334 MG/DL (ref 70–130)
GLUCOSE SERPL-MCNC: 135 MG/DL (ref 65–99)
HCT VFR BLD AUTO: 33 % (ref 34–46.6)
HGB BLD-MCNC: 10.4 G/DL (ref 12–15.9)
IVRT: 59 MS
LEFT ATRIUM VOLUME INDEX: 35 ML/M2
LV EF 2D ECHO EST: 45 %
MCH RBC QN AUTO: 27.5 PG (ref 26.6–33)
MCHC RBC AUTO-ENTMCNC: 31.5 G/DL (ref 31.5–35.7)
MCV RBC AUTO: 87.3 FL (ref 79–97)
MR PISA EROA: 0.4 CM2
MV REGURGITANT VOLUME: 66 CC
PISA ALIASING VEL: 0.3 M/S
PISA RADIUS: 1 CM
PLATELET # BLD AUTO: 263 10*3/MM3 (ref 140–450)
PMV BLD AUTO: 9.8 FL (ref 6–12)
POTASSIUM SERPL-SCNC: 3.9 MMOL/L (ref 3.5–5.2)
RBC # BLD AUTO: 3.78 10*6/MM3 (ref 3.77–5.28)
SODIUM SERPL-SCNC: 130 MMOL/L (ref 136–145)
WBC NRBC COR # BLD AUTO: 8.67 10*3/MM3 (ref 3.4–10.8)

## 2024-04-03 PROCEDURE — 85027 COMPLETE CBC AUTOMATED: CPT | Performed by: INTERNAL MEDICINE

## 2024-04-03 PROCEDURE — 99232 SBSQ HOSP IP/OBS MODERATE 35: CPT | Performed by: FAMILY MEDICINE

## 2024-04-03 PROCEDURE — 63710000001 INSULIN LISPRO (HUMAN) PER 5 UNITS: Performed by: NURSE PRACTITIONER

## 2024-04-03 PROCEDURE — 80048 BASIC METABOLIC PNL TOTAL CA: CPT | Performed by: INTERNAL MEDICINE

## 2024-04-03 PROCEDURE — 82948 REAGENT STRIP/BLOOD GLUCOSE: CPT

## 2024-04-03 PROCEDURE — 25010000002 FUROSEMIDE PER 20 MG: Performed by: INTERNAL MEDICINE

## 2024-04-03 PROCEDURE — 99222 1ST HOSP IP/OBS MODERATE 55: CPT | Performed by: INTERNAL MEDICINE

## 2024-04-03 RX ORDER — ASPIRIN 81 MG/1
81 TABLET ORAL DAILY
COMMUNITY

## 2024-04-03 RX ORDER — SPIRONOLACTONE 25 MG/1
25 TABLET ORAL DAILY
Status: DISCONTINUED | OUTPATIENT
Start: 2024-04-03 | End: 2024-04-04 | Stop reason: HOSPADM

## 2024-04-03 RX ORDER — METOPROLOL TARTRATE 50 MG/1
50 TABLET, FILM COATED ORAL EVERY 12 HOURS SCHEDULED
Status: DISCONTINUED | OUTPATIENT
Start: 2024-04-03 | End: 2024-04-04 | Stop reason: HOSPADM

## 2024-04-03 RX ORDER — FUROSEMIDE 10 MG/ML
40 INJECTION INTRAMUSCULAR; INTRAVENOUS ONCE
Status: COMPLETED | OUTPATIENT
Start: 2024-04-03 | End: 2024-04-03

## 2024-04-03 RX ORDER — ERGOCALCIFEROL 1.25 MG/1
50000 CAPSULE ORAL WEEKLY
COMMUNITY

## 2024-04-03 RX ADMIN — PANTOPRAZOLE SODIUM 40 MG: 40 INJECTION, POWDER, FOR SOLUTION INTRAVENOUS at 09:56

## 2024-04-03 RX ADMIN — FUROSEMIDE 40 MG: 10 INJECTION, SOLUTION INTRAMUSCULAR; INTRAVENOUS at 16:14

## 2024-04-03 RX ADMIN — CITALOPRAM HYDROBROMIDE 40 MG: 20 TABLET ORAL at 09:56

## 2024-04-03 RX ADMIN — METOPROLOL TARTRATE 25 MG: 25 TABLET, FILM COATED ORAL at 09:56

## 2024-04-03 RX ADMIN — INSULIN LISPRO 2 UNITS: 100 INJECTION, SOLUTION INTRAVENOUS; SUBCUTANEOUS at 12:29

## 2024-04-03 RX ADMIN — PANTOPRAZOLE SODIUM 40 MG: 40 INJECTION, POWDER, FOR SOLUTION INTRAVENOUS at 20:09

## 2024-04-03 RX ADMIN — Medication 10 ML: at 20:12

## 2024-04-03 RX ADMIN — ACETAMINOPHEN 650 MG: 325 TABLET ORAL at 16:14

## 2024-04-03 RX ADMIN — ATORVASTATIN CALCIUM 10 MG: 10 TABLET, FILM COATED ORAL at 20:10

## 2024-04-03 RX ADMIN — INSULIN LISPRO 3 UNITS: 100 INJECTION, SOLUTION INTRAVENOUS; SUBCUTANEOUS at 18:45

## 2024-04-03 RX ADMIN — METOPROLOL TARTRATE 50 MG: 50 TABLET, FILM COATED ORAL at 20:09

## 2024-04-03 RX ADMIN — INSULIN LISPRO 5 UNITS: 100 INJECTION, SOLUTION INTRAVENOUS; SUBCUTANEOUS at 20:10

## 2024-04-03 NOTE — CASE MANAGEMENT/SOCIAL WORK
Continued Stay Note  [facility]     Patient Name: Bri Iniguez  MRN: [id number]  Today's Date: 4/3/2024    Admit Date: 4/1/2024    Plan: Home   Discharge Plan       Row Name 04/03/24 1518       Plan    Plan Home    Patient/Family in Agreement with Plan yes    Plan Comments Spoke with patient at bedside. Discussed therapy recommendations of rehab. Patient states that she has no intentions of going to rehab, and that she would not need home health either. Plan remains home at discharge. CM will continue to follow.    Final Discharge Disposition Code 01 - home or self-care                   Discharge Codes    No documentation.                 Expected Discharge Date and Time       Expected Discharge Date Expected Discharge Time    Apr 4, 2024               Won Iniguez RN

## 2024-04-03 NOTE — NURSING NOTE
WOC consult for:    right lower leg   Visited patient who is awake and alert.  Patient states she was receiving wound care at [facility] clinic but this has been a while as wound is healed.  Assessed bilateral lower extremities and noted edema and dry flaky skin indicative of venous stasis dermatitis.  Also noted a little bit more swelling to the right leg but no pain, some dry crusted areas that would benefit from thorough cleansing and lotion..  Denies needs from WOC nurse.

## 2024-04-03 NOTE — PROGRESS NOTES
[facility] Medicine Services  PROGRESS NOTE    Patient Name: Bri Iniguez  : 1935  MRN: [id number]    Date of Admission: 2024  Primary Care Physician: Juan Berg DO    Subjective   Subjective     CC:  soa    HPI:  Patient is an 88-year-old who presented with shortness of breath and was found to be in A-fib with RVR.  Cardiology has been consulted.  Given her acute on chronic diastolic heart failure the plan is continue diuresis for now.  Further attempts at cardioversion are on hold as patient failed cardioversion in the ED but does not wish to pursue any further attempts.  Overall she feels better and denies chest pain.      Objective   Objective     Vital Signs:   Temp:  [96 °F (35.6 °C)-98.8 °F (37.1 °C)] 96 °F (35.6 °C)  Heart Rate:  [] 111  Resp:  [16-20] 18  BP: (103-125)/(67-88) 124/81  Flow (L/min):  [2-21] 2     Physical Exam:  Constitutional: No acute distress, awake, alert  HENT: NCAT, mucous membranes moist  Respiratory: Clear to auscultation bilaterally, respiratory effort normal   Cardiovascular: Irregular rate and rhythm  Gastrointestinal: Positive bowel sounds, soft, nontender, nondistended  Musculoskeletal: No bilateral ankle edema  Psychiatric: Appropriate affect, cooperative  Neurologic: Oriented x 3, strength symmetric in all extremities, Cranial Nerves grossly intact to confrontation, speech clear  Skin: No rashes      Results Reviewed:  LAB RESULTS:      Lab 24  0800 24  0741 24  0326 24  0029 24  1629   WBC 8.67  --  11.00*  --  12.98*   HEMOGLOBIN 10.4* 9.8* 9.8*  --  10.8*   HEMATOCRIT 33.0* 29.6* 29.8*  --  34.3   PLATELETS 263  --  228  --  254   NEUTROS ABS  --   --  6.78  --  9.04*   IMMATURE GRANS (ABS)  --   --  0.19*  --  0.10*   LYMPHS ABS  --   --  2.76  --  2.68   MONOS ABS  --   --  1.11*  --  1.10*   EOS ABS  --   --  0.12  --  0.03   MCV 87.3  --  86.6  --  88.6   PROCALCITONIN  --   --   --  0.10   --    LACTATE  --   --   --  1.5  --    PROTIME  --  25.2*  --   --   --          Lab 04/03/24  0800 04/02/24  1931 04/02/24  1636 04/02/24  1129 04/02/24  0741 04/02/24  0326 04/01/24  1629   SODIUM 130* 131* 125* 127* 132* 131* 123*   POTASSIUM 3.9  --  4.0  --   --  3.2* 3.7   CHLORIDE 93*  --   --   --   --  93* 85*   CO2 26.0  --   --   --   --  25.0 24.0   ANION GAP 11.0  --   --   --   --  13.0 14.0   BUN 13  --   --   --   --  17 14   CREATININE 0.60  --   --   --   --  0.85 1.06*   EGFR 86.5  --   --   --   --  66.0 50.6*   GLUCOSE 135*  --   --   --   --  160* 189*   CALCIUM 8.1*  --   --   --   --  7.9* 8.7   MAGNESIUM  --   --   --   --   --  2.9* 1.9   HEMOGLOBIN A1C  --   --   --   --  7.50*  --   --    TSH  --   --   --   --   --   --  5.040*         Lab 04/01/24  1629   TOTAL PROTEIN 7.4   ALBUMIN 3.5   GLOBULIN 3.9   ALT (SGPT) 20   AST (SGOT) 24   BILIRUBIN 0.8   ALK PHOS 69         Lab 04/02/24  0741 04/02/24  0326 04/02/24  0029 04/01/24  1629   PROBNP  --   --   --  8,091.0*   HSTROP T  --  45* 43* 46*   PROTIME 25.2*  --   --   --    INR 2.26*  --   --   --                  Brief Urine Lab Results  (Last result in the past 365 days)        Color   Clarity   Blood   Leuk Est   Nitrite   Protein   CREAT   Urine HCG        04/02/24 1602             30.7         04/02/24 1602 Yellow   Clear   Negative   Negative   Negative   Negative                   Microbiology Results Abnormal       Procedure Component Value - Date/Time    COVID-19 and FLU A/B PCR, 1 HR TAT - Swab, Nasopharynx [[id number]]  (Normal) Collected: 04/01/24 1626    Lab Status: Final result Specimen: Swab from Nasopharynx Updated: 04/01/24 1700     COVID19 Not Detected     Influenza A PCR Not Detected     Influenza B PCR Not Detected    Narrative:      Fact sheet for providers: https://www.fda.gov/media/552851/download    Fact sheet for patients: https://www.fda.gov/media/614350/download    Test performed by PCR.            Adult  Transthoracic Echo Complete W/ Cont if Necessary Per Protocol    Result Date: 4/3/2024    Left ventricular systolic function is low normal. Estimated left ventricular EF = 45%   Moderate mitral valve regurgitation is present.   Moderate tricuspid valve regurgitation is present.   Estimated right ventricular systolic pressure from tricuspid regurgitation is 44 mmHg.     CT Chest Without Contrast Diagnostic    Result Date: 4/2/2024  CT CHEST WO CONTRAST DIAGNOSTIC Date of Exam: 4/2/2024 3:35 AM EDT Indication: CXR c/w CHF, r/o penumonia, dyspnea/leukocytosis. Comparison: 4/1/2024. Technique: Axial CT images were obtained of the chest without contrast administration.  Reconstructed coronal and sagittal images were also obtained. Automated exposure control and iterative construction methods were used. Findings: Hilum and Mediastinum: No pathologically enlarged lymph nodes. The heart appears enlarged. Trace amount of pericardial fluid present. Atherosclerotic calcifications are present including within the coronary arteries. Moderate size hiatal hernia..   Unremarkable thoracic aorta and pulmonary arteries. Lung Parenchyma and Pleura: Patchy airspace disease is seen within the bilateral lower lobes. Air bronchograms are present. Small bilateral pleural effusions are present. Mild intralobular septal thickening is present.. Upper Abdomen: No acute process. Soft tissues: Unremarkable. Osseous structures: No aggressive focal lytic or sclerotic osseous lesions.     Impression: Impression: 1. Mild pulmonary edema pattern with small bilateral pleural effusions with bibasilar atelectasis and/or pneumonia. 2. Cardiomegaly with trace pericardial effusion. 3. Ancillary findings as described above. Electronically Signed: Tootie Valdez MD  4/2/2024 3:46 AM EDT  Workstation ID: [id number]    XR Chest 1 View    Result Date: 4/1/2024  XR CHEST 1 VW Date of Exam: 4/1/2024 4:27 PM EDT Indication: SOA triage protocol Comparison: Chest  radiograph 1/17/2019 Findings: Mild cardiomegaly. Prominent and slightly indistinct central vasculature worsening from prior exam. There is a small left and suspected trace right pleural effusion with associated bibasilar opacities. No overt interstitial edema. No pneumothorax. No convincing infectious consolidation. Degenerative related osseous changes.     Impression: Impression: Findings of suspected CHF exacerbation including cardiomegaly, trace to small bilateral pleural effusions and pulmonary vascular congestion. Associated bibasilar opacities, presumed atelectasis however correlate for clinical signs of infection. Electronically Signed: Renaldo Silveira MD  4/1/2024 4:45 PM EDT  Workstation ID: [id number]     Results for orders placed during the hospital encounter of 04/01/24    Adult Transthoracic Echo Complete W/ Cont if Necessary Per Protocol    Interpretation Summary    Left ventricular systolic function is low normal. Estimated left ventricular EF = 45%    Moderate mitral valve regurgitation is present.    Moderate tricuspid valve regurgitation is present.    Estimated right ventricular systolic pressure from tricuspid regurgitation is 44 mmHg.      Current medications:  Scheduled Meds:atorvastatin, 10 mg, Oral, Nightly  citalopram, 40 mg, Oral, Daily  furosemide, 40 mg, Intravenous, Once  insulin lispro, 2-7 Units, Subcutaneous, 4x Daily AC & at Bedtime  metoprolol tartrate, 50 mg, Oral, Q12H  pantoprazole, 40 mg, Intravenous, Q12H  pharmacy consult - MTM, , Does not apply, Daily  sodium chloride, 250 mL, Intravenous, Once  sodium chloride, 10 mL, Intravenous, Q12H  spironolactone, 25 mg, Oral, Daily      Continuous Infusions:   PRN Meds:.  acetaminophen    senna-docusate sodium **AND** polyethylene glycol **AND** bisacodyl **AND** bisacodyl    dextrose    dextrose    glucagon (human recombinant)    melatonin    ondansetron ODT **OR** ondansetron    Potassium Replacement - Follow Nurse / BPA Driven  Protocol    sodium chloride    sodium chloride    sodium chloride    Assessment & Plan   Assessment & Plan     Active Hospital Problems    Diagnosis  POA    **Acute on chronic diastolic congestive heart failure [I50.33]  Yes    Nonobstructive CAD [I25.10]  Yes    Possible GI bleed [K92.2]  Yes    Paroxysmal atrial fibrillation [I48.0]  Yes    T2DM (type 2 diabetes mellitus) [E11.9]  Yes    Hyponatremia [E87.1]  Yes    Leukocytosis [D72.829]  Yes    GERD without esophagitis [K21.9]  Yes    Anxiety associated with depression [F41.8]  Yes    Elevated troponin level not due myocardial infarction [R79.89]  Yes    Hyperlipidemia [E78.5]  Yes    Essential hypertension [I10]  Yes      Resolved Hospital Problems   No resolved problems to display.        Brief Hospital Course to date:  Bri Iniguez is a 88 y.o. female admitted with A-fib with RVR and acute on chronic diastolic heart failure with exacerbation.    A-fib with RVR  Chronic diastolic congestive heart failure with acute exacerbation  Elevated troponin  -Failed cardioversion in the ED  -Cardiology consulted and managing  -Continue diuresis per cardiology  -Elevated troponins flat  -Continue metoprolol  -Plan to resume Eliquis on 4/4/2024  -Spironolactone added by cardiology      Expected Discharge Location and Transportation: Home  Expected Discharge   Expected Discharge Date: 4/4/2024; Expected Discharge Time:      DVT prophylaxis:  No DVT prophylaxis order currently exists.         AM-PAC 6 Clicks Score (PT): 12 (04/02/24 2000)    CODE STATUS:   Code Status and Medical Interventions:   Ordered at: 04/01/24 2044     Medical Intervention Limits:    NO intubation (DNI)     Level Of Support Discussed With:    Patient     Code Status (Patient has no pulse and is not breathing):    No CPR (Do Not Attempt to Resuscitate)     Medical Interventions (Patient has pulse or is breathing):    Limited Support     Comments:    DNR/DNI       Gissell Warner MD  04/03/24

## 2024-04-03 NOTE — PROGRESS NOTES
"  [facility] at [facility]   Inpatient Progress Note       LOS: 1 day   Patient Care Team:  Juan Berg DO as PCP - General (Family Medicine)  Pavan rBonson MD as Consulting Physician (Cardiology)    Chief Complaint: Heart failure    Subjective     Interval History:   Patient in bed, feels improved from a dyspnea standpoint.  No further edema.  Does not feel her heart racing.      Review of Systems:   Pertinent positives noted in history, exam, and assessment. Otherwise reviewed and negative.      Objective     Vitals:  Blood pressure 124/81, pulse 111, temperature 96 °F (35.6 °C), temperature source Oral, resp. rate 18, height 167 cm (65.75\"), weight 90 kg (198 lb 6.6 oz), SpO2 92%, not currently breastfeeding.     Intake/Output Summary (Last 24 hours) at 4/3/2024 1347  Last data filed at 4/3/2024 0700  Gross per 24 hour   Intake --   Output 400 ml   Net -400 ml     Vitals reviewed.   Constitutional:       Appearance: Well-developed and not in distress.   Neck:      Thyroid: No thyromegaly.      Vascular: No carotid bruit or JVD.   Pulmonary:      Breath sounds: Normal breath sounds.   Cardiovascular:      Irregularly irregular rhythm.      Murmurs: There is a grade 2/6 systolic murmur.      No gallop. No S3 and S4 gallop.   Pulses:     Intact distal pulses.      Carotid: 2+ bilaterally.     Radial: 2+ bilaterally.  Edema:     Peripheral edema absent.   Abdominal:      General: Bowel sounds are normal.      Palpations: Abdomen is soft. There is no abdominal mass.      Tenderness: There is no abdominal tenderness.   Musculoskeletal:         General: No deformity.      Extremities: No clubbing present.Skin:     General: Skin is warm and dry.      Findings: No rash.   Neurological:      Mental Status: Alert and oriented to person, place, and time.            Results Review:     I reviewed the patient's new clinical results.    Results from last 7 days   Lab Units 04/03/24  0800   WBC 10*3/mm3 8.67 "   HEMOGLOBIN g/dL 10.4*   HEMATOCRIT % 33.0*   PLATELETS 10*3/mm3 263     Results from last 7 days   Lab Units 04/03/24  0800 04/02/24  0326 04/01/24  1629   SODIUM mmol/L 130*   < > 123*   POTASSIUM mmol/L 3.9   < > 3.7   CHLORIDE mmol/L 93*   < > 85*   CO2 mmol/L 26.0   < > 24.0   BUN mg/dL 13   < > 14   CREATININE mg/dL 0.60   < > 1.06*   CALCIUM mg/dL 8.1*   < > 8.7   BILIRUBIN mg/dL  --   --  0.8   ALK PHOS U/L  --   --  69   ALT (SGPT) U/L  --   --  20   AST (SGOT) U/L  --   --  24   GLUCOSE mg/dL 135*   < > 189*    < > = values in this interval not displayed.     Results from last 7 days   Lab Units 04/03/24  0800   SODIUM mmol/L 130*   POTASSIUM mmol/L 3.9   CHLORIDE mmol/L 93*   CO2 mmol/L 26.0   BUN mg/dL 13   CREATININE mg/dL 0.60   GLUCOSE mg/dL 135*   CALCIUM mg/dL 8.1*     Results from last 7 days   Lab Units 04/02/24  0741   INR  2.26*     Lab Results   Lab Value Date/Time    TROPONINT 45 (H) 04/02/2024 0326    TROPONINT 43 (H) 04/02/2024 0029    TROPONINT 46 (H) 04/01/2024 1629     Results from last 7 days   Lab Units 04/01/24  1629   TSH uIU/mL 5.040*   FREE T4 ng/dL 1.39         Results from last 7 days   Lab Units 04/01/24  1629   PROBNP pg/mL 8,091.0*     Results from last 7 days   Lab Units 04/02/24  0326 04/02/24  0029 04/01/24  1629   HSTROP T ng/L 45* 43* 46*         Tele: Atrial fibrillation, ventricular rates 95-1 10    Assessment:      Acute on chronic diastolic congestive heart failure    Hyperlipidemia    Essential hypertension    Paroxysmal atrial fibrillation    T2DM (type 2 diabetes mellitus)    Hyponatremia    Leukocytosis    GERD without esophagitis    Elevated troponin level not due myocardial infarction    Anxiety associated with depression    Nonobstructive CAD    Possible GI bleed      1.  Acute on chronic diastolic heart failure.  HFpEF. approaching euvolemia.  2.  Atrial fibrillation with RVR.  Improved with diuresis.  3.  Elevated troponin, flat, chronic finding and not an  MI.  4.  Hematemesis possible GI bleed, Eliquis on hold for now.    Plan:  1.  More diuresis IV today.  2.  Increase metoprolol doses for rate control  3.  Patient does not wish aggressive therapy therefore would not attempt to reach cardiovert.  Will simply maintain with rate control.  4.  Resume Eliquis tomorrow  5.  And spironolactone  6.  Hopefully home next 24 to 48 hours pending heart rate.    Pavan Bronson MD    Dictated utilizing Dragon dictation

## 2024-04-03 NOTE — CONSULTS
Clinical Nutrition   Nutrition Support Assessment  Reason for Visit: Identified at risk by screening criteria, MST score 2+    Patient Name: Bri Iniguez  YOB: 1935  MRN: [id number]  Date of Encounter: 04/02/24 22:39 EDT  Admission date: 4/1/2024    Comments: Does not meet criteria for malnutrition dx at this time.   Pt allows limited p.o tolerance, allows food is fine. Declines suppl.       Nutrition Assessment   Admission Diagnosis:  A-fib [I48.91]      Problem List:    Acute on chronic diastolic congestive heart failure    Hyperlipidemia    Essential hypertension    Paroxysmal atrial fibrillation    T2DM (type 2 diabetes mellitus)    Hyponatremia    Leukocytosis    GERD without esophagitis    Elevated troponin level not due myocardial infarction    Anxiety associated with depression    Nonobstructive CAD    Possible GI bleed        PMH:   She  has a past medical history of A-fib, Acute blood loss anemia, mild, asymptomatic (01/24/2018), Bursitis, Closed fracture of right olecranon process (10/26/2021), Colonoscopy refused (2015), Constipation, Deaf, left, Deafness, Diabetes mellitus, Diabetes type 2, controlled (08/18/2016), Edema leg (01/20/2021), Elevated cholesterol, Essential hypertension, GERD (gastroesophageal reflux disease), Hearing loss, right, Hip fracture (12/30/2019), Hip pain, History of UTI, Hyperlipidemia, Impaired functional mobility, balance, gait, and endurance, Influenza vaccine administered (2014), Macular degeneration, Mammogram declined (2015), Nonobstructive CAD (4/2/2024), NSTEMI, initial episode of care (04/25/2023), Olecranon fracture, left, closed, initial encounter (10/26/2021), Osteoarthritis of left hip, Postoperative urinary retention (10/05/2020), Primary osteoarthritis of left hip (02/15/2017), Primary osteoarthritis of right knee, 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    PSH:  She  has a past surgical history that includes Cholecystectomy (2002); Tonsillectomy; Hysterectomy (1974); Colonoscopy; Hernia repair (2005); Total hip arthroplasty (Left, 01/23/2018); Bunionectomy (Right, 01/21/2019); Hip Intertrochanteric Nailing (Right, 12/31/2019); Cataract extraction (Bilateral); Total hip arthroplasty (Right, 10/02/2020); Hardware Removal (Right, 10/02/2020); and Cardiac catheterization (N/A, 04/25/2023).    Applicable Nutrition Concerns:   Skin:  Oral:  GI:    Applicable Interval History:   4/2) Declines EGD    Reported/Observed/Food/Nutrition Related History:     4/2  Pt allows limited p.o tolerance, allows food is fine. Declines suppl.     Anthropometrics     Height: Height: 167 cm (65.75\")  Last Filed Weight: Weight: 90 kg (198 lb 6.6 oz) (04/02/24 1626)  Method: Weight Method: Stated  BMI: BMI (Calculated): 32.3    UBW:  Per EMR wt of 195 lbs in 2021 and standing wt of 196 lbs on 4/25/23 - lower wts intervening.  Weight change:   not confirmed at this time.     Nutrition Focused Physical Exam     Date:  4/2       Pt does not meet criteria for malnutrition diagnosis, at this time.     Subcutaneous fat:  Orbital No fat loss identified   Triceps/Biceps No fat loss identified   Thoracic and lumbar region- ribs lower back, midaxillary line Mild     Muscle:  Temple/temporalis Mild   Clavicle- pectoralis, deltoid, trapezius Mild   Clavicle and acromion process  No muscle loss identified   Scapular bone region Mild   Dorsal hand Mild   Patellar  Unable to determine at this time   Quadriceps Unable to determine at this time   Calf Unable to determine at this time     Current Nutrition Prescription   PO: Diet: Gastrointestinal; Fiber-Restricted; Fluid Consistency: Thin (IDDSI 0)  Oral Nutrition Supplement:   Intake: Insufficient data      Nutrition Diagnosis   Date:  4/2            Updated:    Problem Predicted suboptimal energy " intake   Etiology Alt GI Fx   Signs/Symptoms Rpt limited po tolerance/declines suppl   Status:       Goal:   Nutrition to support treatment and Establish PO      Nutrition Intervention      Follow treatment progress, Care plan reviewed, Advise alternate selection, Menu provided, Encourage intake, Supplement offered/refused      Monitoring/Evaluation:   Per protocol, I&O, PO intake, Pertinent labs, Weight, GI status, Symptoms      Greta Black RD  Time Spent: 30 min

## 2024-04-04 ENCOUNTER — READMISSION MANAGEMENT (OUTPATIENT)
Dept: CALL CENTER | Facility: HOSPITAL | Age: 89
End: 2024-04-04
Payer: MEDICARE

## 2024-04-04 VITALS
BODY MASS INDEX: 31.89 KG/M2 | WEIGHT: 198.41 LBS | HEART RATE: 96 BPM | RESPIRATION RATE: 16 BRPM | OXYGEN SATURATION: 91 % | HEIGHT: 66 IN | SYSTOLIC BLOOD PRESSURE: 115 MMHG | DIASTOLIC BLOOD PRESSURE: 63 MMHG | TEMPERATURE: 96.3 F

## 2024-04-04 PROBLEM — I50.21 ACUTE HFREF (HEART FAILURE WITH REDUCED EJECTION FRACTION): Status: ACTIVE | Noted: 2024-04-04

## 2024-04-04 LAB
ANION GAP SERPL CALCULATED.3IONS-SCNC: 8 MMOL/L (ref 5–15)
BASOPHILS # BLD AUTO: 0.02 10*3/MM3 (ref 0–0.2)
BASOPHILS NFR BLD AUTO: 0.2 % (ref 0–1.5)
BUN SERPL-MCNC: 13 MG/DL (ref 8–23)
BUN/CREAT SERPL: 18.3 (ref 7–25)
CALCIUM SPEC-SCNC: 8.4 MG/DL (ref 8.6–10.5)
CHLORIDE SERPL-SCNC: 93 MMOL/L (ref 98–107)
CO2 SERPL-SCNC: 29 MMOL/L (ref 22–29)
CREAT SERPL-MCNC: 0.71 MG/DL (ref 0.57–1)
DEPRECATED RDW RBC AUTO: 46.5 FL (ref 37–54)
EGFRCR SERPLBLD CKD-EPI 2021: 81.9 ML/MIN/1.73
EOSINOPHIL # BLD AUTO: 0.27 10*3/MM3 (ref 0–0.4)
EOSINOPHIL NFR BLD AUTO: 3.1 % (ref 0.3–6.2)
ERYTHROCYTE [DISTWIDTH] IN BLOOD BY AUTOMATED COUNT: 14.5 % (ref 12.3–15.4)
GLUCOSE BLDC GLUCOMTR-MCNC: 167 MG/DL (ref 70–130)
GLUCOSE BLDC GLUCOMTR-MCNC: 330 MG/DL (ref 70–130)
GLUCOSE SERPL-MCNC: 179 MG/DL (ref 65–99)
HCT VFR BLD AUTO: 33.4 % (ref 34–46.6)
HGB BLD-MCNC: 10.6 G/DL (ref 12–15.9)
IMM GRANULOCYTES # BLD AUTO: 0.07 10*3/MM3 (ref 0–0.05)
IMM GRANULOCYTES NFR BLD AUTO: 0.8 % (ref 0–0.5)
LYMPHOCYTES # BLD AUTO: 2.66 10*3/MM3 (ref 0.7–3.1)
LYMPHOCYTES NFR BLD AUTO: 30.2 % (ref 19.6–45.3)
MCH RBC QN AUTO: 28.3 PG (ref 26.6–33)
MCHC RBC AUTO-ENTMCNC: 31.7 G/DL (ref 31.5–35.7)
MCV RBC AUTO: 89.1 FL (ref 79–97)
MONOCYTES # BLD AUTO: 0.91 10*3/MM3 (ref 0.1–0.9)
MONOCYTES NFR BLD AUTO: 10.3 % (ref 5–12)
NEUTROPHILS NFR BLD AUTO: 4.88 10*3/MM3 (ref 1.7–7)
NEUTROPHILS NFR BLD AUTO: 55.4 % (ref 42.7–76)
NRBC BLD AUTO-RTO: 0 /100 WBC (ref 0–0.2)
PLATELET # BLD AUTO: 306 10*3/MM3 (ref 140–450)
PMV BLD AUTO: 9.9 FL (ref 6–12)
POTASSIUM SERPL-SCNC: 4 MMOL/L (ref 3.5–5.2)
RBC # BLD AUTO: 3.75 10*6/MM3 (ref 3.77–5.28)
SODIUM SERPL-SCNC: 130 MMOL/L (ref 136–145)
WBC NRBC COR # BLD AUTO: 8.81 10*3/MM3 (ref 3.4–10.8)

## 2024-04-04 PROCEDURE — 82948 REAGENT STRIP/BLOOD GLUCOSE: CPT

## 2024-04-04 PROCEDURE — 99239 HOSP IP/OBS DSCHRG MGMT >30: CPT | Performed by: FAMILY MEDICINE

## 2024-04-04 PROCEDURE — 85025 COMPLETE CBC W/AUTO DIFF WBC: CPT | Performed by: FAMILY MEDICINE

## 2024-04-04 PROCEDURE — 99232 SBSQ HOSP IP/OBS MODERATE 35: CPT | Performed by: INTERNAL MEDICINE

## 2024-04-04 PROCEDURE — 63710000001 INSULIN LISPRO (HUMAN) PER 5 UNITS: Performed by: NURSE PRACTITIONER

## 2024-04-04 PROCEDURE — 80048 BASIC METABOLIC PNL TOTAL CA: CPT | Performed by: FAMILY MEDICINE

## 2024-04-04 RX ORDER — SPIRONOLACTONE 25 MG/1
25 TABLET ORAL DAILY
Qty: 90 TABLET | Refills: 11 | Status: SHIPPED | OUTPATIENT
Start: 2024-04-05 | End: 2024-04-04

## 2024-04-04 RX ORDER — FUROSEMIDE 80 MG
80 TABLET ORAL DAILY
Qty: 30 TABLET | Refills: 11 | Status: SHIPPED | OUTPATIENT
Start: 2024-04-04 | End: 2024-04-04

## 2024-04-04 RX ORDER — SPIRONOLACTONE 25 MG/1
25 TABLET ORAL DAILY
Qty: 90 TABLET | Refills: 11 | Status: SHIPPED | OUTPATIENT
Start: 2024-04-05

## 2024-04-04 RX ORDER — METOPROLOL TARTRATE 50 MG/1
50 TABLET, FILM COATED ORAL EVERY 12 HOURS SCHEDULED
Qty: 60 TABLET | Refills: 12 | Status: SHIPPED | OUTPATIENT
Start: 2024-04-04 | End: 2024-04-04

## 2024-04-04 RX ORDER — FUROSEMIDE 80 MG
80 TABLET ORAL DAILY
Qty: 30 TABLET | Refills: 11 | Status: SHIPPED | OUTPATIENT
Start: 2024-04-04

## 2024-04-04 RX ORDER — METOPROLOL TARTRATE 50 MG/1
50 TABLET, FILM COATED ORAL EVERY 12 HOURS SCHEDULED
Qty: 60 TABLET | Refills: 12 | Status: SHIPPED | OUTPATIENT
Start: 2024-04-04

## 2024-04-04 RX ADMIN — Medication 10 ML: at 09:37

## 2024-04-04 RX ADMIN — CITALOPRAM HYDROBROMIDE 40 MG: 20 TABLET ORAL at 09:36

## 2024-04-04 RX ADMIN — METOPROLOL TARTRATE 50 MG: 50 TABLET, FILM COATED ORAL at 09:36

## 2024-04-04 RX ADMIN — APIXABAN 5 MG: 5 TABLET, FILM COATED ORAL at 12:41

## 2024-04-04 RX ADMIN — INSULIN LISPRO 5 UNITS: 100 INJECTION, SOLUTION INTRAVENOUS; SUBCUTANEOUS at 12:41

## 2024-04-04 RX ADMIN — PANTOPRAZOLE SODIUM 40 MG: 40 INJECTION, POWDER, FOR SOLUTION INTRAVENOUS at 09:36

## 2024-04-04 RX ADMIN — INSULIN LISPRO 2 UNITS: 100 INJECTION, SOLUTION INTRAVENOUS; SUBCUTANEOUS at 09:36

## 2024-04-04 RX ADMIN — SPIRONOLACTONE 25 MG: 25 TABLET ORAL at 09:36

## 2024-04-04 NOTE — DISCHARGE SUMMARY
"    [facility] Medicine Services  DISCHARGE SUMMARY    Patient Name: Bri Iniguez  : 1935  MRN: [id number]    Date of Admission: 2024  5:17 PM  Date of Discharge:  24    Primary Care Physician: Juan Berg DO    Consults       Date and Time Order Name Status Description    2024  6:16 AM Inpatient Gastroenterology Consult Completed     2024 12:39 AM Inpatient Cardiology Consult Completed             Hospital Course     Presenting Problem: Heart failure    Active Hospital Problems    Diagnosis  POA    **Acute on chronic diastolic congestive heart failure [I50.33]  Yes    Acute HFrEF (heart failure with reduced ejection fraction) [I50.21]  Yes    Nonobstructive CAD [I25.10]  Yes    Possible GI bleed [K92.2]  Yes    Paroxysmal atrial fibrillation [I48.0]  Yes    T2DM (type 2 diabetes mellitus) [E11.9]  Yes    Hyponatremia [E87.1]  Yes    Leukocytosis [D72.829]  Yes    GERD without esophagitis [K21.9]  Yes    Anxiety associated with depression [F41.8]  Yes    Elevated troponin level not due myocardial infarction [R79.89]  Yes    Hyperlipidemia [E78.5]  Yes    Essential hypertension [I10]  Yes      Resolved Hospital Problems   No resolved problems to display.          Hospital Course:  Bri Iniguez is a 88 y.o. female admitted with A-fib with RVR and acute on chronic diastolic heart failure with exacerbation.     A-fib with RVR  Chronic diastolic congestive heart failure with acute exacerbation  Elevated troponin  -Failed cardioversion in the ED  -Cardiology consulted  -diuresed per cardiology  -Elevated troponins flat  -Continue metoprolol  -Plan to resume Eliquis on 2024  -Spironolactone added by cardiology    Per cardiology note: \"1.  Acute on chronic diastolic heart failure.  HFpEF. approaching euvolemia.  2.  Atrial fibrillation with RVR.  Improved with diuresis.  3.  Elevated troponin, flat, chronic finding and not an MI.  4.  Hematemesis possible GI bleed, " "Eliquis on hold for now.     Plan:  Okay to discharge home from a cardiovascular standpoint.  Heart rate overall is well-controlled at rest, patient is essentially wheelchair-bound at home.  Discharge medications addressed in ADT  Discharge on higher dose metoprolol for rate control for A-fib.  Eliquis will be resumed today.  Discussed with patient if she develops any dyspnea, or any further blood loss to turn to the emergency room.  Follow-up with cardiology in 3 weeks time.  Will need BMP through primary physician 1 week postdischarge.\"      Discharge Follow Up Recommendations for outpatient labs/diagnostics:  PCP in 1 week to discuss meds and check BMP  Cardiology in 3 weeks    Day of Discharge     HPI:   Patient feeling much better and request to be discharged home today.  She denies chest pain or shortness of breath.  She is in a wheelchair at baseline.    Review of Systems  Denies chest pain nausea vomiting shortness of breath.    Vital Signs:   Temp:  [96 °F (35.6 °C)-98.5 °F (36.9 °C)] 96.3 °F (35.7 °C)  Heart Rate:  [] 97  Resp:  [16-20] 16  BP: ()/(52-91) 115/67      Physical Exam:  Constitutional: No acute distress, awake, alert  HENT: NCAT, mucous membranes moist  Respiratory: Clear to auscultation bilaterally, respiratory effort normal   Cardiovascular: RRR  Gastrointestinal: Positive bowel sounds, soft, nontender, nondistended  Musculoskeletal: No bilateral ankle edema  Psychiatric: Appropriate affect, cooperative  Neurologic: Oriented x 3, generally weak, lying in bed, Cranial Nerves grossly intact to confrontation, speech clear  Skin: No rashes      Pertinent  and/or Most Recent Results     LAB RESULTS:      Lab 04/04/24  0518 04/03/24  0800 04/02/24  0741 04/02/24  0326 04/02/24  0029 04/01/24  1629   WBC 8.81 8.67  --  11.00*  --  12.98*   HEMOGLOBIN 10.6* 10.4* 9.8* 9.8*  --  10.8*   HEMATOCRIT 33.4* 33.0* 29.6* 29.8*  --  34.3   PLATELETS 306 263  --  228  --  254   NEUTROS ABS 4.88  " --   --  6.78  --  9.04*   IMMATURE GRANS (ABS) 0.07*  --   --  0.19*  --  0.10*   LYMPHS ABS 2.66  --   --  2.76  --  2.68   MONOS ABS 0.91*  --   --  1.11*  --  1.10*   EOS ABS 0.27  --   --  0.12  --  0.03   MCV 89.1 87.3  --  86.6  --  88.6   PROCALCITONIN  --   --   --   --  0.10  --    LACTATE  --   --   --   --  1.5  --    PROTIME  --   --  25.2*  --   --   --          Lab 04/04/24  0518 04/03/24  0800 04/02/24  1931 04/02/24  1636 04/02/24  1129 04/02/24  0741 04/02/24  0326 04/01/24  1629   SODIUM 130* 130* 131* 125* 127* 132* 131* 123*   POTASSIUM 4.0 3.9  --  4.0  --   --  3.2* 3.7   CHLORIDE 93* 93*  --   --   --   --  93* 85*   CO2 29.0 26.0  --   --   --   --  25.0 24.0   ANION GAP 8.0 11.0  --   --   --   --  13.0 14.0   BUN 13 13  --   --   --   --  17 14   CREATININE 0.71 0.60  --   --   --   --  0.85 1.06*   EGFR 81.9 86.5  --   --   --   --  66.0 50.6*   GLUCOSE 179* 135*  --   --   --   --  160* 189*   CALCIUM 8.4* 8.1*  --   --   --   --  7.9* 8.7   MAGNESIUM  --   --   --   --   --   --  2.9* 1.9   HEMOGLOBIN A1C  --   --   --   --   --  7.50*  --   --    TSH  --   --   --   --   --   --   --  5.040*         Lab 04/01/24  1629   TOTAL PROTEIN 7.4   ALBUMIN 3.5   GLOBULIN 3.9   ALT (SGPT) 20   AST (SGOT) 24   BILIRUBIN 0.8   ALK PHOS 69         Lab 04/02/24  0741 04/02/24  0326 04/02/24  0029 04/01/24  1629   PROBNP  --   --   --  8,091.0*   HSTROP T  --  45* 43* 46*   PROTIME 25.2*  --   --   --    INR 2.26*  --   --   --                  Brief Urine Lab Results  (Last result in the past 365 days)        Color   Clarity   Blood   Leuk Est   Nitrite   Protein   CREAT   Urine HCG        04/02/24 1602             30.7         04/02/24 1602 Yellow   Clear   Negative   Negative   Negative   Negative                 Microbiology Results (last 10 days)       Procedure Component Value - Date/Time    COVID-19 and FLU A/B PCR, 1 HR TAT - Swab, Nasopharynx [[id number]]  (Normal) Collected: 04/01/24 0119     Lab Status: Final result Specimen: Swab from Nasopharynx Updated: 04/01/24 1700     COVID19 Not Detected     Influenza A PCR Not Detected     Influenza B PCR Not Detected    Narrative:      Fact sheet for providers: https://www.fda.gov/media/594088/download    Fact sheet for patients: https://www.fda.gov/media/344107/download    Test performed by PCR.            Adult Transthoracic Echo Complete W/ Cont if Necessary Per Protocol    Result Date: 4/3/2024    Left ventricular systolic function is low normal. Estimated left ventricular EF = 45%   Moderate mitral valve regurgitation is present.   Moderate tricuspid valve regurgitation is present.   Estimated right ventricular systolic pressure from tricuspid regurgitation is 44 mmHg.     CT Chest Without Contrast Diagnostic    Result Date: 4/2/2024  CT CHEST WO CONTRAST DIAGNOSTIC Date of Exam: 4/2/2024 3:35 AM EDT Indication: CXR c/w CHF, r/o penumonia, dyspnea/leukocytosis. Comparison: 4/1/2024. Technique: Axial CT images were obtained of the chest without contrast administration.  Reconstructed coronal and sagittal images were also obtained. Automated exposure control and iterative construction methods were used. Findings: Hilum and Mediastinum: No pathologically enlarged lymph nodes. The heart appears enlarged. Trace amount of pericardial fluid present. Atherosclerotic calcifications are present including within the coronary arteries. Moderate size hiatal hernia..   Unremarkable thoracic aorta and pulmonary arteries. Lung Parenchyma and Pleura: Patchy airspace disease is seen within the bilateral lower lobes. Air bronchograms are present. Small bilateral pleural effusions are present. Mild intralobular septal thickening is present.. Upper Abdomen: No acute process. Soft tissues: Unremarkable. Osseous structures: No aggressive focal lytic or sclerotic osseous lesions.     Impression: 1. Mild pulmonary edema pattern with small bilateral pleural effusions with bibasilar  atelectasis and/or pneumonia. 2. Cardiomegaly with trace pericardial effusion. 3. Ancillary findings as described above. Electronically Signed: Tootie Valdez MD  4/2/2024 3:46 AM EDT  Workstation ID: [id number]    XR Chest 1 View    Result Date: 4/1/2024  XR CHEST 1 VW Date of Exam: 4/1/2024 4:27 PM EDT Indication: SOA triage protocol Comparison: Chest radiograph 1/17/2019 Findings: Mild cardiomegaly. Prominent and slightly indistinct central vasculature worsening from prior exam. There is a small left and suspected trace right pleural effusion with associated bibasilar opacities. No overt interstitial edema. No pneumothorax. No convincing infectious consolidation. Degenerative related osseous changes.     Impression: Findings of suspected CHF exacerbation including cardiomegaly, trace to small bilateral pleural effusions and pulmonary vascular congestion. Associated bibasilar opacities, presumed atelectasis however correlate for clinical signs of infection. Electronically Signed: Renaldo Silveira MD  4/1/2024 4:45 PM EDT  Workstation ID: [id number]             Results for orders placed during the hospital encounter of 04/01/24    Adult Transthoracic Echo Complete W/ Cont if Necessary Per Protocol    Interpretation Summary    Left ventricular systolic function is low normal. Estimated left ventricular EF = 45%    Moderate mitral valve regurgitation is present.    Moderate tricuspid valve regurgitation is present.    Estimated right ventricular systolic pressure from tricuspid regurgitation is 44 mmHg.      Plan for Follow-up of Pending Labs/Results: No pending labs    Discharge Details        Discharge Medications        New Medications        Instructions Start Date   spironolactone 25 MG tablet  Commonly known as: ALDACTONE   25 mg, Oral, Daily   Start Date: April 5, 2024            Changes to Medications        Instructions Start Date   furosemide 80 MG tablet  Commonly known as: LASIX  What changed:   medication  strength  how much to take   80 mg, Oral, Daily      metoprolol tartrate 50 MG tablet  Commonly known as: LOPRESSOR  What changed:   medication strength  how much to take  how to take this  when to take this   50 mg, Oral, Every 12 Hours Scheduled             Continue These Medications        Instructions Start Date   aspirin 81 MG EC tablet   81 mg, Oral, Daily      atorvastatin 10 MG tablet  Commonly known as: LIPITOR   10 mg, Oral, Nightly      citalopram 40 MG tablet  Commonly known as: CeleXA   40 mg, Oral, Daily      Eliquis 5 MG tablet tablet  Generic drug: apixaban   TAKE ONE (1) TABLET BY MOUTH TWO TIMES DAILY      OCUVITE ADULT 50+ PO   1 tablet, Oral, Daily      SITagliptin 100 MG tablet  Commonly known as: JANUVIA   100 mg, Oral, Daily      vitamin D 1.25 MG (89156 UT) capsule capsule  Commonly known as: ERGOCALCIFEROL   50,000 Units, Oral, Weekly               No Known Allergies      Discharge Disposition:  Home or Self Care    Diet:  Hospital:  Diet Order   Procedures    Diet: Gastrointestinal; Fiber-Restricted; Fluid Consistency: Thin (IDDSI 0)       Diet Instructions       Diet: Regular/House Diet; Regular (IDDSI 7); Thin (IDDSI 0)      Discharge Diet: Regular/House Diet    Texture: Regular (IDDSI 7)    Fluid Consistency: Thin (IDDSI 0)             Activity:  Activity Instructions       Activity as Tolerated              Restrictions or Other Recommendations:  No restrictions       CODE STATUS:    Code Status and Medical Interventions:   Ordered at: 04/01/24 2044     Medical Intervention Limits:    NO intubation (DNI)     Level Of Support Discussed With:    Patient     Code Status (Patient has no pulse and is not breathing):    No CPR (Do Not Attempt to Resuscitate)     Medical Interventions (Patient has pulse or is breathing):    Limited Support     Comments:    DNR/DNI       No future appointments.    Additional Instructions for the Follow-ups that You Need to Schedule       Discharge Follow-up  with PCP   As directed       Currently Documented PCP:    Juan Berg DO    PCP Phone Number:    [phone]     Follow Up Details: 1 week check bmp review med list        Discharge Follow-up with Specialty: Aiyana/clevaleryer; 3 Weeks   As directed      Specialty: Aiyana/clevinger   Follow Up: 3 Weeks        Discharge Follow-up with Specified Provider: jose; 2 Weeks   As directed      To: jose   Follow Up: 2 Weeks                      Gissell Warner MD  04/04/24      Time Spent on Discharge:  I spent  32  minutes on this discharge activity which included: face-to-face encounter with the patient, reviewing the data in the system, coordination of the care with the nursing staff as well as consultants, documentation, and entering orders.

## 2024-04-04 NOTE — CASE MANAGEMENT/SOCIAL WORK
Case Management Discharge Note      Final Note: Spoke with patient at bedside, who states her plans are to discharge home today, 4/4. Voiced no needs or concerns. Daughter will transport home.         Selected Continued Care - Admitted Since 4/1/2024       Destination    No services have been selected for the patient.                Durable Medical Equipment    No services have been selected for the patient.                Dialysis/Infusion    No services have been selected for the patient.                Home Medical Care    No services have been selected for the patient.                Therapy    No services have been selected for the patient.                Community Resources    No services have been selected for the patient.                Community & DME    No services have been selected for the patient.                         Final Discharge Disposition Code: 01 - home or self-care

## 2024-04-04 NOTE — PROGRESS NOTES
"  [facility]   Inpatient Progress Note       LOS: 2 days   Patient Care Team:  Juan Berg DO as PCP - General (Family Medicine)  Pavan Bronson MD as Consulting Physician (Cardiology)    Chief Complaint: Heart failure    Subjective     Interval History:   Patient sitting in bed, feels good.  Off oxygen.  O2 sats 91%.  Wants to go home.      Review of Systems:   Pertinent positives noted in history, exam, and assessment. Otherwise reviewed and negative.      Objective     Vitals:  Blood pressure 115/67, pulse 97, temperature 97.8 °F (36.6 °C), temperature source Oral, resp. rate 16, height 167 cm (65.75\"), weight 90 kg (198 lb 6.6 oz), SpO2 90%, not currently breastfeeding.     Intake/Output Summary (Last 24 hours) at 4/4/2024 0980  Last data filed at 4/4/2024 0858  Gross per 24 hour   Intake 900 ml   Output 1075 ml   Net -175 ml     Vitals reviewed.   Constitutional:       Appearance: Well-developed and not in distress.   Neck:      Thyroid: No thyromegaly.      Vascular: No carotid bruit or JVD.   Pulmonary:      Breath sounds: Normal breath sounds.   Cardiovascular:      Irregularly irregular rhythm.      No gallop. No S3 and S4 gallop.   Pulses:     Intact distal pulses.      Carotid: 2+ bilaterally.     Radial: 2+ bilaterally.  Edema:     Peripheral edema absent.   Abdominal:      General: Bowel sounds are normal.      Palpations: Abdomen is soft. There is no abdominal mass.      Tenderness: There is no abdominal tenderness.   Musculoskeletal:         General: No deformity.      Extremities: No clubbing present.Skin:     General: Skin is warm and dry.      Findings: No rash.   Neurological:      Mental Status: Alert and oriented to person, place, and time.            Results Review:     I reviewed the patient's new clinical results.    Results from last 7 days   Lab Units 04/04/24  0518   WBC 10*3/mm3 8.81   HEMOGLOBIN g/dL 10.6*   HEMATOCRIT % 33.4*   PLATELETS 10*3/mm3 306 "     Results from last 7 days   Lab Units 04/04/24  0518 04/02/24  0326 04/01/24  1629   SODIUM mmol/L 130*   < > 123*   POTASSIUM mmol/L 4.0   < > 3.7   CHLORIDE mmol/L 93*   < > 85*   CO2 mmol/L 29.0   < > 24.0   BUN mg/dL 13   < > 14   CREATININE mg/dL 0.71   < > 1.06*   CALCIUM mg/dL 8.4*   < > 8.7   BILIRUBIN mg/dL  --   --  0.8   ALK PHOS U/L  --   --  69   ALT (SGPT) U/L  --   --  20   AST (SGOT) U/L  --   --  24   GLUCOSE mg/dL 179*   < > 189*    < > = values in this interval not displayed.     Results from last 7 days   Lab Units 04/04/24  0518   SODIUM mmol/L 130*   POTASSIUM mmol/L 4.0   CHLORIDE mmol/L 93*   CO2 mmol/L 29.0   BUN mg/dL 13   CREATININE mg/dL 0.71   GLUCOSE mg/dL 179*   CALCIUM mg/dL 8.4*     Results from last 7 days   Lab Units 04/02/24  0741   INR  2.26*     Lab Results   Lab Value Date/Time    TROPONINT 45 (H) 04/02/2024 0326    TROPONINT 43 (H) 04/02/2024 0029    TROPONINT 46 (H) 04/01/2024 1629     Results from last 7 days   Lab Units 04/01/24  1629   TSH uIU/mL 5.040*   FREE T4 ng/dL 1.39         Results from last 7 days   Lab Units 04/01/24  1629   PROBNP pg/mL 8,091.0*     Results from last 7 days   Lab Units 04/02/24  0326 04/02/24  0029 04/01/24  1629   HSTROP T ng/L 45* 43* 46*         Tele: Atrial fibrillation, ventricular rates 95-1 10    Assessment:      Acute on chronic diastolic congestive heart failure    Hyperlipidemia    Essential hypertension    Paroxysmal atrial fibrillation    T2DM (type 2 diabetes mellitus)    Hyponatremia    Leukocytosis    GERD without esophagitis    Elevated troponin level not due myocardial infarction    Anxiety associated with depression    Nonobstructive CAD    Possible GI bleed        1.  Acute on chronic diastolic heart failure.  HFpEF. approaching euvolemia.  2.  Atrial fibrillation with RVR.  Improved with diuresis.  3.  Elevated troponin, flat, chronic finding and not an MI.  4.  Hematemesis possible GI bleed, Eliquis on hold for  now.    Plan:  Okay to discharge home from a cardiovascular standpoint.  Heart rate overall is well-controlled at rest, patient is essentially wheelchair-bound at home.  Discharge medications addressed in ADT  Discharge on higher dose metoprolol for rate control for A-fib.  Eliquis will be resumed today.  Discussed with patient if she develops any dyspnea, or any further blood loss to turn to the emergency room.  Follow-up with cardiology in 3 weeks time.  Will need BMP through primary physician 1 week postdischarge.    HAWA Marie    I have seen and examined the patient, I have reviewed the note, discussed the case with the advance practice clinician, made necessary changes and I agree with the final note.    Pavan Bronson MD  04/04/24  10:27 EDT      Dictated utilizing Dragon dictation

## 2024-04-05 NOTE — OUTREACH NOTE
Prep Survey      Flowsheet Row Responses   [facility] facility patient discharged from? [place]   Is LACE score < 7 ? No   Eligibility Readm Mgmt   Discharge diagnosis Acute on chronic diastolic congestive heart failure   Does the patient have one of the following disease processes/diagnoses(primary or secondary)? CHF   Does the patient have Home health ordered? No   Is there a DME ordered? No   Prep survey completed? Yes            KATE MOREJON - Registered Nurse

## 2024-04-08 ENCOUNTER — READMISSION MANAGEMENT (OUTPATIENT)
Dept: CALL CENTER | Facility: HOSPITAL | Age: 89
End: 2024-04-08
Payer: MEDICARE

## 2024-04-08 NOTE — OUTREACH NOTE
CHF Week 1 Survey      Flowsheet Row Responses   [facility] [place] patient discharged from? [place]   Does the patient have one of the following disease processes/diagnoses(primary or secondary)? CHF   CHF Week 1 attempt successful? Yes   Call start time 1042   Call end time 1047   Discharge diagnosis Acute on chronic diastolic congestive heart failure   Meds reviewed with patient/caregiver? Yes   Is the patient having any side effects they believe may be caused by any medication additions or changes? No   Does the patient have all medications ordered at discharge? Yes   Is the patient taking all medications as directed (includes completed medication regime)? Yes   Does the patient have a primary care provider?  Yes   Does the patient have an appointment with their PCP within 7 days of discharge? No   What is preventing the patient from scheduling follow up appointments within 7 days of discharge? Haven't had time   Nursing Interventions Advised patient to make appointment   Pulse Ox monitoring None   Psychosocial issues? No   Comments Pt reports that she is improved and the extra fluid retention is resolving. Pt reports that she is urinating frequently. Pt is unable to stand to weigh r/t using wheelchair, she reports.Pt reports that she does try to follow a low NA diet.   Did the patient receive a copy of their discharge instructions? Yes   Nursing interventions Reviewed instructions with patient   What is the patient's perception of their health status since discharge? Returned to baseline/stable   Nursing interventions Nurse provided patient education   If the patient is a current smoker, are they able to teach back resources for cessation? Not a smoker   Is the patient/caregiver able to teach back the hierarchy of who to call/visit for symptoms/problems? PCP, Specialist, Home health nurse, Urgent Care, ED, 911 Yes   CHF Nursing Interventions Education provided on various zones   CHF Zone this Call Green Zone    Green Zone Patient reports doing well, No chest pain   Green Zone Interventions Meds as directed, Low sodium diet, Follow up visits planned    CHF Week 1 call completed? Yes   Revoked No further contact(revokes)-requires comment   Call end time [phone]            Brigid DIETZ - Registered Nurse

## 2024-05-06 ENCOUNTER — OFFICE VISIT (OUTPATIENT)
Dept: CARDIOLOGY | Facility: CLINIC | Age: 89
End: 2024-05-06
Payer: MEDICARE

## 2024-05-06 VITALS
SYSTOLIC BLOOD PRESSURE: 124 MMHG | HEIGHT: 66 IN | DIASTOLIC BLOOD PRESSURE: 56 MMHG | BODY MASS INDEX: 31.82 KG/M2 | HEART RATE: 98 BPM | WEIGHT: 198 LBS | OXYGEN SATURATION: 100 %

## 2024-05-06 DIAGNOSIS — I48.19 PERSISTENT ATRIAL FIBRILLATION: Primary | ICD-10-CM

## 2024-05-06 DIAGNOSIS — I50.22 HEART FAILURE WITH MILDLY REDUCED EJECTION FRACTION (HFMREF): ICD-10-CM

## 2024-05-06 RX ORDER — DAPAGLIFLOZIN 10 MG/1
10 TABLET, FILM COATED ORAL DAILY
COMMUNITY

## (undated) DEVICE — PK EXTRM LOWR 20

## (undated) DEVICE — SUT VIC 0 CT 36IN J958H

## (undated) DEVICE — Device

## (undated) DEVICE — GLV SURG PREMIERPRO MIC LTX PF SZ8.5 BRN

## (undated) DEVICE — PK CATH CARD 10

## (undated) DEVICE — SPNG GZ WOVN 4X4IN 12PLY 10/BX STRL

## (undated) DEVICE — ANTIBACTERIAL UNDYED BRAIDED (POLYGLACTIN 910), SYNTHETIC ABSORBABLE SUTURE: Brand: COATED VICRYL

## (undated) DEVICE — GOWN,SIRUS,NON REINFRCD XL XLONG: Brand: MEDLINE

## (undated) DEVICE — PROXIMATE SKIN STAPLERS (35 WIDE) CONTAINS 35 STAINLESS STEEL STAPLES (FIXED HEAD): Brand: PROXIMATE

## (undated) DEVICE — SYS SKIN CLS DERMABOND PRINEO W/22CM MESH TP

## (undated) DEVICE — CATH DIAG EXPO .056 FL3.5 6F 100CM

## (undated) DEVICE — COVER,MAYO STAND,XL,STERILE: Brand: MEDLINE

## (undated) DEVICE — BIT DRL 3FLUT QC CALIB 4.2X330X100MM

## (undated) DEVICE — ADULT, W/LG. BACK PAD, RADIOTRANSPARENT ELEMENT AND LEAD WIRE: Brand: DEFIBRILLATION ELECTRODES

## (undated) DEVICE — 3M™ MEDIPORE™ H SOFT CLOTH SURGICAL TAPE, 2863, 3 IN X 10 YD, 12/CASE: Brand: 3M™ MEDIPORE™

## (undated) DEVICE — DISPOSABLE TOURNIQUET CUFF SINGLE BLADDER, SINGLE PORT AND QUICK CONNECT CONNECTOR: Brand: COLOR CUFF

## (undated) DEVICE — SOL LR 1000ML

## (undated) DEVICE — CVR HNDL LIGHT RIGID

## (undated) DEVICE — OCCLUSIVE GAUZE STRIP,3% BISMUTH TRIBROMOPHENATE IN PETROLATUM BLEND: Brand: XEROFORM

## (undated) DEVICE — SENSR O2 OXIMAX FNGR A/ 18IN NONSTR

## (undated) DEVICE — STRYKER PERFORMANCE SERIES SAGITTAL BLADE: Brand: STRYKER PERFORMANCE SERIES

## (undated) DEVICE — GLV SURG SENSICARE MICRO PF LF 9 STRL

## (undated) DEVICE — K-WIRE, SINGLE ENDED TROCAR TIP, SMOOTH, 1.1MM X 150 MM
Type: IMPLANTABLE DEVICE | Site: FOOT | Status: NON-FUNCTIONAL
Brand: MONSTER SCREW SYSTEM
Removed: 2019-01-21

## (undated) DEVICE — GLV SURG BIOGEL PI ULTRATOUCH G SZ7.5 LF

## (undated) DEVICE — DRSNG GZ PETROLTM XEROFORM CURAD 1X8IN STRL

## (undated) DEVICE — NDL ANGIOGR ADV THN SMOTH SGLWALL 21G 1.5

## (undated) DEVICE — MODEL BT2000 P/N 700287-012KIT CONTENTS: MANIFOLD WITH SALINE AND CONTRAST PORTS, SALINE TUBING WITH SPIKE AND HAND SYRINGE, TRANSDUCER: Brand: BT2000 AUTOMATED MANIFOLD KIT

## (undated) DEVICE — MODEL AT P65, P/N 701554-001KIT CONTENTS: HAND CONTROLLER, 3-WAY HIGH-PRESSURE STOPCOCK WITH ROTATING END AND PREMIUM HIGH-PRESSURE TUBING: Brand: ANGIOTOUCH® KIT

## (undated) DEVICE — PRECISION THIN (5.5 X 0.38 X 18.0MM)

## (undated) DEVICE — ST IRR CYSTO W/SPK 77IN LF

## (undated) DEVICE — LIMB HLDR QUILTED QR

## (undated) DEVICE — CVR HNDL LT SURG ACCSSRY BLU STRL

## (undated) DEVICE — CATH DIAG EXPO M/ PK 6FR FL4/FR4 PIG 3PK

## (undated) DEVICE — GW PERIPH GUIDERIGHT STD/EXCHNG/J/TIP SS 0.035IN 5X260CM

## (undated) DEVICE — PK HIP TOTL UNIV 10

## (undated) DEVICE — NDL HYPO ECLPS SFTY 18G 1 1/2IN

## (undated) DEVICE — 3M™ STERI-DRAPE™ U-DRAPE 1015: Brand: STERI-DRAPE™

## (undated) DEVICE — CUFF SCD HEMOFORCE SEQ CALF STD MD

## (undated) DEVICE — SUT ETHLN 3/0 FS1 663G

## (undated) DEVICE — DEV COMP RAD PRELUDESYNC 24CM

## (undated) DEVICE — GLIDESHEATH BASIC HYDROPHILIC COATED INTRODUCER SHEATH: Brand: GLIDESHEATH

## (undated) DEVICE — BLD SCLPL BEAVR MINI STR 2BVL 180D LF

## (undated) DEVICE — GW FOR TROCH NAIL 3.2X400MM

## (undated) DEVICE — 1000 S-DRAPE TOWEL DRAPE 10/BX: Brand: STERI-DRAPE™

## (undated) DEVICE — DRSNG WND GZ CURAD OIL EMULSION 3X3IN STRL

## (undated) DEVICE — GLV SURG SENSICARE W/ALOE PF LF 9 STRL

## (undated) DEVICE — THIN OFFSET (9.0 X 0.38 X 25.0MM)

## (undated) DEVICE — SYR LUERLOK 20CC BX/50

## (undated) DEVICE — 4-PORT MANIFOLD: Brand: NEPTUNE 2

## (undated) DEVICE — ST INF PRI SMRTSTE 20DRP 2VLV 24ML 117

## (undated) DEVICE — 6619 2 PTNT ISO SYS INCISE AREA&LT;(&GT;&&LT;)&GT;P: Brand: STERI-DRAPE™ IOBAN™ 2

## (undated) DEVICE — FLEXIBLE YANKAUER,MEDIUM TIP, NO VACUUM CONTROL: Brand: ARGYLE

## (undated) DEVICE — BNDG ELAS ELITE V/CLOSE 4IN 5YD LF STRL

## (undated) DEVICE — CLAVICLE STRAP: Brand: DEROYAL

## (undated) DEVICE — BNDG ELAS MATRX V/CLS 4IN 5YD LF

## (undated) DEVICE — TRY EPID SFTY 18G 3.5IN 1T7680

## (undated) DEVICE — BNDG ESMARK 4IN 9FT LF STRL BLU

## (undated) DEVICE — KT DEL CMT TRAUMA SYR 4X1ML 2X2ML

## (undated) DEVICE — TB SXN FRAZIER 12F STRL

## (undated) DEVICE — CANN NASL CO2 DIVIDED A/

## (undated) DEVICE — BANDAGE,GAUZE,BULKEE II,4.5"X4.1YD,STRL: Brand: MEDLINE

## (undated) DEVICE — GLV SURG SENSICARE GREEN W/ALOE PF LF 8 STRL

## (undated) DEVICE — SUT VIC 2/0 CT1 27IN J259H

## (undated) DEVICE — ENCORE® LATEX MICRO SIZE 8.5, STERILE LATEX POWDER-FREE SURGICAL GLOVE: Brand: ENCORE

## (undated) DEVICE — SUT VIC 2/0 SH 27IN

## (undated) DEVICE — SYR LL TP 10ML STRL

## (undated) DEVICE — SMARTGOWN SURGICAL GOWN, 2XL: Brand: CONVERTORS

## (undated) DEVICE — SUT VIC 3/0 SH 27IN J416H

## (undated) DEVICE — HDRST POSTIN FM CRDL TRACH SLOT 9X8X4IN

## (undated) DEVICE — ADAPT ST INFUS ADMIN SYR 70IN

## (undated) DEVICE — 2963 MEDIPORE SOFT CLOTH TAPE 3 IN X 10 YD 12 RLS/CS: Brand: 3M™ MEDIPORE™

## (undated) DEVICE — SUT MNCRYL 3/0 SH 27 IN Y416H

## (undated) DEVICE — DRILL,  2.1 X 120MM, CANNULATED, AO: Brand: MONSTER SCREW SYSTEM

## (undated) DEVICE — RICH MAJOR PROCEDURE: Brand: MEDLINE INDUSTRIES, INC.